# Patient Record
Sex: FEMALE | Race: WHITE | Employment: OTHER | URBAN - METROPOLITAN AREA
[De-identification: names, ages, dates, MRNs, and addresses within clinical notes are randomized per-mention and may not be internally consistent; named-entity substitution may affect disease eponyms.]

---

## 2019-06-19 ENCOUNTER — HOSPITAL ENCOUNTER (EMERGENCY)
Age: 59
Discharge: HOME | End: 2019-06-19
Attending: EMERGENCY MEDICINE
Payer: MEDICAID

## 2019-06-19 ENCOUNTER — APPOINTMENT (OUTPATIENT)
Dept: GENERAL RADIOLOGY | Age: 59
End: 2019-06-19
Payer: MEDICAID

## 2019-06-19 VITALS
BODY MASS INDEX: 38.53 KG/M2 | HEIGHT: 64 IN | RESPIRATION RATE: 17 BRPM | SYSTOLIC BLOOD PRESSURE: 147 MMHG | OXYGEN SATURATION: 96 % | HEART RATE: 65 BPM | DIASTOLIC BLOOD PRESSURE: 88 MMHG | WEIGHT: 225.7 LBS | TEMPERATURE: 97.6 F

## 2019-06-19 DIAGNOSIS — R03.0 ELEVATED BLOOD PRESSURE READING: ICD-10-CM

## 2019-06-19 DIAGNOSIS — S60.212A CONTUSION OF LEFT WRIST, INITIAL ENCOUNTER: Primary | ICD-10-CM

## 2019-06-19 PROCEDURE — 99283 EMERGENCY DEPT VISIT LOW MDM: CPT

## 2019-06-19 PROCEDURE — 73110 X-RAY EXAM OF WRIST: CPT

## 2019-06-19 ASSESSMENT — PAIN DESCRIPTION - ORIENTATION: ORIENTATION: LEFT

## 2019-06-19 ASSESSMENT — PAIN DESCRIPTION - PAIN TYPE: TYPE: ACUTE PAIN

## 2019-06-19 ASSESSMENT — PAIN DESCRIPTION - LOCATION: LOCATION: WRIST

## 2019-06-19 ASSESSMENT — PAIN SCALES - GENERAL: PAINLEVEL_OUTOF10: 4

## 2019-06-19 NOTE — ED NOTES
Pt states understanding of d/c instructions and medications. Pt alert and oriented with steady gait upon discharge.       Paulie Campbell RN  06/19/19 2894

## 2019-06-19 NOTE — ED PROVIDER NOTES
TRIAGE CHIEF COMPLAINT:   Chief Complaint   Patient presents with    Wrist Pain         HPI: Goldie Weir is a 60 y.o. female who presents to the Emergency Department with complaint of Left wrist pain and swelling and bruising. The patient states she hit it a week ago when she fell but is still having pain and swelling. She points to the left wrist.  Denies numbness or weakness. She has no elbow or shoulder pain. Denies any other injury or pain. REVIEW OF SYSTEMS:  6 systems reviewed. Pertinent positives per HPI. Otherwise noted to be negative. Nursing notes reviewed and agree with above. Past medical/surgical history reviewed. MEDICATIONS   Patient's Medications   New Prescriptions    No medications on file   Previous Medications    ALBUTEROL (PROVENTIL HFA;VENTOLIN HFA) 108 (90 BASE) MCG/ACT INHALER    Inhale 2 puffs into the lungs every 6 hours as needed for Wheezing    ASCORBIC ACID (VITAMIN C) 500 MG TABLET    Take 500 mg by mouth daily    ASPIRIN 81 MG CHEWABLE TABLET    Take 81 mg by mouth daily    BUPROPION (WELLBUTRIN XL) 300 MG XL TABLET    Take 300 mg by mouth every morning    BUSPIRONE (BUSPAR) 10 MG TABLET    Take 10 mg by mouth 3 times daily    CINNAMON PO    Take by mouth    CLONAZEPAM (KLONOPIN) 1 MG TABLET    Take 1 mg by mouth 2 times daily as needed for Anxiety    DICLOFENAC (VOLTAREN) 75 MG EC TABLET    Take 75 mg by mouth 2 times daily    DIVALPROEX (DEPAKOTE ER) 500 MG ER TABLET    Take 500 mg by mouth daily    FLAXSEED, LINSEED, (FLAXSEED OIL) 1000 MG CAPS    Take by mouth    IBUPROFEN (ADVIL;MOTRIN) 600 MG TABLET    Take 600 mg by mouth every 6 hours as needed for Pain    LEVOTHYROXINE (SYNTHROID) 100 MCG TABLET    Take 100 mcg by mouth Daily    METOLAZONE (ZAROXOLYN) 2.5 MG TABLET    Take 2.5 mg by mouth Indications:  Take on Wednesday and Sundays only 30 minutes before torsemide    METOPROLOL (LOPRESSOR) 25 MG TABLET    Take 25 mg by mouth 2 times daily    MONTELUKAST (SINGULAIR) 10 MG TABLET    Take 10 mg by mouth nightly    MULTIPLE VITAMINS-MINERALS (THERAPEUTIC MULTIVITAMIN-MINERALS) TABLET    Take 1 tablet by mouth daily    MUPIROCIN (BACTROBAN) 2 % OINTMENT    Apply topically 3 times daily Apply topically 3 times daily. NICOTINE POLACRILEX (COMMIT) 4 MG LOZENGE    Take 4 mg by mouth as needed for Smoking cessation    NONFORMULARY        OMEGA-3 FATTY ACIDS (FISH OIL) 1000 MG CPDR    Take 1,000 mg by mouth 2 times daily    OMEPRAZOLE (PRILOSEC) 40 MG CAPSULE    Take 40 mg by mouth 2 times daily    PALIPERIDONE (INVEGA) 6 MG ER TABLET    Take 6 mg by mouth every morning    POLYETHYLENE GLYCOL (GLYCOLAX) PACKET    Take 17 g by mouth daily as needed    POTASSIUM CHLORIDE (KAYCIEL) 20 MEQ/15ML (10%) SOLUTION    Take by mouth daily    RANITIDINE (ZANTAC) 150 MG TABLET    Take 150 mg by mouth 2 times daily    SIMVASTATIN (ZOCOR) 40 MG TABLET    Take 40 mg by mouth nightly    SPIRONOLACTONE (ALDACTONE) 25 MG TABLET    Take 25 mg by mouth daily    TEMAZEPAM (RESTORIL) 30 MG CAPSULE    Take 30 mg by mouth nightly as needed for Sleep    TORSEMIDE (DEMADEX) 100 MG TABLET    Take 20 mg by mouth 2 times daily    VITAMIN D (CHOLECALCIFEROL) 5000 UNITS CAPS CAPSULE    Take 5,000 Units by mouth 2 times daily    VITAMIN E 1000 UNITS CAPSULE    Take 1,000 Units by mouth daily   Modified Medications    No medications on file   Discontinued Medications    No medications on file         ALLERGIES   Allergies   Allergen Reactions    Aleve [Naproxen]     Tums [Calcium Carbonate Antacid]          BP (!) 147/88   Pulse 65   Temp 97.6 °F (36.4 °C) (Oral)   Resp 17   Ht 5' 4\" (1.626 m)   Wt 102.4 kg (225 lb 11.2 oz)   SpO2 96%   BMI 38.74 kg/m²   General:  No acute distress. Non toxic appearance  Head:   Normocephalic and atraumatic  Eyes:   Conjunctiva clear, VIVIANA, EOM's intact. Sclera anicteric. ENT:   Mucous membranes moist  Neck:   Supple.  No adenopathy or jugular venous

## 2020-01-10 ENCOUNTER — HOSPITAL ENCOUNTER (EMERGENCY)
Age: 60
Discharge: HOME | End: 2020-01-10
Attending: EMERGENCY MEDICINE
Payer: MEDICAID

## 2020-01-10 ENCOUNTER — APPOINTMENT (OUTPATIENT)
Dept: GENERAL RADIOLOGY | Age: 60
End: 2020-01-10
Payer: MEDICAID

## 2020-01-10 VITALS
RESPIRATION RATE: 17 BRPM | HEART RATE: 79 BPM | SYSTOLIC BLOOD PRESSURE: 124 MMHG | DIASTOLIC BLOOD PRESSURE: 83 MMHG | OXYGEN SATURATION: 99 % | TEMPERATURE: 98 F

## 2020-01-10 LAB
A/G RATIO: 1.1 (ref 1.1–2.2)
ALBUMIN SERPL-MCNC: 4 G/DL (ref 3.4–5)
ALP BLD-CCNC: 251 U/L (ref 40–129)
ALT SERPL-CCNC: 159 U/L (ref 10–40)
ANION GAP SERPL CALCULATED.3IONS-SCNC: 13 MMOL/L (ref 3–16)
APTT: 26.7 SEC (ref 24.2–36.2)
AST SERPL-CCNC: 93 U/L (ref 15–37)
BASOPHILS ABSOLUTE: 0.1 K/UL (ref 0–0.2)
BASOPHILS RELATIVE PERCENT: 0.8 %
BILIRUB SERPL-MCNC: 0.3 MG/DL (ref 0–1)
BUN BLDV-MCNC: 22 MG/DL (ref 7–20)
CALCIUM SERPL-MCNC: 10.3 MG/DL (ref 8.3–10.6)
CHLORIDE BLD-SCNC: 95 MMOL/L (ref 99–110)
CO2: 31 MMOL/L (ref 21–32)
CREAT SERPL-MCNC: 0.9 MG/DL (ref 0.6–1.2)
D DIMER: 480 NG/ML DDU (ref 0–229)
EOSINOPHILS ABSOLUTE: 0.1 K/UL (ref 0–0.6)
EOSINOPHILS RELATIVE PERCENT: 1.3 %
GFR AFRICAN AMERICAN: >60
GFR NON-AFRICAN AMERICAN: >60
GLOBULIN: 3.8 G/DL
GLUCOSE BLD-MCNC: 101 MG/DL (ref 70–99)
HCT VFR BLD CALC: 38.1 % (ref 36–48)
HEMOGLOBIN: 12.8 G/DL (ref 12–16)
INR BLD: 1.23 (ref 0.86–1.14)
LYMPHOCYTES ABSOLUTE: 1.7 K/UL (ref 1–5.1)
LYMPHOCYTES RELATIVE PERCENT: 20.4 %
MCH RBC QN AUTO: 27.7 PG (ref 26–34)
MCHC RBC AUTO-ENTMCNC: 33.6 G/DL (ref 31–36)
MCV RBC AUTO: 82.5 FL (ref 80–100)
MONOCYTES ABSOLUTE: 1 K/UL (ref 0–1.3)
MONOCYTES RELATIVE PERCENT: 11.6 %
NEUTROPHILS ABSOLUTE: 5.5 K/UL (ref 1.7–7.7)
NEUTROPHILS RELATIVE PERCENT: 65.9 %
PDW BLD-RTO: 13.8 % (ref 12.4–15.4)
PLATELET # BLD: 322 K/UL (ref 135–450)
PMV BLD AUTO: 7.6 FL (ref 5–10.5)
POTASSIUM REFLEX MAGNESIUM: 3.7 MMOL/L (ref 3.5–5.1)
PROTHROMBIN TIME: 14.3 SEC (ref 10–13.2)
RBC # BLD: 4.63 M/UL (ref 4–5.2)
SODIUM BLD-SCNC: 139 MMOL/L (ref 136–145)
TOTAL PROTEIN: 7.8 G/DL (ref 6.4–8.2)
URIC ACID, SERUM: 9.6 MG/DL (ref 2.6–6)
WBC # BLD: 8.3 K/UL (ref 4–11)

## 2020-01-10 PROCEDURE — 6370000000 HC RX 637 (ALT 250 FOR IP): Performed by: EMERGENCY MEDICINE

## 2020-01-10 PROCEDURE — 84550 ASSAY OF BLOOD/URIC ACID: CPT

## 2020-01-10 PROCEDURE — 85025 COMPLETE CBC W/AUTO DIFF WBC: CPT

## 2020-01-10 PROCEDURE — 85379 FIBRIN DEGRADATION QUANT: CPT

## 2020-01-10 PROCEDURE — 85610 PROTHROMBIN TIME: CPT

## 2020-01-10 PROCEDURE — 85730 THROMBOPLASTIN TIME PARTIAL: CPT

## 2020-01-10 PROCEDURE — 73110 X-RAY EXAM OF WRIST: CPT

## 2020-01-10 PROCEDURE — 80053 COMPREHEN METABOLIC PANEL: CPT

## 2020-01-10 PROCEDURE — 99283 EMERGENCY DEPT VISIT LOW MDM: CPT

## 2020-01-10 RX ORDER — PREDNISONE 20 MG/1
20 TABLET ORAL 2 TIMES DAILY
Qty: 8 TABLET | Refills: 0 | Status: SHIPPED | OUTPATIENT
Start: 2020-01-10 | End: 2020-01-14

## 2020-01-10 RX ORDER — ASPIRIN 81 MG/1
TABLET, COATED ORAL
Status: ON HOLD | COMMUNITY
Start: 2019-12-19 | End: 2021-03-16 | Stop reason: HOSPADM

## 2020-01-10 RX ORDER — HYDROXYZINE HYDROCHLORIDE 10 MG/1
TABLET, FILM COATED ORAL
Status: ON HOLD | COMMUNITY
Start: 2019-12-03 | End: 2021-03-14 | Stop reason: CLARIF

## 2020-01-10 RX ORDER — TRAMADOL HYDROCHLORIDE 50 MG/1
50 TABLET ORAL EVERY 6 HOURS PRN
COMMUNITY
Start: 2019-12-30

## 2020-01-10 RX ORDER — POTASSIUM BICARBONATE 25 MEQ/1
25 TABLET, EFFERVESCENT ORAL 2 TIMES DAILY
Status: ON HOLD | COMMUNITY
Start: 2019-08-09 | End: 2021-03-28 | Stop reason: HOSPADM

## 2020-01-10 RX ORDER — NITROGLYCERIN 0.4 MG/1
0.4 TABLET SUBLINGUAL
COMMUNITY
Start: 2018-08-16

## 2020-01-10 RX ORDER — CITALOPRAM 40 MG/1
40 TABLET ORAL
Status: ON HOLD | COMMUNITY
Start: 2020-01-03 | End: 2021-03-14 | Stop reason: CLARIF

## 2020-01-10 RX ORDER — PREDNISONE 20 MG/1
60 TABLET ORAL ONCE
Status: COMPLETED | OUTPATIENT
Start: 2020-01-10 | End: 2020-01-10

## 2020-01-10 RX ORDER — PRAZOSIN HYDROCHLORIDE 2 MG/1
2 CAPSULE ORAL NIGHTLY
Status: ON HOLD | COMMUNITY
Start: 2019-12-19 | End: 2021-03-16 | Stop reason: HOSPADM

## 2020-01-10 RX ORDER — CHLORHEXIDINE GLUCONATE 0.12 MG/ML
15 RINSE ORAL
Status: ON HOLD | COMMUNITY
Start: 2017-07-14 | End: 2021-03-14 | Stop reason: CLARIF

## 2020-01-10 RX ORDER — LOPERAMIDE HYDROCHLORIDE 2 MG/1
CAPSULE ORAL
COMMUNITY
Start: 2019-04-11

## 2020-01-10 RX ORDER — BECLOMETHASONE DIPROPIONATE HFA 80 UG/1
AEROSOL, METERED RESPIRATORY (INHALATION)
Status: ON HOLD | COMMUNITY
Start: 2019-12-19 | End: 2021-03-14 | Stop reason: CLARIF

## 2020-01-10 RX ORDER — GABAPENTIN 300 MG/1
CAPSULE ORAL
COMMUNITY
Start: 2019-07-05

## 2020-01-10 RX ORDER — ACETAMINOPHEN 500 MG
1000 TABLET ORAL EVERY 6 HOURS PRN
Qty: 30 TABLET | Refills: 1 | Status: SHIPPED | OUTPATIENT
Start: 2020-01-10 | End: 2020-01-10

## 2020-01-10 RX ORDER — HYDROXYZINE 50 MG/1
TABLET, FILM COATED ORAL
Status: ON HOLD | COMMUNITY
Start: 2019-12-31 | End: 2021-03-14 | Stop reason: CLARIF

## 2020-01-10 RX ADMIN — PREDNISONE 60 MG: 20 TABLET ORAL at 14:36

## 2020-01-10 ASSESSMENT — PAIN DESCRIPTION - FREQUENCY: FREQUENCY: CONTINUOUS

## 2020-01-10 ASSESSMENT — PAIN DESCRIPTION - ORIENTATION: ORIENTATION: LEFT

## 2020-01-10 ASSESSMENT — PAIN DESCRIPTION - LOCATION: LOCATION: ARM

## 2020-01-10 ASSESSMENT — PAIN SCALES - GENERAL: PAINLEVEL_OUTOF10: 10

## 2020-01-10 ASSESSMENT — PAIN DESCRIPTION - DESCRIPTORS: DESCRIPTORS: SHARP

## 2020-01-10 ASSESSMENT — PAIN DESCRIPTION - PAIN TYPE: TYPE: ACUTE PAIN

## 2020-01-10 NOTE — ED PROVIDER NOTES
TRIAGE CHIEF COMPLAINT:   Chief Complaint   Patient presents with    Arm Pain         HPI: Liseth Chand is a 60 y.o. female who presents to the Emergency Department with complaint of left arm pain mostly in the left wrist and lower arm for the past 1 to 2 weeks. The pain radiates from the wrist up to the elbow. Denies any recent fall or specific injury but she does use a walker and thought she might be putting too much pressure on her left arm when she uses the walker. She denies any shoulder pain. Denies any recent chest pain although does take occasionally for chest pain. Denies shortness of breath. No fever or chills. She has no cough or URI symptoms. Patient has a history of some arthritis of the left wrist and history of old scaphoid fracture of the left wrist.  This or weakness. The patient does take Eliquis but does not know why she takes this medication. It was started 1 or 2 months ago by [facility]. She states she had a DVT in 1 of her legs many years ago as a teenager. She has a history of heart failure with decreased ejection fraction and previous history of lung cancer, left upper lobe. REVIEW OF SYSTEMS:  6 systems reviewed. Pertinent positives per HPI. Otherwise noted to be negative. Nursing notes reviewed and agree with above. Past medical/surgical history reviewed.     MEDICATIONS   Patient's Medications   New Prescriptions    No medications on file   Previous Medications    ALBUTEROL (PROVENTIL HFA;VENTOLIN HFA) 108 (90 BASE) MCG/ACT INHALER    Inhale 2 puffs into the lungs every 6 hours as needed for Wheezing    APIXABAN (ELIQUIS) 5 MG TABS TABLET    Take 5 mg by mouth    ASCORBIC ACID (VITAMIN C) 500 MG TABLET    Take 500 mg by mouth daily    ASPIRIN 81 MG CHEWABLE TABLET    Take 81 mg by mouth daily    ASPIRIN LOW DOSE 81 MG EC TABLET        BUPROPION (WELLBUTRIN XL) 300 MG XL TABLET    Take 300 mg by mouth every morning    BUSPIRONE (BUSPAR) 10 MG TABLET    Take 10 mg by mouth 3 times daily    CHLORHEXIDINE (PERIDEX) 0.12 % SOLUTION    Take 15 mLs by mouth    CINNAMON PO    Take by mouth    CITALOPRAM (CELEXA) 40 MG TABLET    Take 40 mg by mouth    CLONAZEPAM (KLONOPIN) 1 MG TABLET    Take 1 mg by mouth 2 times daily as needed for Anxiety    DICLOFENAC (VOLTAREN) 75 MG EC TABLET    Take 75 mg by mouth 2 times daily    DIVALPROEX (DEPAKOTE ER) 500 MG ER TABLET    Take 500 mg by mouth daily    FLAXSEED, LINSEED, (FLAXSEED OIL) 1000 MG CAPS    Take by mouth    GABAPENTIN (NEURONTIN) 300 MG CAPSULE    TAKE ONE CAPSULE BY MOUTH THREE TIMES A DAY    HYDROXYZINE (ATARAX) 10 MG TABLET        HYDROXYZINE (ATARAX) 50 MG TABLET        IBUPROFEN (ADVIL;MOTRIN) 600 MG TABLET    Take 600 mg by mouth every 6 hours as needed for Pain    LEVOTHYROXINE (SYNTHROID) 100 MCG TABLET    Take 100 mcg by mouth Daily    LOPERAMIDE (IMODIUM) 2 MG CAPSULE    TAKE ONE CAPSULE BY MOUTH THREE TIMES A DAY AS NEEDED FOR DIARRHEA    METOLAZONE (ZAROXOLYN) 2.5 MG TABLET    Take 2.5 mg by mouth Indications: Take on Wednesday and Sundays only 30 minutes before torsemide    METOPROLOL (LOPRESSOR) 25 MG TABLET    Take 25 mg by mouth 2 times daily    MONTELUKAST (SINGULAIR) 10 MG TABLET    Take 10 mg by mouth nightly    MULTIPLE VITAMINS-MINERALS (THERAPEUTIC MULTIVITAMIN-MINERALS) TABLET    Take 1 tablet by mouth daily    MUPIROCIN (BACTROBAN) 2 % OINTMENT    Apply topically 3 times daily Apply topically 3 times daily.     NICOTINE POLACRILEX (COMMIT) 4 MG LOZENGE    Take 4 mg by mouth as needed for Smoking cessation    NITROGLYCERIN (NITROSTAT) 0.4 MG SL TABLET    Place 0.4 mg under the tongue    NONFORMULARY        OMEGA-3 FATTY ACIDS (FISH OIL) 1000 MG CPDR    Take 1,000 mg by mouth 2 times daily    OMEPRAZOLE (PRILOSEC) 40 MG CAPSULE    Take 40 mg by mouth 2 times daily    PALIPERIDONE (INVEGA) 6 MG ER TABLET    Take 6 mg by mouth every morning    POLYETHYLENE GLYCOL (GLYCOLAX) PACKET    Take 17 g by mouth daily as rashes or lesions to exposed skin  Back:   CVA tenderness. Neuro:  Alert and OX3. Speech clear and appropriate. No upper/lower extremity weakness. Normal sensation in all extremities. No facial asymmetry or weakness. Gait normal.  Psych:   Affect normal. Mood normal        RADIOLOGY:  XR WRIST LEFT (MIN 3 VIEWS)   Final Result   Impression: No acute osseous abnormality. Chronic changes of the scaphoid similar to the prior study reflecting sequelae of fracture, nonunion and distal necrosis. Stable radiopaque foreign body.           LAB  Labs Reviewed   D-DIMER, QUANTITATIVE - Abnormal; Notable for the following components:       Result Value    D-Dimer, Quant 480 (*)     All other components within normal limits    Narrative:     Performed at:  [facility]  [address] [employer]  [place]   Phone [phone]   URIC ACID - Abnormal; Notable for the following components:    Uric Acid, Serum 9.6 (*)     All other components within normal limits    Narrative:     Performed at:  [facility]  [address]  [place]   Phone [phone]   COMPREHENSIVE METABOLIC PANEL W/ REFLEX TO MG FOR LOW K - Abnormal; Notable for the following components:    Chloride 95 (*)     Glucose 101 (*)     BUN 22 (*)     Alkaline Phosphatase 251 (*)      (*)     AST 93 (*)     All other components within normal limits    Narrative:     Performed at:  [facility]  [place]   Phone [phone] - Abnormal; Notable for the following components:    Protime 14.3 (*)     INR 1.23 (*)     All other components within normal limits    Narrative:     Performed at:  [facility]  [address] [employer]  [employer]   Phone [phone]   CBC WITH AUTO DIFFERENTIAL    Narrative:     Performed agreement with the plan and questions have been answered. I also discussed with the patient and/or family the reasons which may require a return visit and the importance of follow-up care.        (Please note that portions of this note may have been completed with a voice recognition program.  Efforts were made to edit the dictation but occasionally words are mis-transcribed)        FINAL IMPRESSION:  1 --left wrist pain  2 --elevated uric acid  3 --elevated d-dimer                   Destin Dao MD  01/10/20 Brianna Chaney MD  01/10/20 [id number]

## 2020-01-10 NOTE — ED NOTES
Patient states that her left arm has been hurting for a week or two. She states that she pushes down pretty hard on her walker and she thinks this might have caused the pain. Her left and and wrist are swollen and slightly warm. There also might be slight redness. No denies any injury.       Laurie Teran RN  01/10/20 [id number]

## 2020-01-10 NOTE — ED NOTES
Patient verbalized understanding of d/c instructions. Patient given scripts x 1. Patient left the ED and instructed on follow up. Patient given order for venous doppler and number to call for scheduling.         Neftali Wilson RN  01/10/20 0287

## 2021-03-14 ENCOUNTER — APPOINTMENT (OUTPATIENT)
Dept: GENERAL RADIOLOGY | Age: 61
DRG: 663 | End: 2021-03-14
Payer: MEDICAID

## 2021-03-14 ENCOUNTER — APPOINTMENT (OUTPATIENT)
Dept: CT IMAGING | Age: 61
DRG: 663 | End: 2021-03-14
Payer: MEDICAID

## 2021-03-14 ENCOUNTER — HOSPITAL ENCOUNTER (INPATIENT)
Age: 61
LOS: 2 days | Discharge: HOME | DRG: 663 | End: 2021-03-16
Attending: EMERGENCY MEDICINE | Admitting: INTERNAL MEDICINE
Payer: MEDICAID

## 2021-03-14 DIAGNOSIS — D64.9 ANEMIA, UNSPECIFIED TYPE: Primary | ICD-10-CM

## 2021-03-14 LAB
A/G RATIO: 1.1 (ref 1.1–2.2)
ABO/RH: NORMAL
ACETAMINOPHEN LEVEL: <5 UG/ML (ref 10–30)
ALBUMIN SERPL-MCNC: 4.3 G/DL (ref 3.4–5)
ALP BLD-CCNC: 135 U/L (ref 40–129)
ALT SERPL-CCNC: 30 U/L (ref 10–40)
ANION GAP SERPL CALCULATED.3IONS-SCNC: 12 MMOL/L (ref 3–16)
ANISOCYTOSIS: ABNORMAL
ANISOCYTOSIS: ABNORMAL
ANTIBODY SCREEN: NORMAL
AST SERPL-CCNC: 26 U/L (ref 15–37)
BANDED NEUTROPHILS RELATIVE PERCENT: 3 % (ref 0–7)
BANDED NEUTROPHILS RELATIVE PERCENT: 7 % (ref 0–7)
BASOPHILS ABSOLUTE: 0 K/UL (ref 0–0.2)
BASOPHILS ABSOLUTE: 0 K/UL (ref 0–0.2)
BASOPHILS RELATIVE PERCENT: 0 %
BASOPHILS RELATIVE PERCENT: 0 %
BILIRUB SERPL-MCNC: <0.2 MG/DL (ref 0–1)
BILIRUBIN URINE: NEGATIVE
BLOOD BANK DISPENSE STATUS: NORMAL
BLOOD BANK PRODUCT CODE: NORMAL
BLOOD, URINE: NEGATIVE
BPU ID: NORMAL
BUN BLDV-MCNC: 40 MG/DL (ref 7–20)
CALCIUM SERPL-MCNC: 10.4 MG/DL (ref 8.3–10.6)
CHLORIDE BLD-SCNC: 90 MMOL/L (ref 99–110)
CLARITY: CLEAR
CO2: 32 MMOL/L (ref 21–32)
COLOR: YELLOW
CREAT SERPL-MCNC: 1.6 MG/DL (ref 0.6–1.2)
DESCRIPTION BLOOD BANK: NORMAL
DOHLE BODIES: PRESENT
EOSINOPHILS ABSOLUTE: 0.1 K/UL (ref 0–0.6)
EOSINOPHILS ABSOLUTE: 0.1 K/UL (ref 0–0.6)
EOSINOPHILS RELATIVE PERCENT: 1 %
EOSINOPHILS RELATIVE PERCENT: 1 %
ETHANOL: NORMAL MG/DL (ref 0–0.08)
FERRITIN: 44 NG/ML (ref 15–150)
FOLATE: 14.33 NG/ML (ref 4.78–24.2)
GFR AFRICAN AMERICAN: 40
GFR NON-AFRICAN AMERICAN: 33
GLOBULIN: 3.8 G/DL
GLUCOSE BLD-MCNC: 108 MG/DL (ref 70–99)
GLUCOSE BLD-MCNC: 121 MG/DL (ref 70–99)
GLUCOSE BLD-MCNC: 99 MG/DL (ref 70–99)
GLUCOSE URINE: NEGATIVE MG/DL
HCT VFR BLD CALC: 19.7 % (ref 36–48)
HCT VFR BLD CALC: 22.8 % (ref 36–48)
HEMOGLOBIN: 6.1 G/DL (ref 12–16)
HEMOGLOBIN: 6.3 G/DL (ref 12–16)
HEMOGLOBIN: 7.3 G/DL (ref 12–16)
HYPERSEGMENTED NEUTROPHILS: PRESENT
HYPOCHROMIA: ABNORMAL
HYPOCHROMIA: ABNORMAL
KETONES, URINE: NEGATIVE MG/DL
LACTATE DEHYDROGENASE: 199 U/L (ref 100–190)
LEUKOCYTE ESTERASE, URINE: NEGATIVE
LYMPHOCYTES ABSOLUTE: 1.1 K/UL (ref 1–5.1)
LYMPHOCYTES ABSOLUTE: 1.7 K/UL (ref 1–5.1)
LYMPHOCYTES RELATIVE PERCENT: 12 %
LYMPHOCYTES RELATIVE PERCENT: 9 %
Lab: NORMAL
MAGNESIUM: 2.3 MG/DL (ref 1.8–2.4)
MCH RBC QN AUTO: 26.7 PG (ref 26–34)
MCH RBC QN AUTO: 26.8 PG (ref 26–34)
MCHC RBC AUTO-ENTMCNC: 31.7 G/DL (ref 31–36)
MCHC RBC AUTO-ENTMCNC: 32.1 G/DL (ref 31–36)
MCV RBC AUTO: 83.5 FL (ref 80–100)
MCV RBC AUTO: 84.2 FL (ref 80–100)
MICROSCOPIC EXAMINATION: NORMAL
MONOCYTES ABSOLUTE: 1 K/UL (ref 0–1.3)
MONOCYTES ABSOLUTE: 1.1 K/UL (ref 0–1.3)
MONOCYTES RELATIVE PERCENT: 7 %
MONOCYTES RELATIVE PERCENT: 9 %
MYELOCYTE PERCENT: 1 %
NEUTROPHILS ABSOLUTE: 10 K/UL (ref 1.7–7.7)
NEUTROPHILS ABSOLUTE: 11.4 K/UL (ref 1.7–7.7)
NEUTROPHILS RELATIVE PERCENT: 73 %
NEUTROPHILS RELATIVE PERCENT: 77 %
NITRITE, URINE: NEGATIVE
OCCULT BLOOD DIAGNOSTIC: NORMAL
PDW BLD-RTO: 16.1 % (ref 12.4–15.4)
PDW BLD-RTO: 16.1 % (ref 12.4–15.4)
PERFORMED ON: ABNORMAL
PERFORMED ON: NORMAL
PH UA: 6
PH UA: 6 (ref 5–8)
PLATELET # BLD: 298 K/UL (ref 135–450)
PLATELET # BLD: 343 K/UL (ref 135–450)
PLATELET SLIDE REVIEW: ADEQUATE
PLATELET SLIDE REVIEW: ADEQUATE
PMV BLD AUTO: 6.8 FL (ref 5–10.5)
PMV BLD AUTO: 7 FL (ref 5–10.5)
POLYCHROMASIA: ABNORMAL
POLYCHROMASIA: ABNORMAL
POTASSIUM REFLEX MAGNESIUM: 3.3 MMOL/L (ref 3.5–5.1)
PRO-BNP: 139 PG/ML (ref 0–124)
PROTEIN UA: NEGATIVE MG/DL
RBC # BLD: 2.29 M/UL (ref 4–5.2)
RBC # BLD: 2.36 M/UL (ref 4–5.2)
SALICYLATE, SERUM: <0.3 MG/DL (ref 15–30)
SODIUM BLD-SCNC: 134 MMOL/L (ref 136–145)
SPECIFIC GRAVITY UA: 1.02 (ref 1–1.03)
SPHEROCYTES: ABNORMAL
TOTAL CK: 90 U/L (ref 26–192)
TOTAL PROTEIN: 8.1 G/DL (ref 6.4–8.2)
TOXIC GRANULATION: PRESENT
TROPONIN: <0.01 NG/ML
URINE REFLEX TO CULTURE: NORMAL
URINE TYPE: NORMAL
UROBILINOGEN, URINE: 0.2 E.U./DL
VITAMIN B-12: 806 PG/ML (ref 211–911)
WBC # BLD: 12.4 K/UL (ref 4–11)
WBC # BLD: 14.2 K/UL (ref 4–11)

## 2021-03-14 PROCEDURE — 83550 IRON BINDING TEST: CPT

## 2021-03-14 PROCEDURE — 85045 AUTOMATED RETICULOCYTE COUNT: CPT

## 2021-03-14 PROCEDURE — 82746 ASSAY OF FOLIC ACID SERUM: CPT

## 2021-03-14 PROCEDURE — 83735 ASSAY OF MAGNESIUM: CPT

## 2021-03-14 PROCEDURE — 82077 ASSAY SPEC XCP UR&BREATH IA: CPT

## 2021-03-14 PROCEDURE — 81003 URINALYSIS AUTO W/O SCOPE: CPT

## 2021-03-14 PROCEDURE — 36430 TRANSFUSION BLD/BLD COMPNT: CPT

## 2021-03-14 PROCEDURE — 86923 COMPATIBILITY TEST ELECTRIC: CPT

## 2021-03-14 PROCEDURE — 85018 HEMOGLOBIN: CPT

## 2021-03-14 PROCEDURE — 6370000000 HC RX 637 (ALT 250 FOR IP): Performed by: INTERNAL MEDICINE

## 2021-03-14 PROCEDURE — 85025 COMPLETE CBC W/AUTO DIFF WBC: CPT

## 2021-03-14 PROCEDURE — G0328 FECAL BLOOD SCRN IMMUNOASSAY: HCPCS

## 2021-03-14 PROCEDURE — 82550 ASSAY OF CK (CPK): CPT

## 2021-03-14 PROCEDURE — 1200000000 HC SEMI PRIVATE

## 2021-03-14 PROCEDURE — 86900 BLOOD TYPING SEROLOGIC ABO: CPT

## 2021-03-14 PROCEDURE — 80143 DRUG ASSAY ACETAMINOPHEN: CPT

## 2021-03-14 PROCEDURE — 80179 DRUG ASSAY SALICYLATE: CPT

## 2021-03-14 PROCEDURE — 82607 VITAMIN B-12: CPT

## 2021-03-14 PROCEDURE — 2500000003 HC RX 250 WO HCPCS: Performed by: INTERNAL MEDICINE

## 2021-03-14 PROCEDURE — 2580000003 HC RX 258: Performed by: EMERGENCY MEDICINE

## 2021-03-14 PROCEDURE — 84484 ASSAY OF TROPONIN QUANT: CPT

## 2021-03-14 PROCEDURE — 83036 HEMOGLOBIN GLYCOSYLATED A1C: CPT

## 2021-03-14 PROCEDURE — 83615 LACTATE (LD) (LDH) ENZYME: CPT

## 2021-03-14 PROCEDURE — 93005 ELECTROCARDIOGRAM TRACING: CPT | Performed by: EMERGENCY MEDICINE

## 2021-03-14 PROCEDURE — 36415 COLL VENOUS BLD VENIPUNCTURE: CPT

## 2021-03-14 PROCEDURE — 83880 ASSAY OF NATRIURETIC PEPTIDE: CPT

## 2021-03-14 PROCEDURE — 83540 ASSAY OF IRON: CPT

## 2021-03-14 PROCEDURE — 86901 BLOOD TYPING SEROLOGIC RH(D): CPT

## 2021-03-14 PROCEDURE — 82728 ASSAY OF FERRITIN: CPT

## 2021-03-14 PROCEDURE — 71045 X-RAY EXAM CHEST 1 VIEW: CPT

## 2021-03-14 PROCEDURE — 86850 RBC ANTIBODY SCREEN: CPT

## 2021-03-14 PROCEDURE — 70450 CT HEAD/BRAIN W/O DYE: CPT

## 2021-03-14 PROCEDURE — 85014 HEMATOCRIT: CPT

## 2021-03-14 PROCEDURE — 83010 ASSAY OF HAPTOGLOBIN QUANT: CPT

## 2021-03-14 PROCEDURE — 80053 COMPREHEN METABOLIC PANEL: CPT

## 2021-03-14 PROCEDURE — P9016 RBC LEUKOCYTES REDUCED: HCPCS

## 2021-03-14 PROCEDURE — 99283 EMERGENCY DEPT VISIT LOW MDM: CPT

## 2021-03-14 RX ORDER — LEVOTHYROXINE SODIUM 0.1 MG/1
100 TABLET ORAL
Status: DISCONTINUED | OUTPATIENT
Start: 2021-03-15 | End: 2021-03-16 | Stop reason: HOSPADM

## 2021-03-14 RX ORDER — SENNA AND DOCUSATE SODIUM 50; 8.6 MG/1; MG/1
1 TABLET, FILM COATED ORAL 2 TIMES DAILY
Status: DISCONTINUED | OUTPATIENT
Start: 2021-03-14 | End: 2021-03-16 | Stop reason: HOSPADM

## 2021-03-14 RX ORDER — NICOTINE POLACRILEX 4 MG
15 LOZENGE BUCCAL PRN
Status: DISCONTINUED | OUTPATIENT
Start: 2021-03-14 | End: 2021-03-16 | Stop reason: HOSPADM

## 2021-03-14 RX ORDER — DEXTROSE MONOHYDRATE 25 G/50ML
12.5 INJECTION, SOLUTION INTRAVENOUS PRN
Status: DISCONTINUED | OUTPATIENT
Start: 2021-03-14 | End: 2021-03-16 | Stop reason: HOSPADM

## 2021-03-14 RX ORDER — PROMETHAZINE HYDROCHLORIDE 12.5 MG/1
12.5 TABLET ORAL EVERY 6 HOURS PRN
Status: DISCONTINUED | OUTPATIENT
Start: 2021-03-14 | End: 2021-03-16 | Stop reason: HOSPADM

## 2021-03-14 RX ORDER — DOXYCYCLINE 100 MG/1
100 CAPSULE ORAL 2 TIMES DAILY
Status: ON HOLD | COMMUNITY
Start: 2021-03-12 | End: 2021-03-16 | Stop reason: HOSPADM

## 2021-03-14 RX ORDER — DEXTROSE MONOHYDRATE 50 MG/ML
100 INJECTION, SOLUTION INTRAVENOUS PRN
Status: DISCONTINUED | OUTPATIENT
Start: 2021-03-14 | End: 2021-03-16 | Stop reason: HOSPADM

## 2021-03-14 RX ORDER — DULOXETIN HYDROCHLORIDE 30 MG/1
30 CAPSULE, DELAYED RELEASE ORAL DAILY
COMMUNITY

## 2021-03-14 RX ORDER — INSULIN LISPRO 100 [IU]/ML
0-3 INJECTION, SOLUTION INTRAVENOUS; SUBCUTANEOUS NIGHTLY
Status: DISCONTINUED | OUTPATIENT
Start: 2021-03-14 | End: 2021-03-16 | Stop reason: HOSPADM

## 2021-03-14 RX ORDER — INSULIN LISPRO 100 [IU]/ML
0.08 INJECTION, SOLUTION INTRAVENOUS; SUBCUTANEOUS
Status: DISCONTINUED | OUTPATIENT
Start: 2021-03-14 | End: 2021-03-15

## 2021-03-14 RX ORDER — ARIPIPRAZOLE 10 MG/1
10 TABLET ORAL DAILY
COMMUNITY

## 2021-03-14 RX ORDER — 0.9 % SODIUM CHLORIDE 0.9 %
1000 INTRAVENOUS SOLUTION INTRAVENOUS ONCE
Status: DISCONTINUED | OUTPATIENT
Start: 2021-03-14 | End: 2021-03-16 | Stop reason: HOSPADM

## 2021-03-14 RX ORDER — ACETAMINOPHEN 650 MG/1
650 SUPPOSITORY RECTAL EVERY 6 HOURS PRN
Status: DISCONTINUED | OUTPATIENT
Start: 2021-03-14 | End: 2021-03-16 | Stop reason: HOSPADM

## 2021-03-14 RX ORDER — DULOXETIN HYDROCHLORIDE 60 MG/1
60 CAPSULE, DELAYED RELEASE ORAL DAILY
Status: ON HOLD | COMMUNITY
End: 2021-03-16 | Stop reason: HOSPADM

## 2021-03-14 RX ORDER — SODIUM CHLORIDE 9 MG/ML
INJECTION, SOLUTION INTRAVENOUS PRN
Status: DISCONTINUED | OUTPATIENT
Start: 2021-03-14 | End: 2021-03-16 | Stop reason: HOSPADM

## 2021-03-14 RX ORDER — SODIUM CHLORIDE 0.9 % (FLUSH) 0.9 %
10 SYRINGE (ML) INJECTION EVERY 12 HOURS SCHEDULED
Status: DISCONTINUED | OUTPATIENT
Start: 2021-03-14 | End: 2021-03-16 | Stop reason: HOSPADM

## 2021-03-14 RX ORDER — ONDANSETRON 2 MG/ML
4 INJECTION INTRAMUSCULAR; INTRAVENOUS EVERY 6 HOURS PRN
Status: DISCONTINUED | OUTPATIENT
Start: 2021-03-14 | End: 2021-03-16 | Stop reason: HOSPADM

## 2021-03-14 RX ORDER — DIPHENHYDRAMINE HCL 25 MG
25 TABLET ORAL EVERY 6 HOURS PRN
COMMUNITY

## 2021-03-14 RX ORDER — FLUTICASONE PROPIONATE 220 UG/1
1 AEROSOL, METERED RESPIRATORY (INHALATION) 2 TIMES DAILY
COMMUNITY

## 2021-03-14 RX ORDER — SODIUM CHLORIDE 0.9 % (FLUSH) 0.9 %
10 SYRINGE (ML) INJECTION PRN
Status: DISCONTINUED | OUTPATIENT
Start: 2021-03-14 | End: 2021-03-16 | Stop reason: HOSPADM

## 2021-03-14 RX ORDER — SODIUM CHLORIDE 9 MG/ML
1000 INJECTION, SOLUTION INTRAVENOUS ONCE
Status: COMPLETED | OUTPATIENT
Start: 2021-03-14 | End: 2021-03-14

## 2021-03-14 RX ORDER — ACETAMINOPHEN 325 MG/1
650 TABLET ORAL EVERY 6 HOURS PRN
Status: DISCONTINUED | OUTPATIENT
Start: 2021-03-14 | End: 2021-03-16 | Stop reason: HOSPADM

## 2021-03-14 RX ORDER — INSULIN LISPRO 100 [IU]/ML
0-6 INJECTION, SOLUTION INTRAVENOUS; SUBCUTANEOUS
Status: DISCONTINUED | OUTPATIENT
Start: 2021-03-14 | End: 2021-03-16 | Stop reason: HOSPADM

## 2021-03-14 RX ORDER — DIVALPROEX SODIUM 500 MG/1
500 TABLET, EXTENDED RELEASE ORAL DAILY
Status: DISCONTINUED | OUTPATIENT
Start: 2021-03-14 | End: 2021-03-14

## 2021-03-14 RX ORDER — CICLOPIROX 7.7 MG/G
GEL TOPICAL 2 TIMES DAILY
Status: ON HOLD | COMMUNITY
End: 2021-03-15 | Stop reason: CLARIF

## 2021-03-14 RX ORDER — FAMOTIDINE 40 MG/1
40 TABLET, FILM COATED ORAL DAILY
Status: ON HOLD | COMMUNITY
End: 2021-03-15 | Stop reason: CLARIF

## 2021-03-14 RX ADMIN — MICONAZOLE NITRATE: 20 POWDER TOPICAL at 21:07

## 2021-03-14 RX ADMIN — DOCUSATE SODIUM 50 MG AND SENNOSIDES 8.6 MG 1 TABLET: 8.6; 5 TABLET, FILM COATED ORAL at 16:22

## 2021-03-14 RX ADMIN — MICONAZOLE NITRATE: 20 POWDER TOPICAL at 16:22

## 2021-03-14 RX ADMIN — SODIUM CHLORIDE 500 ML: 9 INJECTION, SOLUTION INTRAVENOUS at 10:54

## 2021-03-14 RX ADMIN — INSULIN LISPRO 10 UNITS: 100 INJECTION, SOLUTION INTRAVENOUS; SUBCUTANEOUS at 17:20

## 2021-03-14 RX ADMIN — DOCUSATE SODIUM 50 MG AND SENNOSIDES 8.6 MG 1 TABLET: 8.6; 5 TABLET, FILM COATED ORAL at 21:07

## 2021-03-14 ASSESSMENT — PAIN DESCRIPTION - DESCRIPTORS: DESCRIPTORS: ACHING

## 2021-03-14 ASSESSMENT — PAIN DESCRIPTION - LOCATION: LOCATION: KNEE;LEG

## 2021-03-14 ASSESSMENT — PAIN DESCRIPTION - PROGRESSION: CLINICAL_PROGRESSION: NOT CHANGED

## 2021-03-14 ASSESSMENT — PAIN SCALES - GENERAL: PAINLEVEL_OUTOF10: 8

## 2021-03-14 NOTE — ED PROVIDER NOTES
Emergency Physician Note    Chief Complaint  Dizziness (Pt c/o constant dizziness x 1 week. )       History of Present Illness  Mahendra Perez is a 63 y.o. female who presents to the ED for light headedness for 1 week. Reports she becomes light headed upon standing and notes bilateral leg weakness. Also experiencing palpitations and some mid-sternal chest pain which is intermittent. Describes mild CP that is better with rest and worse upon exertion. Decreased ability to ambulate over the past week, 2/2 to weakness, uses a walker intermittently at home. Denies fever, dizziness, room spinning, difficulty speaking, balance instability, unilateral weakness,     10 systems reviewed, pertinent positives per HPI otherwise noted to be negative      Nursing notes reviewed. ED Triage Vitals   Enc Vitals Group      BP       Pulse       Resp       Temp       Temp src       SpO2       Weight       Height       Head Circumference       Peak Flow       Pain Score       Pain Loc       Pain Edu? Excl. in [address]? GENERAL:  Awake, alert. Well developed, well nourished with no apparent distress. HENT:  Normocephalic, Atraumatic, moist mucous membranes. EYES:  Pupils equal round and reactive to light, Conjunctiva pale, extraocular movements normal.  NECK:  No meningeal signs, Supple. CHEST:  Regular rate and rhythm, chest wall non-tender. LUNGS:  Clear to auscultation bilaterally. ABDOMEN:  Soft, non-tender, no rebound, rigidity or guarding, non-distended, normal bowel sounds. No costovertebral angle tenderness to palpation. BACK:  No tenderness. EXTREMITIES:  Normal range of motion, no edema, no bony tenderness, no deformity, distal pulses present. SKIN: Warm, dry and intact. Chronic stasis dermatitis to anterior BLE, Small fissure to posterior L heel and small fissure to L great toe. NEUROLOGIC: Normal mental status. Moving all extremities to command.   Negative rombergs, negative finger to nose.  Patient is ambulatory but with difficulty. Sensation is symmetrical and in tact. Patient declined heel to shin 2/2 \"my legs hurt\". : Rectal exam revealed no gross blood, hemoccult performed     LABS and DIAGNOSTIC RESULTS  EKG  The Ekg interpreted by me shows  normal sinus rhythm with a rate of 82  Axis is   Normal  QTc is  normal  Intervals and Durations are unremarkable. ST Segments: normal  Delta waves, Brugada Syndrome, and Short AL are not present. RADIOLOGY  X-RAYS:  I have reviewed radiologic plain film image(s). ALL OTHER NON-PLAIN FILM IMAGES SUCH AS CT, ULTRASOUND AND MRI HAVE BEEN READ BY THE RADIOLOGIST. CT HEAD WO CONTRAST   Final Result      No acute intracranial abnormality or significant mass effect. XR CHEST PORTABLE   Final Result      No acute disease.                        LABS  Labs Reviewed   CBC WITH AUTO DIFFERENTIAL - Abnormal; Notable for the following components:       Result Value    WBC 14.2 (*)     RBC 2.36 (*)     Hemoglobin 6.3 (*)     Hematocrit 19.7 (*)     RDW 16.1 (*)     Neutrophils Absolute 11.4 (*)     Anisocytosis 1+ (*)     Polychromasia 1+ (*)     Hypochromia 1+ (*)     All other components within normal limits    Narrative:     Rock Pruett tel. [phone],  Hematology results called to and read back by Dr. Kateryna Glass, 03/14/2021 10:03,  by [facility]  Performed at:  [facility]  [address],  [address]   Phone [phone]   COMPREHENSIVE METABOLIC PANEL W/ REFLEX TO MG FOR LOW K - Abnormal; Notable for the following components:    Sodium 134 (*)     Potassium reflex Magnesium 3.3 (*)     Chloride 90 (*)     Glucose 121 (*)     BUN 40 (*)     CREATININE 1.6 (*)     GFR Non- 33 (*)     GFR African American 40 (*)     Alkaline Phosphatase 135 (*)     All other components within normal limits    Narrative:     Performed at:  [facility]  [place] [address],  [address] [place] [address], [place] [address]   Phone [phone]   BRAIN NATRIURETIC PEPTIDE - Abnormal; Notable for the following components:    Pro- (*)     All other components within normal limits    Narrative:     Performed at:  [place] [address],  [address] [place] [address], [place] [address]   Phone [phone]   SALICYLATE LEVEL - Abnormal; Notable for the following components:    Salicylate, Serum <7.5 (*)     All other components within normal limits    Narrative:     Performed at:  [place] [address],  [address] [place] [address], [place] [address]   Phone [phone] LEVEL - Abnormal; Notable for the following components:    Acetaminophen Level <5 (*)     All other components within normal limits    Narrative:     Performed at:  [address], [place] [address]   Phone [phone]   TROPONIN    Narrative:     Performed at:  [address], [place] [address]   Phone [phone]   CK    Narrative:     Performed at:  [address], [place] [address]   Phone [phone]   ETHANOL    Narrative:     Performed at:  [address], [place] [address]   Phone [phone]   MAGNESIUM    Narrative:     Performed at:  [address], [place] [address]   Phone [phone]   BLOOD OCCULT STOOL DIAGNOSTIC    Narrative:     ORDER#: [id number]                          ORDERED BY: Susie Kimbrough  SOURCE: Stool                              COLLECTED:  03/14/21 10:30  ANTIBIOTICS AT TREE.:                      RECEIVED :  03/14/21 10:34  Performed at:  [address] [place] [address]   Phone [phone]   URINE RT REFLEX TO CULTURE   URINE DRUG SCREEN       PROCEDURES      MEDICAL DECISION MAKING          The total Critical Care time is 30 minutes which excludes separately billable procedures. The critical care was concerning significant anemia. This time is exclusive of any time documented by any other providers. Patient was given scripts for the following medications. I counseled patient how to take these medications. New Prescriptions    No medications on file       Disposition  Pt is in fair condition upon Admit to telemetry.       Seen in collaboration with attending physician Dr. Jefferson Peck PGY1          Hermelinda Dorado DO  Resident  03/14/21 4276

## 2021-03-14 NOTE — H&P
Hospital Medicine History & Physical      PCP: Chey Nabil    Date of Admission: 3/14/2021    Date of Service: 3/14/2021    Pt seen/examined on 3/14/2021    Admitted to Inpatient with expected LOS greater than two midnights due to medical therapy. Chief Complaint:     Chief Complaint   Patient presents with    Dizziness     Pt c/o constant dizziness x 1 week. History Of Present Illness:      63 y.o. female who presented to [facility]. with fatigue for 1 week worsening over the past few days. A/w generalized weakness over the same time period. Reports easy bruising that has been worked up in the past but no cause was found. She has had several bouts of lower extremity cellulitis for which she has been on and off various antibiotics. This is per patient, although her most recent note from PCP Dr. Destin Cardona at [facility] does not mention anything about antibiotics or cellulitis. The previous note does mention antibiotics but not specifically what she has been on. Found to have hgb 6.9. Last hgb we have was 11.9 in June 2019. No evidence of any bleeding of any kind. FOBT negative    Past Medical History:          Diagnosis Date    Anemia     Anxiety     Arthritis     Back pain     CAD (coronary artery disease)     Cancer ([address]     CHF (congestive heart failure) (HCC)     Chronic kidney disease     Depression     Diabetes mellitus ([address]     Esophageal reflux     Hep C w/o coma, chronic (HCC)     Hyperlipidemia     Hypertension     Leg abscess     Neuropathy     Schizoaffective disorder [facility]     Thyroid disease        Past Surgical History:      History reviewed. No pertinent surgical history. Medications Prior to Admission:      Prior to Admission medications    Medication Sig Start Date End Date Taking?  Authorizing Provider   nitroGLYCERIN (NITROSTAT) 0.4 MG SL tablet Place 0.4 mg under the tongue 8/16/18   Historical Provider, MD   apixaban (ELIQUIS) 5 MG TABS tablet Take 5 mg by mouth    Historical Provider, MD   ASPIRIN LOW DOSE 81 MG EC tablet  12/19/19   Historical Provider, MD   QVAR REDIHALER 80 MCG/ACT AERB inhaler  12/19/19   Historical Provider, MD   chlorhexidine (PERIDEX) 0.12 % solution Take 15 mLs by mouth 7/14/17   Historical Provider, MD   citalopram (CELEXA) 40 MG tablet Take 40 mg by mouth 1/3/20   Historical Provider, MD   gabapentin (NEURONTIN) 300 MG capsule TAKE ONE CAPSULE BY MOUTH THREE TIMES A DAY 7/5/19   Historical Provider, MD   hydrOXYzine (ATARAX) 10 MG tablet  12/3/19   Historical Provider, MD   hydrOXYzine (ATARAX) 50 MG tablet  12/31/19   Historical Provider, MD   loperamide (IMODIUM) 2 MG capsule TAKE ONE CAPSULE BY MOUTH THREE TIMES A DAY AS NEEDED FOR DIARRHEA 4/11/19   Historical Provider, MD   potassium bicarbonate (K-LYTE) 25 MEQ disintegrating tablet Take 25 mEq by mouth 8/9/19   Historical Provider, MD   prazosin (MINIPRESS) 2 MG capsule  12/19/19   Historical Provider, MD   traMADol Delford Ally) 50 MG tablet  12/30/19   Historical Provider, MD   NONFORMULARY     Historical Provider, MD   CINNAMON PO Take by mouth    Historical Provider, MD   albuterol (PROVENTIL HFA;VENTOLIN HFA) 108 (90 BASE) MCG/ACT inhaler Inhale 2 puffs into the lungs every 6 hours as needed for Wheezing    Historical Provider, MD   ascorbic acid (VITAMIN C) 500 MG tablet Take 500 mg by mouth daily    Historical Provider, MD   buPROPion (WELLBUTRIN XL) 300 MG XL tablet Take 300 mg by mouth every morning    Historical Provider, MD   busPIRone (BUSPAR) 10 MG tablet Take 10 mg by mouth 3 times daily    Historical Provider, MD   aspirin 81 MG chewable tablet Take 81 mg by mouth daily    Historical Provider, MD   vitamin D (CHOLECALCIFEROL) 5000 UNITS CAPS capsule Take 5,000 Units by mouth 2 times daily    Historical Provider, MD   clonazePAM (KLONOPIN) 1 MG tablet Take 1 mg by mouth 2 times daily as needed for Anxiety    Historical Provider, MD   diclofenac (VOLTAREN) 75 MG EC tablet Take mg by mouth daily    Historical Provider, MD   temazepam (RESTORIL) 30 MG capsule Take 30 mg by mouth nightly as needed for Sleep    Historical Provider, MD   torsemide (DEMADEX) 100 MG tablet Take 20 mg by mouth 2 times daily    Historical Provider, MD   vitamin E 1000 UNITS capsule Take 1,000 Units by mouth daily    Historical Provider, MD       Allergies:  Aleve [naproxen] and Tums [calcium carbonate antacid]    Social History:      TOBACCO:   reports that she has never smoked. She does not have any smokeless tobacco history on file. ETOH:   reports no history of alcohol use. Family History:      Reviewed in detail and negative except as follows:    History reviewed. No pertinent family history. REVIEW OF SYSTEMS:   Pertinent positives and negatives as noted in the HPI. All other systems reviewed and negative. PHYSICAL EXAM PERFORMED:    /77   Pulse 79   Temp 97.7 °F (36.5 °C) (Oral)   Resp 16   Ht 5' 4\" (1.626 m)   Wt 262 lb 12.6 oz (119.2 kg)   SpO2 98%   BMI 45.11 kg/m²     General appearance:  NAD, obese  Eyes: Pupils equal, round, reactive to light, conjunctiva/corneas clear  Ears/Nose/Mouth/Throat: No external lesions or scars, hearing intact to voice  Neck: Trachea midline, no masses noted, no thyromegaly  Respiratory:  Non-labored breathing, clear to auscultation bilaterally  Cardiovascular: Regular rate and rhythm, no murmurs, gallops, or rubs  Abdomen: obese, soft, non-tender  Musculoskeletal: warm, venous stasis bilaterally  Skin: normal color, no wounds noted  Psychiatric: A&Ox4, good insight and judgment    Labs:     Recent Labs     03/14/21  0925   WBC 14.2*   HGB 6.3*   HCT 19.7*        Recent Labs     03/14/21  0925   *   K 3.3*   CL 90*   CO2 32   BUN 40*   CREATININE 1.6*   CALCIUM 10.4     Recent Labs     03/14/21  0925   AST 26   ALT 30   BILITOT <0.2   ALKPHOS 135*     No results for input(s): INR in the last 72 hours.   Recent Labs     03/14/21  7764 CKTOTAL 90   TROPONINI <0.01       Urinalysis:    No results found for: Caretha Munda, BACTERIA, RBCUA, BLOODU, [address], [address]    Radiology:     CT HEAD WO CONTRAST   Final Result      No acute intracranial abnormality or significant mass effect. XR CHEST PORTABLE   Final Result      No acute disease. ASSESSMENT:    Active Hospital Problems    Diagnosis Date Noted    Anemia [D64.9] 03/14/2021       PLAN:    # Acute anemia  -unclear etiology, FOBT negative  -check iron panel, ferritin, LDH, haptoglobin, hepatic panel, B12, folate, peripheral smear  -trend h/h  -transfuse for hgb< 7    # Hypokalemia  -replete and recheck    # HTN  # Hypothyroid  # HLD  # Depression  # T2DM  # Chronic heart failure  -will need home medications re-ordered once med-rec is done    DVT Prophylaxis: SCDs  Diet: No diet orders on file  Code Status: No Order    PT/OT Eval Status: n/a, baseline    Dispo: Inpt, dispo pending clinical improvement    Aimee Adams MD    Thank you Gaston Bar for the opportunity to be involved in this patient's care. If you have any questions or concerns please feel free to contact me at [phone].

## 2021-03-14 NOTE — ED PROVIDER NOTES
I independently evaluated and obtained a history and physical on Mahendra Perez. All diagnostic, treatment, and disposition assistants were made to myself in conjunction the resident physician. For further details of this patient's emergency department encounter, please see the resident physician's documentation. History: Patient is a 59-year-old female with a complicated past medical history including but not limited to hypothyroidism, schizoaffective disorder, coronary artery disease, CHF, hypertension, hyperlipidemia, and chronic kidney disease who presents with 1 week of diffuse symptoms. She reports that she has had dizziness which she describes as lightheadedness and bilateral leg weakness and denies any vertigo or room spinning. She denies any specific neurologic deficits such as weakness or numbness in 1 arm or leg versus the other, difficulty speaking, facial droop, or balance instability. She reports her primary symptoms are lightheadedness, weakness in bilateral lower extremities, chest pain, shortness of breath, palpitations, and fatigue. She reports that her chest pain is mild, substernal, and has been intermittent for 1 week. She denies any radiation of the chest pain. She reports it is worse with exertion and completely relieved by rest.  She also reports 2 recent cuts to her left foot although these are also a week old. She denies any fever, dysuria, productive cough, or infectious symptoms at this time. She reports that her diffuse weakness has progressed to the point where she has difficulty ambulating on her own. Previously she reports she used a walker intermittently but now she states even with a walker she finds it difficult to ambulate on her own power and therefore her boyfriend brought her to the emergency department for evaluation. She denies any falls or direct trauma.   Of note patient recently saw her primary care doctor 1 month ago and had her Eliquis discontinued due to lack of current indication. Physician Exam: Chronically ill-appearing elderly  female in no acute distress. Regular rate and rhythm. Intact distal pulses. Abdomen soft and nontender to palpation. Symmetric smile. Alert and oriented to person, place, time, situation. 4+ out of 5 strength equal in all 4 extremities with no focal weakness in 1 extremity versus the other. Sensation intact in all 4 extremities. Patient able to stand on own power and has been very slow deliberate gait for 3 to 4 seconds before stating that she no longer feels comfortable walking. Negative Romberg test and negative finger-to-nose. Patient declines heel-to-shin due to stating both of her legs hurt. 2+ pitting edema with bilateral redness concerning for chronic venous stasis. No posterior leg tenderness or palpable venous cords. No beefy red induration or signs of unilateral cellulitis. MDM:    The Ekg interpreted by me shows  normal sinus rhythm with a rate of 82  Axis is   Normal  QTc is  472ms  Intervals and Durations are unremarkable. ST Segments: normal  No previous EKG available for comparison      CVA considered given chief complaint of dizziness however patient clarifies that she felt lightheaded and denies any vertigo, unilateral numbness or weakness, or focal neurologic deficit. I do not suspect CVA at this time. Head CT is not show any acute abnormality patient symptoms have been going on for 1 week. Patient has undetectable troponin EKG does not show any acute signs of ischemia or arrhythmia. Her evaluation is concerning for significant anemia at 6.3. Digital rectal exam is performed with light brown stool which is Hemoccult negative. Patient denies any vaginal bleeding, bloody vomiting, or known blood loss at this time. Given patient's significant anemia and generalized weakness I do feel she warrants admission for possible transfusion and specialty consultation.   I have spoken to the hospitalist who accepts the patient in transfer for further work-up. Given the patient's neurologic symptoms, need for CVA evaluation on arrival for physical exam, emergent anemia, and need for frequent reevaluation she does require 30 minutes critical care time. Please see residents note for more detail. FINAL IMPRESSION      1.  Anemia, unspecified type             Claudeen Rota, MD  03/14/21 3028

## 2021-03-14 NOTE — PROGRESS NOTES
4 Eyes Admission Assessment     I agree as the admission nurse that 2 RN's have performed a thorough Head to Toe Skin Assessment on the patient. ALL assessment sites listed below have been assessed on admission. Areas assessed by both nurses:   [x]   Head, Face, and Ears   [x]   Shoulders, Back, and Chest  [x]   Arms, Elbows, and Hands   [x]   Coccyx, Sacrum, and Ischium  [x]   Legs, Feet, and Heels         Does the Patient have Skin Breakdown?   Abrasion to L great toe and heel, unstageable to L shin, redness to abd folds        Justyn Prevention initiated:  Yes   Wound Care Orders initiated:  NA      WOC nurse consulted for Pressure Injury (Stage 3,4, Unstageable, DTI, NWPT, and Complex wounds) or Justyn score 18 or lower:  Yes      Nurse 1 eSignature: Electronically signed by Laura Jain RN on 3/14/21 at 1:51 PM EDT    **SHARE this note so that the co-signing nurse is able to place an eSignature**    Nurse 2 eSignature: Electronically signed by Dennise Eller RN on 3/14/21 at 2:05 PM EDT

## 2021-03-14 NOTE — CONSULTS
Medication Reconciliation Update:    I spoke to pharmacist at [facility], then reviewed home medications at bedside with patient. NOTE: Pt adds her meds to her \"med planner\" each week     Further changes made to home med list:    ADDED:  1) Apixaban 5mg BID. Last filled 2/17 x 30-day. Per PCP note 2/16, pt was to discontinue. Per pt, she forgot this, and has been adding to weekly med planner and taking BID. No indication per PCP note 2/16. 2) Augmentin 875 mg BID - Filled 3/4 x 10-day. Per pt, she had not finished the 10-days when admitted. 3) Clonazepam 0.5 mg daily PRN. Filled 2/20 x 30-day    DOSE CHANGE:  1) Levothyroxine- takes 125 mcg daily. Removed old entry of 100 mcg daily      REMOVED (not taking)  1) Vitamin C  2) Loprox cream  3) Cinnamon  4) Famotidine  5) Flaxseed  6) Vitamin E  7) Fish oil  8) Omeprazole    OTHER NOTES:  1) Potassium chloride-  25 mEq tablet- pr Last filled 2/17 x 30-days. Prescribed BID. Per pt, this large tablet does not fit in her med planner, and she has been forgetting to take it for about 1 month. Pharmacy Consult Note  - Admission Medication Reconciliation      Pharmacy consulted for reconciliation of iugtq-fy-afszetzae medications. I reviewed Rx fill history via \"Complete Dispense Report\" in Epic, recent office visits at [facility]. The following changes made to jzklh-mg-ihmopszum medication list:    ADDED:  1. Abilify  2. Doxycycline  3. Diphenhydramine  4. Ciclopirox  5. Duloxetine  6. Famotidine  7. flovent    Dose or Frequency CHANGE:  1. Torsemide from 20mg BID to 100mg daily  2. Vitamin D from BID to daily    REMOVED:  1. Apixaban (seems to be discontinued on 2/16)  2. Duplicate aspirin  3. Buspirone  4. Chlorhexidine  5. Citalopram  6. Clonazepam  7. Diclofenac  8. Divalproex  9. Hydroxyzine  10. Metolazone  11. paliperidone  12. Potassium chloride  13. Beclomethasone    NOTES:  Medication Reconciliation is not yet finalized. I was not able to discuss with patient home medications. Patient's pharmacy is closed today [facility] [phone] and pharmacy will call to follow up tomorrow with refill history. Adjusted home medications based on recent visits to /refill history    Patient finished a course of Augmentin on 3/14. The doxycyline was ordered for 3/12-3/22. The duloxetine orders appears to be started on 1/6 for both 30mg and 60mg, unsure of how patient is taking these medications. UPDATED HOME MEDICATION LIST  No current facility-administered medications on file prior to encounter. Medication Sig    ARIPiprazole (ABILIFY) 10 MG tablet Take 10 mg by mouth daily    doxycycline monohydrate (MONODOX) 100 MG capsule Take 100 mg by mouth 2 times daily    diphenhydrAMINE (BENADRYL ALLERGY) 25 MG tablet Take 25 mg by mouth every 6 hours as needed for Itching    Ciclopirox (LOPROX) 0.77 % gel Apply topically 2 times daily Apply to legs    DULoxetine (CYMBALTA) 60 MG extended release capsule Take 60 mg by mouth daily    DULoxetine (CYMBALTA) 30 MG extended release capsule Take 30 mg by mouth daily    famotidine (PEPCID) 40 MG tablet Take 40 mg by mouth daily    fluticasone (FLOVENT HFA) 220 MCG/ACT inhaler Inhale 1 puff into the lungs 2 times daily    nitroGLYCERIN (NITROSTAT) 0.4 MG SL tablet Place 0.4 mg under the tongue    ASPIRIN LOW DOSE 81 MG EC tablet     gabapentin (NEURONTIN) 300 MG capsule TAKE ONE CAPSULE BY MOUTH THREE TIMES A DAY    loperamide (IMODIUM) 2 MG capsule TAKE ONE CAPSULE BY MOUTH THREE TIMES A DAY AS NEEDED FOR DIARRHEA    potassium bicarbonate (K-LYTE) 25 MEQ disintegrating tablet Take 25 mEq by mouth daily     prazosin (MINIPRESS) 2 MG capsule Take 2 mg by mouth nightly     traMADol (ULTRAM) 50 MG tablet Take 50 mg by mouth every 6 hours as needed for Pain.      albuterol (PROVENTIL HFA;VENTOLIN HFA) 108 (90 BASE) MCG/ACT inhaler Inhale 2 puffs into the lungs every 6 hours as needed for Wheezing    buPROPion (WELLBUTRIN XL) 300 MG XL tablet Take 300 mg by mouth every morning    vitamin D (CHOLECALCIFEROL) 5000 UNITS CAPS capsule Take 5,000 Units by mouth daily     levothyroxine (SYNTHROID) 100 MCG tablet Take 100 mcg by mouth every morning (before breakfast)     metoprolol (LOPRESSOR) 25 MG tablet Take 25 mg by mouth 2 times daily    montelukast (SINGULAIR) 10 MG tablet Take 10 mg by mouth nightly    Multiple Vitamins-Minerals (THERAPEUTIC MULTIVITAMIN-MINERALS) tablet Take 1 tablet by mouth daily    mupirocin (BACTROBAN) 2 % ointment Apply topically 3 times daily Apply topically 3 times daily.  polyethylene glycol (GLYCOLAX) packet Take 17 g by mouth daily as needed    simvastatin (ZOCOR) 40 MG tablet Take 40 mg by mouth nightly    spironolactone (ALDACTONE) 25 MG tablet Take 25 mg by mouth daily    torsemide (DEMADEX) 100 MG tablet Take 100 mg by mouth daily     CINNAMON PO Take by mouth    ascorbic acid (VITAMIN C) 500 MG tablet Take 500 mg by mouth daily    Omega-3 Fatty Acids (FISH OIL) 1000 MG CPDR Take 1,000 mg by mouth 2 times daily    Flaxseed, Linseed, (FLAXSEED OIL) 1000 MG CAPS Take by mouth    nicotine polacrilex (COMMIT) 4 MG lozenge Take 4 mg by mouth as needed for Smoking cessation    omeprazole (PRILOSEC) 40 MG capsule Take 40 mg by mouth 2 times daily    vitamin E 1000 UNITS capsule Take 1,000 Units by mouth daily          Thank you for the consult  Please call with questions:    Ilana DUMONT   3/14/2021 3:01 PM  [phone] ([address] [place] [address])

## 2021-03-14 NOTE — PROGRESS NOTES
Pt admitted to room 2458 by stretcher. Alert and oriented. Fall precautions in place. Admission assessment complete, unable to complete home med rec, pt does not know names of medications and unable to reach her pharmacy. Call light in reach.

## 2021-03-15 LAB
ANION GAP SERPL CALCULATED.3IONS-SCNC: 8 MMOL/L (ref 3–16)
BLOOD BANK DISPENSE STATUS: NORMAL
BLOOD BANK PRODUCT CODE: NORMAL
BLOOD SMEAR REVIEW: NORMAL
BPU ID: NORMAL
BUN BLDV-MCNC: 18 MG/DL (ref 7–20)
CALCIUM SERPL-MCNC: 9.2 MG/DL (ref 8.3–10.6)
CHLORIDE BLD-SCNC: 98 MMOL/L (ref 99–110)
CO2: 33 MMOL/L (ref 21–32)
CREAT SERPL-MCNC: 1 MG/DL (ref 0.6–1.2)
DESCRIPTION BLOOD BANK: NORMAL
EKG ATRIAL RATE: 82 BPM
EKG DIAGNOSIS: NORMAL
EKG P AXIS: 80 DEGREES
EKG P-R INTERVAL: 162 MS
EKG Q-T INTERVAL: 404 MS
EKG QRS DURATION: 86 MS
EKG QTC CALCULATION (BAZETT): 472 MS
EKG R AXIS: 27 DEGREES
EKG T AXIS: 50 DEGREES
EKG VENTRICULAR RATE: 82 BPM
ESTIMATED AVERAGE GLUCOSE: 108.3 MG/DL
GFR AFRICAN AMERICAN: >60
GFR NON-AFRICAN AMERICAN: 56
GLUCOSE BLD-MCNC: 106 MG/DL (ref 70–99)
GLUCOSE BLD-MCNC: 109 MG/DL (ref 70–99)
GLUCOSE BLD-MCNC: 109 MG/DL (ref 70–99)
GLUCOSE BLD-MCNC: 111 MG/DL (ref 70–99)
GLUCOSE BLD-MCNC: 117 MG/DL (ref 70–99)
GLUCOSE BLD-MCNC: 124 MG/DL (ref 70–99)
HAPTOGLOBIN: 263 MG/DL (ref 30–200)
HBA1C MFR BLD: 5.4 %
HCT VFR BLD CALC: 20.6 % (ref 36–48)
HCT VFR BLD CALC: 21.8 % (ref 36–48)
HCT VFR BLD CALC: 22.8 % (ref 36–48)
HCT VFR BLD CALC: 25.9 % (ref 36–48)
HEMOGLOBIN: 6.8 G/DL (ref 12–16)
HEMOGLOBIN: 7.8 G/DL (ref 12–16)
HEMOGLOBIN: 8.3 G/DL (ref 12–16)
IMMATURE RETIC FRACT: 0.76 (ref 0.21–0.37)
IRON SATURATION: 7 % (ref 15–50)
IRON: 30 UG/DL (ref 37–145)
MAGNESIUM: 2.5 MG/DL (ref 1.8–2.4)
MCH RBC QN AUTO: 28.4 PG (ref 26–34)
MCHC RBC AUTO-ENTMCNC: 33.2 G/DL (ref 31–36)
MCV RBC AUTO: 85.7 FL (ref 80–100)
PDW BLD-RTO: 16.3 % (ref 12.4–15.4)
PERFORMED ON: ABNORMAL
PLATELET # BLD: 292 K/UL (ref 135–450)
PMV BLD AUTO: 6.8 FL (ref 5–10.5)
POTASSIUM REFLEX MAGNESIUM: 3.5 MMOL/L (ref 3.5–5.1)
RBC # BLD: 2.4 M/UL (ref 4–5.2)
RETICULOCYTE COUNT PCT: 6.42 % (ref 0.5–2.18)
SODIUM BLD-SCNC: 139 MMOL/L (ref 136–145)
TOTAL IRON BINDING CAPACITY: 426 UG/DL (ref 260–445)
WBC # BLD: 9.9 K/UL (ref 4–11)

## 2021-03-15 PROCEDURE — 85014 HEMATOCRIT: CPT

## 2021-03-15 PROCEDURE — 2580000003 HC RX 258: Performed by: INTERNAL MEDICINE

## 2021-03-15 PROCEDURE — 93010 ELECTROCARDIOGRAM REPORT: CPT | Performed by: INTERNAL MEDICINE

## 2021-03-15 PROCEDURE — 36430 TRANSFUSION BLD/BLD COMPNT: CPT

## 2021-03-15 PROCEDURE — 80048 BASIC METABOLIC PNL TOTAL CA: CPT

## 2021-03-15 PROCEDURE — 6370000000 HC RX 637 (ALT 250 FOR IP): Performed by: STUDENT IN AN ORGANIZED HEALTH CARE EDUCATION/TRAINING PROGRAM

## 2021-03-15 PROCEDURE — 94761 N-INVAS EAR/PLS OXIMETRY MLT: CPT

## 2021-03-15 PROCEDURE — 85027 COMPLETE CBC AUTOMATED: CPT

## 2021-03-15 PROCEDURE — 6370000000 HC RX 637 (ALT 250 FOR IP): Performed by: INTERNAL MEDICINE

## 2021-03-15 PROCEDURE — 85018 HEMOGLOBIN: CPT

## 2021-03-15 PROCEDURE — 83735 ASSAY OF MAGNESIUM: CPT

## 2021-03-15 PROCEDURE — P9016 RBC LEUKOCYTES REDUCED: HCPCS

## 2021-03-15 PROCEDURE — 94640 AIRWAY INHALATION TREATMENT: CPT

## 2021-03-15 PROCEDURE — 6360000002 HC RX W HCPCS: Performed by: STUDENT IN AN ORGANIZED HEALTH CARE EDUCATION/TRAINING PROGRAM

## 2021-03-15 PROCEDURE — 2060000000 HC ICU INTERMEDIATE R&B

## 2021-03-15 PROCEDURE — 6360000002 HC RX W HCPCS: Performed by: INTERNAL MEDICINE

## 2021-03-15 PROCEDURE — 36415 COLL VENOUS BLD VENIPUNCTURE: CPT

## 2021-03-15 RX ORDER — CLONAZEPAM 0.5 MG/1
0.5 TABLET ORAL DAILY
Status: DISCONTINUED | OUTPATIENT
Start: 2021-03-15 | End: 2021-03-16 | Stop reason: HOSPADM

## 2021-03-15 RX ORDER — CLONAZEPAM 0.5 MG/1
0.5 TABLET ORAL DAILY PRN
COMMUNITY

## 2021-03-15 RX ORDER — FLUTICASONE PROPIONATE 220 UG/1
1 AEROSOL, METERED RESPIRATORY (INHALATION) 2 TIMES DAILY
Status: DISCONTINUED | OUTPATIENT
Start: 2021-03-15 | End: 2021-03-15

## 2021-03-15 RX ORDER — FAMOTIDINE 20 MG/1
40 TABLET, FILM COATED ORAL DAILY
Status: DISCONTINUED | OUTPATIENT
Start: 2021-03-15 | End: 2021-03-16 | Stop reason: HOSPADM

## 2021-03-15 RX ORDER — BUPROPION HYDROCHLORIDE 150 MG/1
300 TABLET ORAL EVERY MORNING
Status: DISCONTINUED | OUTPATIENT
Start: 2021-03-16 | End: 2021-03-16 | Stop reason: HOSPADM

## 2021-03-15 RX ORDER — ALBUTEROL SULFATE 2.5 MG/3ML
2.5 SOLUTION RESPIRATORY (INHALATION) EVERY 4 HOURS PRN
Status: DISCONTINUED | OUTPATIENT
Start: 2021-03-15 | End: 2021-03-16 | Stop reason: HOSPADM

## 2021-03-15 RX ORDER — BUDESONIDE 0.5 MG/2ML
0.5 INHALANT ORAL 2 TIMES DAILY
Status: DISCONTINUED | OUTPATIENT
Start: 2021-03-15 | End: 2021-03-16 | Stop reason: HOSPADM

## 2021-03-15 RX ORDER — AMOXICILLIN AND CLAVULANATE POTASSIUM 875; 125 MG/1; MG/1
1 TABLET, FILM COATED ORAL 2 TIMES DAILY
Status: ON HOLD | COMMUNITY
Start: 2021-03-05 | End: 2021-03-16 | Stop reason: HOSPADM

## 2021-03-15 RX ORDER — GABAPENTIN 300 MG/1
300 CAPSULE ORAL 3 TIMES DAILY
Status: DISCONTINUED | OUTPATIENT
Start: 2021-03-15 | End: 2021-03-16 | Stop reason: HOSPADM

## 2021-03-15 RX ORDER — DULOXETIN HYDROCHLORIDE 60 MG/1
60 CAPSULE, DELAYED RELEASE ORAL DAILY
Status: DISCONTINUED | OUTPATIENT
Start: 2021-03-15 | End: 2021-03-16 | Stop reason: HOSPADM

## 2021-03-15 RX ORDER — SODIUM CHLORIDE 9 MG/ML
INJECTION, SOLUTION INTRAVENOUS PRN
Status: DISCONTINUED | OUTPATIENT
Start: 2021-03-15 | End: 2021-03-16 | Stop reason: HOSPADM

## 2021-03-15 RX ORDER — BUPROPION HYDROCHLORIDE 300 MG/1
300 TABLET ORAL EVERY MORNING
COMMUNITY

## 2021-03-15 RX ORDER — TRAMADOL HYDROCHLORIDE 50 MG/1
50 TABLET ORAL EVERY 6 HOURS PRN
Status: DISCONTINUED | OUTPATIENT
Start: 2021-03-15 | End: 2021-03-16 | Stop reason: HOSPADM

## 2021-03-15 RX ORDER — LEVOTHYROXINE SODIUM 0.12 MG/1
125 TABLET ORAL DAILY
COMMUNITY

## 2021-03-15 RX ADMIN — Medication 10 ML: at 20:15

## 2021-03-15 RX ADMIN — BUDESONIDE INHALATION 500 MCG: 0.5 SUSPENSION RESPIRATORY (INHALATION) at 15:12

## 2021-03-15 RX ADMIN — Medication 10 ML: at 07:50

## 2021-03-15 RX ADMIN — FAMOTIDINE 40 MG: 20 TABLET, FILM COATED ORAL at 12:11

## 2021-03-15 RX ADMIN — LEVOTHYROXINE SODIUM 100 MCG: 0.1 TABLET ORAL at 07:49

## 2021-03-15 RX ADMIN — ACETAMINOPHEN 650 MG: 325 TABLET ORAL at 02:03

## 2021-03-15 RX ADMIN — GABAPENTIN 300 MG: 300 CAPSULE ORAL at 23:16

## 2021-03-15 RX ADMIN — IRON SUCROSE 300 MG: 20 INJECTION, SOLUTION INTRAVENOUS at 17:57

## 2021-03-15 RX ADMIN — MICONAZOLE NITRATE: 20 POWDER TOPICAL at 07:50

## 2021-03-15 RX ADMIN — DULOXETINE HYDROCHLORIDE 60 MG: 60 CAPSULE, DELAYED RELEASE ORAL at 16:25

## 2021-03-15 RX ADMIN — DOCUSATE SODIUM 50 MG AND SENNOSIDES 8.6 MG 1 TABLET: 8.6; 5 TABLET, FILM COATED ORAL at 20:14

## 2021-03-15 RX ADMIN — MICONAZOLE NITRATE: 20 POWDER TOPICAL at 20:10

## 2021-03-15 RX ADMIN — CLONAZEPAM 0.5 MG: 0.5 TABLET ORAL at 16:25

## 2021-03-15 RX ADMIN — ACETAMINOPHEN 650 MG: 325 TABLET ORAL at 23:18

## 2021-03-15 RX ADMIN — DOCUSATE SODIUM 50 MG AND SENNOSIDES 8.6 MG 1 TABLET: 8.6; 5 TABLET, FILM COATED ORAL at 07:49

## 2021-03-15 ASSESSMENT — PAIN SCALES - GENERAL
PAINLEVEL_OUTOF10: 3
PAINLEVEL_OUTOF10: 0
PAINLEVEL_OUTOF10: 1
PAINLEVEL_OUTOF10: 0
PAINLEVEL_OUTOF10: 8

## 2021-03-15 ASSESSMENT — PAIN DESCRIPTION - PROGRESSION: CLINICAL_PROGRESSION: NOT CHANGED

## 2021-03-15 ASSESSMENT — PAIN DESCRIPTION - ORIENTATION: ORIENTATION: RIGHT;LEFT

## 2021-03-15 ASSESSMENT — PAIN DESCRIPTION - LOCATION: LOCATION: LEG

## 2021-03-15 ASSESSMENT — PAIN - FUNCTIONAL ASSESSMENT: PAIN_FUNCTIONAL_ASSESSMENT: ACTIVITIES ARE NOT PREVENTED

## 2021-03-15 NOTE — CARE COORDINATION
Case Management Assessment           Initial Evaluation                Date / Time of Evaluation: 3/15/2021 12:05 PM                 Assessment Completed by: Lary Higuera    Patient Name: Adry Tidwell     YOB: 1960  Diagnosis: Anemia [D64.9]     Date / Time: 3/14/2021  9:17 AM    Patient Admission Status: Inpatient    If patient is discharged prior to next notation, then this note serves as note for discharge by case management. Current PCP: Pino Porter Patient: No    Chart Reviewed: Yes  Patient/ Family Interviewed: Yes    Initial assessment completed at bedside with: patient    Hospitalization in the last 30 days: Yes    Emergency Contacts:  Extended Emergency Contact Information  Primary Emergency Contact: Isak Ferrell  Address: [address]           [address] [place] [address] [place] [address] Phone: [phone]  Relation: Other  [facility] Phone: [phone]  Relation: Child   needed? No    Advance Directives:   Code Status: Full Code      Financial  Payor: MEDICAID [place] / Plan: [address] [place] [address] DEPT OF JOB / Product Type: *No Product type* /     Pre-cert required for SNF: Yes    Pharmacy    [facility], [address] [place] [address], [facility]  [address]  [facility]  Phone: [phone] Fax: [phone]      Potential assistance Purchasing Medications: Potential Assistance Purchasing Medications: No  Does Patient want to participate in local refill/ meds to beds program?: No    Meds To Beds General Rules:  1. Can ONLY be done Monday- Friday between 8:30am-5pm  2. Prescription(s) must be in pharmacy by 3pm to be filled same day  3. Copy of patient's insurance/ prescription drug card and patient face sheet must be sent along with the prescription(s)  4. Cost of Rx cannot be added to hospital bill.  If financial assistance is needed, please contact unit  or ;  or  CANNOT provide pharmacy voucher for patients co-pays  5. Patients can then  the prescription on their way out of the hospital at discharge, or pharmacy can deliver to the bedside if staff is available. (payment due at time of pick-up or delivery - cash, check, or card accepted)     Able to afford home medications/ co-pay costs: Yes    ADLS  Support Systems: Spouse/Significant Other, Children    PT AM-PAC:   /24  OT AM-PAC:   /24    HOUSING  Home Environment: Home   Steps:     Plans to RETURN to current housing: Yes  Barriers to RETURNING to current housing:     [address]  Currently ACTIVE with [address]: No  Home Care Agency: in the past had [facility] [address]    Currently ACTIVE with [place] on Aging: Yes  Passport/ Waiver: Yes  Passport/ Waiver Services: [facility] and Meals on [address]  DME Provider: none        DISCHARGE PLAN:  Disposition: Home with [address]: [address] Rob[address]o    p [phone]  f [phone] for discharge: family -daughter    Factors facilitating achievement of predicted outcomes: Family support, Motivated and Cooperative    Barriers to discharge: Medical complications    Additional Case Management Notes: active with [place] [facility], meals on wheels, life alert    The Plan for Transition of Care is related to the following treatment goals of Anemia [D64.9]    The Patient and/or patient representative Napoleon Hicks and her family were provided with a choice of provider and agrees with the discharge plan Yes    Freedom of choice list was provided with basic dialogue that supports the patient's individualized plan of care/goals and shares the quality data associated with the providers.  Yes    Care Transition patient: No    Sena Halsted, RN  [facility]  Case Management Department  Ph: [phone]

## 2021-03-15 NOTE — PROGRESS NOTES
than or equal to 92% on room air = 0    Peak Flow (asthma only): not applicable = 0    RSI: 7-8 = BID and Q4HPRN (every four hours as needed) for dyspnea        Plan       Goals: medication delivery, mobilize retained secretions, volume expansion and improve oxygenation    Patient/caregiver was educated on the proper method of use for Respiratory Care Devices:  Yes      Level of patient/caregiver understanding able to:   ? Verbalize understanding   ? Demonstrate understanding       ? Teach back        ? Needs reinforcement       ? No available caregiver               ? Other:     Response to education:  Good     Is patient being placed on Home Treatment Regimen? No     Does the patient have everything they need prior to discharge? No     Comments: Patient takes Albuterol inhaler PRN at home. Plan of Care: Continue meds as ordered. Electronically signed by Davonte Murillo RCP on 3/15/2021 at 3:21 PM    Respiratory Protocol Guidelines     1. Assessment and treatment by Respiratory Therapy will be initiated for medication and therapeutic interventions upon initiation of aerosolized medication. 2. Physician will be contacted for respiratory rate (RR) greater than 35 breaths per minute. Therapy will be held for heart rate (HR) greater than 140 beats per minute, pending direction from physician. 3. Bronchodilators will be administered via Metered Dose Inhaler (MDI) with spacer when the following criteria are met:  a. Alert and cooperative     b. HR < 140 bpm  c. RR < 30 bpm                d. Can demonstrate a 2-3 second inspiratory hold  4. Bronchodilators will be administered via Hand Held Nebulizer [facility]) to patients when ANY of the following criteria are met  a. Incognizant or uncooperative          b. Patients treated with HHN at Home        c. Unable to demonstrate proper use of MDI with spacer     d. RR > 30 bpm   5.  Bronchodilators will be delivered via Metered Dose Inhaler (MDI), HHN, Aerogen to intubated patients on mechanical ventilation. 6. Inhalation medication orders will be delivered and/or substituted as outlined below. Aerosolized Medications Ordering and Administration Guidelines:    1. All Medications will be ordered by a physician, and their frequency and/or modality will be adjusted as defined by the patients Respiratory Severity Index (RSI) score. 2. If the patient does not have documented COPD, consider discontinuing anticholinergics when RSI is less than 9.  3. If the bronchospasm worsens (increased RSI), then the bronchodilator frequency can be increased to a maximum of every 4 hours. If greater than every 4 hours is required, the physician will be contacted. 4. If the bronchospasm improves, the frequency of the bronchodilator can be decreased, based on the patient's RSI, but not less than home treatment regimen frequency. 5. Bronchodilator(s) will be discontinued if patient has a RSI less than 9 and has received no scheduled or as needed treatment for 72  Hrs. Patients Ordered on a Mucolytic Agent:    1. Must always be administered with a bronchodilator. 2. Discontinue if patient experiences worsened bronchospasm, or secretions have lessened to the point that the patient is able to clear them with a cough. Anti-inflammatory and Combination Medications:    1. If the patient lacks prior history of lung disease, is not using inhaled anti-inflammatory medication at home, and lacks wheezing by examination or by history for at least 24 hours, contact physician for possible discontinuation.

## 2021-03-15 NOTE — CONSULTS
[facility]                        Consult Note      Requesting Physician:  Aravind Maloney    CHIEF COMPLAINT:   Chief Complaint   Patient presents with    Dizziness     Pt c/o constant dizziness x 1 week. HISTORY OF PRESENT ILLNESS:      Ms. Ricky Parker  is a 63 y.o. female we are seeing in consultation for Anemia. Patient is a delightful 27-year-old white female who was admitted with severe anemia weakness and shortness of breath. On admission her hemoglobin was 6.8 down from 11.9 on a previous CBC in 2019. The patient relates no prior history of anemia or transfusion. Her last colonoscopy was about 10 years ago. She has not noted any blood in her stools or urine. She has not had periods for quite a long period of time, she tells me due to taking medications to suppress them. She has been transfused overnight. On admission iron studies were drawn showing her to have severe iron deficiency. Of note vitamin B12 and folate were both normal as was her haptoglobin. Past Medical History:        Diagnosis Date    Anemia     Anxiety     Arthritis     Back pain     CAD (coronary artery disease)     Cancer ([facility] [address]     CHF (congestive heart failure) (HCC)     Chronic kidney disease     Depression     Diabetes mellitus (HCC)     Esophageal reflux     Hep C w/o coma, chronic (HCC)     Hyperlipidemia     Hypertension     Leg abscess     Neuropathy     Schizoaffective disorder ([facility] [address]     Thyroid disease      Past Surgical History:    History reviewed. No pertinent surgical history.     Current Medications:    Current Facility-Administered Medications: 0.9 % sodium chloride infusion, , Intravenous, PRN  albuterol (PROVENTIL) nebulizer solution 2.5 mg, 2.5 mg, Nebulization, Q4H PRN  famotidine (PEPCID) tablet 40 mg, 40 mg, Oral, Daily  budesonide (PULMICORT) nebulizer suspension 500 mcg, 0.5 mg, Nebulization, BID  iron sucrose (VENOFER) 300 mg in sodium chloride 0.9 % 250 mL IVPB, 300 mg, Intravenous, Q24H  0.9 % on file     Minutes per session: Not on file    Stress: Not on file   Relationships    Social connections     Talks on phone: Not on file     Gets together: Not on file     Attends Zoroastrianism service: Not on file     Active member of club or organization: Not on file     Attends meetings of clubs or organizations: Not on file     Relationship status: Not on file    Intimate partner violence     Fear of current or ex partner: Not on file     Emotionally abused: Not on file     Physically abused: Not on file     Forced sexual activity: Not on file   Other Topics Concern    Not on file   Social History Narrative    Not on file          Family History:     History reviewed. No pertinent family history. REVIEW OF SYSTEMS:      · Constitutional: Denies fever, sweats, weight loss. .     · Eyes: No visual changes or diplopia. No scleral icterus. · ENT: No Headaches, hearing loss or vertigo. No mouth sores or sore throat. · Cardiovascular: No chest pain, dyspnea on exertion, palpitations or loss of consciousness. · Respiratory: No cough or wheezing, no sputum production. No hemoptysis. .    · Gastrointestinal: No abdominal pain, appetite loss, blood in stools. No change in bowel habits. · Genitourinary: No dysuria, trouble voiding, or hematuria. · Musculoskeletal:  Generalized weakness. No joint complaints. · Integumentary: No rash or pruritis. · Neurological: No headache, diplopia. No change in gait, balance, or coordination. No paresthesias. · Endocrine: No temperature intolerance. No excessive thirst, fluid intake, or urination. · Hematologic/Lymphatic: No abnormal bruising or ecchymoses, blood clots or swollen lymph nodes. · Allergic/Immunologic: No nasal congestion or hives.          PHYSICAL EXAM:      Vitals:  /67   Pulse 82   Temp 98.3 °F (36.8 °C) (Oral)   Resp 16   Ht 5' 4\" (1.626 m)   Wt 264 lb 15.9 oz (120.2 kg)   SpO2 99%   BMI 45.49 kg/m²     CONSTITUTIONAL:  awake, alert, cooperative, no apparent distress, and appears stated age NAD  EYES:  Lids and lashes normal, pupils equal, round and reactive to light, extra ocular muscles intact, sclera clear, conjunctiva normal  ENT:  Normocephalic, without obvious abnormality, atramatic, sinuses nontender on palpation, external ears without lesions, oral pharynx with moist mucus membranes, tonsils without erythema or exudates, gums normal and good dentition. NECK:  Supple, symmetrical, trachea midline, no adenopathy, thyroid symmetric, not enlarged and no tenderness, skin normal  HEMATOLOGIC/LYMPHATICS:  no cervical lymphadenopathy, no supraclavicular lymphadenopathy, no axillary lymphadenopathy and no inguinal lymphadenopathy  LUNGS:  No increased work of breathing, good air exchange, clear to auscultation bilaterally, no crackles or wheezing  CARDIOVASCULAR:  , regular rate and rhythm, normal S1 and S2, no S3 or S4, and no murmur noted  ABDOMEN:  No scars, normal bowel sounds, soft, non-distended, non-tender, no masses palpated, no hepatosplenomegally  MUSCULOSKELETAL:  There is no redness, warmth, or swelling of the joints. Full range of motion noted. Motor strength is 5 out of 5 all extremities bilaterally. NEUROLOGIC:  Awake, alert, oriented to name, place and time. Cranial nerves II-XII are grossly intact. Motor is 5 out of 5 bilaterally. SKIN:  no bruising or bleeding      DATA:    PT/INR:    Recent Labs     03/14/21  0925   PROT 8.1     PTT:  No results for input(s): APTT in the last 72 hours.   CMP:    Lab Results   Component Value Date     03/15/2021    K 3.5 03/15/2021    CL 98 03/15/2021    CO2 33 03/15/2021    BUN 18 03/15/2021    PROT 8.1 03/14/2021     Magnesium:    Lab Results   Component Value Date    MG 2.50 03/15/2021     Phosphorus:  No components found for: PO4  Calcium:  No components found for: CA  CBC:    Lab Results   Component Value Date    WBC 9.9 03/15/2021    RBC 2.40 03/15/2021    HGB 8.3 03/15/2021    HCT 25.9 03/15/2021    MCV 85.7 03/15/2021    RDW 16.3 03/15/2021     03/15/2021     DIFF:    Lab Results   Component Value Date    MCV 85.7 03/15/2021    RDW 16.3 03/15/2021        IMPRESSION/RECOMMENDATIONS:    1. Iron Deficiency anemia:  At this point she is clearly severely anemic and iron deficient. This is the most likely etiology for her anemia. We will give her IV iron to replace that. If she does not normalize her hemoglobin following that we will need to consider a bone marrow aspirate and biopsy. Of course she will need a GI work-up with both upper and lower endoscopies in the not too distant future to try to understand why she is losing blood. I related all this to her. She had several questions which I believe were answered adequately. Most of this could, of course, be done as an outpatient and I have given her my card for follow-up purposes. Thank you for the consultation. Will follow with you    Yogesh Grimm.  Kenny Nova, [address] [place] [address]

## 2021-03-15 NOTE — PROGRESS NOTES
Internal Medicine  Progress Note  PGY-3    Hospital Day: 2                                                      Admit Date: 3/14/2021                                     PCP: Blake Negron      CC: Dizziness                       Interval Hx: No acute events overnight    Daily Plan:  3/15/2021    Subjective: Patient seen and examined at bedside. Patient denies dizziness or lightheadedness this morning. No fevers, chills, chest pain, SOB, abdominal pain, nausea, vomiting, dysuria, hematuria, melena, hematochezia. Medications:    Scheduled Meds:   famotidine  40 mg Oral Daily    fluticasone  1 puff Inhalation BID    sodium chloride  1,000 mL Intravenous Once    sodium chloride flush  10 mL Intravenous 2 times per day    sennosides-docusate sodium  1 tablet Oral BID    levothyroxine  100 mcg Oral QAM AC    insulin lispro  0.08 Units/kg Subcutaneous TID WC    insulin lispro  0-6 Units Subcutaneous TID WC    insulin lispro  0-3 Units Subcutaneous Nightly    miconazole   Topical BID      Continuous Infusions:   sodium chloride      sodium chloride      dextrose       PRN Meds:sodium chloride, albuterol, sodium chloride, sodium chloride flush, promethazine **OR** ondansetron, magnesium hydroxide, acetaminophen **OR** acetaminophen, glucose, dextrose, glucagon (rDNA), dextrose    Allergies: Allergies   Allergen Reactions    Aleve [Naproxen]     Tums [Calcium Carbonate Antacid]          Physical Exam:     Vitals: /75   Pulse 82   Temp 97.5 °F (36.4 °C) (Oral)   Resp 18   Ht 5' 4\" (1.626 m)   Wt 264 lb 15.9 oz (120.2 kg)   SpO2 94%   BMI 45.49 kg/m²     I/O:      Intake/Output Summary (Last 24 hours) at 3/15/2021 1113  Last data filed at 3/14/2021 2206  Gross per 24 hour   Intake 770 ml   Output 2300 ml   Net -1530 ml         Physical Exam  Constitutional:       General: She is not in acute distress. Appearance: She is obese. She is not ill-appearing, toxic-appearing or diaphoretic. HENT:      Head: Normocephalic and atraumatic. Eyes:      General: No scleral icterus. Right eye: No discharge. Left eye: No discharge. Extraocular Movements: Extraocular movements intact. Conjunctiva/sclera: Conjunctivae normal.      Pupils: Pupils are equal, round, and reactive to light. Cardiovascular:      Rate and Rhythm: Normal rate and regular rhythm. Heart sounds: Normal heart sounds. No murmur. No friction rub. No gallop. Pulmonary:      Effort: Pulmonary effort is normal. No respiratory distress. Breath sounds: Normal breath sounds. No wheezing, rhonchi or rales. Abdominal:      General: Bowel sounds are normal. There is no distension. Palpations: Abdomen is soft. Tenderness: There is no abdominal tenderness. There is no guarding or rebound. Musculoskeletal:         General: No swelling or tenderness. Comments: BLLE with venous stasis changes    Skin:     General: Skin is warm and dry. Coloration: Skin is pale. Skin is not jaundiced. Neurological:      General: No focal deficit present. Mental Status: Mental status is at baseline.              DATA:       Labs  CBC:   Recent Labs     03/14/21  0925 03/14/21  1422 03/14/21  2213 03/15/21  0642   WBC 14.2* 12.4*  --  9.9   HGB 6.3* 6.1* 7.3* 6.8*   HCT 19.7* 19.2* 22.8* 20.6*    298  --  292       BMP:   Recent Labs     03/14/21  0925 03/15/21  0642   * 139   K 3.3* 3.5   CL 90* 98*   CO2 32 33*   BUN 40* 18   CREATININE 1.6* 1.0   GLUCOSE 121* 106*     LFT's:   Recent Labs     03/14/21 0925   AST 26   ALT 30   BILITOT <0.2   ALKPHOS 135*     Troponin:   Recent Labs     03/14/21 0925   TROPONINI <0.01     CK:   Recent Labs     03/14/21 0925   CKTOTAL 90       Urinalysis:  Lab Results   Component Value Date    NITRU Negative 03/14/2021    BLOODU Negative 03/14/2021    SPECGRAV 1.020 03/14/2021    GLUCOSEU Negative 03/14/2021       Radiology:  CT HEAD WO CONTRAST   Final Result      No acute intracranial abnormality or significant mass effect. XR CHEST PORTABLE   Final Result      No acute disease. ASSESSMENT AND PLAN:     Acute anemia - unclear etiology, FOBT negative, haptoglobin 263, iron 30, iron sat 7, ferritin 44. B12, folate wnl.  S/p 1 unit pRBC  - consider heme consult  - trend h/h  - transfuse for hgb< 7, transfusing another 1 unit pRBC this morning.      Hypokalemia - resolved     # HTN - currently normotensive, monitor   # Hypothyroid - continue home synthroid   # HLD  # Depression - patient unsure of cymbalta dose, awaiting med rec  # T2DM - a1c 5.4, not on any medications at home, LDSSI  # Chronic heart failure - holding home torsemide     Code Status:Full Code  FEN: Diet NPO, After Midnight  PPX: SCDs  DISPO: GMF      I will discuss the patient with Angel Alcaraz MD  -----------------------------  Rachel Cam MD  Internal Medicine Resident, PGY-3

## 2021-03-15 NOTE — PLAN OF CARE
Problem: Pain:  Goal: Pain level will decrease  Description: Pain level will decrease  Outcome: Ongoing     Problem: Falls - Risk of:  Goal: Will remain free from falls  Description: Will remain free from falls  Outcome: Ongoing  Goal: Absence of physical injury  Description: Absence of physical injury  Outcome: Ongoing

## 2021-03-15 NOTE — PROGRESS NOTES
Call from lab.  hgb 6.8/hct 20.6. MD notified via perfect serve. 2. The status of comorbities. (See ED/admit documents)

## 2021-03-15 NOTE — PROGRESS NOTES
Physician Progress Note      PATIENT:               Jasson Hill  CSN #:                  [id number]  :                       1960  ADMIT DATE:       3/14/2021 9:17 AM  DISCH DATE:  RESPONDING  PROVIDER #:        Kirk Peña MD        QUERY TEXT:    Stage of Chronic Kidney Disease: Please provide further specificity, if known. Clinical indicators include: chronic kidney disease, bun, creatinine  Options provided:  -- Chronic kidney disease stage 1  -- Chronic kidney disease stage 2  -- Chronic kidney disease stage 3  -- Chronic kidney disease stage 3a  -- Chronic kidney disease stage 3b  -- Chronic kidney disease stage 4  -- Chronic kidney disease stage 5  -- Chronic kidney disease stage 5, requiring dialysis  -- End stage renal disease  -- Other - I will add my own diagnosis  -- Disagree - Not applicable / Not valid  -- Disagree - Clinically Unable to determine / Unknown        PROVIDER RESPONSE TEXT:    The patient has chronic kidney disease stage 1.       Electronically signed by:  Kirk Peña MD 3/15/2021 3:27 PM

## 2021-03-16 VITALS
DIASTOLIC BLOOD PRESSURE: 76 MMHG | HEART RATE: 88 BPM | WEIGHT: 255.8 LBS | TEMPERATURE: 98.4 F | RESPIRATION RATE: 16 BRPM | HEIGHT: 64 IN | SYSTOLIC BLOOD PRESSURE: 131 MMHG | OXYGEN SATURATION: 93 % | BODY MASS INDEX: 43.67 KG/M2

## 2021-03-16 LAB
AMORPHOUS: NORMAL /HPF
ANION GAP SERPL CALCULATED.3IONS-SCNC: 10 MMOL/L (ref 3–16)
BILIRUBIN URINE: NEGATIVE
BLOOD, URINE: NEGATIVE
BUN BLDV-MCNC: 13 MG/DL (ref 7–20)
CALCIUM SERPL-MCNC: 9.6 MG/DL (ref 8.3–10.6)
CHLORIDE BLD-SCNC: 96 MMOL/L (ref 99–110)
CLARITY: CLEAR
CO2: 29 MMOL/L (ref 21–32)
COLOR: YELLOW
CREAT SERPL-MCNC: 1 MG/DL (ref 0.6–1.2)
GFR AFRICAN AMERICAN: >60
GFR NON-AFRICAN AMERICAN: 56
GLUCOSE BLD-MCNC: 106 MG/DL (ref 70–99)
GLUCOSE BLD-MCNC: 117 MG/DL (ref 70–99)
GLUCOSE BLD-MCNC: 118 MG/DL (ref 70–99)
GLUCOSE BLD-MCNC: 98 MG/DL (ref 70–99)
GLUCOSE URINE: NEGATIVE MG/DL
HCT VFR BLD CALC: 25.4 % (ref 36–48)
HEMOGLOBIN: 8.3 G/DL (ref 12–16)
KETONES, URINE: NEGATIVE MG/DL
LEUKOCYTE ESTERASE, URINE: ABNORMAL
MAGNESIUM: 2.4 MG/DL (ref 1.8–2.4)
MCH RBC QN AUTO: 27.7 PG (ref 26–34)
MCHC RBC AUTO-ENTMCNC: 32.6 G/DL (ref 31–36)
MCV RBC AUTO: 85.1 FL (ref 80–100)
MICROSCOPIC EXAMINATION: YES
NITRITE, URINE: NEGATIVE
PDW BLD-RTO: 16.6 % (ref 12.4–15.4)
PERFORMED ON: ABNORMAL
PH UA: 6.5 (ref 5–8)
PLATELET # BLD: 344 K/UL (ref 135–450)
PMV BLD AUTO: 6.9 FL (ref 5–10.5)
POTASSIUM REFLEX MAGNESIUM: 3.4 MMOL/L (ref 3.5–5.1)
PROTEIN UA: NEGATIVE MG/DL
RBC # BLD: 2.98 M/UL (ref 4–5.2)
RBC UA: NORMAL /HPF (ref 0–4)
SODIUM BLD-SCNC: 135 MMOL/L (ref 136–145)
SPECIFIC GRAVITY UA: 1.01 (ref 1–1.03)
URINE TYPE: ABNORMAL
UROBILINOGEN, URINE: 0.2 E.U./DL
WBC # BLD: 10.9 K/UL (ref 4–11)
WBC UA: NORMAL /HPF (ref 0–5)

## 2021-03-16 PROCEDURE — 85027 COMPLETE CBC AUTOMATED: CPT

## 2021-03-16 PROCEDURE — 6360000002 HC RX W HCPCS: Performed by: INTERNAL MEDICINE

## 2021-03-16 PROCEDURE — 80048 BASIC METABOLIC PNL TOTAL CA: CPT

## 2021-03-16 PROCEDURE — 2580000003 HC RX 258: Performed by: INTERNAL MEDICINE

## 2021-03-16 PROCEDURE — 6370000000 HC RX 637 (ALT 250 FOR IP): Performed by: STUDENT IN AN ORGANIZED HEALTH CARE EDUCATION/TRAINING PROGRAM

## 2021-03-16 PROCEDURE — 83735 ASSAY OF MAGNESIUM: CPT

## 2021-03-16 PROCEDURE — 6370000000 HC RX 637 (ALT 250 FOR IP): Performed by: INTERNAL MEDICINE

## 2021-03-16 PROCEDURE — 94640 AIRWAY INHALATION TREATMENT: CPT

## 2021-03-16 PROCEDURE — 81001 URINALYSIS AUTO W/SCOPE: CPT

## 2021-03-16 PROCEDURE — 6360000002 HC RX W HCPCS: Performed by: STUDENT IN AN ORGANIZED HEALTH CARE EDUCATION/TRAINING PROGRAM

## 2021-03-16 PROCEDURE — 94761 N-INVAS EAR/PLS OXIMETRY MLT: CPT

## 2021-03-16 PROCEDURE — 36415 COLL VENOUS BLD VENIPUNCTURE: CPT

## 2021-03-16 RX ORDER — FERROUS SULFATE 325(65) MG
325 TABLET ORAL 2 TIMES DAILY
Qty: 180 TABLET | Refills: 1 | Status: ON HOLD | OUTPATIENT
Start: 2021-03-16 | End: 2021-03-26

## 2021-03-16 RX ORDER — POTASSIUM CHLORIDE 20 MEQ/1
40 TABLET, EXTENDED RELEASE ORAL ONCE
Status: COMPLETED | OUTPATIENT
Start: 2021-03-16 | End: 2021-03-16

## 2021-03-16 RX ORDER — PANTOPRAZOLE SODIUM 40 MG/1
40 TABLET, DELAYED RELEASE ORAL DAILY
Qty: 90 TABLET | Refills: 1 | Status: SHIPPED | OUTPATIENT
Start: 2021-03-16

## 2021-03-16 RX ADMIN — GABAPENTIN 300 MG: 300 CAPSULE ORAL at 13:32

## 2021-03-16 RX ADMIN — DULOXETINE HYDROCHLORIDE 60 MG: 60 CAPSULE, DELAYED RELEASE ORAL at 08:11

## 2021-03-16 RX ADMIN — CLONAZEPAM 0.5 MG: 0.5 TABLET ORAL at 08:11

## 2021-03-16 RX ADMIN — IRON SUCROSE 300 MG: 20 INJECTION, SOLUTION INTRAVENOUS at 13:27

## 2021-03-16 RX ADMIN — BUPROPION HYDROCHLORIDE 300 MG: 150 TABLET, FILM COATED, EXTENDED RELEASE ORAL at 08:11

## 2021-03-16 RX ADMIN — LEVOTHYROXINE SODIUM 100 MCG: 0.1 TABLET ORAL at 06:38

## 2021-03-16 RX ADMIN — BUDESONIDE INHALATION 500 MCG: 0.5 SUSPENSION RESPIRATORY (INHALATION) at 08:30

## 2021-03-16 RX ADMIN — POTASSIUM CHLORIDE 40 MEQ: 1500 TABLET, EXTENDED RELEASE ORAL at 10:36

## 2021-03-16 RX ADMIN — FAMOTIDINE 40 MG: 20 TABLET, FILM COATED ORAL at 08:12

## 2021-03-16 RX ADMIN — Medication 10 ML: at 08:15

## 2021-03-16 RX ADMIN — MICONAZOLE NITRATE: 20 POWDER TOPICAL at 08:12

## 2021-03-16 RX ADMIN — GABAPENTIN 300 MG: 300 CAPSULE ORAL at 08:11

## 2021-03-16 RX ADMIN — DOCUSATE SODIUM 50 MG AND SENNOSIDES 8.6 MG 1 TABLET: 8.6; 5 TABLET, FILM COATED ORAL at 08:12

## 2021-03-16 ASSESSMENT — PAIN SCALES - GENERAL: PAINLEVEL_OUTOF10: 0

## 2021-03-16 NOTE — PLAN OF CARE
Problem: Pain:  Goal: Pain level will decrease  Description: Pain level will decrease  Outcome: Ongoing  Note: Pt with complaints of restless legs. Consulted MD and gabapentin ordered. Pt satisfied. Problem: Falls - Risk of:  Goal: Will remain free from falls  Description: Will remain free from falls  Outcome: Ongoing  Note: Pt is a High fall risk. See Ann Marie Ordonez Fall Score and ABCDS Injury Risk assessments.   + Screening for Orthostasis and/or + High Fall Risk per FRANKLIN/ABCDS: Explained fall risk precautions to pt and family and rationale behind their use to keep the patient safe. Pt bed is in low position, side rails up, call light and belongings are in reach. Fall wristband applied and present on pts wrist.  Bed alarm on. Pt encouraged to call for assistance. Will continue with hourly rounds for PO intake, pain needs, toileting and repositioning as needed.

## 2021-03-16 NOTE — DISCHARGE SUMMARY
INTERNAL MEDICINE    RESIDENT DISCHARGE SUMMARY   Discharge Summaries      Patient ID: Kyung Nguyen   Gender: female      :  1960  AGE: 63 y.o. MRN:  [id number]  Code Status: Full Code   PCP: Ada Nam    Admit date:  3/14/2021      Discharge date:  21    Admitting Physician:  Ana Francois MD    Discharge Physician: Rachel Palmer MD     Discharged Condition:  Stable    Discharge Diagnoses:  Dizziness   Iron Deficiency Anemia     The patient was seen and examined on day of discharge and this discharge summary is in conjunction with any daily progress note from day of discharge. Hospital Course:   63 y.o. female who presented to [facility] with fatigue for 1 week worsening over the past few days. Found to have hemoglobin 6.9. FOBT negative and no evidence of bleeding. Patient admitted for further workup.      Patient required 2 units pRBC during admission. Labs studies were consistent with iron deficiency anemia. Patient received 2 doses IV venofer prior to discharge and is to follow up with Hematology for a 3rd infusion. She was also discharged on Ferrous Sulfate. Patient also instructed to follow up with Gastroenterology for EGD and Colonoscopy. Patient was already supposed to be off of Eliquis per her report, so this was stopped on discharge. Aspirin also held at discharge.      Patient remained normotensive during admission. Prazosin stopped on discharge. Patient was also on antibiotics for concerns of cellulitis prior to admission. Patient's lower extremities seemed more consistent with chronic venous stasis dermatitis, and therefore antibiotics were stopped.      Patient discharged in stable condition.      Disposition:  Home    Physical Exam Performed:     /76   Pulse 88   Temp 98.4 °F (36.9 °C) (Oral)   Resp 16   Ht 5' 4\" (1.626 m)   Wt 255 lb 12.8 oz (116 kg)   SpO2 93%   BMI 43.91 kg/m²     Physical Exam    Constitutional:       General: She is not in acute distress.     Appearance: She is obese. She is not ill-appearing, toxic-appearing or diaphoretic. HENT:      Head: Normocephalic and atraumatic. Eyes:      General: No scleral icterus.        Right eye: No discharge.         Left eye: No discharge.      Extraocular Movements: Extraocular movements intact.      Conjunctiva/sclera: Conjunctivae normal.      Pupils: Pupils are equal, round, and reactive to light. Cardiovascular:      Rate and Rhythm: Normal rate and regular rhythm.      Heart sounds: Normal heart sounds. No murmur. No friction rub. No gallop.    Pulmonary:      Effort: Pulmonary effort is normal. No respiratory distress.      Breath sounds: Normal breath sounds. No wheezing, rhonchi or rales. Abdominal:      General: Bowel sounds are normal. There is no distension.      Palpations: Abdomen is soft.      Tenderness: There is no abdominal tenderness. There is no guarding or rebound. Musculoskeletal:         General: No swelling or tenderness.      Comments: BLLE with venous stasis changes    Skin:     General: Skin is warm and dry.      Coloration: Skin is pale. Skin is not jaundiced. Neurological:      General: No focal deficit present.      Mental Status: Mental status is at baseline.     Labs: For convenience and continuity at follow-up the following most recent labs are provided:      CBC:    Lab Results   Component Value Date    WBC 10.9 03/16/2021    HGB 8.3 03/16/2021    HCT 25.4 03/16/2021     03/16/2021       Renal:    Lab Results   Component Value Date     03/16/2021    K 3.4 03/16/2021    CL 96 03/16/2021    CO2 29 03/16/2021    BUN 13 03/16/2021    CREATININE 1.0 03/16/2021    CALCIUM 9.6 03/16/2021         Significant Diagnostic Studies    Radiology:   CT HEAD WO CONTRAST   Final Result      No acute intracranial abnormality or significant mass effect. XR CHEST PORTABLE   Final Result      No acute disease.                          Consults:     IP CONSULT TO SOCIAL WORK  IP CONSULT TO PHARMACY  IP CONSULT TO HEMATOLOGY      Discharge Instructions/Follow-up:  Follow up with PCP in 1 week, hematology in 2 days, and gastroenterology in 1 week. Activity: activity as tolerated    Diet: regular diet    Discharge Medications:     Current Discharge Medication List           Details   ferrous sulfate (IRON 325) 325 (65 Fe) MG tablet Take 1 tablet by mouth 2 times daily  Qty: 180 tablet, Refills: 1      pantoprazole (PROTONIX) 40 MG tablet Take 1 tablet by mouth daily  Qty: 90 tablet, Refills: 1              Details   clonazePAM (KLONOPIN) 0.5 MG tablet Take 0.5 mg by mouth daily as needed. buPROPion (WELLBUTRIN XL) 300 MG extended release tablet Take 300 mg by mouth every morning      levothyroxine (SYNTHROID) 125 MCG tablet Take 125 mcg by mouth Daily      ARIPiprazole (ABILIFY) 10 MG tablet Take 10 mg by mouth daily      diphenhydrAMINE (BENADRYL ALLERGY) 25 MG tablet Take 25 mg by mouth every 6 hours as needed for Itching      DULoxetine (CYMBALTA) 30 MG extended release capsule Take 30 mg by mouth daily      fluticasone (FLOVENT HFA) 220 MCG/ACT inhaler Inhale 1 puff into the lungs 2 times daily      gabapentin (NEURONTIN) 300 MG capsule TAKE ONE CAPSULE BY MOUTH THREE TIMES A DAY      loperamide (IMODIUM) 2 MG capsule TAKE ONE CAPSULE BY MOUTH THREE TIMES A DAY AS NEEDED FOR DIARRHEA      potassium bicarbonate (K-LYTE) 25 MEQ disintegrating tablet Take 25 mEq by mouth 2 times daily Pt has been forgetting to take; does not fit in her weekly med planner      traMADol (ULTRAM) 50 MG tablet Take 50 mg by mouth every 6 hours as needed for Pain.        albuterol (PROVENTIL HFA;VENTOLIN HFA) 108 (90 BASE) MCG/ACT inhaler Inhale 2 puffs into the lungs every 6 hours as needed for Wheezing      vitamin D (CHOLECALCIFEROL) 5000 UNITS CAPS capsule Take 5,000 Units by mouth daily       metoprolol (LOPRESSOR) 25 MG tablet Take 25 mg by mouth 2 times daily

## 2021-03-16 NOTE — PROGRESS NOTES
Pt discharged pt to home via lift. Peripheral IV removed. Discharge paperwork reviewed with patient, patient stated understanding. All needs met.

## 2021-03-16 NOTE — PROGRESS NOTES
4 Eyes Admission Assessment     I agree as the admission nurse that 2 RN's have performed a thorough Head to Toe Skin Assessment on the patient. ALL assessment sites listed below have been assessed on admission. Areas assessed by both nurses: Matilde Mcconnell and Daron Peper  [x]   Head, Face, and Ears   [x]   Shoulders, Back, and Chest  [x]   Arms, Elbows, and Hands   [x]   Coccyx, Sacrum, and Ischium  [x]   Legs, Feet, and Heels        Does the Patient have Skin Breakdown?   Yes a wound was noted on the Admission Assessment and an LDA was Initiated documentation include the Kyra-wound, Wound Assessment, Measurements, Dressing Treatment, Drainage, and Color\",         Justyn Prevention initiated:  Yes   Wound Care Orders initiated:  NA      WOC nurse consulted for Pressure Injury (Stage 3,4, Unstageable, DTI, NWPT, and Complex wounds) or Justyn score 18 or lower:  Yes      Nurse 1 eSignature: Electronically signed by Rebeca Mukherjee RN on 3/15/21 at 9:18 PM EDT    **SHARE this note so that the co-signing nurse is able to place an eSignature**    Nurse 2 eSignature: Electronically signed by Indira Fong RN on 3/15/21 at 9:46 PM EDT

## 2021-03-16 NOTE — PROGRESS NOTES
INTERNAL MEDICINE    RESIDENT DISCHARGE SUMMARY   Discharge Summaries      Patient ID: Ellis Bishop   Gender: female      :  1960  AGE: 63 y.o. MRN:  [id number]  Code Status: Full Code   PCP: Chey Ferrer    Admit date:  3/14/2021      Discharge date:  21    Admitting Physician:  Claudene Rook, MD    Discharge Physician: Dalton Arambula MD     Discharged Condition:  Stable    Discharge Diagnoses:  Dizziness   Iron Deficiency Anemia     The patient was seen and examined on day of discharge and this discharge summary is in conjunction with any daily progress note from day of discharge. Hospital Course:   63 y.o. female who presented to [facility] with fatigue for 1 week worsening over the past few days. Found to have hemoglobin 6.9. FOBT negative and no evidence of bleeding. Patient admitted for further workup. Patient required 2 units pRBC during admission. Labs studies were consistent with iron deficiency anemia. Patient received 2 doses IV venofer prior to discharge and is to follow up with Hematology for a 3rd infusion. She was also discharged on Ferrous Sulfate. Patient also instructed to follow up with Gastroenterology for EGD and Colonoscopy. Patient was already supposed to be off of Eliquis per her report, so this was stopped on discharge. Aspirin also held at discharge. Patient remained normotensive during admission. Prazosin stopped on discharge. Patient was also on antibiotics for concerns of cellulitis prior to admission. Patient's lower extremities seemed more consistent with chronic venous stasis dermatitis, and therefore antibiotics were stopped. Patient discharged in stable condition.      Disposition:  Home    Physical Exam Performed:     /76   Pulse 88   Temp 98.4 °F (36.9 °C) (Oral)   Resp 16   Ht 5' 4\" (1.626 m)   Wt 255 lb 12.8 oz (116 kg)   SpO2 93%   BMI 43.91 kg/m²     Physical Exam    Constitutional:       General: She is not in acute distress. Appearance: She is obese. She is not ill-appearing, toxic-appearing or diaphoretic. HENT:      Head: Normocephalic and atraumatic. Eyes:      General: No scleral icterus. Right eye: No discharge. Left eye: No discharge. Extraocular Movements: Extraocular movements intact. Conjunctiva/sclera: Conjunctivae normal.      Pupils: Pupils are equal, round, and reactive to light. Cardiovascular:      Rate and Rhythm: Normal rate and regular rhythm. Heart sounds: Normal heart sounds. No murmur. No friction rub. No gallop. Pulmonary:      Effort: Pulmonary effort is normal. No respiratory distress. Breath sounds: Normal breath sounds. No wheezing, rhonchi or rales. Abdominal:      General: Bowel sounds are normal. There is no distension. Palpations: Abdomen is soft. Tenderness: There is no abdominal tenderness. There is no guarding or rebound. Musculoskeletal:         General: No swelling or tenderness. Comments: BLLE with venous stasis changes    Skin:     General: Skin is warm and dry. Coloration: Skin is pale. Skin is not jaundiced. Neurological:      General: No focal deficit present. Mental Status: Mental status is at baseline. Labs: For convenience and continuity at follow-up the following most recent labs are provided:      CBC:    Lab Results   Component Value Date    WBC 10.9 03/16/2021    HGB 8.3 03/16/2021    HCT 25.4 03/16/2021     03/16/2021       Renal:    Lab Results   Component Value Date     03/16/2021    K 3.4 03/16/2021    CL 96 03/16/2021    CO2 29 03/16/2021    BUN 13 03/16/2021    CREATININE 1.0 03/16/2021    CALCIUM 9.6 03/16/2021         Significant Diagnostic Studies    Radiology:   CT HEAD WO CONTRAST   Final Result      No acute intracranial abnormality or significant mass effect. XR CHEST PORTABLE   Final Result      No acute disease.                          Consults:     IP CONSULT TO SOCIAL WORK  IP CONSULT TO PHARMACY  IP CONSULT TO HEMATOLOGY      Discharge Instructions/Follow-up:  Follow up with PCP in 1 week, hematology in 2 days, and gastroenterology in 1 week. Activity: activity as tolerated    Diet: regular diet    Discharge Medications:     Current Discharge Medication List           Details   ferrous sulfate (IRON 325) 325 (65 Fe) MG tablet Take 1 tablet by mouth 2 times daily  Qty: 180 tablet, Refills: 1      pantoprazole (PROTONIX) 40 MG tablet Take 1 tablet by mouth daily  Qty: 90 tablet, Refills: 1              Details   clonazePAM (KLONOPIN) 0.5 MG tablet Take 0.5 mg by mouth daily as needed. buPROPion (WELLBUTRIN XL) 300 MG extended release tablet Take 300 mg by mouth every morning      levothyroxine (SYNTHROID) 125 MCG tablet Take 125 mcg by mouth Daily      ARIPiprazole (ABILIFY) 10 MG tablet Take 10 mg by mouth daily      diphenhydrAMINE (BENADRYL ALLERGY) 25 MG tablet Take 25 mg by mouth every 6 hours as needed for Itching      DULoxetine (CYMBALTA) 30 MG extended release capsule Take 30 mg by mouth daily      fluticasone (FLOVENT HFA) 220 MCG/ACT inhaler Inhale 1 puff into the lungs 2 times daily      gabapentin (NEURONTIN) 300 MG capsule TAKE ONE CAPSULE BY MOUTH THREE TIMES A DAY      loperamide (IMODIUM) 2 MG capsule TAKE ONE CAPSULE BY MOUTH THREE TIMES A DAY AS NEEDED FOR DIARRHEA      potassium bicarbonate (K-LYTE) 25 MEQ disintegrating tablet Take 25 mEq by mouth 2 times daily Pt has been forgetting to take; does not fit in her weekly med planner      traMADol (ULTRAM) 50 MG tablet Take 50 mg by mouth every 6 hours as needed for Pain.        albuterol (PROVENTIL HFA;VENTOLIN HFA) 108 (90 BASE) MCG/ACT inhaler Inhale 2 puffs into the lungs every 6 hours as needed for Wheezing      vitamin D (CHOLECALCIFEROL) 5000 UNITS CAPS capsule Take 5,000 Units by mouth daily       metoprolol (LOPRESSOR) 25 MG tablet Take 25 mg by mouth 2 times daily

## 2021-03-16 NOTE — PROGRESS NOTES
Patient transferred from [phone] to [phone]. All belongings with transferred with patient. Report given to receiving nurse. Will continue to monitor.

## 2021-03-16 NOTE — PROGRESS NOTES
Pt states having restless leg in legs. Takes gabapentin and tramadol at home. RN notified MD. See new orders.

## 2021-03-16 NOTE — CONSULTS
[facility]                        Consult Note      Requesting Physician:  Aravind Maloney    CHIEF COMPLAINT:   Chief Complaint   Patient presents with    Dizziness     Pt c/o constant dizziness x 1 week. HISTORY OF PRESENT ILLNESS:      Ms. Ricky Parker  is a 63 y.o. female we are seeing in consultation for Anemia. Patient is a delightful 27-year-old white female who was admitted with severe anemia weakness and shortness of breath. On admission her hemoglobin was 6.8 down from 11.9 on a previous CBC in 2019. The patient relates no prior history of anemia or transfusion. Her last colonoscopy was about 10 years ago. She has not noted any blood in her stools or urine. She has not had periods for quite a long period of time, she tells me due to taking medications to suppress them. She has been transfused overnight. On admission iron studies were drawn showing her to have severe iron deficiency. Of note vitamin B12 and folate were both normal as was her haptoglobin. Past Medical History:        Diagnosis Date    Anemia     Anxiety     Arthritis     Back pain     CAD (coronary artery disease)     Cancer ([facility] [address]     CHF (congestive heart failure) (HCC)     Chronic kidney disease     Depression     Diabetes mellitus (HCC)     Esophageal reflux     Hep C w/o coma, chronic (HCC)     Hyperlipidemia     Hypertension     Leg abscess     Neuropathy     Schizoaffective disorder ([facility] [address]     Thyroid disease      Past Surgical History:    History reviewed. No pertinent surgical history.     Current Medications:    Current Facility-Administered Medications: 0.9 % sodium chloride infusion, , Intravenous, PRN  albuterol (PROVENTIL) nebulizer solution 2.5 mg, 2.5 mg, Nebulization, Q4H PRN  famotidine (PEPCID) tablet 40 mg, 40 mg, Oral, Daily  budesonide (PULMICORT) nebulizer suspension 500 mcg, 0.5 mg, Nebulization, BID  iron sucrose (VENOFER) 300 mg in sodium chloride 0.9 % 250 mL IVPB, 300 mg, Intravenous, Q24H  buPROPion (WELLBUTRIN XL) extended release tablet 300 mg, 300 mg, Oral, QAM  clonazePAM (KLONOPIN) tablet 0.5 mg, 0.5 mg, Oral, Daily  DULoxetine (CYMBALTA) extended release capsule 60 mg, 60 mg, Oral, Daily  traMADol (ULTRAM) tablet 50 mg, 50 mg, Oral, Q6H PRN  gabapentin (NEURONTIN) capsule 300 mg, 300 mg, Oral, TID  0.9 % sodium chloride bolus, 1,000 mL, Intravenous, Once  0.9 % sodium chloride infusion, , Intravenous, PRN  sodium chloride flush 0.9 % injection 10 mL, 10 mL, Intravenous, 2 times per day  sodium chloride flush 0.9 % injection 10 mL, 10 mL, Intravenous, PRN  promethazine (PHENERGAN) tablet 12.5 mg, 12.5 mg, Oral, Q6H PRN **OR** ondansetron (ZOFRAN) injection 4 mg, 4 mg, Intravenous, Q6H PRN  sennosides-docusate sodium (SENOKOT-S) 8.6-50 MG tablet 1 tablet, 1 tablet, Oral, BID  magnesium hydroxide (MILK OF MAGNESIA) 400 MG/5ML suspension 30 mL, 30 mL, Oral, Daily PRN  acetaminophen (TYLENOL) tablet 650 mg, 650 mg, Oral, Q6H PRN **OR** acetaminophen (TYLENOL) suppository 650 mg, 650 mg, Rectal, Q6H PRN  levothyroxine (SYNTHROID) tablet 100 mcg, 100 mcg, Oral, QAM AC  glucose (GLUTOSE) 40 % oral gel 15 g, 15 g, Oral, PRN  dextrose 50 % IV solution, 12.5 g, Intravenous, PRN  glucagon (rDNA) injection 1 mg, 1 mg, Intramuscular, PRN  dextrose 5 % solution, 100 mL/hr, Intravenous, PRN  insulin lispro (1 Unit Dial) 0-6 Units, 0-6 Units, Subcutaneous, TID WC  insulin lispro (1 Unit Dial) 0-3 Units, 0-3 Units, Subcutaneous, Nightly  miconazole (MICOTIN) 2 % powder, , Topical, BID  Allergies:  Aleve [naproxen] and Tums [calcium carbonate antacid]    Social History:      Social History     Socioeconomic History    Marital status: Legally      Spouse name: Not on file    Number of children: Not on file    Years of education: Not on file    Highest education level: Not on file   Occupational History    Not on file   Social Needs    Financial resource strain: Not on file    Food insecurity Worry: Not on file     Inability: Not on file    Transportation needs     Medical: Not on file     Non-medical: Not on file   Tobacco Use    Smoking status: Never Smoker   Substance and Sexual Activity    Alcohol use: No    Drug use: No    Sexual activity: Not on file   Lifestyle    Physical activity     Days per week: Not on file     Minutes per session: Not on file    Stress: Not on file   Relationships    Social connections     Talks on phone: Not on file     Gets together: Not on file     Attends Hoahaoism service: Not on file     Active member of club or organization: Not on file     Attends meetings of clubs or organizations: Not on file     Relationship status: Not on file    Intimate partner violence     Fear of current or ex partner: Not on file     Emotionally abused: Not on file     Physically abused: Not on file     Forced sexual activity: Not on file   Other Topics Concern    Not on file   Social History Narrative    Not on file          Family History:     History reviewed. No pertinent family history. REVIEW OF SYSTEMS:      · Constitutional: Denies fever, sweats, weight loss. .     · Eyes: No visual changes or diplopia. No scleral icterus. · ENT: No Headaches, hearing loss or vertigo. No mouth sores or sore throat. · Cardiovascular: No chest pain, dyspnea on exertion, palpitations or loss of consciousness. · Respiratory: No cough or wheezing, no sputum production. No hemoptysis. .    · Gastrointestinal: No abdominal pain, appetite loss, blood in stools. No change in bowel habits. · Genitourinary: No dysuria, trouble voiding, or hematuria. · Musculoskeletal:  Generalized weakness. No joint complaints. · Integumentary: No rash or pruritis. · Neurological: No headache, diplopia. No change in gait, balance, or coordination. No paresthesias. · Endocrine: No temperature intolerance. No excessive thirst, fluid intake, or urination.    · Hematologic/Lymphatic: No abnormal bruising or ecchymoses, blood clots or swollen lymph nodes. · Allergic/Immunologic: No nasal congestion or hives. PHYSICAL EXAM:      Vitals:  /70   Pulse 82   Temp 98.3 °F (36.8 °C) (Oral)   Resp 14   Ht 5' 4\" (1.626 m)   Wt 264 lb 15.9 oz (120.2 kg)   SpO2 (!) 4%   BMI 45.49 kg/m²     CONSTITUTIONAL:  awake, alert, cooperative, no apparent distress, and appears stated age NAD  EYES:  Lids and lashes normal, pupils equal, round and reactive to light, extra ocular muscles intact, sclera clear, conjunctiva normal  ENT:  Normocephalic, without obvious abnormality, atramatic, sinuses nontender on palpation, external ears without lesions, oral pharynx with moist mucus membranes, tonsils without erythema or exudates, gums normal and good dentition. NECK:  Supple, symmetrical, trachea midline, no adenopathy, thyroid symmetric, not enlarged and no tenderness, skin normal  HEMATOLOGIC/LYMPHATICS:  no cervical lymphadenopathy, no supraclavicular lymphadenopathy, no axillary lymphadenopathy and no inguinal lymphadenopathy  LUNGS:  No increased work of breathing, good air exchange, clear to auscultation bilaterally, no crackles or wheezing  CARDIOVASCULAR:  , regular rate and rhythm, normal S1 and S2, no S3 or S4, and no murmur noted  ABDOMEN:  No scars, normal bowel sounds, soft, non-distended, non-tender, no masses palpated, no hepatosplenomegally  MUSCULOSKELETAL:  There is no redness, warmth, or swelling of the joints. Full range of motion noted. Motor strength is 5 out of 5 all extremities bilaterally. NEUROLOGIC:  Awake, alert, oriented to name, place and time. Cranial nerves II-XII are grossly intact. Motor is 5 out of 5 bilaterally. SKIN:  no bruising or bleeding      DATA:    PT/INR:    Recent Labs     03/14/21  0925   PROT 8.1     PTT:  No results for input(s): APTT in the last 72 hours.   CMP:    Lab Results   Component Value Date     03/16/2021    K 3.4 03/16/2021    CL 96 03/16/2021 CO2 29 03/16/2021    BUN 13 03/16/2021    PROT 8.1 03/14/2021     Magnesium:    Lab Results   Component Value Date    MG 2.40 03/16/2021     Phosphorus:  No components found for: PO4  Calcium:  No components found for: CA  CBC:    Lab Results   Component Value Date    WBC 10.9 03/16/2021    RBC 2.98 03/16/2021    HGB 8.3 03/16/2021    HCT 25.4 03/16/2021    MCV 85.1 03/16/2021    RDW 16.6 03/16/2021     03/16/2021     DIFF:    Lab Results   Component Value Date    MCV 85.1 03/16/2021    RDW 16.6 03/16/2021        IMPRESSION/RECOMMENDATIONS:    1. Iron Deficiency anemia:  At this point she is clearly severely anemic and iron deficient. This is the most likely etiology for her anemia. We will give her IV iron to replace that. If she does not normalize her hemoglobin following that we will need to consider a bone marrow aspirate and biopsy. Of course she will need a GI work-up with both upper and lower endoscopies in the not too distant future to try to understand why she is losing blood. I related all this to her. She had several questions which I believe were answered adequately. Most of this could, of course, be done as an outpatient and I have given her my card for follow-up purposes. 3/16/21: Johnathon first day of IV iron well. No new complaints this morning. Has vigorous retic response. Hgb up from yesterday. Will receive second dose of IV iron today, could receive last dose in the office if you wish to discharge. Thank you for the consultation. Will follow with you    Perico Petty.  Renato Pro, [address] [place] [address]

## 2021-03-16 NOTE — PROGRESS NOTES
Patient admitted to [facility] via stretcherfrom 6s for diagnosis of iron deficiency anemia. Patient oriented to patient room including call light and bed controls. Admission assessment completed - see admission flowsheet documentation. Patient is a high fall risk. Safety measures instituted per policy. Patient oriented to unit policies and procedures including: pain management practices, unit safety precautions, family rapid response, q4h vital signs and assessments, daily 4am lab draws, weekly chest x-rays, daily chlorhexidine bathing, standing transfusion orders, and routine central line care. Also discussed use of call light and how to get in touch with nursing staff. Stressed the importance of calling out immediately for any changes in condition including but not limited to: pain, chills, fever, nausea, vomiting, diarrhea, chest pain, sob/solano, assistance with toileting, bleeding, or any other symptoms that are out of the ordinary for the patient. Patient verbalizes understanding of all instructions and will call for assistance as needed.

## 2021-03-26 ENCOUNTER — APPOINTMENT (OUTPATIENT)
Dept: GENERAL RADIOLOGY | Age: 61
DRG: 463 | End: 2021-03-26
Payer: MEDICAID

## 2021-03-26 ENCOUNTER — HOSPITAL ENCOUNTER (INPATIENT)
Age: 61
LOS: 2 days | Discharge: HOME | DRG: 463 | End: 2021-03-28
Attending: EMERGENCY MEDICINE | Admitting: INTERNAL MEDICINE
Payer: MEDICAID

## 2021-03-26 DIAGNOSIS — N39.0 URINARY TRACT INFECTION WITHOUT HEMATURIA, SITE UNSPECIFIED: ICD-10-CM

## 2021-03-26 DIAGNOSIS — N17.9 AKI (ACUTE KIDNEY INJURY) (HCC): ICD-10-CM

## 2021-03-26 DIAGNOSIS — L03.116 LEFT LEG CELLULITIS: ICD-10-CM

## 2021-03-26 DIAGNOSIS — R53.1 GENERALIZED WEAKNESS: Primary | ICD-10-CM

## 2021-03-26 DIAGNOSIS — D50.9 IRON DEFICIENCY ANEMIA, UNSPECIFIED IRON DEFICIENCY ANEMIA TYPE: ICD-10-CM

## 2021-03-26 PROBLEM — L03.90 CELLULITIS: Status: ACTIVE | Noted: 2021-03-26

## 2021-03-26 LAB
A/G RATIO: 1.2 (ref 1.1–2.2)
ALBUMIN SERPL-MCNC: 4 G/DL (ref 3.4–5)
ALP BLD-CCNC: 124 U/L (ref 40–129)
ALT SERPL-CCNC: 22 U/L (ref 10–40)
ANION GAP SERPL CALCULATED.3IONS-SCNC: 10 MMOL/L (ref 3–16)
ANISOCYTOSIS: ABNORMAL
AST SERPL-CCNC: 21 U/L (ref 15–37)
BACTERIA: ABNORMAL /HPF
BASOPHILS ABSOLUTE: 0.1 K/UL (ref 0–0.2)
BASOPHILS RELATIVE PERCENT: 1.1 %
BILIRUB SERPL-MCNC: <0.2 MG/DL (ref 0–1)
BILIRUBIN URINE: NEGATIVE
BLOOD, URINE: NEGATIVE
BUN BLDV-MCNC: 34 MG/DL (ref 7–20)
C-REACTIVE PROTEIN: 21.8 MG/L (ref 0–5.1)
CALCIUM SERPL-MCNC: 9.3 MG/DL (ref 8.3–10.6)
CHLORIDE BLD-SCNC: 99 MMOL/L (ref 99–110)
CLARITY: CLEAR
CO2: 29 MMOL/L (ref 21–32)
COLOR: YELLOW
CREAT SERPL-MCNC: 1.3 MG/DL (ref 0.6–1.2)
EKG ATRIAL RATE: 69 BPM
EKG DIAGNOSIS: NORMAL
EKG P AXIS: 65 DEGREES
EKG P-R INTERVAL: 168 MS
EKG Q-T INTERVAL: 416 MS
EKG QRS DURATION: 90 MS
EKG QTC CALCULATION (BAZETT): 445 MS
EKG R AXIS: -2 DEGREES
EKG T AXIS: 42 DEGREES
EKG VENTRICULAR RATE: 69 BPM
EOSINOPHILS ABSOLUTE: 0.1 K/UL (ref 0–0.6)
EOSINOPHILS RELATIVE PERCENT: 1.9 %
EPITHELIAL CELLS, UA: ABNORMAL /HPF (ref 0–5)
GFR AFRICAN AMERICAN: 50
GFR NON-AFRICAN AMERICAN: 42
GLOBULIN: 3.4 G/DL
GLUCOSE BLD-MCNC: 95 MG/DL (ref 70–99)
GLUCOSE URINE: NEGATIVE MG/DL
HCT VFR BLD CALC: 27.5 % (ref 36–48)
HEMOGLOBIN: 8.7 G/DL (ref 12–16)
HYPOCHROMIA: ABNORMAL
KETONES, URINE: NEGATIVE MG/DL
LACTIC ACID: 0.5 MMOL/L (ref 0.4–2)
LEUKOCYTE ESTERASE, URINE: ABNORMAL
LYMPHOCYTES ABSOLUTE: 1.6 K/UL (ref 1–5.1)
LYMPHOCYTES RELATIVE PERCENT: 23.9 %
MCH RBC QN AUTO: 26.8 PG (ref 26–34)
MCHC RBC AUTO-ENTMCNC: 31.6 G/DL (ref 31–36)
MCV RBC AUTO: 84.9 FL (ref 80–100)
MICROCYTES: ABNORMAL
MICROSCOPIC EXAMINATION: YES
MONOCYTES ABSOLUTE: 0.9 K/UL (ref 0–1.3)
MONOCYTES RELATIVE PERCENT: 14.1 %
NEUTROPHILS ABSOLUTE: 4 K/UL (ref 1.7–7.7)
NEUTROPHILS RELATIVE PERCENT: 59 %
NITRITE, URINE: NEGATIVE
PDW BLD-RTO: 17.9 % (ref 12.4–15.4)
PH UA: 6 (ref 5–8)
PLATELET SLIDE REVIEW: ADEQUATE
POLYCHROMASIA: ABNORMAL
POTASSIUM REFLEX MAGNESIUM: 3.8 MMOL/L (ref 3.5–5.1)
PROTEIN UA: NEGATIVE MG/DL
RBC # BLD: 3.24 M/UL (ref 4–5.2)
RBC UA: ABNORMAL /HPF (ref 0–4)
SEDIMENTATION RATE, ERYTHROCYTE: 76 MM/HR (ref 0–30)
SLIDE REVIEW: ABNORMAL
SODIUM BLD-SCNC: 138 MMOL/L (ref 136–145)
SPECIFIC GRAVITY UA: 1.01 (ref 1–1.03)
SPHEROCYTES: ABNORMAL
STOMATOCYTES: ABNORMAL
T4 FREE: 1.3 NG/DL (ref 0.9–1.8)
TOTAL PROTEIN: 7.4 G/DL (ref 6.4–8.2)
TROPONIN: <0.01 NG/ML
TSH REFLEX: 0.88 UIU/ML (ref 0.27–4.2)
TSH SERPL DL<=0.05 MIU/L-ACNC: 0.89 UIU/ML (ref 0.27–4.2)
URINE REFLEX TO CULTURE: YES
URINE TYPE: ABNORMAL
UROBILINOGEN, URINE: 0.2 E.U./DL
VITAMIN D 25-HYDROXY: 67.3 NG/ML
WBC # BLD: 6.7 K/UL (ref 4–11)
WBC UA: ABNORMAL /HPF (ref 0–5)

## 2021-03-26 PROCEDURE — 97166 OT EVAL MOD COMPLEX 45 MIN: CPT

## 2021-03-26 PROCEDURE — 99284 EMERGENCY DEPT VISIT MOD MDM: CPT

## 2021-03-26 PROCEDURE — 86140 C-REACTIVE PROTEIN: CPT

## 2021-03-26 PROCEDURE — 84484 ASSAY OF TROPONIN QUANT: CPT

## 2021-03-26 PROCEDURE — 87086 URINE CULTURE/COLONY COUNT: CPT

## 2021-03-26 PROCEDURE — 87077 CULTURE AEROBIC IDENTIFY: CPT

## 2021-03-26 PROCEDURE — 87186 SC STD MICRODIL/AGAR DIL: CPT

## 2021-03-26 PROCEDURE — 81001 URINALYSIS AUTO W/SCOPE: CPT

## 2021-03-26 PROCEDURE — 87070 CULTURE OTHR SPECIMN AEROBIC: CPT

## 2021-03-26 PROCEDURE — 73590 X-RAY EXAM OF LOWER LEG: CPT

## 2021-03-26 PROCEDURE — 96365 THER/PROPH/DIAG IV INF INIT: CPT

## 2021-03-26 PROCEDURE — 87205 SMEAR GRAM STAIN: CPT

## 2021-03-26 PROCEDURE — 97162 PT EVAL MOD COMPLEX 30 MIN: CPT

## 2021-03-26 PROCEDURE — 97535 SELF CARE MNGMENT TRAINING: CPT

## 2021-03-26 PROCEDURE — 2580000003 HC RX 258: Performed by: EMERGENCY MEDICINE

## 2021-03-26 PROCEDURE — 71045 X-RAY EXAM CHEST 1 VIEW: CPT

## 2021-03-26 PROCEDURE — 93005 ELECTROCARDIOGRAM TRACING: CPT | Performed by: EMERGENCY MEDICINE

## 2021-03-26 PROCEDURE — 6360000002 HC RX W HCPCS: Performed by: STUDENT IN AN ORGANIZED HEALTH CARE EDUCATION/TRAINING PROGRAM

## 2021-03-26 PROCEDURE — 80053 COMPREHEN METABOLIC PANEL: CPT

## 2021-03-26 PROCEDURE — 97530 THERAPEUTIC ACTIVITIES: CPT

## 2021-03-26 PROCEDURE — 82306 VITAMIN D 25 HYDROXY: CPT

## 2021-03-26 PROCEDURE — 94664 DEMO&/EVAL PT USE INHALER: CPT

## 2021-03-26 PROCEDURE — 94640 AIRWAY INHALATION TREATMENT: CPT

## 2021-03-26 PROCEDURE — 2580000003 HC RX 258: Performed by: STUDENT IN AN ORGANIZED HEALTH CARE EDUCATION/TRAINING PROGRAM

## 2021-03-26 PROCEDURE — 6370000000 HC RX 637 (ALT 250 FOR IP): Performed by: STUDENT IN AN ORGANIZED HEALTH CARE EDUCATION/TRAINING PROGRAM

## 2021-03-26 PROCEDURE — 97116 GAIT TRAINING THERAPY: CPT

## 2021-03-26 PROCEDURE — 6370000000 HC RX 637 (ALT 250 FOR IP): Performed by: INTERNAL MEDICINE

## 2021-03-26 PROCEDURE — 86403 PARTICLE AGGLUT ANTBDY SCRN: CPT

## 2021-03-26 PROCEDURE — 2580000003 HC RX 258: Performed by: INTERNAL MEDICINE

## 2021-03-26 PROCEDURE — 36415 COLL VENOUS BLD VENIPUNCTURE: CPT

## 2021-03-26 PROCEDURE — 85025 COMPLETE CBC W/AUTO DIFF WBC: CPT

## 2021-03-26 PROCEDURE — 6360000002 HC RX W HCPCS: Performed by: EMERGENCY MEDICINE

## 2021-03-26 PROCEDURE — 84443 ASSAY THYROID STIM HORMONE: CPT

## 2021-03-26 PROCEDURE — 84439 ASSAY OF FREE THYROXINE: CPT

## 2021-03-26 PROCEDURE — 93010 ELECTROCARDIOGRAM REPORT: CPT | Performed by: INTERNAL MEDICINE

## 2021-03-26 PROCEDURE — 83605 ASSAY OF LACTIC ACID: CPT

## 2021-03-26 PROCEDURE — 2060000000 HC ICU INTERMEDIATE R&B

## 2021-03-26 PROCEDURE — 85652 RBC SED RATE AUTOMATED: CPT

## 2021-03-26 RX ORDER — ACETAMINOPHEN 325 MG/1
650 TABLET ORAL EVERY 6 HOURS PRN
Status: DISCONTINUED | OUTPATIENT
Start: 2021-03-26 | End: 2021-03-28 | Stop reason: HOSPADM

## 2021-03-26 RX ORDER — MONTELUKAST SODIUM 10 MG/1
10 TABLET ORAL NIGHTLY
COMMUNITY

## 2021-03-26 RX ORDER — ASPIRIN 81 MG/1
81 TABLET ORAL DAILY
Status: ON HOLD | COMMUNITY
End: 2021-03-26

## 2021-03-26 RX ORDER — GABAPENTIN 300 MG/1
300 CAPSULE ORAL 3 TIMES DAILY
Status: DISCONTINUED | OUTPATIENT
Start: 2021-03-26 | End: 2021-03-28 | Stop reason: HOSPADM

## 2021-03-26 RX ORDER — FERROUS SULFATE 325(65) MG
325 TABLET ORAL 2 TIMES DAILY WITH MEALS
Status: DISCONTINUED | OUTPATIENT
Start: 2021-03-26 | End: 2021-03-28 | Stop reason: HOSPADM

## 2021-03-26 RX ORDER — PROMETHAZINE HYDROCHLORIDE 12.5 MG/1
12.5 TABLET ORAL EVERY 6 HOURS PRN
Status: DISCONTINUED | OUTPATIENT
Start: 2021-03-26 | End: 2021-03-28 | Stop reason: HOSPADM

## 2021-03-26 RX ORDER — MAGNESIUM SULFATE IN WATER 40 MG/ML
2000 INJECTION, SOLUTION INTRAVENOUS PRN
Status: DISCONTINUED | OUTPATIENT
Start: 2021-03-26 | End: 2021-03-28 | Stop reason: HOSPADM

## 2021-03-26 RX ORDER — 0.9 % SODIUM CHLORIDE 0.9 %
1000 INTRAVENOUS SOLUTION INTRAVENOUS ONCE
Status: COMPLETED | OUTPATIENT
Start: 2021-03-26 | End: 2021-03-26

## 2021-03-26 RX ORDER — SODIUM CHLORIDE 0.9 % (FLUSH) 0.9 %
10 SYRINGE (ML) INJECTION PRN
Status: DISCONTINUED | OUTPATIENT
Start: 2021-03-26 | End: 2021-03-28 | Stop reason: HOSPADM

## 2021-03-26 RX ORDER — DEXTROSE MONOHYDRATE 50 MG/ML
100 INJECTION, SOLUTION INTRAVENOUS PRN
Status: DISCONTINUED | OUTPATIENT
Start: 2021-03-26 | End: 2021-03-28 | Stop reason: HOSPADM

## 2021-03-26 RX ORDER — ONDANSETRON 2 MG/ML
4 INJECTION INTRAMUSCULAR; INTRAVENOUS EVERY 6 HOURS PRN
Status: DISCONTINUED | OUTPATIENT
Start: 2021-03-26 | End: 2021-03-28 | Stop reason: HOSPADM

## 2021-03-26 RX ORDER — ARIPIPRAZOLE 5 MG/1
10 TABLET ORAL DAILY
Status: DISCONTINUED | OUTPATIENT
Start: 2021-03-26 | End: 2021-03-28 | Stop reason: HOSPADM

## 2021-03-26 RX ORDER — OXYCODONE HYDROCHLORIDE 5 MG/1
5 TABLET ORAL EVERY 4 HOURS PRN
Status: DISCONTINUED | OUTPATIENT
Start: 2021-03-26 | End: 2021-03-26

## 2021-03-26 RX ORDER — SODIUM CHLORIDE 0.9 % (FLUSH) 0.9 %
10 SYRINGE (ML) INJECTION EVERY 12 HOURS SCHEDULED
Status: DISCONTINUED | OUTPATIENT
Start: 2021-03-26 | End: 2021-03-28 | Stop reason: HOSPADM

## 2021-03-26 RX ORDER — BUPROPION HYDROCHLORIDE 150 MG/1
300 TABLET ORAL EVERY MORNING
Status: DISCONTINUED | OUTPATIENT
Start: 2021-03-26 | End: 2021-03-28 | Stop reason: HOSPADM

## 2021-03-26 RX ORDER — FAMOTIDINE 20 MG/1
20 TABLET, FILM COATED ORAL DAILY PRN
Status: DISCONTINUED | OUTPATIENT
Start: 2021-03-26 | End: 2021-03-28 | Stop reason: HOSPADM

## 2021-03-26 RX ORDER — DULOXETIN HYDROCHLORIDE 60 MG/1
60 CAPSULE, DELAYED RELEASE ORAL DAILY
COMMUNITY
End: 2021-04-13

## 2021-03-26 RX ORDER — ATORVASTATIN CALCIUM 20 MG/1
20 TABLET, FILM COATED ORAL DAILY
Status: DISCONTINUED | OUTPATIENT
Start: 2021-03-26 | End: 2021-03-28 | Stop reason: HOSPADM

## 2021-03-26 RX ORDER — DULOXETIN HYDROCHLORIDE 30 MG/1
30 CAPSULE, DELAYED RELEASE ORAL DAILY
Status: DISCONTINUED | OUTPATIENT
Start: 2021-03-26 | End: 2021-03-26

## 2021-03-26 RX ORDER — MONTELUKAST SODIUM 10 MG/1
10 TABLET ORAL NIGHTLY
Status: DISCONTINUED | OUTPATIENT
Start: 2021-03-26 | End: 2021-03-28 | Stop reason: HOSPADM

## 2021-03-26 RX ORDER — BUDESONIDE 0.5 MG/2ML
0.5 INHALANT ORAL 2 TIMES DAILY
Status: DISCONTINUED | OUTPATIENT
Start: 2021-03-26 | End: 2021-03-28 | Stop reason: HOSPADM

## 2021-03-26 RX ORDER — DOXYCYCLINE 50 MG/1
100 CAPSULE ORAL EVERY 12 HOURS SCHEDULED
Status: DISCONTINUED | OUTPATIENT
Start: 2021-03-26 | End: 2021-03-28 | Stop reason: HOSPADM

## 2021-03-26 RX ORDER — HEPARIN SODIUM 5000 [USP'U]/ML
5000 INJECTION, SOLUTION INTRAVENOUS; SUBCUTANEOUS EVERY 8 HOURS SCHEDULED
Status: DISCONTINUED | OUTPATIENT
Start: 2021-03-26 | End: 2021-03-28 | Stop reason: HOSPADM

## 2021-03-26 RX ORDER — POTASSIUM CHLORIDE 7.45 MG/ML
10 INJECTION INTRAVENOUS PRN
Status: DISCONTINUED | OUTPATIENT
Start: 2021-03-26 | End: 2021-03-28 | Stop reason: HOSPADM

## 2021-03-26 RX ORDER — ACETAMINOPHEN 650 MG/1
650 SUPPOSITORY RECTAL EVERY 6 HOURS PRN
Status: DISCONTINUED | OUTPATIENT
Start: 2021-03-26 | End: 2021-03-28 | Stop reason: HOSPADM

## 2021-03-26 RX ORDER — LEVOTHYROXINE SODIUM 0.12 MG/1
125 TABLET ORAL DAILY
Status: DISCONTINUED | OUTPATIENT
Start: 2021-03-26 | End: 2021-03-28 | Stop reason: HOSPADM

## 2021-03-26 RX ORDER — TRAMADOL HYDROCHLORIDE 50 MG/1
50 TABLET ORAL EVERY 6 HOURS PRN
Status: DISCONTINUED | OUTPATIENT
Start: 2021-03-26 | End: 2021-03-28 | Stop reason: HOSPADM

## 2021-03-26 RX ORDER — CLONAZEPAM 0.5 MG/1
0.5 TABLET ORAL DAILY PRN
Status: DISCONTINUED | OUTPATIENT
Start: 2021-03-26 | End: 2021-03-28 | Stop reason: HOSPADM

## 2021-03-26 RX ORDER — ALBUTEROL SULFATE 2.5 MG/3ML
2.5 SOLUTION RESPIRATORY (INHALATION) EVERY 4 HOURS PRN
Status: DISCONTINUED | OUTPATIENT
Start: 2021-03-26 | End: 2021-03-28 | Stop reason: HOSPADM

## 2021-03-26 RX ORDER — PANTOPRAZOLE SODIUM 40 MG/1
40 TABLET, DELAYED RELEASE ORAL DAILY
Status: DISCONTINUED | OUTPATIENT
Start: 2021-03-26 | End: 2021-03-28 | Stop reason: HOSPADM

## 2021-03-26 RX ORDER — DEXTROSE MONOHYDRATE 25 G/50ML
12.5 INJECTION, SOLUTION INTRAVENOUS PRN
Status: DISCONTINUED | OUTPATIENT
Start: 2021-03-26 | End: 2021-03-28 | Stop reason: HOSPADM

## 2021-03-26 RX ORDER — OSTOMY ADHESIVE
1 STRIP MISCELLANEOUS ONCE
Status: DISCONTINUED | OUTPATIENT
Start: 2021-03-26 | End: 2021-03-28 | Stop reason: HOSPADM

## 2021-03-26 RX ORDER — FLUTICASONE PROPIONATE 220 UG/1
1 AEROSOL, METERED RESPIRATORY (INHALATION) 2 TIMES DAILY
Status: DISCONTINUED | OUTPATIENT
Start: 2021-03-26 | End: 2021-03-26

## 2021-03-26 RX ORDER — SODIUM CHLORIDE 9 MG/ML
1000 INJECTION, SOLUTION INTRAVENOUS ONCE
Status: COMPLETED | OUTPATIENT
Start: 2021-03-26 | End: 2021-03-26

## 2021-03-26 RX ORDER — SODIUM CHLORIDE 9 MG/ML
INJECTION, SOLUTION INTRAVENOUS CONTINUOUS
Status: DISCONTINUED | OUTPATIENT
Start: 2021-03-26 | End: 2021-03-28

## 2021-03-26 RX ORDER — NICOTINE POLACRILEX 4 MG
15 LOZENGE BUCCAL PRN
Status: DISCONTINUED | OUTPATIENT
Start: 2021-03-26 | End: 2021-03-28 | Stop reason: HOSPADM

## 2021-03-26 RX ADMIN — Medication 5000 UNITS: at 16:26

## 2021-03-26 RX ADMIN — SODIUM CHLORIDE: 9 INJECTION, SOLUTION INTRAVENOUS at 20:40

## 2021-03-26 RX ADMIN — GABAPENTIN 300 MG: 300 CAPSULE ORAL at 11:57

## 2021-03-26 RX ADMIN — SODIUM CHLORIDE 1000 ML: 9 INJECTION, SOLUTION INTRAVENOUS at 05:47

## 2021-03-26 RX ADMIN — DOXYCYCLINE 100 MG: 50 CAPSULE ORAL at 11:57

## 2021-03-26 RX ADMIN — HEPARIN SODIUM 5000 UNITS: 5000 INJECTION INTRAVENOUS; SUBCUTANEOUS at 16:26

## 2021-03-26 RX ADMIN — LEVOTHYROXINE SODIUM 125 MCG: 125 TABLET ORAL at 12:01

## 2021-03-26 RX ADMIN — CEFTRIAXONE 1000 MG: 1 INJECTION, POWDER, FOR SOLUTION INTRAMUSCULAR; INTRAVENOUS at 05:02

## 2021-03-26 RX ADMIN — TRAMADOL HYDROCHLORIDE 50 MG: 50 TABLET, FILM COATED ORAL at 21:16

## 2021-03-26 RX ADMIN — SODIUM CHLORIDE 1000 ML: 9 INJECTION, SOLUTION INTRAVENOUS at 09:54

## 2021-03-26 RX ADMIN — GABAPENTIN 300 MG: 300 CAPSULE ORAL at 20:40

## 2021-03-26 RX ADMIN — BUPROPION HYDROCHLORIDE 300 MG: 150 TABLET, FILM COATED, EXTENDED RELEASE ORAL at 11:57

## 2021-03-26 RX ADMIN — CEFTRIAXONE 1000 MG: 1 INJECTION, POWDER, FOR SOLUTION INTRAMUSCULAR; INTRAVENOUS at 05:47

## 2021-03-26 RX ADMIN — ARIPIPRAZOLE 10 MG: 5 TABLET ORAL at 11:56

## 2021-03-26 RX ADMIN — DOXYCYCLINE 100 MG: 50 CAPSULE ORAL at 20:40

## 2021-03-26 RX ADMIN — GABAPENTIN 300 MG: 300 CAPSULE ORAL at 16:26

## 2021-03-26 RX ADMIN — SODIUM CHLORIDE 1000 ML: 9 INJECTION, SOLUTION INTRAVENOUS at 04:05

## 2021-03-26 RX ADMIN — PANTOPRAZOLE SODIUM 40 MG: 40 TABLET, DELAYED RELEASE ORAL at 11:57

## 2021-03-26 RX ADMIN — MONTELUKAST 10 MG: 10 TABLET, FILM COATED ORAL at 20:40

## 2021-03-26 RX ADMIN — ATORVASTATIN CALCIUM 20 MG: 20 TABLET, FILM COATED ORAL at 11:57

## 2021-03-26 RX ADMIN — BUDESONIDE 500 MCG: 0.5 SUSPENSION RESPIRATORY (INHALATION) at 20:47

## 2021-03-26 RX ADMIN — ALBUTEROL SULFATE 2.5 MG: 2.5 SOLUTION RESPIRATORY (INHALATION) at 12:06

## 2021-03-26 ASSESSMENT — PAIN DESCRIPTION - FREQUENCY: FREQUENCY: CONTINUOUS

## 2021-03-26 ASSESSMENT — ENCOUNTER SYMPTOMS
VOMITING: 0
WHEEZING: 0
DIARRHEA: 1
CONSTIPATION: 0
SHORTNESS OF BREATH: 0
NAUSEA: 0
PHOTOPHOBIA: 0
CHEST TIGHTNESS: 0
RHINORRHEA: 0
ABDOMINAL PAIN: 0
SINUS PRESSURE: 0
SINUS PAIN: 0

## 2021-03-26 ASSESSMENT — PAIN DESCRIPTION - PROGRESSION: CLINICAL_PROGRESSION: NOT CHANGED

## 2021-03-26 ASSESSMENT — PAIN SCALES - GENERAL: PAINLEVEL_OUTOF10: 7

## 2021-03-26 ASSESSMENT — PAIN DESCRIPTION - LOCATION: LOCATION: LEG

## 2021-03-26 ASSESSMENT — PAIN DESCRIPTION - DESCRIPTORS: DESCRIPTORS: ACHING;OTHER (COMMENT)

## 2021-03-26 ASSESSMENT — PAIN DESCRIPTION - ORIENTATION: ORIENTATION: RIGHT;LEFT

## 2021-03-26 ASSESSMENT — PAIN DESCRIPTION - PAIN TYPE: TYPE: CHRONIC PAIN

## 2021-03-26 NOTE — ED PROVIDER NOTES
[facility]  EMERGENCY DEPT VISIT      Patient Identification  Radha Nguyen is a 63 y.o. female. Chief Complaint   Fatigue and Fall      History of Present Illness: This is a  63 y.o. female who presents via ambulance to the ED with complaints of generalized weakness, lightheadedness and a fall. Patient states that she has been feeling generally fatigued and weak or drinking well with a little bit of lightheadedness. She bent over to do something and fell into a basket and was too weak to get up. She had to push her medical alert button. She denies hitting head. No loc. No headache. No neck or back pain. Left shoulder slightly sore. Patient states that she has been having loose stools today. She has felt nauseated but has had no vomiting. She reports some indigestion but no specific abdominal pain. No fever. No chest pain or shortness of breath. She was recently discharged from the hospital 10 days ago after being found to be very anemic. She received IV iron in the hospital and was supposed to follow-up with GI for outpatient colonoscopy and EGD but this is not yet been arranged. Patient denies any melena or hematochezia. She does report that she has been on antibiotics and believes it to be doxycycline for an infection on her left leg. She states that she has a chronic wound there that will not heal.    Past Medical History:   Diagnosis Date    Anemia     Anxiety     Arthritis     Back pain     CAD (coronary artery disease)     Cancer (HCC)     CHF (congestive heart failure) (HCC)     Chronic kidney disease     Depression     Diabetes mellitus (HCC)     Esophageal reflux     Hep C w/o coma, chronic (HCC)     Hyperlipidemia     Hypertension     Leg abscess     Neuropathy     Schizoaffective disorder ([facility] [address]     Thyroid disease        History reviewed. No pertinent surgical history.       Current Facility-Administered Medications:     cefTRIAXone (ROCEPHIN) 1000 mg IVPB in 50 mL D5W minibag, 1,000 mg, Intravenous, Once, Joanne Burdick MD    0.9 % sodium chloride bolus, 1,000 mL, Intravenous, Once, Joanne Burdick MD    Current Outpatient Medications:     ferrous sulfate (IRON 325) 325 (65 Fe) MG tablet, Take 1 tablet by mouth 2 times daily, Disp: 180 tablet, Rfl: 1    pantoprazole (PROTONIX) 40 MG tablet, Take 1 tablet by mouth daily, Disp: 90 tablet, Rfl: 1    clonazePAM (KLONOPIN) 0.5 MG tablet, Take 0.5 mg by mouth daily as needed. , Disp: , Rfl:     buPROPion (WELLBUTRIN XL) 300 MG extended release tablet, Take 300 mg by mouth every morning, Disp: , Rfl:     levothyroxine (SYNTHROID) 125 MCG tablet, Take 125 mcg by mouth Daily, Disp: , Rfl:     ARIPiprazole (ABILIFY) 10 MG tablet, Take 10 mg by mouth daily, Disp: , Rfl:     diphenhydrAMINE (BENADRYL ALLERGY) 25 MG tablet, Take 25 mg by mouth every 6 hours as needed for Itching, Disp: , Rfl:     DULoxetine (CYMBALTA) 30 MG extended release capsule, Take 30 mg by mouth daily, Disp: , Rfl:     fluticasone (FLOVENT HFA) 220 MCG/ACT inhaler, Inhale 1 puff into the lungs 2 times daily, Disp: , Rfl:     nitroGLYCERIN (NITROSTAT) 0.4 MG SL tablet, Place 0.4 mg under the tongue, Disp: , Rfl:     gabapentin (NEURONTIN) 300 MG capsule, TAKE ONE CAPSULE BY MOUTH THREE TIMES A DAY, Disp: , Rfl:     loperamide (IMODIUM) 2 MG capsule, TAKE ONE CAPSULE BY MOUTH THREE TIMES A DAY AS NEEDED FOR DIARRHEA, Disp: , Rfl:     potassium bicarbonate (K-LYTE) 25 MEQ disintegrating tablet, Take 25 mEq by mouth 2 times daily Pt has been forgetting to take; does not fit in her weekly med planner, Disp: , Rfl:     traMADol (ULTRAM) 50 MG tablet, Take 50 mg by mouth every 6 hours as needed for Pain. , Disp: , Rfl:     albuterol (PROVENTIL HFA;VENTOLIN HFA) 108 (90 BASE) MCG/ACT inhaler, Inhale 2 puffs into the lungs every 6 hours as needed for Wheezing, Disp: , Rfl:     ascorbic acid (VITAMIN C) 500 MG tablet, Take 500 mg by mouth daily, Disp: , Rfl:     vitamin D (CHOLECALCIFEROL) 5000 UNITS CAPS capsule, Take 5,000 Units by mouth daily , Disp: , Rfl:     metoprolol (LOPRESSOR) 25 MG tablet, Take 25 mg by mouth 2 times daily, Disp: , Rfl:     montelukast (SINGULAIR) 10 MG tablet, Take 10 mg by mouth nightly, Disp: , Rfl:     Multiple Vitamins-Minerals (THERAPEUTIC MULTIVITAMIN-MINERALS) tablet, Take 1 tablet by mouth daily, Disp: , Rfl:     nicotine polacrilex (COMMIT) 4 MG lozenge, Take 4 mg by mouth as needed for Smoking cessation, Disp: , Rfl:     mupirocin (BACTROBAN) 2 % ointment, Apply topically 3 times daily Apply topically 3 times daily. , Disp: , Rfl:     polyethylene glycol (GLYCOLAX) packet, Take 17 g by mouth daily as needed, Disp: , Rfl:     simvastatin (ZOCOR) 40 MG tablet, Take 40 mg by mouth nightly, Disp: , Rfl:     spironolactone (ALDACTONE) 25 MG tablet, Take 25 mg by mouth daily, Disp: , Rfl:     torsemide (DEMADEX) 100 MG tablet, Take 100 mg by mouth daily , Disp: , Rfl:     vitamin E 1000 UNITS capsule, Take 1,000 Units by mouth daily, Disp: , Rfl:     Allergies   Allergen Reactions    Aleve [Naproxen]     Tums [Calcium Carbonate Antacid]        Social History     Socioeconomic History    Marital status: Legally      Spouse name: Not on file    Number of children: Not on file    Years of education: Not on file    Highest education level: Not on file   Occupational History    Not on file   Social Needs    Financial resource strain: Not on file    Food insecurity     Worry: Not on file     Inability: Not on file   [employer] needs     Medical: Not on file     Non-medical: Not on file   Tobacco Use    Smoking status: Never Smoker    Smokeless tobacco: Never Used   Substance and Sexual Activity    Alcohol use: No    Drug use: No    Sexual activity: Not on file   Lifestyle    Physical activity     Days per week: Not on file     Minutes per session: Not on file    Stress: Not on file   Relationships  Social connections     Talks on phone: Not on file     Gets together: Not on file     Attends Buddhism service: Not on file     Active member of club or organization: Not on file     Attends meetings of clubs or organizations: Not on file     Relationship status: Not on file    Intimate partner violence     Fear of current or ex partner: Not on file     Emotionally abused: Not on file     Physically abused: Not on file     Forced sexual activity: Not on file   Other Topics Concern    Not on file   Social History Narrative    Not on file       Nursing Notes Reviewed      ROS:  GENERAL:  No fever, no chills, no diaphoresis, + appetite changes  EYES: no eye discharge, no eye redness, no visual changes  ENT: no nasal congestion, no sore throat  CARDIAC: no chest pain, no palpitations, no leg swelling  PULM: no cough, no shortness of breath  ABD: no abdominal pain, + nausea, no vomiting, + diarrhea, no melena or hematochezia  : no dysuria, no hematuria, no urgency, no frequency. No flank pain  MUSCULOSKELETAL: no back pain, no arthralgias, + myalgias  NEURO: no headache, + lightheadedness, no dizziness, no numbness, no weakness, no syncope, no confusion, no speech difficulty  SKIN: no rashes, + erythema, + wounds, no ecchymosis      PHYSICAL EXAM:  GENERAL APPEARANCE: Roland Land is in no acute respiratory distress. Awake and alert. VITAL SIGNS:   ED Triage Vitals [03/26/21 0221]   Enc Vitals Group      /68      Pulse 66      Resp 16      Temp 98.2 °F (36.8 °C)      Temp Source Oral      SpO2 98 %      Weight       Height       Head Circumference       Peak Flow       Pain Score       Pain Loc       Pain Edu? Excl. in [facility]? HEAD: Normocephalic, atraumatic. EYES:  Extraocular muscles are intact. Pupils equal round and reactive to light. Conjunctivas are pink. Negative scleral icterus. ENT:  Mucous membranes are moist.  Pharynx without erythema or exudates. NECK: Nontender and supple.   No No acute findings in the chest.              Labs:  Results for orders placed or performed during the hospital encounter of 03/26/21   CBC Auto Differential   Result Value Ref Range    WBC 6.7 4.0 - 11.0 K/uL    RBC 3.24 (L) 4.00 - 5.20 M/uL    Hemoglobin 8.7 (L) 12.0 - 16.0 g/dL    Hematocrit 27.5 (L) 36.0 - 48.0 %    MCV 84.9 80.0 - 100.0 fL    MCH 26.8 26.0 - 34.0 pg    MCHC 31.6 31.0 - 36.0 g/dL    RDW 17.9 (H) 12.4 - 15.4 %    PLATELET SLIDE REVIEW Adequate     SLIDE REVIEW see below     Neutrophils % 59.0 %    Lymphocytes % 23.9 %    Monocytes % 14.1 %    Eosinophils % 1.9 %    Basophils % 1.1 %    Neutrophils Absolute 4.0 1.7 - 7.7 K/uL    Lymphocytes Absolute 1.6 1.0 - 5.1 K/uL    Monocytes Absolute 0.9 0.0 - 1.3 K/uL    Eosinophils Absolute 0.1 0.0 - 0.6 K/uL    Basophils Absolute 0.1 0.0 - 0.2 K/uL    Anisocytosis 2+ (A)     Microcytes Occasional (A)     Polychromasia Occasional (A)     Hypochromia Occasional (A)     Spherocytes 1+ (A)     Stomatocytes Occasional (A)    Comprehensive Metabolic Panel w/ Reflex to MG   Result Value Ref Range    Sodium 138 136 - 145 mmol/L    Potassium reflex Magnesium 3.8 3.5 - 5.1 mmol/L    Chloride 99 99 - 110 mmol/L    CO2 29 21 - 32 mmol/L    Anion Gap 10 3 - 16    Glucose 95 70 - 99 mg/dL    BUN 34 (H) 7 - 20 mg/dL    CREATININE 1.3 (H) 0.6 - 1.2 mg/dL    GFR Non-African American 42 (A) >60    GFR  50 (A) >60    Calcium 9.3 8.3 - 10.6 mg/dL    Total Protein 7.4 6.4 - 8.2 g/dL    Albumin 4.0 3.4 - 5.0 g/dL    Albumin/Globulin Ratio 1.2 1.1 - 2.2    Total Bilirubin <0.2 0.0 - 1.0 mg/dL    Alkaline Phosphatase 124 40 - 129 U/L    ALT 22 10 - 40 U/L    AST 21 15 - 37 U/L    Globulin 3.4 g/dL   Urinalysis Reflex to Culture    Specimen: Urine, clean catch   Result Value Ref Range    Color, UA Yellow Straw/Yellow    Clarity, UA Clear Clear    Glucose, Ur Negative Negative mg/dL    Bilirubin Urine Negative Negative    Ketones, Urine Negative Negative mg/dL

## 2021-03-26 NOTE — CONSULTS
Department of Podiatry Consult Note  Resident       Reason for Consult:  non-healing wound LLE  Requesting Physician:  Heidy Fitzpatrick MD    CHIEF COMPLAINT:  Leg wound    HISTORY OF PRESENT ILLNESS:                The patient is a 63 y.o. female with significant past medical history of CAD, CKD, DM, HLD, HTN, anemia, schizophrenia, hep C, and thyroid disease who is consulted for nonhealing wound left lower extremity. Patient states that she has chronic swelling to bilateral lower extremities as well as a wound to the left lower extremity that will heal and open back up. Patient states that Dr. Teena Lorenzana at [facility] has been treating her in the past.  Patient states that the patient had used a medicated compressive dressing to heal the wound however it has been opened back up for quite a while and she thinks she was taking an oral antibiotic. Patient states that she has chronic swelling and has compression garments at home but she has not been wearing secondary to the wound on her left leg. Patient denies recent trauma to bilateral lower extremities. The patient was admitted to [facility] secondary to cellulitis however she presented to the emergency department for generalized weakness, lightheadedness, and a fall. The patient states that she has been feeling fatigued recently. Of note, the patient was recently discharged from the hospital on the 16th secondary to dizziness and iron deficiency anemia. Past Medical History:        Diagnosis Date    Anemia     Anxiety     Arthritis     Back pain     CAD (coronary artery disease)     Cancer ([facility] [address]     CHF (congestive heart failure) (HCC)     Chronic kidney disease     Depression     Diabetes mellitus (HCC)     Esophageal reflux     Hep C w/o coma, chronic (HCC)     Hyperlipidemia     Hypertension     Leg abscess     Neuropathy     Schizoaffective disorder ([facility] [address]     Thyroid disease      Past Surgical History:    History reviewed.  No pertinent surgical history.   Current Medications:    Current Facility-Administered Medications: sodium chloride flush 0.9 % injection 10 mL, 10 mL, Intravenous, 2 times per day  sodium chloride flush 0.9 % injection 10 mL, 10 mL, Intravenous, PRN  potassium chloride 10 mEq/100 mL IVPB (Peripheral Line), 10 mEq, Intravenous, PRN  magnesium sulfate 2000 mg in 50 mL IVPB premix, 2,000 mg, Intravenous, PRN  promethazine (PHENERGAN) tablet 12.5 mg, 12.5 mg, Oral, Q6H PRN **OR** ondansetron (ZOFRAN) injection 4 mg, 4 mg, Intravenous, Q6H PRN  famotidine (PEPCID) tablet 20 mg, 20 mg, Oral, Daily PRN  acetaminophen (TYLENOL) tablet 650 mg, 650 mg, Oral, Q6H PRN **OR** acetaminophen (TYLENOL) suppository 650 mg, 650 mg, Rectal, Q6H PRN  oxyCODONE (ROXICODONE) immediate release tablet 5 mg, 5 mg, Oral, Q4H PRN  [START ON 3/27/2021] cefTRIAXone (ROCEPHIN) 2000 mg IVPB in D5W 50ml minibag, 2,000 mg, Intravenous, Q24H  albuterol (PROVENTIL) nebulizer solution 2.5 mg, 2.5 mg, Nebulization, Q4H PRN  ARIPiprazole (ABILIFY) tablet 10 mg, 10 mg, Oral, Daily  buPROPion (WELLBUTRIN XL) extended release tablet 300 mg, 300 mg, Oral, QAM  clonazePAM (KLONOPIN) tablet 0.5 mg, 0.5 mg, Oral, Daily PRN  ferrous sulfate (IRON 325) tablet 325 mg, 325 mg, Oral, BID WC  gabapentin (NEURONTIN) capsule 300 mg, 300 mg, Oral, TID  levothyroxine (SYNTHROID) tablet 125 mcg, 125 mcg, Oral, Daily  montelukast (SINGULAIR) tablet 10 mg, 10 mg, Oral, Nightly  pantoprazole (PROTONIX) tablet 40 mg, 40 mg, Oral, Daily  atorvastatin (LIPITOR) tablet 20 mg, 20 mg, Oral, Daily  glucose (GLUTOSE) 40 % oral gel 15 g, 15 g, Oral, PRN  dextrose 50 % IV solution, 12.5 g, Intravenous, PRN  glucagon (rDNA) injection 1 mg, 1 mg, Intramuscular, PRN  dextrose 5 % solution, 100 mL/hr, Intravenous, PRN  heparin (porcine) injection 5,000 Units, 5,000 Units, Subcutaneous, 3 times per day  budesonide (PULMICORT) nebulizer suspension 500 mcg, 0.5 mg, Nebulization, BID  doxycycline monohydrate (MONODOX) capsule 100 mg, 100 mg, Oral, 2 times per day  0.9 % sodium chloride infusion, , Intravenous, Continuous  [START ON 3/27/2021] DULoxetine (CYMBALTA) extended release capsule 90 mg, 90 mg, Oral, Daily  Allergies:   Aleve [naproxen] and Tums [calcium carbonate antacid]  Social History:    TOBACCO:   reports that she has never smoked. She has never used smokeless tobacco.  ETOH:   reports no history of alcohol use. Family History:   History reviewed. No pertinent family history. REVIEW OF SYSTEMS:    CONSTITUTIONAL:  positive for  fatigue and malaise  GENITOURINARY:  positive for frequency and urgency  MUSCULOSKELETAL:  positive for  arthralgias, pain and joint swelling  BEHAVIOR/PSYCH:  positive for fatigue and anxiety    PHYSICAL EXAM:      Vitals:    /64   Pulse 73   Temp 96.9 °F (36.1 °C) (Oral)   Resp 18   Wt 255 lb (115.7 kg)   SpO2 93%   BMI 43.77 kg/m²     LABS:   Recent Labs     03/26/21  0232   WBC 6.7   HGB 8.7*   HCT 27.5*     Recent Labs     03/26/21  0232      K 3.8   CL 99   CO2 29   BUN 34*   CREATININE 1.3*     Recent Labs     03/26/21  0232   PROT 7.4         GENERAL: Patient is alert and oriented x3. No signs of acute distress noted. LOWER EXTREMITY EVALUATION:    VASCULAR: DP and PT pulses are palpable 2/4 b/l. CFT is brisk to the digits of the foot b/l. Skin temperature is warm to cool from proximal to distal with focal calor noted to b/l LE. Right LE erythema resolves with elevation, left LE erythema remains with elevation. Diffuse non-pitting edema noted to b/l LE, L>R. No pain with calf compression b/l. NEUROLOGIC: Gross and epicritic sensation is intact b/l. Protective sensation is intact at all pedal sites b/l. DERMATOLOGIC:   Patient provided verbal consent for photos to be obtained today, 3/26/21.         Full-thickness ulceration noted to the left proximal lower extremity measuring approximately 4 cm in diameter with a combination of granular, fibrotic, and sanguinous crust.  Periwound erythema noted. Erythema noted to the anterior shin from the level of the wound extending distally to the level of the ankle. No fluctuance, crepitus, drainage, malodor, tunneling, or tracking noted. Diffuse dermatologic changes noted to bilateral lower extremities likely secondary to hemosiderin deposits. Nails 1-5 b/l are thickened, elongated, and discolored yellow with subungual debris. . Webspaces 1-4 b/l are clean, dry, and intact. No hyperkeratosis noted. No subcutaneous nodules, rashes, or other skin lesions noted. MUSCULOSKELETAL: Muscle strength is 5/5 for all pedal groups tested. Pain with periwound palpation. Ankle joint ROM is decreased in dorsiflexion with the knee extended. Left foot no biomechanical abnormalities noted. Right foot forefoot varus deformity. IMAGING:  Not indicated    ASSESSMENT:   1. Cellulitis, left LE  2. Chronic venous stasis dermatitis  3. Venous insufficiency b/l LE  4.   Diabetes mellitus type 2    PLAN:  -Patient seen and examined at the bedside this afternoon  -VSS, no leukocytosis noted (WBC 6.7)  -ESR 76 and CRP 21.8  -Prealbumin ordered, will f/u results  -XR left tib fib ordered, will f/u results  -Wound culture left LE obtained, will f/u results  -Continue antibiotics per primary  -Left LE dressed with Betadine, Mepilex Band-Aid, and Ace compression  -Unna boot ordered to room, will apply at dressing change tomorrow  -Nursing communication placed for LILLY hose to be placed on the right LE  -Patient is WBAT to b/l LE with appropriate assistance  -Discussed with patient's the importance of extremity elevation and compression to assist in edema control      DISPO: Full-thickness ulceration noted to the left LE mild cellulitis in the setting of chronic venous stasis dermatitis, will continue with local wound care while patient is in house      Thank you for the opportunity to take part in the

## 2021-03-26 NOTE — PROGRESS NOTES
Tub/Shower unit  Bathroom Toilet: Standard(vanity next to commode)  Bathroom Equipment: Grab bars in shower, Shower chair  Home Equipment: Rolling walker, Alert Button  ADL Assistance: Needs assistance(emiienarturo helps in the shower)  Homemaking Assistance: Needs assistance(COA services 2x week)  Ambulation Assistance: Independent(uses walker all the time inside apartment, does not use outside apartment bc can't carry apartment down steps)  Transfer Assistance: Independent  Active : No(christiano drives)  Leisure & Hobbies: Reading medical books  Additional Comments: Christiano also uses a walker at home. Emiienarturo has COA services 3x week in addition to the COA services pt receives 2x week. Pt and emiienarturo help each other in the shower \"because we are fall risks\". Christiano drives to grocery store and waits in the car, while pt shops inside.       Objective  Strength  Strength RLE: WFL  Strength LLE: WFL    Bed mobility  Supine to Sit: Modified independent     Transfers  Sit to Stand: Modified independent  Stand to sit: Modified independent     Ambulation  Device: Rolling Walker  Assistance: Stand by assistance  Quality of Gait: steady gait  Gait Deviations: Slow Kelsie;Decreased step length;Decreased step height  Distance: 15ft + 30ft    Balance  Sitting - Static: Good  Sitting - Dynamic: Good  Standing - Static: Good  Standing - Dynamic: Good(using rolling walker)    Treatment included gait and transfer training with patient education     Plan: Discharge acute PT      Safety Devices  Type of devices: Left in chair, Call light within reach, Nurse notified(RN aware no chair alarm in place)      AM-PAC Score  AM-PAC Inpatient Mobility Raw Score : 22 (03/26/21 1414)  AM-PAC Inpatient T-Scale Score : 53.28 (03/26/21 1414)  Mobility Inpatient CMS 0-100% Score: 20.91 (03/26/21 1414)  Mobility Inpatient CMS G-Code Modifier : CJ (03/26/21 1414)      Therapy Time   Individual Concurrent Group Co-treatment   Time In

## 2021-03-26 NOTE — DISCHARGE INSTR - COC
Continuity of Care Form    Patient Name: Neeraj Bullard   :  1960  MRN:  [id number]    Admit date:  3/26/2021  Discharge date:  ***    Code Status Order: Full Code   Advance Directives:   Advance Care Flowsheet Documentation       Date/Time Healthcare Directive Type of Healthcare Directive Copy in [address] Agent's Name Healthcare Agent's Phone Number    21 0725  No, patient does not have an advance directive for healthcare treatment -- -- -- -- --            Admitting Physician:  Dara Angel DO  PCP: Tana Storm    Discharging Nurse: [facility] Unit/Room#: 2671/0200-56  Discharging Unit Phone Number: ***    Emergency Contact:   Extended Emergency Contact Information  Primary Emergency Contact: Isak Ferrell  Address: [address]           [place] [address] Aaliyah Fabian of [address] [place] [address] Phone: [phone]  Relation: Other  Secondary Emergency Contact: Precious Westbrook  [place] Phone: [phone]  Relation: Child   needed? No    Past Surgical History:  History reviewed. No pertinent surgical history. Immunization History: There is no immunization history on file for this patient.     Active Problems:  Patient Active Problem List   Diagnosis Code    Anemia D64.9    Cellulitis L03.90       Isolation/Infection:   Isolation            C Diff Contact          Patient Infection Status       Infection Onset Added Last Indicated Last Indicated By Review Planned Expiration Resolved Resolved By    C-diff Rule Out 21 Clostridium Difficile Toxin/Antigen (Ordered)                Nurse Assessment:  Last Vital Signs: /64   Pulse 73   Temp 96.9 °F (36.1 °C) (Oral)   Resp 18   Wt 255 lb (115.7 kg)   SpO2 93%   BMI 43.77 kg/m²     Last documented pain score (0-10 scale):    Last Weight:   Wt Readings from Last 1 Encounters:   21 255 lb (115.7 kg)     Mental Status:  {IP PT MENTAL STATUS::::0}    IV Access:  { PHUONG IV ACCESS:441397001:::0}    Nursing Mobility/ADLs:  Walking   {CHP DME ADLs:970871619:::0}  Transfer  {CHP DME ADLs:139623449:::0}  Bathing  {CHP DME ADLs:928272098:::0}  Dressing  {CHP DME ADLs:223213164:::0}  Toileting  {CHP DME ADLs:586100332:::0}  Feeding  {CHP DME ADLs:968392845:::0}  Med Admin  {CHP DME ADLs:001648858:::0}  Med Delivery   { PHUONG MED Delivery:089880797:::0}    Wound Care Documentation and Therapy:  Wound 21 Leg Left; Lower unstageable (Active)   Wound Etiology Pressure Unstageable 21 1106   Dressing/Treatment Open to air 21 1106   Number of days: 11        Elimination:  Continence: Bowel: {YES / EI:13228}  Bladder: {YES / QT:22840}  Urinary Catheter: {Urinary Catheter:798750022:::0}   Colostomy/Ileostomy/Ileal Conduit: {YES / UR:63731}       Date of Last BM: ***    Intake/Output Summary (Last 24 hours) at 3/26/2021 1357  Last data filed at 3/26/2021 0532  Gross per 24 hour   Intake 50 ml   Output --   Net 50 ml     I/O last 3 completed shifts:   In: 48 [IV Piggyback:50]  Out: -     Safety Concerns:     [employer] Safety Concerns:345669543:::0}    Impairments/Disabilities:      [employer] Impairments/Disabilities:210230410:::0}    Nutrition Therapy:  Current Nutrition Therapy:   [employer] Diet List:915648463:::0}    Routes of Feeding: {CHP DME Other Feedings:948619923:::0}  Liquids: {Slp liquid thickness:15100}  Daily Fluid Restriction: {CHP DME Yes amt example:419300231:::0}  Last Modified Barium Swallow with Video (Video Swallowing Test): {Done Not Done ZMOJ:922377193:::2}    Treatments at the Time of Hospital Discharge:   Respiratory Treatments: ***  Oxygen Therapy:  {Therapy; copd oxygen:97687:::0}  Ventilator:    {[facility] Vent List:776399723:::0}    Rehab Therapies: {THERAPEUTIC INTERVENTION:4367554012}  Weight Bearing Status/Restrictions: {[facility] Weight Bearin:::0}  Other Medical Equipment (for information only, NOT a DME order): {EQUIPMENT:376851829}  Other Treatments: ***    Patient's personal belongings (please select all that are sent with patient):  {CHP DME Belongings:340698465:::0}    RN SIGNATURE:  {Esignature:590747115:::0}    CASE MANAGEMENT/SOCIAL WORK SECTION    Inpatient Status Date: ***    Readmission Risk Assessment Score:  Readmission Risk              Risk of Unplanned Readmission:        25           Discharging to Facility/ Agency   Name:   Address:  Phone:  Fax:    Dialysis Facility (if applicable)   Name:  Address:  Dialysis Schedule:  Phone:  Fax:    / signature: {Esignature:182301074:::0}    PHYSICIAN SECTION    Prognosis: {Prognosis:1420039675:::0}    Condition at Discharge: Nava Wade Patient Condition:222268188:::0}    Rehab Potential (if transferring to Rehab): {Prognosis:4556657204:::0}    Recommended Labs or Other Treatments After Discharge: ***    PODIATRY WOUND CARE INSTRUCTIONS:  Left lower extremity dressing is to be changed on Monday, Wednesday, and Friday consisting of:  1. Gently wrap Unna boot from the toe sulcus to the knee without compression  2. Gently wrap Coban from toe sulcus to the knee without compression  3. Gently wrap 4 inch Ace bandage from toe to ankle with compression  4. Gently wrap 6 inch Ace bandage from ankle to knee with compression    Please use LILLY hose for compression of the right lower extremity daily. Patient is weightbearing as tolerated to bilateral lower extremities. Patient is to schedule a follow-up appointment with the [facility] wound care center within 1 week of discharge. Physician Certification: I certify the above information and transfer of Robles Escoto  is necessary for the continuing treatment of the diagnosis listed and that she requires {Admit to Appropriate Level of Care:01149:::0} for {GREATER/LESS:289204312} 30 days.      Update Admission H&P: {CHP DME Changes in HandP:264592758:::0}    PHYSICIAN SIGNATURE: {[facility]:688766849:::0}

## 2021-03-26 NOTE — H&P
Internal Medicine   History and Physical  PGY-3      Patient's PCP: Allyson Espinoza    Date of Admission: 3/26/2021    Date of Service: Pt seen/examined on 3/26/2021 and Admitted to Inpatient with expected LOS greater than two midnights due to medical therapy. CC: Fatigue and Fall     HISTORY OF PRESENT ILLNESS:   63 y.o. female with a PMH iron deficiency anemia, HTN, HLD, chronic diastolic heart failure, depression, hypothyroidism presenting with fall. Patient states she fell over a stool yesterday. She thinks she lost her balance, but is unsure. Denies hitting her head or any pain following the fall. She says she often loses her balance but does not always fall. She does not recall being lightheaded or dizzy. No chest pain, SOB, abdominal pain, nausea, vomiting, constipation. Did have 2 episodes of diarrhea yesterday, but has not had any more today. Does note some difficulty with urination and endorses urinary frequency. Also endorses fatigue which is worse than prior admission. Past Medical / Surgical History:    Past Medical History:   Diagnosis Date    Anemia     Anxiety     Arthritis     Back pain     CAD (coronary artery disease)     [facility] ([facility] [address]     CHF (congestive heart failure) (HCC)     Chronic kidney disease     Depression     Diabetes mellitus (HCC)     Esophageal reflux     Hep C w/o coma, chronic (HCC)     Hyperlipidemia     Hypertension     Leg abscess     Neuropathy     [facility]     Thyroid disease      History reviewed. No pertinent surgical history. Medications Prior to Admission:    Prior to Admission medications    Medication Sig Start Date End Date Taking?  Authorizing Provider   DULoxetine (CYMBALTA) 60 MG extended release capsule Take 60 mg by mouth daily 30mg + 60mg = 90mg daily   Yes Historical Provider, MD   montelukast (SINGULAIR) 10 MG tablet Take 10 mg by mouth nightly   Yes Historical Provider, MD   pantoprazole (PROTONIX) 40 MG tablet Take 1 tablet by mouth daily 3/16/21  Yes Maylin Zacarias MD   clonazePAM (KLONOPIN) 0.5 MG tablet Take 0.5 mg by mouth daily as needed. Yes Historical Provider, MD   buPROPion (WELLBUTRIN XL) 300 MG extended release tablet Take 300 mg by mouth every morning   Yes Historical Provider, MD   levothyroxine (SYNTHROID) 125 MCG tablet Take 125 mcg by mouth Daily   Yes Historical Provider, MD   ARIPiprazole (ABILIFY) 10 MG tablet Take 10 mg by mouth daily   Yes Historical Provider, MD   diphenhydrAMINE (BENADRYL ALLERGY) 25 MG tablet Take 25 mg by mouth every 6 hours as needed for Itching   Yes Historical Provider, MD   DULoxetine (CYMBALTA) 30 MG extended release capsule Take 30 mg by mouth daily 30mg + 60mg = 90mg total   Yes Historical Provider, MD   fluticasone (FLOVENT HFA) 220 MCG/ACT inhaler Inhale 1 puff into the lungs 2 times daily   Yes Historical Provider, MD   gabapentin (NEURONTIN) 300 MG capsule TAKE ONE CAPSULE BY MOUTH THREE TIMES A DAY 7/5/19  Yes Historical Provider, MD   loperamide (IMODIUM) 2 MG capsule TAKE ONE CAPSULE BY MOUTH THREE TIMES A DAY AS NEEDED FOR DIARRHEA 4/11/19  Yes Historical Provider, MD   potassium bicarbonate (K-LYTE) 25 MEQ disintegrating tablet Take 25 mEq by mouth 2 times daily Pt has been forgetting to take; does not fit in her weekly med planner 8/9/19  Yes Historical Provider, MD   traMADol (ULTRAM) 50 MG tablet Take 50 mg by mouth every 6 hours as needed for Pain.   12/30/19  Yes Historical Provider, MD   albuterol (PROVENTIL HFA;VENTOLIN HFA) 108 (90 BASE) MCG/ACT inhaler Inhale 2 puffs into the lungs every 6 hours as needed for Wheezing   Yes Historical Provider, MD   ascorbic acid (VITAMIN C) 500 MG tablet Take 500 mg by mouth daily   Yes Historical Provider, MD   vitamin D (CHOLECALCIFEROL) 5000 UNITS CAPS capsule Take 5,000 Units by mouth daily    Yes Historical Provider, MD   metoprolol (LOPRESSOR) 25 MG tablet Take 25 mg by mouth 2 times daily   Yes Historical stiffness.        + LLE swelling and pain. Skin: Positive for pallor. Negative for rash. Neurological: Negative for dizziness, light-headedness and headaches. All other systems reviewed and are negative. PHYSICAL EXAM:  /72   Pulse 83   Temp 97.4 °F (36.3 °C) (Oral)   Resp 16   Ht 5' 4\" (1.626 m)   Wt 255 lb (115.7 kg)   SpO2 95%   BMI 43.77 kg/m²   No results for input(s): POCGLU in the last 72 hours. Physical Exam  Constitutional:       General: She is not in acute distress. Appearance: She is obese. She is not ill-appearing, toxic-appearing or diaphoretic. Comments: Lethargic but easily arouses and holds a conversation   HENT:      Head: Normocephalic and atraumatic. Eyes:      General: No scleral icterus. Right eye: No discharge. Left eye: No discharge. Extraocular Movements: Extraocular movements intact. Conjunctiva/sclera: Conjunctivae normal.   Cardiovascular:      Rate and Rhythm: Normal rate and regular rhythm. Heart sounds: Normal heart sounds. No murmur. No friction rub. No gallop. Pulmonary:      Effort: Pulmonary effort is normal. No respiratory distress. Breath sounds: Normal breath sounds. No wheezing, rhonchi or rales. Abdominal:      General: Bowel sounds are normal. There is no distension. Palpations: Abdomen is soft. Tenderness: There is no abdominal tenderness. There is no guarding or rebound. Musculoskeletal:      Left lower leg: Edema (1+ pitting edema) present. Comments: Has chronic venous stasis changes bilaterally, but LLE with increased swelling and erythema. Also has non-healing wound LLE   Skin:     General: Skin is warm and dry. Coloration: Skin is pale. Neurological:      General: No focal deficit present. Mental Status: Mental status is at baseline.            LABS:  Recent Labs     03/26/21  0232   WBC 6.7   HGB 8.7*   HCT 27.5*     Recent Labs     03/26/21 0232      K 3.8 CL 99   CO2 29   BUN 34*   CREATININE 1.3*   GLUCOSE 95     Recent Labs     03/26/21  0232   AST 21   ALT 22   BILITOT <0.2   ALKPHOS 124     Recent Labs     03/26/21  0232   TROPONINI <0.01             Recent Labs     03/26/21  0620   LACTA 0.5       Urinalysis:  Lab Results   Component Value Date    NITRU Negative 03/26/2021    WBCUA 21-50 03/26/2021    BACTERIA 1+ 03/26/2021    RBCUA 3-4 03/26/2021    BLOODU Negative 03/26/2021    SPECGRAV 1.015 03/26/2021    GLUCOSEU Negative 03/26/2021       Radiology:       XR CHEST PORTABLE   Final Result     No acute findings in the chest.          XR TIBIA FIBULA LEFT (2 VIEWS)    (Results Pending)         Assessment & Plan:      Acute Cystitis - UA with mod LE, 21-50 WBC, does have 6-10 epi  - continue rocephin  - f/u urine cx  -  ml/hr    LLE Cellulitis - patient has blle chronic venous stasis changes, but LLE has increased erythema and swelling along with non-healing wound  - consult podiatry   - continue rocephin  - start doxycycline   - check ESR/CRP    MAAME - suspect prerenal. Has received 3 L IVF  - continue IVF    Fatigue - patient endorses worsening fatigue. Hemoglobin is stable compared to recent admission. May be related to infection. - check TSH  - management as above    Diarrhea - has been on abx recently, had 2 episodes of diarrhea prior to admission. No diarrhea since admitted  - check C diff   - holding home imodium  - continue IVF    Iron Deficiency Anemia  - hgb stable.  Received IV venofer on last admission  - continue iron supplements  - will need outpatient GI follow up as this has not been completed yet     Fall - patient unsure why she fell, no LOC  - PT/OT  - check orthostats  - Vit D25 OH level ordered    HTN - SBP 90-100s  - holding spironolactone, lopressor and torsemid    Hypothyroidism  - continue home synthroid  - check TSH, free T4    HLD  - continue statin    T2DM - last A1c 5.4, not on any medications at home  - hypoglycemia protocol  - accuchecks AC/HS    Depression  - continue cymbalta, wellbutrin    Chronic Diastolic Heart Failure - no s/s acute exacerbation. ECHO 2/2020 with EF 55-60%  - holding torsemide    Code Status:  Full Code  F/E/N:  DIET CARB CONTROL;  GI / DVT Prophylaxis:  subQ heparin  Disposition:  GMF  PT/OT Eval Status: Consulted     I will discuss the patient with MD Eloina Sheppard MD,   Internal Medicine Resident, PGY-3    I performed a history and physical examination on the patient and discussed the management with the resident physician. I reviewed and agree with the findings and plan as documented in her note. Necessary changes made to the note.      I certify that Benjamin Hidalgo is expected to be hospitalized for > 2 midnights based on the assessment and plan as outlined in resident note    Electronically signed by Eloina Gonzales MD on 3/26/21 at 2:27 PM EDT

## 2021-03-26 NOTE — PLAN OF CARE
4 Eyes Admission Assessment     I agree as the admission nurse that 2 RN's have performed a thorough Head to Toe Skin Assessment on the patient. ALL assessment sites listed below have been assessed on admission. Areas assessed by both nurses: ***  [x]   Head, Face, and Ears   [x]   Shoulders, Back, and Chest  [x]   Arms, Elbows, and Hands   [x]   Coccyx, Sacrum, and Ischium  [x]   Legs, Feet, and Heels        Does the Patient have Skin Breakdown?   Yes a wound was noted on the Admission Assessment and an LDA was Initiated documentation include the Kyra-wound, Wound Assessment, Measurements, Dressing Treatment, Drainage, and Color\",          Justyn Prevention initiated:  Yes   Wound Care Orders initiated:  Yes DPM      WOC nurse consulted for Pressure Injury (Stage 3,4, Unstageable, DTI, NWPT, and Complex wounds) or Justyn score 18 or lower:  NA     Nurse 1 eSignature: Electronically signed by Dee Fields RN on 3/26/21 at 6:53 PM EDT    **SHARE this note so that the co-signing nurse is able to place an eSignature**    Nurse 2 eSignature: {Esignature:[id number]}

## 2021-03-26 NOTE — PROGRESS NOTES
Occupational Therapy  Facility/Department: [address]  Daily Treatment Note  NAME: Robles Escoto  : 1960  MRN: [id number]    Date of Service: 3/26/2021    Discharge Recommendations:Laura Murillo scored a 22/24 on the -PAC ADL Inpatient form. At this time, no further OT is recommended upon discharge. Recommend patient returns to prior setting with prior services. Home with assist PRN, Home with nursing aide  OT Equipment Recommendations  Equipment Needed: No    Assessment   Assessment: Pt from home with S/O. Pt recently d/c'ed with anemia 3/16. Pt appears functioning at baseline level with mobility ( walker ) and ADLs. Pt active with COA / has life alert button and HHA. Pt would benefit from visiting RN to manage health issues. No DME needs. No Acute OT services. Prognosis: Fair  Decision Making: Medium Complexity  OT Education: OT Role;Plan of Care  Patient Education: verb understanding  REQUIRES OT FOLLOW UP: No  Activity Tolerance  Activity Tolerance: Patient Tolerated treatment well  Activity Tolerance: pt does well with encouragement  Safety Devices  Safety Devices in place: Yes  Type of devices: Call light within reach; Chair alarm in place;Nurse notified; Left in chair         Patient Diagnosis(es): The primary encounter diagnosis was Generalized weakness. Diagnoses of MAAME (acute kidney injury) ([facility] [address] Urinary tract infection without hematuria, site unspecified, Left leg cellulitis, and Iron deficiency anemia, unspecified iron deficiency anemia type were also pertinent to this visit. has a past medical history of Anemia, Anxiety, Arthritis, Back pain, CAD (coronary artery disease), Cancer ([address] CHF (congestive heart failure) ([address] Chronic kidney disease, Depression, Diabetes mellitus ([address] Esophageal reflux, Hep C w/o coma, chronic ([address] Hyperlipidemia, Hypertension, Leg abscess, Neuropathy, Schizoaffective disorder ([address] and Thyroid disease.    has no past surgical history on file. Restrictions  Position Activity Restriction  Other position/activity restrictions: Up as tolerated    Subjective   General  Chart Reviewed: Yes  Additional Pertinent Hx: Admit 3/26 with cellulitis  LLE    recently d/c from [facility] 3/16 with anemia. Cxray - neg, UA +                                     PMHX: CAD, OA, CHF, HTN, HLD, DM, anemia, anxiety, Schizoaffective d/o, Depression/ neuropathy  Family / Caregiver Present: No  Diagnosis: Cellulitis LLE  Subjective  Subjective: \" I feel pretty good\"  Pt in bed agreeable for OOB /OT eval and tx. Vital Signs  Patient Currently in Pain: Denies     Orientation  Orientation  Overall Orientation Status: Within Functional Limits    Objective    ADL  Feeding: Setup  Grooming: Setup  LE Dressing: Modified independent ;Supervision  Toileting: Modified independent ;Supervision  Additional Comments: Pt reports she sit for LE ADLs. S/O can help her if needed. Balance  Sitting Balance: Independent  Standing Balance: Modified independent   Standing Balance  Time: 3 mins x 2 and 4 mins x 2  Activity: functional transfers /stance and functional mobility in room / bathroom  Functional Mobility  Functional - Mobility Device: Rolling Walker  Activity: To/from bathroom; Other  Assist Level: Supervision  Toilet Transfers  Toilet - Technique: Ambulating  Equipment Used: Standard toilet  Toilet Transfer: Modified independent  Toilet Transfers Comments: using rail -- has sink for leverage     Transfers  Sit to stand: Supervision  Stand to sit: Supervision  Transfer Comments: v.cues for hand placement -- encouragement when L foot was sore. Cognition  Overall Cognitive Status: [facility]  Cognition Comment: Slow processing noted. ? Intellectual disability / mental health disorder.         Plan  D/c Acute OT services     AM-PAC Score  AM-PAC Inpatient Daily Activity Raw Score: 22 (03/26/21 1439)  AM-PAC Inpatient ADL T-Scale Score : 47.1 (03/26/21 1439)  ADL Inpatient CMS 0-100% Score: 25.8 (03/26/21 1439)  ADL Inpatient CMS G-Code Modifier : CJ (03/26/21 1439)      Therapy Time   Individual Concurrent Group Co-treatment   Time In 1342         Time Out 1422         Minutes 40              Timed Code Treatment Minutes:   24 mins     Total Treatment Minutes:  40 mins       Kiera Amaro, OT

## 2021-03-26 NOTE — PROGRESS NOTES
RESPIRATORY THERAPY ASSESSMENT    Name:  Neeraj Bullard  Medical Record Number:  [id number]  Age: 63 y.o. Gender: female  : 1960  Today's Date:  3/26/2021  Room:  6317/6317-01    Assessment     Is the patient being admitted for a COPD or Asthma exacerbation? No   (If yes the patient will be seen every 4 hours for the first 24 hours and then reassessed)    Patient Admission Diagnosis      Allergies  Allergies   Allergen Reactions    Aleve [Naproxen]     Tums [Calcium Carbonate Antacid]              Pulmonary History:Pneumonia  Home Oxygen Therapy:  room air  Home Respiratory Therapy:Albuterol and Budesonide   Current Respiratory Therapy:    Treatment Type: HHN  Medications: Albuterol O4 PRN    Respiratory Severity Index(RSI)   Patients with orders for inhalation medications, oxygen, or any therapeutic treatment modality will be placed on Respiratory Protocol. They will be assessed with the first treatment and at least every 72 hours thereafter. The following severity scale will be used to determine frequency of treatment intervention. Smoking History: No Smoking History = 0    Social History  Social History     Tobacco Use    Smoking status: Never Smoker    Smokeless tobacco: Never Used   Substance Use Topics    Alcohol use: No    Drug use: No       Recent Surgical History: None = 0  Past Surgical History  History reviewed. No pertinent surgical history.     Level of Consciousness: Alert, Oriented, and Cooperative = 0    Level of Activity: Mostly sedentary, minimal walking = 2    Respiratory Pattern: Regular Pattern; RR 8-20 = 0    Breath Sounds: Diminished unilaterally = 1    Sputum   ,  , Sputum How Obtained: Spontaneous cough  Cough: Strong, spontaneous, non-productive = 0    Vital Signs   /72   Pulse 83   Temp 97.4 °F (36.3 °C) (Oral)   Resp 16   Ht 5' 4\" (1.626 m)   Wt 255 lb (115.7 kg)   SpO2 95%   BMI 43.77 kg/m²   SPO2 (COPD values may differ): Greater than or equal to 92% will be delivered and/or substituted as outlined below. Aerosolized Medications Ordering and Administration Guidelines:    1. All Medications will be ordered by a physician, and their frequency and/or modality will be adjusted as defined by the patients Respiratory Severity Index (RSI) score. 2. If the patient does not have documented COPD, consider discontinuing anticholinergics when RSI is less than 9.  3. If the bronchospasm worsens (increased RSI), then the bronchodilator frequency can be increased to a maximum of every 4 hours. If greater than every 4 hours is required, the physician will be contacted. 4. If the bronchospasm improves, the frequency of the bronchodilator can be decreased, based on the patient's RSI, but not less than home treatment regimen frequency. 5. Bronchodilator(s) will be discontinued if patient has a RSI less than 9 and has received no scheduled or as needed treatment for 72  Hrs. Patients Ordered on a Mucolytic Agent:    1. Must always be administered with a bronchodilator. 2. Discontinue if patient experiences worsened bronchospasm, or secretions have lessened to the point that the patient is able to clear them with a cough. Anti-inflammatory and Combination Medications:    1. If the patient lacks prior history of lung disease, is not using inhaled anti-inflammatory medication at home, and lacks wheezing by examination or by history for at least 24 hours, contact physician for possible discontinuation.

## 2021-03-27 LAB
A/G RATIO: 1.2 (ref 1.1–2.2)
ALBUMIN SERPL-MCNC: 3.6 G/DL (ref 3.4–5)
ALP BLD-CCNC: 125 U/L (ref 40–129)
ALT SERPL-CCNC: 15 U/L (ref 10–40)
ANION GAP SERPL CALCULATED.3IONS-SCNC: 11 MMOL/L (ref 3–16)
AST SERPL-CCNC: 15 U/L (ref 15–37)
BASOPHILS ABSOLUTE: 0 K/UL (ref 0–0.2)
BASOPHILS RELATIVE PERCENT: 0.8 %
BILIRUB SERPL-MCNC: 0.3 MG/DL (ref 0–1)
BUN BLDV-MCNC: 15 MG/DL (ref 7–20)
CALCIUM SERPL-MCNC: 9 MG/DL (ref 8.3–10.6)
CHLORIDE BLD-SCNC: 106 MMOL/L (ref 99–110)
CO2: 24 MMOL/L (ref 21–32)
CREAT SERPL-MCNC: 0.9 MG/DL (ref 0.6–1.2)
EOSINOPHILS ABSOLUTE: 0.1 K/UL (ref 0–0.6)
EOSINOPHILS RELATIVE PERCENT: 1.7 %
GFR AFRICAN AMERICAN: >60
GFR NON-AFRICAN AMERICAN: >60
GLOBULIN: 3.1 G/DL
GLUCOSE BLD-MCNC: 105 MG/DL (ref 70–99)
GLUCOSE BLD-MCNC: 93 MG/DL (ref 70–99)
GLUCOSE BLD-MCNC: 95 MG/DL (ref 70–99)
GLUCOSE BLD-MCNC: 97 MG/DL (ref 70–99)
GLUCOSE BLD-MCNC: 97 MG/DL (ref 70–99)
HCT VFR BLD CALC: 28 % (ref 36–48)
HEMOGLOBIN: 8.8 G/DL (ref 12–16)
LYMPHOCYTES ABSOLUTE: 1.1 K/UL (ref 1–5.1)
LYMPHOCYTES RELATIVE PERCENT: 17.6 %
MCH RBC QN AUTO: 27.6 PG (ref 26–34)
MCHC RBC AUTO-ENTMCNC: 31.6 G/DL (ref 31–36)
MCV RBC AUTO: 87.3 FL (ref 80–100)
MONOCYTES ABSOLUTE: 0.7 K/UL (ref 0–1.3)
MONOCYTES RELATIVE PERCENT: 11.1 %
NEUTROPHILS ABSOLUTE: 4.2 K/UL (ref 1.7–7.7)
NEUTROPHILS RELATIVE PERCENT: 68.8 %
PDW BLD-RTO: 18.5 % (ref 12.4–15.4)
PERFORMED ON: ABNORMAL
PERFORMED ON: NORMAL
PLATELET # BLD: 249 K/UL (ref 135–450)
PMV BLD AUTO: 8.2 FL (ref 5–10.5)
POTASSIUM REFLEX MAGNESIUM: 3.9 MMOL/L (ref 3.5–5.1)
PREALBUMIN: 17.9 MG/DL (ref 20–40)
RBC # BLD: 3.2 M/UL (ref 4–5.2)
SODIUM BLD-SCNC: 141 MMOL/L (ref 136–145)
TOTAL PROTEIN: 6.7 G/DL (ref 6.4–8.2)
URINE CULTURE, ROUTINE: NORMAL
WBC # BLD: 6.1 K/UL (ref 4–11)

## 2021-03-27 PROCEDURE — 2060000000 HC ICU INTERMEDIATE R&B

## 2021-03-27 PROCEDURE — 2580000003 HC RX 258: Performed by: INTERNAL MEDICINE

## 2021-03-27 PROCEDURE — 6360000002 HC RX W HCPCS: Performed by: STUDENT IN AN ORGANIZED HEALTH CARE EDUCATION/TRAINING PROGRAM

## 2021-03-27 PROCEDURE — 80053 COMPREHEN METABOLIC PANEL: CPT

## 2021-03-27 PROCEDURE — 94640 AIRWAY INHALATION TREATMENT: CPT

## 2021-03-27 PROCEDURE — 36415 COLL VENOUS BLD VENIPUNCTURE: CPT

## 2021-03-27 PROCEDURE — 6370000000 HC RX 637 (ALT 250 FOR IP): Performed by: INTERNAL MEDICINE

## 2021-03-27 PROCEDURE — 6360000002 HC RX W HCPCS: Performed by: INTERNAL MEDICINE

## 2021-03-27 PROCEDURE — 85025 COMPLETE CBC W/AUTO DIFF WBC: CPT

## 2021-03-27 PROCEDURE — 84134 ASSAY OF PREALBUMIN: CPT

## 2021-03-27 PROCEDURE — 6370000000 HC RX 637 (ALT 250 FOR IP): Performed by: STUDENT IN AN ORGANIZED HEALTH CARE EDUCATION/TRAINING PROGRAM

## 2021-03-27 RX ADMIN — PANTOPRAZOLE SODIUM 40 MG: 40 TABLET, DELAYED RELEASE ORAL at 06:56

## 2021-03-27 RX ADMIN — HEPARIN SODIUM 5000 UNITS: 5000 INJECTION INTRAVENOUS; SUBCUTANEOUS at 00:48

## 2021-03-27 RX ADMIN — GABAPENTIN 300 MG: 300 CAPSULE ORAL at 13:17

## 2021-03-27 RX ADMIN — DULOXETINE HYDROCHLORIDE 90 MG: 60 CAPSULE, DELAYED RELEASE ORAL at 09:28

## 2021-03-27 RX ADMIN — TRAMADOL HYDROCHLORIDE 50 MG: 50 TABLET, FILM COATED ORAL at 15:59

## 2021-03-27 RX ADMIN — BUPROPION HYDROCHLORIDE 300 MG: 150 TABLET, FILM COATED, EXTENDED RELEASE ORAL at 09:28

## 2021-03-27 RX ADMIN — CEFTRIAXONE SODIUM 2000 MG: 2 INJECTION, POWDER, FOR SOLUTION INTRAMUSCULAR; INTRAVENOUS at 06:56

## 2021-03-27 RX ADMIN — Medication 10 ML: at 22:26

## 2021-03-27 RX ADMIN — DICLOFENAC SODIUM 2 G: 10 GEL TOPICAL at 13:17

## 2021-03-27 RX ADMIN — SODIUM CHLORIDE: 9 INJECTION, SOLUTION INTRAVENOUS at 19:30

## 2021-03-27 RX ADMIN — MONTELUKAST 10 MG: 10 TABLET, FILM COATED ORAL at 21:13

## 2021-03-27 RX ADMIN — GABAPENTIN 300 MG: 300 CAPSULE ORAL at 21:13

## 2021-03-27 RX ADMIN — DOXYCYCLINE 100 MG: 50 CAPSULE ORAL at 09:28

## 2021-03-27 RX ADMIN — BUDESONIDE 500 MCG: 0.5 SUSPENSION RESPIRATORY (INHALATION) at 08:35

## 2021-03-27 RX ADMIN — HEPARIN SODIUM 5000 UNITS: 5000 INJECTION INTRAVENOUS; SUBCUTANEOUS at 16:00

## 2021-03-27 RX ADMIN — HEPARIN SODIUM 5000 UNITS: 5000 INJECTION INTRAVENOUS; SUBCUTANEOUS at 09:27

## 2021-03-27 RX ADMIN — Medication 10 ML: at 09:29

## 2021-03-27 RX ADMIN — GABAPENTIN 300 MG: 300 CAPSULE ORAL at 09:29

## 2021-03-27 RX ADMIN — HEPARIN SODIUM 5000 UNITS: 5000 INJECTION INTRAVENOUS; SUBCUTANEOUS at 23:28

## 2021-03-27 RX ADMIN — Medication 5000 UNITS: at 09:30

## 2021-03-27 RX ADMIN — LEVOTHYROXINE SODIUM 125 MCG: 125 TABLET ORAL at 06:56

## 2021-03-27 RX ADMIN — BUDESONIDE 500 MCG: 0.5 SUSPENSION RESPIRATORY (INHALATION) at 21:01

## 2021-03-27 RX ADMIN — ARIPIPRAZOLE 10 MG: 5 TABLET ORAL at 09:28

## 2021-03-27 RX ADMIN — FERROUS SULFATE TAB 325 MG (65 MG ELEMENTAL FE) 325 MG: 325 (65 FE) TAB at 17:15

## 2021-03-27 RX ADMIN — TRAMADOL HYDROCHLORIDE 50 MG: 50 TABLET, FILM COATED ORAL at 09:27

## 2021-03-27 RX ADMIN — ATORVASTATIN CALCIUM 20 MG: 20 TABLET, FILM COATED ORAL at 21:15

## 2021-03-27 RX ADMIN — FERROUS SULFATE TAB 325 MG (65 MG ELEMENTAL FE) 325 MG: 325 (65 FE) TAB at 09:39

## 2021-03-27 RX ADMIN — DOXYCYCLINE 100 MG: 50 CAPSULE ORAL at 21:13

## 2021-03-27 ASSESSMENT — PAIN DESCRIPTION - ONSET
ONSET: ON-GOING
ONSET: ON-GOING

## 2021-03-27 ASSESSMENT — PAIN SCALES - GENERAL
PAINLEVEL_OUTOF10: 6
PAINLEVEL_OUTOF10: 5

## 2021-03-27 ASSESSMENT — PAIN DESCRIPTION - FREQUENCY
FREQUENCY: INTERMITTENT
FREQUENCY: CONTINUOUS

## 2021-03-27 ASSESSMENT — PAIN DESCRIPTION - LOCATION: LOCATION: LEG

## 2021-03-27 ASSESSMENT — PAIN - FUNCTIONAL ASSESSMENT
PAIN_FUNCTIONAL_ASSESSMENT: ACTIVITIES ARE NOT PREVENTED
PAIN_FUNCTIONAL_ASSESSMENT: ACTIVITIES ARE NOT PREVENTED

## 2021-03-27 ASSESSMENT — PAIN DESCRIPTION - PROGRESSION
CLINICAL_PROGRESSION: GRADUALLY WORSENING

## 2021-03-27 ASSESSMENT — PAIN DESCRIPTION - ORIENTATION
ORIENTATION: LEFT
ORIENTATION: LEFT

## 2021-03-27 ASSESSMENT — PAIN DESCRIPTION - PAIN TYPE: TYPE: CHRONIC PAIN

## 2021-03-27 ASSESSMENT — PAIN DESCRIPTION - DESCRIPTORS: DESCRIPTORS: ACHING

## 2021-03-27 NOTE — PROGRESS NOTES
Pt had large formed bm, however unable to send for c.diff as it was not diarrhea, no abdominal pain/tenderness/loss of appetite and does not have a fever.

## 2021-03-27 NOTE — PLAN OF CARE
Pt resting comfortably in bed with IV fluids infusing. BLE elevated on pillows. No stool this shift for ordered c-diff testing. Patient is alert and oriented, calls appropriately for needs. Will continue to monitor. Problem: Falls - Risk of:  Goal: Will remain free from falls  Description: Will remain free from falls  Outcome: Ongoing   Patient at risk for falls. Patient resting quietly in bed. Side rails up x 3. Bed locked in lowest position. Bed alarm on. Bedside table and call light within reach. Patient instructed to call for assistance. Patient verbalized understanding. Will continue to monitor.

## 2021-03-27 NOTE — PROGRESS NOTES
Podiatric Surgery Daily Progress Note      Admit Date: 3/26/2021      Code:Full Code    Patient seen and examined, labs and records reviewed    Subjective:     Patient seen at the recliner chair this AM with legs in a dependant position. Patient denies any overnight acute events. Patient denies f/c/n/v/sob/cp. Review of Systems: A 12 point review of symptoms is unremarkable with the exception of the chief complaint. Patient specifically denies nausea, fever, vomiting, chills, shortness of breath, chest pain, abdominal pain, constipation or difficulty urinating.            Objective     BP (!) 153/84   Pulse 106   Temp 98.5 °F (36.9 °C) (Oral)   Resp 18   Ht 5' 4\" (1.626 m)   Wt 255 lb (115.7 kg)   SpO2 93%   BMI 43.77 kg/m²      I/O:    Intake/Output Summary (Last 24 hours) at 3/27/2021 0650  Last data filed at 3/27/2021 0409  Gross per 24 hour   Intake 1673.33 ml   Output --   Net 1673.33 ml              Wt Readings from Last 3 Encounters:   03/26/21 255 lb (115.7 kg)   03/16/21 255 lb 12.8 oz (116 kg)   06/19/19 225 lb 11.2 oz (102.4 kg)       LABS:    Recent Labs     03/26/21  0232 03/27/21  0523   WBC 6.7 6.1   HGB 8.7* 8.8*   HCT 27.5* 28.0*   PLT  --  249        Recent Labs     03/27/21  0523      K 3.9      CO2 24   BUN 15   CREATININE 0.9        Recent Labs     03/26/21  0232 03/27/21  0523   PROT 7.4 6.7       CBC with Differential:    Lab Results   Component Value Date    WBC 6.1 03/27/2021    RBC 3.20 03/27/2021    HGB 8.8 03/27/2021    HCT 28.0 03/27/2021     03/27/2021    MCV 87.3 03/27/2021    MCH 27.6 03/27/2021    MCHC 31.6 03/27/2021    RDW 18.5 03/27/2021    BANDSPCT 7 03/14/2021    LYMPHOPCT 17.6 03/27/2021    MONOPCT 11.1 03/27/2021    MYELOPCT 1 03/14/2021    BASOPCT 0.8 03/27/2021    MONOSABS 0.7 03/27/2021    LYMPHSABS 1.1 03/27/2021    EOSABS 0.1 03/27/2021    BASOSABS 0.0 03/27/2021     CMP:    Lab Results   Component Value Date     03/27/2021    K 3.9 03/27/2021     03/27/2021    CO2 24 03/27/2021    BUN 15 03/27/2021    CREATININE 0.9 03/27/2021    GFRAA >60 03/27/2021    AGRATIO 1.2 03/27/2021    LABGLOM >60 03/27/2021    GLUCOSE 97 03/27/2021    PROT 6.7 03/27/2021    LABALBU 3.6 03/27/2021    CALCIUM 9.0 03/27/2021    BILITOT 0.3 03/27/2021    ALKPHOS 125 03/27/2021    AST 15 03/27/2021    ALT 15 03/27/2021         LOWER EXTREMITY EXAMINATION    Dressing to left LE intact. No strikethrough noted to the external dressing. Moderate sanguinous drainage noted to the internal layers of the dressing. VASCULAR: DP and PT pulses are palpable 2/4 b/l. CFT is brisk to the digits of the foot b/l. Skin temperature is warm to cool from proximal to distal with focal calor noted to b/l LE. Right LE erythema resolves with elevation, left LE erythema remains with elevation. Diffuse non-pitting edema noted to b/l LE, L>R. No pain with calf compression b/l.     NEUROLOGIC: Gross and epicritic sensation is intact b/l. Protective sensation is intact at all pedal sites b/l.     DERMATOLOGIC:   Full-thickness ulceration noted to the left proximal lower extremity measuring approximately 4 cm in diameter with a combination of granular, fibrotic, and sanguinous crust.  Periwound erythema noted. Erythema noted to the anterior shin from the level of the wound extending distally to the level of the ankle. No fluctuance, crepitus, drainage, malodor, tunneling, or tracking noted.     Diffuse dermatologic changes noted to bilateral lower extremities likely secondary to hemosiderin deposits. Nails 1-5 b/l are thickened, elongated, and discolored yellow with subungual debris. . Webspaces 1-4 b/l are clean, dry, and intact. No hyperkeratosis noted. No subcutaneous nodules, rashes, or other skin lesions noted.     MUSCULOSKELETAL: Muscle strength is 5/5 for all pedal groups tested. Pain with periwound palpation. Ankle joint ROM is decreased in dorsiflexion with the knee extended. Left foot no biomechanical abnormalities noted. Right foot forefoot varus deformity. IMAGING:  Narrative   2 VIEWS LEFT TIBIA-FIBULA.       HISTORY: Nonhealing ulcer       FINDINGS: There is no fracture or dislocation.  No focal bony erosive process or periosteal new bone identified.  Visualized knee and ankle joints are intact.  There is diffuse edematous changes throughout the lower extremity and ankle.  No gas in the    soft tissues seen.  No radiopaque foreign body identified.           Impression       1.  No findings for acute bony abnormality.       2.  Diffuse edematous changes throughout the left lower extremity.           ASSESSMENT/PLAN  1. Cellulitis, left LE  2. Chronic venous stasis dermatitis  3. Venous insufficiency b/l LE  4.   Diabetes mellitus type 2    -VSS, no leukocytosis noted (WBC 6.1)  -ESR 76 and CRP 21.8  -Prealbumin ordered, will f/u results  -XR left tib fib ordered, will f/u results  -Wound culture left LE in process, will f/u results  -Continue antibiotics per primary  -Left LE dressed with unna boots, kerlix, and ace with compression  -Left LE dressing to be left clean, dry, and intact - will change Monday  -Unna boot ordered to room, will apply at dressing change tomorrow  -Nursing communication placed for LILLY hose to be placed on the right LE daily  -Patient is WBAT to b/l LE with appropriate assistance  -Discussed with patient's the importance of extremity elevation and compression to assist in edema control, voiced understanding    Patient seen and evaluated with Dr. Vianey Richey DPM.    DISPO: Full-thickness ulceration noted to the left LE mild cellulitis in the setting of chronic venous stasis dermatitis, will continue with local wound care while patient is in house; will f/u wound culture for final antibiotic recommendations    Jeana Owens DPM  Podiatric Resident PGY2  Pager [phone] or [employer]  3/27/2021, 6:50 AM

## 2021-03-27 NOTE — PROGRESS NOTES
Hospitalist Progress Note      PCP: Smita Lora    Date of Admission: 3/26/2021    Chief Complaint: Fatigue and [facility] AND [place] Course: Admitted for MAAME, acute cystitis and left lower extremity cellulitis. Continue antibiotics and IV fluids. Subjective: Patient states that her lower extremity pain is improving and her urinary symptoms are improving. Denies any other symptoms. Medications:  Reviewed    Infusion Medications    dextrose      sodium chloride 100 mL/hr at 03/26/21 2040     Scheduled Medications    sodium chloride flush  10 mL Intravenous 2 times per day    cefTRIAXone (ROCEPHIN) IV  2,000 mg Intravenous Q24H    ARIPiprazole  10 mg Oral Daily    buPROPion  300 mg Oral QAM    ferrous sulfate  325 mg Oral BID WC    gabapentin  300 mg Oral TID    levothyroxine  125 mcg Oral Daily    montelukast  10 mg Oral Nightly    pantoprazole  40 mg Oral Daily    atorvastatin  20 mg Oral Daily    heparin (porcine)  5,000 Units Subcutaneous 3 times per day    budesonide  0.5 mg Nebulization BID    doxycycline monohydrate  100 mg Oral 2 times per day    DULoxetine  90 mg Oral Daily    vitamin D  5,000 Units Oral Daily    Unna-Flex Elastic Unna Boot  1 each Topical Once     PRN Meds: sodium chloride flush, potassium chloride, magnesium sulfate, promethazine **OR** ondansetron, famotidine, acetaminophen **OR** acetaminophen, albuterol, clonazePAM, glucose, dextrose, glucagon (rDNA), dextrose, traMADol      Intake/Output Summary (Last 24 hours) at 3/27/2021 1985  Last data filed at 3/27/2021 0902  Gross per 24 hour   Intake 2153.33 ml   Output 600 ml   Net 1553.33 ml       Physical Exam Performed:    BP (!) 143/80   Pulse 91   Temp 97.7 °F (36.5 °C) (Oral)   Resp 18   Ht 5' 4\" (1.626 m)   Wt 255 lb (115.7 kg)   SpO2 95%   BMI 43.77 kg/m²     General appearance: No apparent distress, appears stated age and cooperative.   Morbidly obese  HEENT: Pupils equal, round, and reactive to light. Conjunctivae/corneas clear. Neck: Supple, with full range of motion. No jugular venous distention. Trachea midline. Respiratory:  Normal respiratory effort. Clear to auscultation, bilaterally without Rales/Wheezes/Rhonchi. Cardiovascular: Regular rate and rhythm with normal S1/S2 without murmurs, rubs or gallops. Abdomen: Soft, non-tender, non-distended with normal bowel sounds. Musculoskeletal: Bilateral lower extremity 1+ edema with chronic venous stasis changes. Left lower extremity with nonhealing wound, in dressing  Neurologic:  Neurovascularly intact without any focal sensory/motor deficits. Cranial nerves: II-XII intact, grossly non-focal.  Psychiatric: Alert and oriented, thought content appropriate, normal insight  Capillary Refill: Brisk,< 3 seconds   Peripheral Pulses: +2 palpable, equal bilaterally       Labs:   Recent Labs     03/26/21 0232 03/27/21 0523   WBC 6.7 6.1   HGB 8.7* 8.8*   HCT 27.5* 28.0*   PLT  --  249     Recent Labs     03/26/21 0232 03/27/21  0523    141   K 3.8 3.9   CL 99 106   CO2 29 24   BUN 34* 15   CREATININE 1.3* 0.9   CALCIUM 9.3 9.0     Recent Labs     03/26/21 0232 03/27/21  0523   AST 21 15   ALT 22 15   BILITOT <0.2 0.3   ALKPHOS 124 125     No results for input(s): INR in the last 72 hours. Recent Labs     03/26/21 0232   TROPONINI <0.01       Urinalysis:      Lab Results   Component Value Date    NITRU Negative 03/26/2021    WBCUA 21-50 03/26/2021    BACTERIA 1+ 03/26/2021    RBCUA 3-4 03/26/2021    BLOODU Negative 03/26/2021    SPECGRAV 1.015 03/26/2021    GLUCOSEU Negative 03/26/2021       Radiology:  XR TIBIA FIBULA LEFT (2 VIEWS)   Final Result      1. No findings for acute bony abnormality. 2.  Diffuse edematous changes throughout the left lower extremity.         XR CHEST PORTABLE   Final Result     No acute findings in the chest.                  Assessment/Plan:  Acute Cystitis - UA with mod LE, 21-50 WBC, does have 6-10 epi  - patient

## 2021-03-28 VITALS
BODY MASS INDEX: 45.04 KG/M2 | HEIGHT: 64 IN | RESPIRATION RATE: 16 BRPM | TEMPERATURE: 98.1 F | OXYGEN SATURATION: 96 % | SYSTOLIC BLOOD PRESSURE: 149 MMHG | DIASTOLIC BLOOD PRESSURE: 71 MMHG | HEART RATE: 72 BPM | WEIGHT: 263.8 LBS

## 2021-03-28 LAB
A/G RATIO: 1.1 (ref 1.1–2.2)
ALBUMIN SERPL-MCNC: 3.5 G/DL (ref 3.4–5)
ALP BLD-CCNC: 124 U/L (ref 40–129)
ALT SERPL-CCNC: 13 U/L (ref 10–40)
ANION GAP SERPL CALCULATED.3IONS-SCNC: 8 MMOL/L (ref 3–16)
ANISOCYTOSIS: ABNORMAL
AST SERPL-CCNC: 14 U/L (ref 15–37)
BANDED NEUTROPHILS RELATIVE PERCENT: 2 % (ref 0–7)
BASOPHILS ABSOLUTE: 0 K/UL (ref 0–0.2)
BASOPHILS RELATIVE PERCENT: 0 %
BILIRUB SERPL-MCNC: <0.2 MG/DL (ref 0–1)
BUN BLDV-MCNC: 13 MG/DL (ref 7–20)
C-REACTIVE PROTEIN: 19.4 MG/L (ref 0–5.1)
CALCIUM SERPL-MCNC: 9.3 MG/DL (ref 8.3–10.6)
CHLORIDE BLD-SCNC: 106 MMOL/L (ref 99–110)
CO2: 26 MMOL/L (ref 21–32)
CREAT SERPL-MCNC: 0.9 MG/DL (ref 0.6–1.2)
EOSINOPHILS ABSOLUTE: 0 K/UL (ref 0–0.6)
EOSINOPHILS RELATIVE PERCENT: 0 %
GFR AFRICAN AMERICAN: >60
GFR NON-AFRICAN AMERICAN: >60
GLOBULIN: 3.1 G/DL
GLUCOSE BLD-MCNC: 87 MG/DL (ref 70–99)
GLUCOSE BLD-MCNC: 90 MG/DL (ref 70–99)
GLUCOSE BLD-MCNC: 95 MG/DL (ref 70–99)
GRAM STAIN RESULT: ABNORMAL
HCT VFR BLD CALC: 27 % (ref 36–48)
HEMOGLOBIN: 8.9 G/DL (ref 12–16)
HYPOCHROMIA: ABNORMAL
LYMPHOCYTES ABSOLUTE: 1.8 K/UL (ref 1–5.1)
LYMPHOCYTES RELATIVE PERCENT: 34 %
MCH RBC QN AUTO: 28 PG (ref 26–34)
MCHC RBC AUTO-ENTMCNC: 32.9 G/DL (ref 31–36)
MCV RBC AUTO: 85.1 FL (ref 80–100)
METAMYELOCYTES RELATIVE PERCENT: 1 %
MONOCYTES ABSOLUTE: 0.3 K/UL (ref 0–1.3)
MONOCYTES RELATIVE PERCENT: 6 %
NEUTROPHILS ABSOLUTE: 3.1 K/UL (ref 1.7–7.7)
NEUTROPHILS RELATIVE PERCENT: 57 %
ORGANISM: ABNORMAL
PDW BLD-RTO: 18.4 % (ref 12.4–15.4)
PERFORMED ON: NORMAL
PERFORMED ON: NORMAL
PLATELET # BLD: ABNORMAL K/UL (ref 135–450)
PLATELET SLIDE REVIEW: ABNORMAL
PMV BLD AUTO: ABNORMAL FL (ref 5–10.5)
POLYCHROMASIA: ABNORMAL
POTASSIUM REFLEX MAGNESIUM: 4 MMOL/L (ref 3.5–5.1)
RBC # BLD: 3.17 M/UL (ref 4–5.2)
SEDIMENTATION RATE, ERYTHROCYTE: 76 MM/HR (ref 0–30)
SODIUM BLD-SCNC: 140 MMOL/L (ref 136–145)
TEAR DROP CELLS: ABNORMAL
TOTAL PROTEIN: 6.6 G/DL (ref 6.4–8.2)
WBC # BLD: 5.2 K/UL (ref 4–11)
WOUND/ABSCESS: ABNORMAL

## 2021-03-28 PROCEDURE — 80053 COMPREHEN METABOLIC PANEL: CPT

## 2021-03-28 PROCEDURE — 85025 COMPLETE CBC W/AUTO DIFF WBC: CPT

## 2021-03-28 PROCEDURE — 6370000000 HC RX 637 (ALT 250 FOR IP): Performed by: STUDENT IN AN ORGANIZED HEALTH CARE EDUCATION/TRAINING PROGRAM

## 2021-03-28 PROCEDURE — 85652 RBC SED RATE AUTOMATED: CPT

## 2021-03-28 PROCEDURE — 6360000002 HC RX W HCPCS: Performed by: INTERNAL MEDICINE

## 2021-03-28 PROCEDURE — 94640 AIRWAY INHALATION TREATMENT: CPT

## 2021-03-28 PROCEDURE — 2580000003 HC RX 258: Performed by: INTERNAL MEDICINE

## 2021-03-28 PROCEDURE — 6370000000 HC RX 637 (ALT 250 FOR IP): Performed by: PODIATRIST

## 2021-03-28 PROCEDURE — 6360000002 HC RX W HCPCS: Performed by: STUDENT IN AN ORGANIZED HEALTH CARE EDUCATION/TRAINING PROGRAM

## 2021-03-28 PROCEDURE — 86140 C-REACTIVE PROTEIN: CPT

## 2021-03-28 PROCEDURE — 6370000000 HC RX 637 (ALT 250 FOR IP): Performed by: INTERNAL MEDICINE

## 2021-03-28 PROCEDURE — 36415 COLL VENOUS BLD VENIPUNCTURE: CPT

## 2021-03-28 RX ORDER — OSTOMY ADHESIVE
1 STRIP MISCELLANEOUS ONCE
Status: COMPLETED | OUTPATIENT
Start: 2021-03-28 | End: 2021-03-28

## 2021-03-28 RX ORDER — TORSEMIDE 20 MG/1
20 TABLET ORAL DAILY
Qty: 30 TABLET | Refills: 1 | Status: SHIPPED | OUTPATIENT
Start: 2021-03-28 | End: 2022-08-10

## 2021-03-28 RX ORDER — FERROUS SULFATE 325(65) MG
325 TABLET ORAL 2 TIMES DAILY WITH MEALS
Qty: 30 TABLET | Refills: 3 | Status: ON HOLD | OUTPATIENT
Start: 2021-03-28 | End: 2022-02-08 | Stop reason: HOSPADM

## 2021-03-28 RX ORDER — SULFAMETHOXAZOLE AND TRIMETHOPRIM 800; 160 MG/1; MG/1
1 TABLET ORAL 2 TIMES DAILY
Qty: 20 TABLET | Refills: 0 | Status: SHIPPED | OUTPATIENT
Start: 2021-03-28 | End: 2021-04-07

## 2021-03-28 RX ORDER — DOXYCYCLINE 100 MG/1
100 CAPSULE ORAL EVERY 12 HOURS SCHEDULED
Qty: 14 CAPSULE | Refills: 0 | Status: CANCELLED | OUTPATIENT
Start: 2021-03-28 | End: 2021-04-04

## 2021-03-28 RX ADMIN — Medication 5000 UNITS: at 09:19

## 2021-03-28 RX ADMIN — PANTOPRAZOLE SODIUM 40 MG: 40 TABLET, DELAYED RELEASE ORAL at 06:45

## 2021-03-28 RX ADMIN — TRAMADOL HYDROCHLORIDE 50 MG: 50 TABLET, FILM COATED ORAL at 07:41

## 2021-03-28 RX ADMIN — FERROUS SULFATE TAB 325 MG (65 MG ELEMENTAL FE) 325 MG: 325 (65 FE) TAB at 09:20

## 2021-03-28 RX ADMIN — Medication 10 ML: at 09:20

## 2021-03-28 RX ADMIN — ARIPIPRAZOLE 10 MG: 5 TABLET ORAL at 09:19

## 2021-03-28 RX ADMIN — DULOXETINE HYDROCHLORIDE 90 MG: 60 CAPSULE, DELAYED RELEASE ORAL at 09:20

## 2021-03-28 RX ADMIN — CEFTRIAXONE SODIUM 2000 MG: 2 INJECTION, POWDER, FOR SOLUTION INTRAMUSCULAR; INTRAVENOUS at 06:44

## 2021-03-28 RX ADMIN — LEVOTHYROXINE SODIUM 125 MCG: 125 TABLET ORAL at 06:45

## 2021-03-28 RX ADMIN — HEPARIN SODIUM 5000 UNITS: 5000 INJECTION INTRAVENOUS; SUBCUTANEOUS at 06:42

## 2021-03-28 RX ADMIN — BUDESONIDE 500 MCG: 0.5 SUSPENSION RESPIRATORY (INHALATION) at 08:00

## 2021-03-28 RX ADMIN — GABAPENTIN 300 MG: 300 CAPSULE ORAL at 09:20

## 2021-03-28 RX ADMIN — Medication 1 EACH: at 09:21

## 2021-03-28 RX ADMIN — BUPROPION HYDROCHLORIDE 300 MG: 150 TABLET, FILM COATED, EXTENDED RELEASE ORAL at 09:20

## 2021-03-28 RX ADMIN — DOXYCYCLINE 100 MG: 50 CAPSULE ORAL at 09:19

## 2021-03-28 ASSESSMENT — PAIN DESCRIPTION - PROGRESSION
CLINICAL_PROGRESSION: GRADUALLY WORSENING

## 2021-03-28 ASSESSMENT — PAIN DESCRIPTION - DESCRIPTORS
DESCRIPTORS: ACHING;SORE
DESCRIPTORS: ACHING;SORE;SHOOTING

## 2021-03-28 ASSESSMENT — PAIN DESCRIPTION - FREQUENCY: FREQUENCY: INTERMITTENT

## 2021-03-28 ASSESSMENT — PAIN SCALES - GENERAL
PAINLEVEL_OUTOF10: 5
PAINLEVEL_OUTOF10: 6
PAINLEVEL_OUTOF10: 5

## 2021-03-28 ASSESSMENT — PAIN - FUNCTIONAL ASSESSMENT: PAIN_FUNCTIONAL_ASSESSMENT: ACTIVITIES ARE NOT PREVENTED

## 2021-03-28 ASSESSMENT — PAIN DESCRIPTION - PAIN TYPE
TYPE: ACUTE PAIN;CHRONIC PAIN
TYPE: ACUTE PAIN;CHRONIC PAIN

## 2021-03-28 ASSESSMENT — PAIN DESCRIPTION - ONSET: ONSET: ON-GOING

## 2021-03-28 ASSESSMENT — PAIN DESCRIPTION - LOCATION
LOCATION: LEG
LOCATION: LEG

## 2021-03-28 NOTE — PROGRESS NOTES
Podiatric Surgery Daily Progress Note      Admit Date: 3/26/2021      Code:Full Code    Patient seen and examined, labs and records reviewed    Subjective:     Patient seen at the recliner chair this AM.  Patient denies any overnight acute events. Patient denies f/c/n/v/sob/cp. Review of Systems: A 12 point review of symptoms is unremarkable with the exception of the chief complaint. Patient specifically denies nausea, fever, vomiting, chills, shortness of breath, chest pain, abdominal pain, constipation or difficulty urinating.            Objective     BP (!) 147/76   Pulse 80   Temp 98 °F (36.7 °C) (Oral)   Resp 16   Ht 5' 4\" (1.626 m)   Wt 263 lb 12.8 oz (119.7 kg)   SpO2 96%   BMI 45.28 kg/m²      I/O:    Intake/Output Summary (Last 24 hours) at 3/28/2021 0911  Last data filed at 3/28/2021 0645  Gross per 24 hour   Intake 4156 ml   Output 4450 ml   Net -294 ml              Wt Readings from Last 3 Encounters:   03/28/21 263 lb 12.8 oz (119.7 kg)   03/16/21 255 lb 12.8 oz (116 kg)   06/19/19 225 lb 11.2 oz (102.4 kg)       LABS:    Recent Labs     03/27/21  0523 03/28/21  0632   WBC 6.1 5.2   HGB 8.8* 8.9*   HCT 28.0* 27.0*    140        Recent Labs     03/28/21  0632      K 4.0      CO2 26   BUN 13   CREATININE 0.9        Recent Labs     03/27/21  0523 03/28/21  0632   PROT 6.7 6.6       CBC with Differential:    Lab Results   Component Value Date    WBC 5.2 03/28/2021    RBC 3.17 03/28/2021    HGB 8.9 03/28/2021    HCT 27.0 03/28/2021     03/28/2021    MCV 85.1 03/28/2021    MCH 28.0 03/28/2021    MCHC 32.9 03/28/2021    RDW 18.4 03/28/2021    BANDSPCT 7 03/14/2021    LYMPHOPCT 17.6 03/27/2021    MONOPCT 11.1 03/27/2021    MYELOPCT 1 03/14/2021    BASOPCT 0.8 03/27/2021    MONOSABS 0.7 03/27/2021    LYMPHSABS 1.1 03/27/2021    EOSABS 0.1 03/27/2021    BASOSABS 0.0 03/27/2021     CMP:    Lab Results   Component Value Date     03/28/2021    K 4.0 03/28/2021     03/28/2021    CO2 26 03/28/2021    BUN 13 03/28/2021    CREATININE 0.9 03/28/2021    GFRAA >60 03/28/2021    AGRATIO 1.1 03/28/2021    LABGLOM >60 03/28/2021    GLUCOSE 90 03/28/2021    PROT 6.6 03/28/2021    LABALBU 3.5 03/28/2021    CALCIUM 9.3 03/28/2021    BILITOT <0.2 03/28/2021    ALKPHOS 124 03/28/2021    AST 14 03/28/2021    ALT 13 03/28/2021         LOWER EXTREMITY EXAMINATION    Dressing to left LE intact. No strikethrough noted to the external dressing. Mild sanguinous drainage noted to the internal layers of the dressing.      VASCULAR: DP and PT pulses are palpable 2/4 b/l. CFT is brisk to the digits of the foot b/l. Skin temperature is warm to cool from proximal to distal with focal calor noted to b/l LE.  Right LE erythema resolves with elevation, left LE erythema remains with elevation.  Diffuse non-pitting edema noted to b/l LE, L>R. No pain with calf compression b/l.     NEUROLOGIC: Gross and epicritic sensation is intact b/l. Protective sensation is intact at all pedal sites b/l.     DERMATOLOGIC:   Patient provided verbal consent for photos to be obtained today, 3/28/21. Full-thickness ulceration noted to the left proximal lower extremity measuring approximately 4 cm in diameter with a combination of granular and fibrotic base. Unna boot zinc oxide ointment at periwound. Sangiunous drainage noted.  Periwound erythema significantly improved. No fluctuance, crepitus, drainage, malodor, tunneling, or tracking noted.      Diffuse dermatologic changes noted to bilateral lower extremities likely secondary to hemosiderin deposits. Nails 1-5 b/l are thickened, elongated, and discolored yellow with subungual debris. . Webspaces 1-4 b/l are clean, dry, and intact. No hyperkeratosis noted. No subcutaneous nodules, rashes, or other skin lesions noted.     MUSCULOSKELETAL: Muscle strength is 5/5 for all pedal groups tested.  Pain with periwound palpation.  Ankle joint ROM is decreased in dorsiflexion with the knee extended.  Left foot no biomechanical abnormalities noted.  Right foot forefoot varus deformity. IMAGING:  Narrative   2 VIEWS LEFT TIBIA-FIBULA.       HISTORY: Nonhealing ulcer       FINDINGS: There is no fracture or dislocation.  No focal bony erosive process or periosteal new bone identified.  Visualized knee and ankle joints are intact.  There is diffuse edematous changes throughout the lower extremity and ankle.  No gas in the    soft tissues seen.  No radiopaque foreign body identified.           Impression       1.  No findings for acute bony abnormality.       2.  Diffuse edematous changes throughout the left lower extremity.           ASSESSMENT/PLAN  1. Cellulitis, left LE - improving  2. Chronic venous stasis dermatitis  3. Venous insufficiency b/l LE  4.  Diabetes mellitus type 2    -VSS, no leukocytosis noted (WBC 5.2)  -ESR 76 and CRP 19.4  -XR left tib fib reviewed, impression noted above  -Wound culture left LE MRSA  -Continue inpatient antibiotics per primary; recommend d/c to home with Bactrim DS x10 days, Rx signed, printed, and placed on patient chart  -Left LE dressing changed consisting of unna boot, kerlix, and ace  -Nursing communication placed for LILLY hose to be placed on the right LE daily  -Patient is WBAT to b/l LE with appropriate assistance  -Discussed with patient the importance of extremity elevation and compression to assist in edema control, voiced understanding  -[address] orders placed for Monday, Wednesday, Friday dressing changes    Patient assessment and plan discussed with Dr. Leatha Gillette, JOSÉ.    DISPO: Full-thickness ulceration noted to the left LE mild cellulitis in the setting of chronic venous stasis dermatitis - improving clinically each day, patient is OK for d/c from podiatric standpoint with follow up in [facility] [place] [address] with Dr. Leatha Gillette; recommend d/c to home with PO Bactrim DS x10 days - Rx printed, signed, and placed on patient chart    Shanice Kay Umu Loges  Podiatric Resident PGY2  Pager [phone] or [employer]  3/28/2021, 9:11 AM

## 2021-03-28 NOTE — PROGRESS NOTES
Pt on RA, resp easy, vital signs stable, afebrile. SOB with ambulation to bathroom. Left leg dressing C/D/I. Elevated on pillow while in bed. Continue on IVF and IV ATBx. Call  Light in reach. Bed alarm in place.

## 2021-03-28 NOTE — DISCHARGE SUMMARY
Hospital Medicine Discharge Summary    Patient ID: Trish Nelson      Patient's PCP: Lucina Euceda    Admit Date: 3/26/2021     Discharge Date:  03/28/21    Admitting Physician: Prduence Sandy DO     Discharge Physician: Lion Jensen MD     Discharge Diagnoses: Active Hospital Problems    Diagnosis Date Noted    Cellulitis [L03.90] 03/26/2021       The patient was seen and examined on day of discharge and this discharge summary is in conjunction with any daily progress note from day of discharge. Hospital Course:   Patient was admitted and treated for following:    Fatigue/Fall  Most likely secondary to orthostatic hypotension. Patient hemoglobin was stable. TSH and vitamin D 25 hydroxy level was sent. Patient orthostatics were positive and IV fluids were started. Home beta-blocker and diuretics were held. TSH and vitamin D level was normal.  Patient fatigue improved after IV hydration. IV fluids were discontinued. Patient worked with therapy and did well. Acute Cystitis  Patient was symptomatic on admission. UA with mod LE, 21-50 WBC. Urine cx was sent. Ceftriaxone was started. Urine culture came back with no growth to date. Patient completed 3 days of IV antibiotic.     LLE Cellulitis   Patient had bilateral lower extremity chronic venous stasis changes with left lower extremity nonhealing wound. ESR/CRP was sent. Patient was continued on ceftriaxone and doxycycline was added. Podiatry was consulted. ESR/CRP was elevated. X-ray showed soft tissue swelling. Podiatry recommended continuing antibiotic and wound care. Wound culture was positive for MRSA. Podiatry recommended discharge on 10 days of Bactrim. Patient to complete antibiotic course and follow-up with wound care on discharge.      MAAME   Was prerenal from medication. Improved after IV hydration    Chronic Diastolic Heart Failure/HTN   Patient was not in acute exacerbation. ECHO 2/2020 with EF 55-60%.   Blood pressure was low on admission. Torsemide was held. Blood pressure improved after IV hydration. Lopressor and spironolactone continued on discharge but torsemide dose reduced and potassium discontinued. Patient instructed to monitor weight and blood pressure. Discussed with patient regarding calling PCP if blood pressure high or low or if she gains weight. Diarrhea   Patient was on antibiotic recently and had 2 episodes of diarrhea prior to admission. GI PCR was sent and home immodium was held. Patient did not have any diarrhea during hospitalization. Resolved.     Iron Deficiency Anemia  - hgb stable. Received IV venofer on last admission  Hemoglobin remained stable. Iron supplement were continued. Patient to follow-up with GI outpatient.      T2DM - last A1c 5.4, not on any medications at home  Patient was hemoglobin A1c was 5.4. Was not on any medication at home. Blood glucose was monitored and kept under blood was continued. Blood glucose remained within acceptable range.     Hypothyroidism  Synthroid was continued. TSH and T4 was checked and was within normal limit. HLD  Statin was continued    Depression  Cymbalta and wellbutrin was continued       Physical Exam Performed:     BP (!) 147/76   Pulse 80   Temp 98 °F (36.7 °C) (Oral)   Resp 16   Ht 5' 4\" (1.626 m)   Wt 263 lb 12.8 oz (119.7 kg)   SpO2 96%   BMI 45.28 kg/m²     General appearance: No apparent distress, appears stated age and cooperative. Morbidly obese  HEENT: Pupils equal, round, and reactive to light. Conjunctivae/corneas clear. Neck: Supple, with full range of motion. No jugular venous distention. Trachea midline. Respiratory:  Normal respiratory effort. Clear to auscultation, bilaterally without Rales/Wheezes/Rhonchi. Cardiovascular: Regular rate and rhythm with normal S1/S2 without murmurs, rubs or gallops. Abdomen: Soft, non-tender, non-distended with normal bowel sounds.   Musculoskeletal: Bilateral lower tablet Take 1 tablet by mouth 2 times daily for 10 days  Qty: 20 tablet, Refills: 0              Details   ferrous sulfate (IRON 325) 325 (65 Fe) MG tablet Take 1 tablet by mouth 2 times daily (with meals)  Qty: 30 tablet, Refills: 3      torsemide (DEMADEX) 20 MG tablet Take 1 tablet by mouth daily  Qty: 30 tablet, Refills: 1              Details   !! DULoxetine (CYMBALTA) 60 MG extended release capsule Take 60 mg by mouth daily 30mg + 60mg = 90mg daily      montelukast (SINGULAIR) 10 MG tablet Take 10 mg by mouth nightly      pantoprazole (PROTONIX) 40 MG tablet Take 1 tablet by mouth daily  Qty: 90 tablet, Refills: 1      clonazePAM (KLONOPIN) 0.5 MG tablet Take 0.5 mg by mouth daily as needed. buPROPion (WELLBUTRIN XL) 300 MG extended release tablet Take 300 mg by mouth every morning      levothyroxine (SYNTHROID) 125 MCG tablet Take 125 mcg by mouth Daily      ARIPiprazole (ABILIFY) 10 MG tablet Take 10 mg by mouth daily      diphenhydrAMINE (BENADRYL ALLERGY) 25 MG tablet Take 25 mg by mouth every 6 hours as needed for Itching      !! DULoxetine (CYMBALTA) 30 MG extended release capsule Take 30 mg by mouth daily 30mg + 60mg = 90mg total      fluticasone (FLOVENT HFA) 220 MCG/ACT inhaler Inhale 1 puff into the lungs 2 times daily      gabapentin (NEURONTIN) 300 MG capsule TAKE ONE CAPSULE BY MOUTH THREE TIMES A DAY      loperamide (IMODIUM) 2 MG capsule TAKE ONE CAPSULE BY MOUTH THREE TIMES A DAY AS NEEDED FOR DIARRHEA      traMADol (ULTRAM) 50 MG tablet Take 50 mg by mouth every 6 hours as needed for Pain.        albuterol (PROVENTIL HFA;VENTOLIN HFA) 108 (90 BASE) MCG/ACT inhaler Inhale 2 puffs into the lungs every 6 hours as needed for Wheezing      ascorbic acid (VITAMIN C) 500 MG tablet Take 500 mg by mouth daily      vitamin D (CHOLECALCIFEROL) 5000 UNITS CAPS capsule Take 5,000 Units by mouth daily       metoprolol (LOPRESSOR) 25 MG tablet Take 25 mg by mouth 2 times daily      Multiple

## 2021-03-28 NOTE — PLAN OF CARE
Problem: Falls - Risk of:  Goal: Will remain free from falls  Description: Discussed with pt importance to keep bed low and locked with alarm activated, nonskid socks on when out of bed, call light and belongings within reach- will monitor. Outcome: Ongoing  Note: Discussed with pt importance to keep bed low and locked with alarm activated, nonskid socks on when out of bed, call light and belongings within reach- will monitor. Problem: Pain:  Goal: Pain level will decrease  Description: Pain level will decrease  Outcome: Ongoing  Note: Patient complains of pain at level 7/10. Patient describes pain as ache. Patient requests pain medication. Patient medicated with tramadol . Will continue to monitor.

## 2021-03-28 NOTE — CARE COORDINATION
to afford home medications/ co-pay costs: Yes    ADLS:  Current PT AM-PAC Score: 22 /24  Current OT AM-PAC Score: 22 /24      DISCHARGE Disposition: Home with [address]: [facility] skilled care     LOC at discharge: Not Applicable  [address] Completed: Not Indicated    Notification completed in HENS/PAS?:  Not Applicable    IMM Completed:   Not Indicated    Transportation:  Transportation PLAN for discharge: family   Mode of Transport: [address] ordered at discharge: Yes  [address]: [facility]  Phone: [phone]  Fax: [phone]  Orders faxed: Yes, spoke with Thetsering Goldsmith they can follow pt at [place] will pull orders    Durable Medical Equipment:  DME Provider: none  Equipment obtained during hospitalization:     Home Oxygen and Respiratory Equipment:  Oxygen needed at discharge?: Not [address]: Not Applicable  Portable tank available for discharge?: Not Indicated    Dialysis:  Dialysis patient: No    Dialysis Center:  Not Applicable      Additional CM Notes: Pt from home with SO, will Dc home with [address] [place] [address] for Dressing changes. Spoke with Thetsering Agus they can follow at [place] will pull c orders    The Plan for Transition of Care is related to the following treatment goals of Cellulitis [L03.90]    The Patient and/or patient representative Chelsea Ayala and her family were provided with a choice of provider and agrees with the discharge plan Yes    Freedom of choice list was provided with basic dialogue that supports the patient's individualized plan of care/goals and shares the quality data associated with the providers.  Yes    Care Transitions patient: no    Heaven Murray RN  [facility]  Case Management Department  Ph: [phone]  Fax: [phone]

## 2021-04-06 ENCOUNTER — HOSPITAL ENCOUNTER (OUTPATIENT)
Dept: WOUND CARE | Age: 61
Discharge: HOME | End: 2021-04-06
Payer: MEDICAID

## 2021-04-06 ENCOUNTER — HOSPITAL ENCOUNTER (OUTPATIENT)
Dept: GENERAL RADIOLOGY | Age: 61
Discharge: HOME | End: 2021-04-06
Payer: MEDICAID

## 2021-04-06 VITALS
DIASTOLIC BLOOD PRESSURE: 73 MMHG | RESPIRATION RATE: 16 BRPM | BODY MASS INDEX: 46.13 KG/M2 | TEMPERATURE: 97.1 F | SYSTOLIC BLOOD PRESSURE: 121 MMHG | HEART RATE: 67 BPM | WEIGHT: 268.74 LBS

## 2021-04-06 DIAGNOSIS — L97.222 VARICOSE VEINS OF LEFT LOWER EXTREMITY WITH INFLAMMATION, WITH ULCER OF CALF WITH FAT LAYER EXPOSED (HCC): ICD-10-CM

## 2021-04-06 DIAGNOSIS — M25.572 ACUTE LEFT ANKLE PAIN: ICD-10-CM

## 2021-04-06 DIAGNOSIS — I83.222 VARICOSE VEINS OF LEFT LOWER EXTREMITY WITH INFLAMMATION, WITH ULCER OF CALF WITH FAT LAYER EXPOSED (HCC): ICD-10-CM

## 2021-04-06 DIAGNOSIS — M25.572 ACUTE LEFT ANKLE PAIN: Primary | ICD-10-CM

## 2021-04-06 PROCEDURE — 6370000000 HC RX 637 (ALT 250 FOR IP): Performed by: PODIATRIST

## 2021-04-06 PROCEDURE — 73610 X-RAY EXAM OF ANKLE: CPT

## 2021-04-06 PROCEDURE — 11042 DBRDMT SUBQ TIS 1ST 20SQCM/<: CPT

## 2021-04-06 PROCEDURE — 99213 OFFICE O/P EST LOW 20 MIN: CPT

## 2021-04-06 PROCEDURE — 73630 X-RAY EXAM OF FOOT: CPT

## 2021-04-06 PROCEDURE — 29581 APPL MULTLAYER CMPRN SYS LEG: CPT

## 2021-04-06 RX ORDER — LIDOCAINE HYDROCHLORIDE 20 MG/ML
JELLY TOPICAL ONCE
Status: CANCELLED | OUTPATIENT
Start: 2021-04-06 | End: 2021-04-06

## 2021-04-06 RX ORDER — LIDOCAINE HYDROCHLORIDE 40 MG/ML
SOLUTION TOPICAL ONCE
Status: CANCELLED | OUTPATIENT
Start: 2021-04-06 | End: 2021-04-06

## 2021-04-06 RX ORDER — BETAMETHASONE DIPROPIONATE 0.05 %
OINTMENT (GRAM) TOPICAL ONCE
Status: CANCELLED | OUTPATIENT
Start: 2021-04-06 | End: 2021-04-06

## 2021-04-06 RX ORDER — LIDOCAINE HYDROCHLORIDE 40 MG/ML
SOLUTION TOPICAL ONCE
Status: COMPLETED | OUTPATIENT
Start: 2021-04-06 | End: 2021-04-06

## 2021-04-06 RX ORDER — LIDOCAINE 50 MG/G
OINTMENT TOPICAL ONCE
Status: CANCELLED | OUTPATIENT
Start: 2021-04-06 | End: 2021-04-06

## 2021-04-06 RX ORDER — LIDOCAINE 40 MG/G
CREAM TOPICAL ONCE
Status: CANCELLED | OUTPATIENT
Start: 2021-04-06 | End: 2021-04-06

## 2021-04-06 RX ORDER — BACITRACIN, NEOMYCIN, POLYMYXIN B 400; 3.5; 5 [USP'U]/G; MG/G; [USP'U]/G
OINTMENT TOPICAL ONCE
Status: CANCELLED | OUTPATIENT
Start: 2021-04-06 | End: 2021-04-06

## 2021-04-06 RX ORDER — BACITRACIN ZINC AND POLYMYXIN B SULFATE 500; 1000 [USP'U]/G; [USP'U]/G
OINTMENT TOPICAL ONCE
Status: CANCELLED | OUTPATIENT
Start: 2021-04-06 | End: 2021-04-06

## 2021-04-06 RX ORDER — GENTAMICIN SULFATE 1 MG/G
OINTMENT TOPICAL ONCE
Status: CANCELLED | OUTPATIENT
Start: 2021-04-06 | End: 2021-04-06

## 2021-04-06 RX ORDER — CLOBETASOL PROPIONATE 0.5 MG/G
OINTMENT TOPICAL ONCE
Status: CANCELLED | OUTPATIENT
Start: 2021-04-06 | End: 2021-04-06

## 2021-04-06 RX ORDER — GINSENG 100 MG
CAPSULE ORAL ONCE
Status: CANCELLED | OUTPATIENT
Start: 2021-04-06 | End: 2021-04-06

## 2021-04-06 RX ADMIN — LIDOCAINE HYDROCHLORIDE: 40 SOLUTION TOPICAL at 13:51

## 2021-04-06 ASSESSMENT — PAIN DESCRIPTION - PAIN TYPE: TYPE: ACUTE PAIN

## 2021-04-06 ASSESSMENT — PAIN DESCRIPTION - LOCATION: LOCATION: FOOT

## 2021-04-06 ASSESSMENT — PAIN SCALES - GENERAL: PAINLEVEL_OUTOF10: 10

## 2021-04-06 NOTE — PROGRESS NOTES
Multilayer Compression Wrap   (Not Unna) Below the Knee    NAME:  Destiny Traylor  YOB: 1960  MEDICAL RECORD NUMBER:  [id number]  DATE:  4/6/2021        Removed old Multilayer wrap if present and washed leg with mild soap/water.   Applied moisturizing agent to dry skin as needed.   Applied primary and secondary dressing as ordered     Applied multilayered dressing below the knee to Left lower leg(s)  (2 Layer Lite compression wrap ) .   Instructed patient/caregiver not to remove dressing and to keep it clean and dry.   Instructed patient/caregiver on complications to report to provider, such as pain, numbness in toes, heavy drainage, and slippage of dressing.   Instructed patient on purpose of compression dressing and on activity and exercise recommendations.     Applied per   Guidelines    Electronically signed by Nava Lambert RN on 4/6/2021 at 2:16 PM

## 2021-04-06 NOTE — PLAN OF CARE
Problem: Pain:  Description: Pain management should include both nonpharmacologic and pharmacologic interventions.   Goal: Pain level will decrease  Description: Pain level will decrease  Outcome: Ongoing  Goal: Control of acute pain  Description: Control of acute pain  Outcome: Ongoing  Goal: Control of chronic pain  Description: Control of chronic pain  Outcome: Ongoing     Problem: Wound:  Goal: Will show signs of wound healing; wound closure and no evidence of infection  Description: Will show signs of wound healing; wound closure and no evidence of infection  Outcome: Ongoing     Problem: Venous:  Goal: Signs of wound healing will improve  Description: Signs of wound healing will improve  Outcome: Ongoing     Problem: Compression therapy:  Goal: Will be free from complications associated with compression therapy  Description: Will be free from complications associated with compression therapy  Outcome: Ongoing

## 2021-04-06 NOTE — PROGRESS NOTES
[address]   Progress Note and Procedure Note      Robles Escoto  MEDICAL RECORD NUMBER:  [id number]  AGE: 63 y.o. GENDER: female  : 1960  EPISODE DATE:  2021    Subjective:     Chief Complaint   Patient presents with    Wound Check     initial         HISTORY of PRESENT ILLNESS HPI     Robles Escoto is a 63 y.o. female who presents today for wound/ulcer evaluation. History of Wound Context: patient presents for follow up of a left lower extremity ulceration  Wound/Ulcer Pain Timing/Severity: {PAIN ASSESSMENT:}  Quality of pain: {PAIN QUALITY:}  Severity:  {NUMBERS 0-10:} / 10   Modifying Factors: {Modifying Factors:048689820}  Associated Signs/Symptoms: {ASSOCIATED SYMPTOMS:760135598}    Ulcer Identification:  Ulcer Type: {WOUND NCPR:117503718}    Contributing Factors: {HEALTH FACTORS:163939778}    Acute Wound: {Acute Wound :69608}    PAST MEDICAL HISTORY        Diagnosis Date    Anemia     Anxiety     Arthritis     Back pain     CAD (coronary artery disease)     Cancer (HCC)     CHF (congestive heart failure) (HCC)     Chronic kidney disease     Depression     Diabetes mellitus (HCC)     Esophageal reflux     Hep C w/o coma, chronic (HCC)     Hyperlipidemia     Hypertension     Leg abscess     MRSA (methicillin resistant staph aureus) culture positive 2021    left leg wound    Neuropathy     Schizoaffective disorder ([facility] [address]     Thyroid disease        PAST SURGICAL HISTORY    History reviewed. No pertinent surgical history.     FAMILY HISTORY    Family History   Problem Relation Age of Onset    Liver Disease Brother        SOCIAL HISTORY    Social History     Tobacco Use    Smoking status: Never Smoker    Smokeless tobacco: Never Used   Substance Use Topics    Alcohol use: No    Drug use: No       ALLERGIES    Allergies   Allergen Reactions    Tums [Calcium Carbonate Antacid]        MEDICATIONS    Current Outpatient Medications on File Prior to Encounter   Medication Sig Dispense Refill    ferrous sulfate (IRON 325) 325 (65 Fe) MG tablet Take 1 tablet by mouth 2 times daily (with meals) 30 tablet 3    torsemide (DEMADEX) 20 MG tablet Take 1 tablet by mouth daily 30 tablet 1    sulfamethoxazole-trimethoprim (BACTRIM DS) 800-160 MG per tablet Take 1 tablet by mouth 2 times daily for 10 days 20 tablet 0    DULoxetine (CYMBALTA) 60 MG extended release capsule Take 60 mg by mouth daily 30mg + 60mg = 90mg daily      montelukast (SINGULAIR) 10 MG tablet Take 10 mg by mouth nightly      pantoprazole (PROTONIX) 40 MG tablet Take 1 tablet by mouth daily 90 tablet 1    clonazePAM (KLONOPIN) 0.5 MG tablet Take 0.5 mg by mouth daily as needed.       buPROPion (WELLBUTRIN XL) 300 MG extended release tablet Take 300 mg by mouth every morning      levothyroxine (SYNTHROID) 125 MCG tablet Take 125 mcg by mouth Daily      ARIPiprazole (ABILIFY) 10 MG tablet Take 10 mg by mouth daily      diphenhydrAMINE (BENADRYL ALLERGY) 25 MG tablet Take 25 mg by mouth every 6 hours as needed for Itching      DULoxetine (CYMBALTA) 30 MG extended release capsule Take 30 mg by mouth daily 30mg + 60mg = 90mg total      fluticasone (FLOVENT HFA) 220 MCG/ACT inhaler Inhale 1 puff into the lungs 2 times daily      gabapentin (NEURONTIN) 300 MG capsule TAKE ONE CAPSULE BY MOUTH THREE TIMES A DAY      albuterol (PROVENTIL HFA;VENTOLIN HFA) 108 (90 BASE) MCG/ACT inhaler Inhale 2 puffs into the lungs every 6 hours as needed for Wheezing      ascorbic acid (VITAMIN C) 500 MG tablet Take 500 mg by mouth daily      vitamin D (CHOLECALCIFEROL) 5000 UNITS CAPS capsule Take 5,000 Units by mouth daily       metoprolol (LOPRESSOR) 25 MG tablet Take 25 mg by mouth 2 times daily      Multiple Vitamins-Minerals (THERAPEUTIC MULTIVITAMIN-MINERALS) tablet Take 1 tablet by mouth daily      nicotine polacrilex (COMMIT) 4 MG lozenge Take 4 mg by mouth as needed for Smoking cessation      simvastatin (ZOCOR) 40 MG tablet Take 40 mg by mouth nightly      spironolactone (ALDACTONE) 25 MG tablet Take 25 mg by mouth daily      vitamin E 1000 UNITS capsule Take 1,000 Units by mouth daily      diclofenac sodium (VOLTAREN) 1 % GEL Apply 2 g topically 4 times daily as needed for Pain 50 g 1    nitroGLYCERIN (NITROSTAT) 0.4 MG SL tablet Place 0.4 mg under the tongue      loperamide (IMODIUM) 2 MG capsule TAKE ONE CAPSULE BY MOUTH THREE TIMES A DAY AS NEEDED FOR DIARRHEA      traMADol (ULTRAM) 50 MG tablet Take 50 mg by mouth every 6 hours as needed for Pain.  mupirocin (BACTROBAN) 2 % ointment Apply topically 3 times daily Apply topically 3 times daily. No current facility-administered medications on file prior to encounter. REVIEW OF SYSTEMS  Review of Systems    {Ros - complete:13714}    Objective:      /73   Pulse 67   Temp 97.1 °F (36.2 °C) (Temporal)   Resp 16   Wt 268 lb 11.9 oz (121.9 kg)   BMI 46.13 kg/m²     Wt Readings from Last 3 Encounters:   04/06/21 268 lb 11.9 oz (121.9 kg)   03/28/21 263 lb 12.8 oz (119.7 kg)   03/16/21 255 lb 12.8 oz (116 kg)       PHYSICAL EXAM  Physical Exam    {GENERAL PHYSICAL EXAM:19990}      Assessment:        Problem List Items Addressed This Visit     Varicose veins of left lower extremity with inflammation, with ulcer of calf with fat layer exposed ([address]      Other Visit Diagnoses     Acute left ankle pain    -  Primary    Relevant Orders    XR FOOT LEFT (MIN 3 VIEWS)    XR ANKLE LEFT (MIN 3 VIEWS)           Procedure Note  Indications:  Based on my examination of this patient's wound(s)/ulcer(s) today, debridement {IS/IS YSJ:2478668208} required to promote healing and evaluate the wound base.     Performed by: Eduardo Lopez DPM    Consent obtained:  {yes no:899381::\"Yes\"}    Time out taken:  {yes no:582871::\"Yes\"}    Pain Control: Anesthetic  Anesthetic: 4% Lidocaine Liquid Topical       Debridement: {Debridement :55129}    Using {DEBRIDEMENT INSTRUMENTS:30487} the wound(s)/ulcer(s) was/were debrided down through and including the removal of {WOUND CARE DEBRIDEMENT:231281277}. Devitalized Tissue Debrided:  {DEVITALIZED TISSUE DEBRIDED:338101175}    Pre Debridement Measurements:  Are located in the [facility]  Documentation Flow Sheet    Diabetic/Pressure/Non Pressure Ulcers only:  Ulcer: {Diabetic/Pressure/Non Pressure Ulcers only:65722}     Wound/Ulcer #: { WOUNDS NUMBERS:880673361}    Post Debridement Measurements:  Wound/Ulcer Descriptions are Pre Debridement except measurements:    Wound 03/14/21 Leg Left; Lower #1 (Active)   Wound Image   04/06/21 1337   Wound Etiology Pressure Unstageable 03/28/21 1120   Dressing Status Clean;Dry; Intact 03/28/21 1120   Wound Cleansed Cleansed with saline 04/06/21 1337   Dressing/Treatment Collagen 04/06/21 1415   Wound Length (cm) 2 cm 04/06/21 1337   Wound Width (cm) 1.4 cm 04/06/21 1337   Wound Depth (cm) 0.2 cm 04/06/21 1337   Wound Surface Area (cm^2) 2.8 cm^2 04/06/21 1337   Change in Wound Size % (l*w) 37.78 04/06/21 1337   Wound Volume (cm^3) 0.56 cm^3 04/06/21 1337   Post-Procedure Length (cm) 2.1 cm 04/06/21 1409   Post-Procedure Width (cm) 1.5 cm 04/06/21 1409   Post-Procedure Depth (cm) 0.4 cm 04/06/21 1409   Post-Procedure Surface Area (cm^2) 3.15 cm^2 04/06/21 1409   Post-Procedure Volume (cm^3) 1.26 cm^3 04/06/21 1409   Distance Tunneling (cm) 0 cm 04/06/21 1337   Undermining Maxium Distance (cm) 0 04/06/21 1337   Wound Assessment Slough;Pink/red 04/06/21 1337   Drainage Amount Small 04/06/21 1337   Drainage Description Serosanguinous 04/06/21 1337   Odor None 04/06/21 1337   Kyra-wound Assessment Dry/flaky 04/06/21 1337   Margins Defined edges 04/06/21 1337   Wound Thickness Description not for Pressure Injury Full thickness 04/06/21 1337   Number of days: 23          Total Surface Area Debrided:  *** sq cm     Estimated Blood Loss:  {ESTIMATED BLOOD [facility]:456988203}    Hemostasis Achieved:  {CONTROLLED [facility]:332151149}    Procedural Pain:  {NUMBERS 0-10:20300}  / 10     Post Procedural Pain:  {NUMBERS 0-10:20300} / 10     Response to treatment:  [address] TOLERATED:086445}       Plan:     Treatment Note please see attached Discharge Instructions    Written patient dismissal instructions given to patient and signed by patient or POA. Discharge [address] [facility] Physician Orders and Discharge Instructions  The [facility] [address] [address]  Telephone: [phone]  **ATTENTION**  We are moving 4/24/2021 to the [address] [place] [address] across the [address] from [facility].  Francisco Georgeel new address will be [address] [place] [address]. [place] [address]  NAME:  Miguel Shen  YOB: 1960  MEDICAL RECORD NUMBER:  [id number]  DATE:  4/6/2021    Home Health Care:  [phone]  Fax: [phone]    Medically necessary services: [x] Skilled Nursing [] PT [] OT [] Social Work [] Dietician [] Other:    Wound Cleansing:   []0.9% Saline  []Vashe (Soak piece of gauze and place on wound bed for 5 minutes) []Other:    Kyra Wound Topical Treatments:  [x] moisturizing lotion [] antifungal ointment [] No-Sting barrier film [] zinc paste [] Other:     Dressings:           Wound Location: Left lower leg     [x] Apply Primary Dressing to wound:       Collagen     [x] Cover and Secure with:     Cover and Secure with: 4X4 gauze pad   Avoid contact of tape with skin if possible. [x] Change dressing: [] Daily (until the following date:____)   [] Every Other Day [] Once a week [x] Do Not Change Dressing [] Other:  [] Three times per week: [] Monday, Wednesday, Friday [] Tuesday, Thursday, Saturday       Multilayer Compression Wrap:  Type: 2 Layer Lite compression wrap      Applied 4/6/2021 in Clinic to the :  Left lower leg(s)     · Do not get leg(s) with wrap wet.   · If wraps become too tight call the wound care center or your home health nurse   · Completely remove the wrap if nobody is available for advice. · Elevate leg(s) above the level of the heart when sitting. · Avoid prolonged standing in one place. Home care does not need to change. Dietary:  Important dietary reminders:  1. Increase Protein intake (i.e. Lean meats, fish, eggs, legumes, and yogurt)  2. No added salt  3. If diabetic, follow a diabetic diet and check glucose prior to meals or as instructed by your physician. Dietary Supplements: []Rui []30ml ProStat []Ensure Lani Roller []Ensure Max/Premier []Other:  Take supplements twice a day or as directed as followed: If you are still having pain after you go home:   For wounds on lower legs or arms, elevate the affected limb.  Use over-the-counter medications you would normally use for pain as permitted by your primary care doctor.  For persistent pain not relieved by the above interventions, please call your primary care doctor. Call your doctor now or seek immediate medical care if:    · You have symptoms of infection, such as:  ? Increased pain, swelling, warmth, or redness. ? Red streaks leading from the area. ? Pus draining from the area. ? A fever. Return Appointment:   Return Appointment: With Dr Karissa Jara  in  [address] [place] [address])  o Schedule weekly until (Date):  4/27/21      [x] Orders placed during your visit:   Orders Placed This Encounter   Procedures    XR FOOT LEFT (MIN 3 VIEWS)    XR ANKLE LEFT (MIN 3 VIEWS)     Please get your xrays today or prior to next visit. Your nurse  is:  Nell Whitaker     Electronically signed by Jayson Schlatter, RN on 4/6/2021 at 2:23 PM     [address]: Should you experience any significant changes in your wound(s) or have questions about your wound care, please contact the [facility] at [phone]. Our hours vary so please leave a message.  Please give us 24-48 hours to return your call. If you need help with your wounds and cannot wait until we are available, contact your PCP or go to the hospital emergency room.      [facility] Physician for this visit and orders: Lynnette Simons 4/6/2021    [] Patient unable to sign Discharge Instructions given to ECF/Transportation/POA          Electronically signed by Yaquelin Nevarez DPM on 4/6/2021 at 3:16 PM

## 2021-04-08 ENCOUNTER — NURSE ONLY (OUTPATIENT)
Dept: PRIMARY CARE CLINIC | Age: 61
End: 2021-04-08
Payer: MEDICAID

## 2021-04-08 DIAGNOSIS — Z20.822 SUSPECTED COVID-19 VIRUS INFECTION: Primary | ICD-10-CM

## 2021-04-08 PROCEDURE — 99211 OFF/OP EST MAY X REQ PHY/QHP: CPT | Performed by: NURSE PRACTITIONER

## 2021-04-08 NOTE — PROGRESS NOTES
[address]   Progress Note and Procedure Note      Radha Nguyen  MEDICAL RECORD NUMBER:  [id number]  AGE: 63 y.o. GENDER: female  : 1960  EPISODE DATE:  2021    Subjective:     Chief Complaint   Patient presents with    Wound Check     initial         HISTORY of PRESENT ILLNESS HPI     Radha Nguyen is a 63 y.o. female who presents today for wound/ulcer evaluation. History of Wound Context: Patient presents today for follow-up of a left lower extremity ulceration. Patient relates she has been having daily dressing changes per home health care. She denies any constitutional symptoms. Wound/Ulcer Pain Timing/Severity: none  Quality of pain: N/A  Severity:  0 / 10   Modifying Factors: None  Associated Signs/Symptoms: edema    Ulcer Identification:  Ulcer Type: venous    Contributing Factors: edema and venous stasis    Acute Wound: N/A    PAST MEDICAL HISTORY        Diagnosis Date    Anemia     Anxiety     Arthritis     Back pain     CAD (coronary artery disease)     Cancer (HCC)     CHF (congestive heart failure) (HCC)     Chronic kidney disease     Depression     Diabetes mellitus (HCC)     Esophageal reflux     Hep C w/o coma, chronic (HCC)     Hyperlipidemia     Hypertension     Leg abscess     MRSA (methicillin resistant staph aureus) culture positive 2021    left leg wound    Neuropathy     Schizoaffective disorder ([facility] [address] [facility]     Thyroid disease        PAST SURGICAL HISTORY    History reviewed. No pertinent surgical history.     FAMILY HISTORY    Family History   Problem Relation Age of Onset    Liver Disease Brother        SOCIAL HISTORY    Social History     Tobacco Use    Smoking status: Never Smoker    Smokeless tobacco: Never Used   Substance Use Topics    Alcohol use: No    Drug use: No       ALLERGIES    Allergies   Allergen Reactions    Tums [Calcium Carbonate Antacid] Nausea Only       MEDICATIONS    Current Outpatient Medications on File Prior to Encounter   Medication Sig Dispense Refill    ferrous sulfate (IRON 325) 325 (65 Fe) MG tablet Take 1 tablet by mouth 2 times daily (with meals) 30 tablet 3    torsemide (DEMADEX) 20 MG tablet Take 1 tablet by mouth daily 30 tablet 1    DULoxetine (CYMBALTA) 60 MG extended release capsule Take 60 mg by mouth daily 30mg + 60mg = 90mg daily      montelukast (SINGULAIR) 10 MG tablet Take 10 mg by mouth nightly      pantoprazole (PROTONIX) 40 MG tablet Take 1 tablet by mouth daily 90 tablet 1    clonazePAM (KLONOPIN) 0.5 MG tablet Take 0.5 mg by mouth daily as needed.       buPROPion (WELLBUTRIN XL) 300 MG extended release tablet Take 300 mg by mouth every morning      levothyroxine (SYNTHROID) 125 MCG tablet Take 125 mcg by mouth Daily      ARIPiprazole (ABILIFY) 10 MG tablet Take 10 mg by mouth daily      diphenhydrAMINE (BENADRYL ALLERGY) 25 MG tablet Take 25 mg by mouth every 6 hours as needed for Itching      DULoxetine (CYMBALTA) 30 MG extended release capsule Take 30 mg by mouth daily 30mg + 60mg = 90mg total      fluticasone (FLOVENT HFA) 220 MCG/ACT inhaler Inhale 1 puff into the lungs 2 times daily      gabapentin (NEURONTIN) 300 MG capsule TAKE ONE CAPSULE BY MOUTH THREE TIMES A DAY      albuterol (PROVENTIL HFA;VENTOLIN HFA) 108 (90 BASE) MCG/ACT inhaler Inhale 2 puffs into the lungs every 6 hours as needed for Wheezing      ascorbic acid (VITAMIN C) 500 MG tablet Take 500 mg by mouth daily      vitamin D (CHOLECALCIFEROL) 5000 UNITS CAPS capsule Take 5,000 Units by mouth daily       metoprolol (LOPRESSOR) 25 MG tablet Take 25 mg by mouth 2 times daily      Multiple Vitamins-Minerals (THERAPEUTIC MULTIVITAMIN-MINERALS) tablet Take 1 tablet by mouth daily      nicotine polacrilex (COMMIT) 4 MG lozenge Take 4 mg by mouth as needed for Smoking cessation      simvastatin (ZOCOR) 40 MG tablet Take 40 mg by mouth nightly      spironolactone (ALDACTONE) 25 MG tablet Take 25 mg by mouth daily      vitamin E 1000 UNITS capsule Take 1,000 Units by mouth daily      diclofenac sodium (VOLTAREN) 1 % GEL Apply 2 g topically 4 times daily as needed for Pain 50 g 1    nitroGLYCERIN (NITROSTAT) 0.4 MG SL tablet Place 0.4 mg under the tongue      loperamide (IMODIUM) 2 MG capsule TAKE ONE CAPSULE BY MOUTH THREE TIMES A DAY AS NEEDED FOR DIARRHEA      traMADol (ULTRAM) 50 MG tablet Take 50 mg by mouth every 6 hours as needed for Pain.  mupirocin (BACTROBAN) 2 % ointment Apply topically 3 times daily Apply topically 3 times daily. No current facility-administered medications on file prior to encounter. REVIEW OF SYSTEMS  Review of Systems    Pertinent items are noted in HPI. Review of Systems: A 12 point review of symptoms is unremarkable with the exception of the chief complaint. Patient specifically denies nausea, fever, vomiting, chills, shortness of breath, chest pain, abdominal pain, constipation or difficulty urinating. Objective:      /73   Pulse 67   Temp 97.1 °F (36.2 °C) (Temporal)   Resp 16   Wt 268 lb 11.9 oz (121.9 kg)   BMI 46.13 kg/m²     Wt Readings from Last 3 Encounters:   04/08/21 268 lb (121.6 kg)   04/06/21 268 lb 11.9 oz (121.9 kg)   03/28/21 263 lb 12.8 oz (119.7 kg)       PHYSICAL EXAM  Physical Exam    DP/PT pulses palpable bilaterally. CFT brisk to all digits. Digits are pink and warm to the touch. Hair growth is normal in appearance. edema noted bilaterally. No calf pain with palpation noted. Patient has multiple varicose veins noted on her bilateral lower extremities. Epicritic sensation is grossly intact bilaterally. Negative clonus and babinski reflex is down going. Ulcer noted on the anterior aspect of the left lower leg. Wound has fibrotic and nonviable tissue that extends down through and includes the subcutaneous tissue. After debridement the wound has a granular base.   There is no surrounding erythema, edema, warmth or malodor noted. The wound does not probe or track to bone. Patient's muscle strength for dorsiflexion plantarflexion inversion and eversion is intact and symmetrical bilaterally. Assessment:        Problem List Items Addressed This Visit     Varicose veins of left lower extremity with inflammation, with ulcer of calf with fat layer exposed ([address]      Other Visit Diagnoses     Acute left ankle pain    -  Primary    Relevant Orders    XR FOOT LEFT (MIN 3 VIEWS) (Completed)    XR ANKLE LEFT (MIN 3 VIEWS) (Completed)           Procedure Note  Indications:  Based on my examination of this patient's wound(s)/ulcer(s) today, debridement is required to promote healing and evaluate the wound base. Performed by: Alem Hernández DPM    Consent obtained:  Yes    Time out taken:  Yes    Pain Control: Anesthetic  Anesthetic: 4% Lidocaine Liquid Topical       Debridement: Excisional Debridement    Using curette the wound(s)/ulcer(s) was/were debrided down through and including the removal of epidermis, dermis and subcutaneous tissue. Devitalized Tissue Debrided:  fibrin and slough    Pre Debridement Measurements:  Are located in the [place]  Documentation Flow Sheet    Diabetic/Pressure/Non Pressure Ulcers only:  Ulcer: Non-Pressure ulcer, fat layer exposed     Wound/Ulcer #: 1    Post Debridement Measurements:  Wound/Ulcer Descriptions are Pre Debridement except measurements:    Wound 03/14/21 Leg Left; Lower #1 (Active)   Wound Image   04/06/21 1337   Wound Etiology Pressure Unstageable 03/28/21 1120   Dressing Status Clean;Dry; Intact 03/28/21 1120   Wound Cleansed Cleansed with saline 04/06/21 1337   Dressing/Treatment Collagen 04/06/21 1415   Wound Length (cm) 2 cm 04/06/21 1337   Wound Width (cm) 1.4 cm 04/06/21 1337   Wound Depth (cm) 0.2 cm 04/06/21 1337   Wound Surface Area (cm^2) 2.8 cm^2 04/06/21 1337   Change in Wound Size % (l*w) 37.78 04/06/21 1337   Wound Volume (cm^3) 0.56 cm^3 04/06/21 1337   Post-Procedure Length (cm) 2.1 cm 04/06/21 1409   Post-Procedure Width (cm) 1.5 cm 04/06/21 1409   Post-Procedure Depth (cm) 0.4 cm 04/06/21 1409   Post-Procedure Surface Area (cm^2) 3.15 cm^2 04/06/21 1409   Post-Procedure Volume (cm^3) 1.26 cm^3 04/06/21 1409   Distance Tunneling (cm) 0 cm 04/06/21 1337   Undermining Maxium Distance (cm) 0 04/06/21 1337   Wound Assessment Slough;Pink/red 04/06/21 1337   Drainage Amount Small 04/06/21 1337   Drainage Description Serosanguinous 04/06/21 1337   Odor None 04/06/21 1337   Kyra-wound Assessment Dry/flaky 04/06/21 1337   Margins Defined edges 04/06/21 1337   Wound Thickness Description not for Pressure Injury Full thickness 04/06/21 1337   Number of days: 24          Total Surface Area Debrided:  3.15 sq cm     Estimated Blood Loss:  Minimal    Hemostasis Achieved:  by pressure    Procedural Pain:  0  / 10     Post Procedural Pain:  0 / 10     Response to treatment:  Well tolerated by patient. Plan:   E/M x30 minutes. Will control patient's edema with a multilayer compression dressing. Discussed with patient that the likely etiology of the slow healing wound is edema that is not properly managed. Patient was instructed to keep her dressing clean dry and intact. Treatment Note please see attached Discharge Instructions    Written patient dismissal instructions given to patient and signed by patient or POA. RTC 1 week       Discharge [address] [facility] Physician Orders and Discharge Instructions  The [address] [facility], [address] [place] [address]  Telephone: [phone]  **ATTENTION**  We are moving 4/24/2021 to the [address] [place] [address] across the [address] from [facility]  [address] [place] [address] address will be [facility] [address].  [place] [address]  NAME:  Lilli Hernandez  YOB: 1960  MEDICAL RECORD NUMBER:  [id number]  DATE:  4/6/2021    Home Health Care:  [facility] [address] [facility] [phone]  Fax: [phone]    Medically necessary services: [x] Skilled Nursing [] PT [] OT [] Social Work [] Dietician [] Other:    Wound Cleansing:   []0.9% Saline  []Vashe (Soak piece of gauze and place on wound bed for 5 minutes) []Other:    Kyra Wound Topical Treatments:  [x] moisturizing lotion [] antifungal ointment [] No-Sting barrier film [] zinc paste [] Other:     Dressings:           Wound Location: Left lower leg     [x] Apply Primary Dressing to wound:       Collagen     [x] Cover and Secure with:     Cover and Secure with: 4X4 gauze pad   Avoid contact of tape with skin if possible. [x] Change dressing: [] Daily (until the following date:____)   [] Every Other Day [] Once a week [x] Do Not Change Dressing [] Other:  [] Three times per week: [] Monday, Wednesday, Friday [] Tuesday, Thursday, Saturday       Multilayer Compression Wrap:  Type: 2 Layer Lite compression wrap      Applied 4/6/2021 in Clinic to the :  Left lower leg(s)     · Do not get leg(s) with wrap wet. · If wraps become too tight call the wound care center or your home health nurse   · Completely remove the wrap if nobody is available for advice. · Elevate leg(s) above the level of the heart when sitting. · Avoid prolonged standing in one place. Home care does not need to change. Dietary:  Important dietary reminders:  1. Increase Protein intake (i.e. Lean meats, fish, eggs, legumes, and yogurt)  2. No added salt  3. If diabetic, follow a diabetic diet and check glucose prior to meals or as instructed by your physician. Dietary Supplements: []Rui []30ml ProStat []Ensure [place] []Ensure Max/Premier []Other:  Take supplements twice a day or as directed as followed: If you are still having pain after you go home:   For wounds on lower legs or arms, elevate the affected limb.  Use over-the-counter medications you would normally use for pain as permitted by your primary care doctor.    For persistent pain not relieved by the above interventions, please call your primary care doctor. Call your doctor now or seek immediate medical care if:    · You have symptoms of infection, such as:  ? Increased pain, swelling, warmth, or redness. ? Red streaks leading from the area. ? Pus draining from the area. ? A fever. Return Appointment:   Return Appointment: With Dr Kojo Scott  in  [facility])  o Schedule weekly until (Date):  4/27/21      [x] Orders placed during your visit:   Orders Placed This Encounter   Procedures    XR FOOT LEFT (MIN 3 VIEWS)    XR ANKLE LEFT (MIN 3 VIEWS)     Please get your xrays today or prior to next visit. Your nurse  is:  Mary Mccullough     Electronically signed by Nicholas Cox RN on 4/6/2021 at 2:23 PM     [address]: Should you experience any significant changes in your wound(s) or have questions about your wound care, please contact the [address] [place] [address] at [phone]. Our hours vary so please leave a message. Please give us 24-48 hours to return your call. If you need help with your wounds and cannot wait until we are available, contact your PCP or go to the hospital emergency room.      [employer] Physician for this visit and orders: Lynnette Quintana 4/6/2021    [] Patient unable to sign Discharge Instructions given to ECF/Transportation/POA          Electronically signed by Kojo Scott DPM on 4/8/2021 at 10:09 AM

## 2021-04-08 NOTE — PROGRESS NOTES
Yvonne Metz received a viral test for COVID-19. They were educated on isolation and quarantine as appropriate. For any symptoms, they were directed to seek care from their PCP, given contact information to establish with a doctor, directed to an urgent care or the emergency room.

## 2021-04-09 LAB — SARS-COV-2: NOT DETECTED

## 2021-04-13 ENCOUNTER — HOSPITAL ENCOUNTER (OUTPATIENT)
Dept: WOUND CARE | Age: 61
Discharge: HOME | End: 2021-04-13
Payer: MEDICAID

## 2021-04-13 VITALS
TEMPERATURE: 97.7 F | RESPIRATION RATE: 16 BRPM | DIASTOLIC BLOOD PRESSURE: 74 MMHG | HEART RATE: 69 BPM | SYSTOLIC BLOOD PRESSURE: 117 MMHG

## 2021-04-13 DIAGNOSIS — L97.222 VARICOSE VEINS OF LEFT LOWER EXTREMITY WITH INFLAMMATION, WITH ULCER OF CALF WITH FAT LAYER EXPOSED (HCC): Primary | ICD-10-CM

## 2021-04-13 DIAGNOSIS — I83.222 VARICOSE VEINS OF LEFT LOWER EXTREMITY WITH INFLAMMATION, WITH ULCER OF CALF WITH FAT LAYER EXPOSED (HCC): Primary | ICD-10-CM

## 2021-04-13 PROCEDURE — 6370000000 HC RX 637 (ALT 250 FOR IP): Performed by: PODIATRIST

## 2021-04-13 PROCEDURE — 29581 APPL MULTLAYER CMPRN SYS LEG: CPT

## 2021-04-13 PROCEDURE — 11042 DBRDMT SUBQ TIS 1ST 20SQCM/<: CPT

## 2021-04-13 RX ORDER — LIDOCAINE HYDROCHLORIDE 20 MG/ML
JELLY TOPICAL ONCE
Status: CANCELLED | OUTPATIENT
Start: 2021-04-13 | End: 2021-04-13

## 2021-04-13 RX ORDER — BACITRACIN, NEOMYCIN, POLYMYXIN B 400; 3.5; 5 [USP'U]/G; MG/G; [USP'U]/G
OINTMENT TOPICAL ONCE
Status: CANCELLED | OUTPATIENT
Start: 2021-04-13 | End: 2021-04-13

## 2021-04-13 RX ORDER — GINSENG 100 MG
CAPSULE ORAL ONCE
Status: CANCELLED | OUTPATIENT
Start: 2021-04-13 | End: 2021-04-13

## 2021-04-13 RX ORDER — BETAMETHASONE DIPROPIONATE 0.05 %
OINTMENT (GRAM) TOPICAL ONCE
Status: CANCELLED | OUTPATIENT
Start: 2021-04-13 | End: 2021-04-13

## 2021-04-13 RX ORDER — LIDOCAINE 40 MG/G
CREAM TOPICAL ONCE
Status: CANCELLED | OUTPATIENT
Start: 2021-04-13 | End: 2021-04-13

## 2021-04-13 RX ORDER — BACITRACIN ZINC AND POLYMYXIN B SULFATE 500; 1000 [USP'U]/G; [USP'U]/G
OINTMENT TOPICAL ONCE
Status: CANCELLED | OUTPATIENT
Start: 2021-04-13 | End: 2021-04-13

## 2021-04-13 RX ORDER — LIDOCAINE HYDROCHLORIDE 40 MG/ML
SOLUTION TOPICAL ONCE
Status: COMPLETED | OUTPATIENT
Start: 2021-04-13 | End: 2021-04-13

## 2021-04-13 RX ORDER — LIDOCAINE HYDROCHLORIDE 40 MG/ML
SOLUTION TOPICAL ONCE
Status: CANCELLED | OUTPATIENT
Start: 2021-04-13 | End: 2021-04-13

## 2021-04-13 RX ORDER — POTASSIUM BICARBONATE 25 MEQ/1
25 TABLET, EFFERVESCENT ORAL 2 TIMES DAILY
COMMUNITY

## 2021-04-13 RX ORDER — GENTAMICIN SULFATE 1 MG/G
OINTMENT TOPICAL ONCE
Status: CANCELLED | OUTPATIENT
Start: 2021-04-13 | End: 2021-04-13

## 2021-04-13 RX ORDER — LIDOCAINE 50 MG/G
OINTMENT TOPICAL ONCE
Status: CANCELLED | OUTPATIENT
Start: 2021-04-13 | End: 2021-04-13

## 2021-04-13 RX ORDER — CLOBETASOL PROPIONATE 0.5 MG/G
OINTMENT TOPICAL ONCE
Status: CANCELLED | OUTPATIENT
Start: 2021-04-13 | End: 2021-04-13

## 2021-04-13 RX ADMIN — LIDOCAINE HYDROCHLORIDE: 40 SOLUTION TOPICAL at 13:51

## 2021-04-13 ASSESSMENT — PAIN DESCRIPTION - ORIENTATION: ORIENTATION: LEFT

## 2021-04-13 ASSESSMENT — PAIN DESCRIPTION - DESCRIPTORS: DESCRIPTORS: ACHING

## 2021-04-13 ASSESSMENT — PAIN SCALES - GENERAL: PAINLEVEL_OUTOF10: 8

## 2021-04-13 ASSESSMENT — PAIN DESCRIPTION - PAIN TYPE: TYPE: ACUTE PAIN

## 2021-04-13 NOTE — PROGRESS NOTES
[address]   Progress Note and Procedure Note      Robles Escoto  MEDICAL RECORD NUMBER:  [id number]  AGE: 63 y.o. GENDER: female  : 1960  EPISODE DATE:  2021    Subjective:     Chief Complaint   Patient presents with    Wound Check     left ankle         HISTORY of PRESENT ILLNESS HPI     Robles Escoto is a 63 y.o. female who presents today for wound/ulcer evaluation. History of Wound Context: Patient presents today for follow-up of a left lower extremity ulceration. Patient relates she has been having daily dressing changes per home health care as she relates that she removed her dressing because it was hurting her foot. She denies any constitutional symptoms. Wound/Ulcer Pain Timing/Severity: none  Quality of pain: N/A  Severity:  0 / 10   Modifying Factors: None  Associated Signs/Symptoms: edema    Ulcer Identification:  Ulcer Type: venous    Contributing Factors: edema and venous stasis    Acute Wound: N/A    PAST MEDICAL HISTORY        Diagnosis Date    Anemia     Anxiety     Arthritis     Back pain     CAD (coronary artery disease)     Cancer (HCC)     CHF (congestive heart failure) (HCC)     Chronic kidney disease     Depression     Diabetes mellitus (HCC)     Esophageal reflux     Hep C w/o coma, chronic (HCC)     Hyperlipidemia     Hypertension     Leg abscess     MRSA (methicillin resistant staph aureus) culture positive 2021    left leg wound    Neuropathy     Schizoaffective disorder ([facility] [address]     Thyroid disease        PAST SURGICAL HISTORY    History reviewed. No pertinent surgical history.     FAMILY HISTORY    Family History   Problem Relation Age of Onset    Liver Disease Brother        SOCIAL HISTORY    Social History     Tobacco Use    Smoking status: Former Smoker     Packs/day: 2.00     Years: 30.00     Pack years: 60.00     Types: Cigarettes     Quit date: 2021     Years since quittin.2    Smokeless tobacco: Never Used   Substance Use Topics    Alcohol use: No    Drug use: No       ALLERGIES    Allergies   Allergen Reactions    Tums [Calcium Carbonate Antacid] Nausea Only       MEDICATIONS    Current Outpatient Medications on File Prior to Encounter   Medication Sig Dispense Refill    potassium bicarbonate (K-LYTE) 25 MEQ disintegrating tablet Take 25 mEq by mouth 2 times daily      diclofenac sodium (VOLTAREN) 1 % GEL Apply 2 g topically 4 times daily as needed for Pain 50 g 1    ferrous sulfate (IRON 325) 325 (65 Fe) MG tablet Take 1 tablet by mouth 2 times daily (with meals) 30 tablet 3    torsemide (DEMADEX) 20 MG tablet Take 1 tablet by mouth daily (Patient taking differently: Take 100 mg by mouth daily ) 30 tablet 1    montelukast (SINGULAIR) 10 MG tablet Take 10 mg by mouth nightly      pantoprazole (PROTONIX) 40 MG tablet Take 1 tablet by mouth daily 90 tablet 1    clonazePAM (KLONOPIN) 0.5 MG tablet Take 0.5 mg by mouth daily as needed.  buPROPion (WELLBUTRIN XL) 300 MG extended release tablet Take 300 mg by mouth every morning      levothyroxine (SYNTHROID) 125 MCG tablet Take 125 mcg by mouth Daily      ARIPiprazole (ABILIFY) 10 MG tablet Take 10 mg by mouth daily      diphenhydrAMINE (BENADRYL ALLERGY) 25 MG tablet Take 25 mg by mouth every 6 hours as needed for Itching      DULoxetine (CYMBALTA) 30 MG extended release capsule Take 30 mg by mouth daily       fluticasone (FLOVENT HFA) 220 MCG/ACT inhaler Inhale 1 puff into the lungs 2 times daily      gabapentin (NEURONTIN) 300 MG capsule TAKE ONE CAPSULE BY MOUTH THREE TIMES A DAY      traMADol (ULTRAM) 50 MG tablet Take 50 mg by mouth every 6 hours as needed for Pain.        albuterol (PROVENTIL HFA;VENTOLIN HFA) 108 (90 BASE) MCG/ACT inhaler Inhale 2 puffs into the lungs every 6 hours as needed for Wheezing      vitamin D (CHOLECALCIFEROL) 5000 UNITS CAPS capsule Take 5,000 Units by mouth daily       metoprolol (LOPRESSOR) 25 MG tablet Take 25 mg by mouth 2 times daily      Multiple Vitamins-Minerals (THERAPEUTIC MULTIVITAMIN-MINERALS) tablet Take 1 tablet by mouth daily      nicotine polacrilex (COMMIT) 4 MG lozenge Take 4 mg by mouth as needed for Smoking cessation      simvastatin (ZOCOR) 40 MG tablet Take 40 mg by mouth nightly      spironolactone (ALDACTONE) 25 MG tablet Take 25 mg by mouth daily      vitamin E 1000 UNITS capsule Take 1,000 Units by mouth daily      ibuprofen (ADVIL;MOTRIN) 800 MG tablet Take 800 mg by mouth every 6 hours as needed for Pain      nitroGLYCERIN (NITROSTAT) 0.4 MG SL tablet Place 0.4 mg under the tongue      loperamide (IMODIUM) 2 MG capsule TAKE ONE CAPSULE BY MOUTH THREE TIMES A DAY AS NEEDED FOR DIARRHEA      ascorbic acid (VITAMIN C) 500 MG tablet Take 500 mg by mouth daily      mupirocin (BACTROBAN) 2 % ointment Apply topically 3 times daily Apply topically 3 times daily. No current facility-administered medications on file prior to encounter. REVIEW OF SYSTEMS  Review of Systems    Pertinent items are noted in HPI. Review of Systems: A 12 point review of symptoms is unremarkable with the exception of the chief complaint. Patient specifically denies nausea, fever, vomiting, chills, shortness of breath, chest pain, abdominal pain, constipation or difficulty urinating. Objective:      /74   Pulse 69   Temp 97.7 °F (36.5 °C) (Temporal)   Resp 16     Wt Readings from Last 3 Encounters:   04/06/21 268 lb 11.9 oz (121.9 kg)   03/28/21 263 lb 12.8 oz (119.7 kg)   03/16/21 255 lb 12.8 oz (116 kg)       PHYSICAL EXAM  Physical Exam    DP/PT pulses palpable bilaterally. CFT brisk to all digits. Digits are pink and warm to the touch. Hair growth is normal in appearance. edema noted bilaterally. No calf pain with palpation noted. Patient has multiple varicose veins noted on her bilateral lower extremities. Epicritic sensation is grossly intact bilaterally. Negative clonus and babinski reflex is down going. Ulcer noted on the anterior aspect of the left lower leg. Wound has fibrotic and nonviable tissue that extends down through and includes the subcutaneous tissue. After debridement the wound has a granular base. There is no surrounding erythema, edema, warmth or malodor noted. The wound does not probe or track to bone. Patient's muscle strength for dorsiflexion plantarflexion inversion and eversion is intact and symmetrical bilaterally. Assessment:        Problem List Items Addressed This Visit     Varicose veins of left lower extremity with inflammation, with ulcer of calf with fat layer exposed ([address] - Primary    Relevant Orders    Initiate Outpatient Wound Care Protocol           Procedure Note  Indications:  Based on my examination of this patient's wound(s)/ulcer(s) today, debridement is required to promote healing and evaluate the wound base. Performed by: Christine Celaya DPM    Consent obtained:  Yes    Time out taken:  Yes    Pain Control: Anesthetic  Anesthetic: 4% Lidocaine Liquid Topical       Debridement: Excisional Debridement    Using curette the wound(s)/ulcer(s) was/were debrided down through and including the removal of epidermis, dermis and subcutaneous tissue. Devitalized Tissue Debrided:  fibrin and slough    Pre Debridement Measurements:  Are located in the [place]  Documentation Flow Sheet    Diabetic/Pressure/Non Pressure Ulcers only:  Ulcer: Non-Pressure ulcer, fat layer exposed     Wound/Ulcer #: 1    Post Debridement Measurements:  Wound/Ulcer Descriptions are Pre Debridement except measurements:    Wound 03/14/21 Leg Left; Lower #1 (Active)   Wound Image   04/06/21 1337   Wound Etiology Pressure Unstageable 03/28/21 1120   Dressing Status Clean;Dry; Intact 03/28/21 1120   Wound Cleansed Cleansed with saline 04/13/21 1351   Dressing/Treatment Collagen 04/06/21 1415   Wound Length (cm) 1.6 cm 04/13/21 1351   Wound Width (cm) 1.4 cm 04/13/21 1351   Wound Depth (cm) 0.1 cm 04/13/21 1351   Wound Surface Area (cm^2) 2.24 cm^2 04/13/21 1351   Change in Wound Size % (l*w) 50.22 04/13/21 1351   Wound Volume (cm^3) 0.22 cm^3 04/13/21 1351   Wound Healing % 61 04/13/21 1351   Post-Procedure Length (cm) 1.7 cm 04/13/21 1441   Post-Procedure Width (cm) 1.5 cm 04/13/21 1441   Post-Procedure Depth (cm) 0.3 cm 04/13/21 1441   Post-Procedure Surface Area (cm^2) 2.55 cm^2 04/13/21 1441   Post-Procedure Volume (cm^3) 0.76 cm^3 04/13/21 1441   Distance Tunneling (cm) 0 cm 04/13/21 1351   Undermining Maxium Distance (cm) 0 04/13/21 1351   Wound Assessment Fibrinous 04/13/21 1351   Drainage Amount Small 04/13/21 1351   Drainage Description Brown 04/13/21 1351   Odor None 04/13/21 1351   Kyra-wound Assessment Dry/flaky 04/13/21 1351   Margins Defined edges 04/13/21 1351   Wound Thickness Description not for Pressure Injury Full thickness 04/13/21 1351   Number of days: 30          Total Surface Area Debrided:  2.55  sq cm     Estimated Blood Loss:  Minimal    Hemostasis Achieved:  by pressure    Procedural Pain:  0  / 10     Post Procedural Pain:  0 / 10     Response to treatment:  Well tolerated by patient. Plan:   E/M x30 minutes. Will add a spadigrip to the right lower extremity as patient relates that her skin is itching. She has edema on this leg and this may be causing some of the itching. Will control patient's edema with a multilayer compression dressing. Discussed with patient that the likely etiology of the slow healing wound is edema that is not properly managed. Patient was instructed to keep her dressing clean dry and intact. Treatment Note please see attached Discharge Instructions    Written patient dismissal instructions given to patient and signed by patient or POA.       RTC 1 week       Discharge [facility] Physician Orders and Discharge Instructions  [facility] [address] [facility], [facility] [address]  Telephone: [phone]  **ATTENTION**  We are moving 4/24/2021 to the [address] [place] [address] across the [address] from [facility].  [facility] address will be [address] [place] [address] with soap and water prior to and after every dressing change. Wound Cleansing: (All wounds are cleaned with 0.9% saline during your wound care visit)     · Keep wounds dry in the shower unless otherwise instructed by the physician. · For wounds on lower legs, cast covers can be purchased at local drug stores, so that you may shower and keep the wound(s) dry. Kyra wound Topical Treatments:  Do not apply lotions, creams, or ointments to the skin around the wound bed unless directed as followed:     [x] Apply around the wound: [x] moisturizing lotion [] Antifungal ointment [] No-Sting barrier film [] Zinc paste [] Other:     Dressings:           Wound Location: Left Lower Leg      Apply Primary Dressing to wound:       Collagen      Cover and Secure with:     Cover and Secure with: 4X4 gauze pad   Avoid contact of tape with skin if possible.  When to change Dressing: [] Daily [] Every Other Day [] Once a week  [] Three times per week: [] Monday, Wednesday, Friday [] Tuesday, Thursday, Saturday  [x] Do Not Change Dressing [] Other:       Edema Control:  Apply:  [x]  Spandagrip Strength:High compression  20-30 mm/Hg [] Left Lower Leg [x] Right Lower Leg        Apply every morning immediately when getting up. They should be applied to affected leg(s) from mid foot to knee making sure to cover the heel. Remove every night before going to bed. [x] Elevate leg(s) above the level of the heart for 30 minutes 4-5 times a day and/or when sitting. [x] Avoid prolonged standing in one place.        Multilayer Compression Wrap:    Type: 2 Layer Lite compression wrap   · Applied in Clinic to the :  Left lower leg(s) on 4/13/2021  · Do return your call. Call your doctor now or seek immediate medical care if:    · You have symptoms of infection, such as:  ? Increased pain, swelling, warmth, or redness. ? Red streaks leading from the area. ? Pus draining from the area. ? A fever.         Physician for this visit and orders: Baltazar Arnett DPM    [] Patient unable to sign Discharge Instructions given to ECF/Transportation/POA          Electronically signed by Baltazar Arnett DPM on 4/13/2021 at 2:56 PM

## 2021-04-13 NOTE — PROGRESS NOTES
Multilayer Compression Wrap   (Not Unna) Below the Knee    NAME:  Destiny Traylor  YOB: 1960  MEDICAL RECORD NUMBER:  [id number]  DATE:  4/13/2021        Removed old Multilayer wrap if present and washed leg with mild soap/water.   Applied moisturizing agent to dry skin as needed.   Applied primary and secondary dressing as ordered     Applied multilayered dressing below the knee to Left lower leg(s)  (2 Layer compression wrap ) .   Instructed patient/caregiver not to remove dressing and to keep it clean and dry.   Instructed patient/caregiver on complications to report to provider, such as pain, numbness in toes, heavy drainage, and slippage of dressing.   Instructed patient on purpose of compression dressing and on activity and exercise recommendations.     Applied per   Guidelines    Electronically signed by Venus Dasilva RN on 4/13/2021 at 2:59 PM

## 2021-04-20 ENCOUNTER — HOSPITAL ENCOUNTER (OUTPATIENT)
Dept: WOUND CARE | Age: 61
Discharge: HOME | End: 2021-04-20
Payer: MEDICAID

## 2021-04-20 NOTE — DISCHARGE INSTR - COC
Continuity of Care Form    Patient Name: Ozzie Nichols   :  1960  MRN:  [id number]    Admit date:  (Not on file)  Discharge date:  ***    Code Status Order: Prior   Advance Directives:     Admitting Physician:  No admitting provider for patient encounter. PCP: Riki Mccann    Discharging Nurse: [facility] Unit/Room#: No information available for this encounter. Discharging Unit Phone Number: ***    Emergency Contact:   Extended Emergency Contact Information  Primary Emergency Contact: Isak Ferrell  Address: [address]           [address] Phone: [phone]  Relation: Other  Secondary Emergency Contact: Simone Roberto  [place] Phone: [phone]  Relation: Child   needed? No    Past Surgical History:  No past surgical history on file. Immunization History: There is no immunization history on file for this patient. Active Problems:  Patient Active Problem List   Diagnosis Code    Anemia D64.9    Cellulitis L03.90    Varicose veins of left lower extremity with inflammation, with ulcer of calf with fat layer exposed [facility] I83.222, J85.810       Isolation/Infection:   Isolation          No Isolation        Patient Infection Status     Infection Onset Added Last Indicated Last Indicated By Review Planned Expiration Resolved Resolved By    MRSA 21 Culture, Wound        C-diff Rule Out 21 Clostridium difficile toxin/antigen (Ordered)              Nurse Assessment:  Last Vital Signs: There were no vitals taken for this visit.     Last documented pain score (0-10 scale):    Last Weight:   Wt Readings from Last 1 Encounters:   21 268 lb 11.9 oz (121.9 kg)     Mental Status:  {IP PT MENTAL STATUS:20164}    IV Access:  [address] [facility] PHUONG IV ACCESS:[id number]}    Nursing Mobility/ADLs:  Walking   {CHP DME XICC:293486017}  Transfer  {CHP DME APSQ:628403151}  Bathing  {P DME HTMD:232629473}  Dressing  {CHP DME VRTU:565599304}  Toileting  {CHP DME NNXY:763292927}  Feeding  {CHP DME RONU:646431464}  Med Admin  {CHP DME DVIK:633570654}  Med Delivery   { PHUONG MED Delivery:385770712}    Wound Care Documentation and Therapy:  Wound 03/14/21 Leg Left; Lower #1 (Active)   Wound Image   04/06/21 1337   Wound Etiology Pressure Unstageable 03/28/21 1120   Dressing Status Clean;Dry; Intact 03/28/21 1120   Wound Cleansed Cleansed with saline 04/13/21 1351   Dressing/Treatment Collagen 04/13/21 1452   Wound Length (cm) 1.6 cm 04/13/21 1351   Wound Width (cm) 1.4 cm 04/13/21 1351   Wound Depth (cm) 0.1 cm 04/13/21 1351   Wound Surface Area (cm^2) 2.24 cm^2 04/13/21 1351   Change in Wound Size % (l*w) 50.22 04/13/21 1351   Wound Volume (cm^3) 0.22 cm^3 04/13/21 1351   Wound Healing % 61 04/13/21 1351   Post-Procedure Length (cm) 1.7 cm 04/13/21 1441   Post-Procedure Width (cm) 1.5 cm 04/13/21 1441   Post-Procedure Depth (cm) 0.3 cm 04/13/21 1441   Post-Procedure Surface Area (cm^2) 2.55 cm^2 04/13/21 1441   Post-Procedure Volume (cm^3) 0.76 cm^3 04/13/21 1441   Distance Tunneling (cm) 0 cm 04/13/21 1351   Undermining Maxium Distance (cm) 0 04/13/21 1351   Wound Assessment Fibrinous 04/13/21 1351   Drainage Amount Small 04/13/21 1351   Drainage Description Brown 04/13/21 1351   Odor None 04/13/21 1351   Kyra-wound Assessment Dry/flaky 04/13/21 1351   Margins Defined edges 04/13/21 1351   Wound Thickness Description not for Pressure Injury Full thickness 04/13/21 1351   Number of days: 37        Elimination:  Continence:   · Bowel: {YES / JT:68623}  · Bladder: {YES / OS:10057}  Urinary Catheter: {Urinary Catheter:453340506}   Colostomy/Ileostomy/Ileal Conduit: {YES / EZ:09129}       Date of Last BM: ***  No intake or output data in the 24 hours ending 04/20/21 1733  No intake/output data recorded.     Safety Concerns:     [address] [facility] Safety Concerns:938011521}    Impairments/Disabilities:      [address] [facility] Impairments/Disabilities:822995753}    Nutrition Therapy:  Current Nutrition Therapy:   [address] PHUONG Diet List:858479393}    Routes of Feeding: {CHP DME Other Feedings:086173372}  Liquids: {Slp liquid thickness:40756}  Daily Fluid Restriction: {CHP DME Yes amt example:008383760}  Last Modified Barium Swallow with Video (Video Swallowing Test): {Done Not Done JTTR:154103644}    Treatments at the Time of Hospital Discharge:   Respiratory Treatments: ***  Oxygen Therapy:  {Therapy; copd oxygen:59071}  Ventilator:    {[facility] Vent WGDZ:097875845}    Rehab Therapies: {THERAPEUTIC INTERVENTION:7461368820}  Weight Bearing Status/Restrictions: {[facility] Weight Bearin}  Other Medical Equipment (for information only, NOT a DME order):  {EQUIPMENT:815466768}  Other Treatments: ***    Patient's personal belongings (please select all that are sent with patient):  {[facility] DME Belongings:739687426}    RN SIGNATURE:  {Esignature:558903078}    CASE MANAGEMENT/SOCIAL WORK SECTION    Inpatient Status Date: ***    Readmission Risk Assessment Score:  Readmission Risk              Risk of Unplanned Readmission:        0           Discharging to Facility/ Agency   · Name:   · Address:  · Phone:  · Fax:    Dialysis Facility (if applicable)   · Name:  · Address:  · Dialysis Schedule:  · Phone:  · Fax:    / signature: {Esignature:594154161}    PHYSICIAN SECTION    Prognosis: {Prognosis:5635585662}    Condition at Discharge: [address] Patient Condition:943436189}    Rehab Potential (if transferring to Rehab): {Prognosis:0490510836}    Recommended Labs or Other Treatments After Discharge: ***    Physician Certification: I certify the above information and transfer of Essie Hernandez  is necessary for the continuing treatment of the diagnosis listed and that she requires {Admit to Appropriate Level of Care:99474} for {GREATER/LESS:279070549} 30 days.      Update Admission H&P: {CHP DME Changes in ATWGF:977963465}    PHYSICIAN SIGNATURE:  {[facility]:572619215}

## 2021-04-23 ENCOUNTER — HOSPITAL ENCOUNTER (OUTPATIENT)
Dept: WOUND CARE | Age: 61
Discharge: HOME | End: 2021-04-23
Payer: MEDICAID

## 2021-04-23 ENCOUNTER — NURSE ONLY (OUTPATIENT)
Dept: PRIMARY CARE CLINIC | Age: 61
End: 2021-04-23
Payer: MEDICAID

## 2021-04-23 VITALS
DIASTOLIC BLOOD PRESSURE: 78 MMHG | TEMPERATURE: 96.8 F | RESPIRATION RATE: 16 BRPM | SYSTOLIC BLOOD PRESSURE: 127 MMHG | HEART RATE: 68 BPM

## 2021-04-23 DIAGNOSIS — L97.222 VARICOSE VEINS OF LEFT LOWER EXTREMITY WITH INFLAMMATION, WITH ULCER OF CALF WITH FAT LAYER EXPOSED (HCC): Primary | ICD-10-CM

## 2021-04-23 DIAGNOSIS — Z01.818 PRE-OP EXAMINATION: Primary | ICD-10-CM

## 2021-04-23 DIAGNOSIS — I83.222 VARICOSE VEINS OF LEFT LOWER EXTREMITY WITH INFLAMMATION, WITH ULCER OF CALF WITH FAT LAYER EXPOSED (HCC): Primary | ICD-10-CM

## 2021-04-23 PROCEDURE — 6370000000 HC RX 637 (ALT 250 FOR IP): Performed by: PODIATRIST

## 2021-04-23 PROCEDURE — 29581 APPL MULTLAYER CMPRN SYS LEG: CPT

## 2021-04-23 PROCEDURE — 11042 DBRDMT SUBQ TIS 1ST 20SQCM/<: CPT

## 2021-04-23 PROCEDURE — 99211 OFF/OP EST MAY X REQ PHY/QHP: CPT | Performed by: NURSE PRACTITIONER

## 2021-04-23 RX ORDER — CLOBETASOL PROPIONATE 0.5 MG/G
OINTMENT TOPICAL ONCE
Status: CANCELLED | OUTPATIENT
Start: 2021-04-23 | End: 2021-04-23

## 2021-04-23 RX ORDER — LIDOCAINE HYDROCHLORIDE 20 MG/ML
JELLY TOPICAL ONCE
Status: CANCELLED | OUTPATIENT
Start: 2021-04-23 | End: 2021-04-23

## 2021-04-23 RX ORDER — LIDOCAINE 40 MG/G
CREAM TOPICAL ONCE
Status: CANCELLED | OUTPATIENT
Start: 2021-04-23 | End: 2021-04-23

## 2021-04-23 RX ORDER — BACITRACIN, NEOMYCIN, POLYMYXIN B 400; 3.5; 5 [USP'U]/G; MG/G; [USP'U]/G
OINTMENT TOPICAL ONCE
Status: CANCELLED | OUTPATIENT
Start: 2021-04-23 | End: 2021-04-23

## 2021-04-23 RX ORDER — LIDOCAINE HYDROCHLORIDE 40 MG/ML
SOLUTION TOPICAL ONCE
Status: CANCELLED | OUTPATIENT
Start: 2021-04-23 | End: 2021-04-23

## 2021-04-23 RX ORDER — BETAMETHASONE DIPROPIONATE 0.05 %
OINTMENT (GRAM) TOPICAL ONCE
Status: CANCELLED | OUTPATIENT
Start: 2021-04-23 | End: 2021-04-23

## 2021-04-23 RX ORDER — BACITRACIN ZINC AND POLYMYXIN B SULFATE 500; 1000 [USP'U]/G; [USP'U]/G
OINTMENT TOPICAL ONCE
Status: CANCELLED | OUTPATIENT
Start: 2021-04-23 | End: 2021-04-23

## 2021-04-23 RX ORDER — LIDOCAINE 50 MG/G
OINTMENT TOPICAL ONCE
Status: CANCELLED | OUTPATIENT
Start: 2021-04-23 | End: 2021-04-23

## 2021-04-23 RX ORDER — GINSENG 100 MG
CAPSULE ORAL ONCE
Status: CANCELLED | OUTPATIENT
Start: 2021-04-23 | End: 2021-04-23

## 2021-04-23 RX ORDER — LIDOCAINE HYDROCHLORIDE 40 MG/ML
SOLUTION TOPICAL ONCE
Status: COMPLETED | OUTPATIENT
Start: 2021-04-23 | End: 2021-04-23

## 2021-04-23 RX ORDER — GENTAMICIN SULFATE 1 MG/G
OINTMENT TOPICAL ONCE
Status: CANCELLED | OUTPATIENT
Start: 2021-04-23 | End: 2021-04-23

## 2021-04-23 RX ADMIN — LIDOCAINE HYDROCHLORIDE: 40 SOLUTION TOPICAL at 13:41

## 2021-04-23 ASSESSMENT — PAIN SCALES - GENERAL: PAINLEVEL_OUTOF10: 8

## 2021-04-23 NOTE — PROGRESS NOTES
[facility]  Progress Note and Procedure Note      Marah Kunz  MEDICAL RECORD NUMBER:  [id number]  AGE: 63 y.o. GENDER: female  : 1960  EPISODE DATE:  2021    Subjective:     Chief Complaint   Patient presents with    Wound Check     left leg         HISTORY of PRESENT ILLNESS HPI     Marah Kunz is a 63 y.o. female who presents today for wound/ulcer evaluation. History of Wound Context: LEFT anterior shin wound with 80-90% wound granulation, pink and healthy with continued concerns of edema  Wound/Ulcer Pain Timing/Severity: intermittent, moderate  Quality of pain: burning  Severity:  3 / 10   Modifying Factors: Pain worsens with debridement  Associated Signs/Symptoms: edema and pain    Ulcer Identification:  Ulcer Type: venous    Contributing Factors: edema, venous stasis, shear force and obesity    Acute Wound: N/A        PAST MEDICAL HISTORY        Diagnosis Date    Anemia     Anxiety     Arthritis     Back pain     CAD (coronary artery disease)     Cancer (HCC)     CHF (congestive heart failure) (HCC)     Chronic kidney disease     Depression     Diabetes mellitus (HCC)     Esophageal reflux     Hep C w/o coma, chronic (HCC)     Hyperlipidemia     Hypertension     Leg abscess     MRSA (methicillin resistant staph aureus) culture positive 2021    left leg wound    Neuropathy     Schizoaffective disorder ([facility] [address]     Thyroid disease        PAST SURGICAL HISTORY    History reviewed. No pertinent surgical history. FAMILY HISTORY    Family History   Problem Relation Age of Onset    Liver Disease Brother        SOCIAL HISTORY    Social History     Tobacco Use    Smoking status: Former Smoker     Packs/day: 2.00     Years: 30.00     Pack years: 60.00     Types: Cigarettes     Quit date: 2021     Years since quittin.3    Smokeless tobacco: Never Used   Substance Use Topics    Alcohol use: No    Drug use:  No ALLERGIES    Allergies   Allergen Reactions    Tums [Calcium Carbonate Antacid] Nausea Only       MEDICATIONS    Current Outpatient Medications on File Prior to Encounter   Medication Sig Dispense Refill    potassium bicarbonate (K-LYTE) 25 MEQ disintegrating tablet Take 25 mEq by mouth 2 times daily      ferrous sulfate (IRON 325) 325 (65 Fe) MG tablet Take 1 tablet by mouth 2 times daily (with meals) 30 tablet 3    torsemide (DEMADEX) 20 MG tablet Take 1 tablet by mouth daily (Patient taking differently: Take 100 mg by mouth daily ) 30 tablet 1    montelukast (SINGULAIR) 10 MG tablet Take 10 mg by mouth nightly      pantoprazole (PROTONIX) 40 MG tablet Take 1 tablet by mouth daily 90 tablet 1    clonazePAM (KLONOPIN) 0.5 MG tablet Take 0.5 mg by mouth daily as needed.       buPROPion (WELLBUTRIN XL) 300 MG extended release tablet Take 300 mg by mouth every morning      ARIPiprazole (ABILIFY) 10 MG tablet Take 10 mg by mouth daily      diphenhydrAMINE (BENADRYL ALLERGY) 25 MG tablet Take 25 mg by mouth every 6 hours as needed for Itching      DULoxetine (CYMBALTA) 30 MG extended release capsule Take 30 mg by mouth daily       fluticasone (FLOVENT HFA) 220 MCG/ACT inhaler Inhale 1 puff into the lungs 2 times daily      gabapentin (NEURONTIN) 300 MG capsule TAKE ONE CAPSULE BY MOUTH THREE TIMES A DAY      albuterol (PROVENTIL HFA;VENTOLIN HFA) 108 (90 BASE) MCG/ACT inhaler Inhale 2 puffs into the lungs every 6 hours as needed for Wheezing      ascorbic acid (VITAMIN C) 500 MG tablet Take 500 mg by mouth daily      vitamin D (CHOLECALCIFEROL) 5000 UNITS CAPS capsule Take 5,000 Units by mouth daily       metoprolol (LOPRESSOR) 25 MG tablet Take 25 mg by mouth 2 times daily      Multiple Vitamins-Minerals (THERAPEUTIC MULTIVITAMIN-MINERALS) tablet Take 1 tablet by mouth daily      nicotine polacrilex (COMMIT) 4 MG lozenge Take 4 mg by mouth as needed for Smoking cessation      simvastatin (ZOCOR) 40 MG tablet Take 40 mg by mouth nightly      spironolactone (ALDACTONE) 25 MG tablet Take 25 mg by mouth daily      vitamin E 1000 UNITS capsule Take 1,000 Units by mouth daily      ibuprofen (ADVIL;MOTRIN) 800 MG tablet Take 800 mg by mouth every 6 hours as needed for Pain      diclofenac sodium (VOLTAREN) 1 % GEL Apply 2 g topically 4 times daily as needed for Pain 50 g 1    levothyroxine (SYNTHROID) 125 MCG tablet Take 125 mcg by mouth Daily      nitroGLYCERIN (NITROSTAT) 0.4 MG SL tablet Place 0.4 mg under the tongue      loperamide (IMODIUM) 2 MG capsule TAKE ONE CAPSULE BY MOUTH THREE TIMES A DAY AS NEEDED FOR DIARRHEA      traMADol (ULTRAM) 50 MG tablet Take 50 mg by mouth every 6 hours as needed for Pain.  mupirocin (BACTROBAN) 2 % ointment Apply topically 3 times daily Apply topically 3 times daily. No current facility-administered medications on file prior to encounter. REVIEW OF SYSTEMS    Pertinent items are noted in HPI. Last U1F if applicable:   Hemoglobin A1C   Date Value Ref Range Status   03/14/2021 5.4 See comment % Final     Comment:     Comment:  Diagnosis of Diabetes: > or = 6.5%  Increased risk of diabetes (Prediabetes): 5.7-6.4%  Glycemic Control: Nonpregnant Adults: <7.0%                    Pregnant: <6.0%           Objective:      /78   Pulse 68   Temp 96.8 °F (36 °C) (Temporal)   Resp 16     Wt Readings from Last 3 Encounters:   04/06/21 268 lb 11.9 oz (121.9 kg)   03/28/21 263 lb 12.8 oz (119.7 kg)   03/16/21 255 lb 12.8 oz (116 kg)       PHYSICAL EXAM    General Appearance: alert and oriented to person, place and time, well-developed and well-nourished, in no acute distress      Assessment:      1.  Varicose veins of left lower extremity with inflammation, with ulcer of calf with fat layer exposed ([address]           Procedure Note  Indications:  Based on my examination of this patient's wound(s)/ulcer(s) today, debridement is required to promote healing and evaluate the wound base. Performed by: Meng Perkins DPM    Consent obtained:  Yes    Time out taken:  Yes    Pain Control: Anesthetic  Anesthetic: 4% Lidocaine Liquid Topical       Debridement: Excisional Debridement    Using curette, scissors and forceps the wound(s)/ulcer(s) was/were debrided down through and including the removal of epidermis, dermis and subcutaneous tissue. Devitalized Tissue Debrided:  fibrin, biofilm, slough, exudate and callus    Pre Debridement Measurements:  Are located in the [place]  Documentation Flow Sheet    Wound/Ulcer #: 1    Post Debridement Measurements:  Wound/Ulcer Descriptions are Pre Debridement except measurements:    Wound 03/14/21 Leg Left; Lower #1 (Active)   Wound Image   04/06/21 1337   Wound Etiology Pressure Unstageable 03/28/21 1120   Dressing Status Clean;Dry; Intact 03/28/21 1120   Wound Cleansed Cleansed with saline 04/23/21 1335   Dressing/Treatment Collagen 04/23/21 1345   Wound Length (cm) 1.6 cm 04/23/21 1335   Wound Width (cm) 1.1 cm 04/23/21 1335   Wound Depth (cm) 0.1 cm 04/23/21 1335   Wound Surface Area (cm^2) 1.76 cm^2 04/23/21 1335   Change in Wound Size % (l*w) 60.89 04/23/21 1335   Wound Volume (cm^3) 0.18 cm^3 04/23/21 1335   Wound Healing % 68 04/23/21 1335   Post-Procedure Length (cm) 1.7 cm 04/23/21 1345   Post-Procedure Width (cm) 1.2 cm 04/23/21 1345   Post-Procedure Depth (cm) 0.3 cm 04/23/21 1345   Post-Procedure Surface Area (cm^2) 2.04 cm^2 04/23/21 1345   Post-Procedure Volume (cm^3) 0.61 cm^3 04/23/21 1345   Distance Tunneling (cm) 0 cm 04/23/21 1335   Undermining Maxium Distance (cm) 0 04/23/21 1335   Wound Assessment Fibrinous 04/23/21 1335   Drainage Amount Moderate 04/23/21 1335   Drainage Description Brown;Serosanguinous 04/23/21 1335   Odor None 04/23/21 1335   Kyra-wound Assessment Dry/flaky 04/23/21 1335   Margins Defined edges 04/23/21 1335   Wound Thickness Description not for Pressure Injury Full thickness 04/23/21 1335   Number of days: 39          Percent of Wound(s)/Ulcer(s) Debrided: 100%    Total Surface Area Debrided:   2.04 sq cm   Diabetic/Pressure/Non Pressre Ulcers only:  Ulcer: Non-Pressure ulcer, fat layer exposed     Estimated Blood Loss:  Minimal    Hemostasis Achieved:  by pressure    Procedural Pain:  5  / 10     Post Procedural Pain:  2 / 10     Response to treatment:  Well tolerated by patient. Plan:   Continued promotion of granulation thru serial debridement and use of Collagen as structural scaffolding for wound healing    Treatment Note please see attached Discharge Instructions    New Medication(s) at this visit:   New Prescriptions    No medications on file       Other orders at this visit:   Orders Placed This Encounter   Procedures    Initiate Outpatient Wound Care Protocol       Smoking Cessation: Counseling given: Not Answered      Written patient dismissal instructions given to patient and signed by patient or POA. Discharge [facility] Physician Orders and Discharge Instructions  [address] [address] [address]  Telephone: [phone] hands with soap and water prior to and after every dressing change. Wound Cleansing: (All wounds are cleaned with 0.9% saline during your wound care visit)   · Do not scrub or use excessive force. · With each dressing change, rinse wounds with 0.9% Saline. (May use wound wash or soft contact solution. Both can be purchased at a local drug store). · If unable to obtain saline, may use a gentle soap and water. · Keep wounds dry in the shower unless otherwise instructed by the physician. · For wounds on lower legs, cast covers can be purchased at local drug stores, so that you may shower and keep the wound(s) dry.     [] Instructions for Vashe Wash solution ONLY: Apply enough Vashe to soak a piece of gauze and place on wound bed for 5-10 minutes. DO NOT rinse after Vashe has been applied. Follow dressing application as instructed below. [] Vashe Wash applied during clinic visit. Kyra wound Topical Treatments:  Do not apply lotions, creams, or ointments to the skin around the wound bed unless directed as followed:     :       Dressings:           Wound Location: left lower leg      Apply Primary Dressing to wound:       Collagen      Cover and Secure with:     Cover and Secure with: 4X4 gauze pad   Avoid contact of tape with skin if possible.  When to change Dressing: [] Daily [] Every Other Day [] Once a week  [] Three times per week: [] Monday, Wednesday, Friday [] Tuesday, Thursday, Saturday  [x] Do Not Change Dressing [] Other:           Edema Control:  Apply: [] Compression Stocking  [] Left Lower Leg [] Right Lower Leg     [x]  Spandagrip Strength:High compression  20-30 mm/Hg [] Left Lower Leg [x] Right Lower Leg        Apply every morning immediately when getting up. They should be applied to affected leg(s) from mid foot to knee making sure to cover the heel. Remove every night before going to bed. [x] Elevate leg(s) above the level of the heart for 30 minutes 4-5 times a day and/or when sitting. [x] Avoid prolonged standing in one place. Multilayer Compression Wrap:    Type: 2 Layer Lite compression wrap   · Applied in Clinic to the :  Left lower leg(s) on 4/23/2021  · Do not get leg(s) with wrap wet. · If wraps become too tight call the center or completely remove the wrap. · Elevate leg(s) above the level of the heart when sitting. · Avoid prolonged standing in one place. ·     Dietary:  Important dietary reminders:  1. Increase Protein intake (i.e. Lean meats, fish, eggs, legumes, and yogurt)  2. No added salt  3. If diabetic, follow a diabetic diet and check glucose prior to meals or as instructed by your physician.     Dietary Supplements:  []Rui  []30ml ProStat  []EnsureEnlive []Ensure Max/Premier  []Other:    If you are still having pain after you go home:   For wounds on lower legs or arms, elevate the affected limb.  Use over-the-counter medications you would normally use for pain as permitted by your primary care doctor.  For persistent pain not relieved by the above interventions, please call your primary care doctor. Return Appointment:   Return Appointment: With Dr Rory Rivero  in  [facility])  o Scheduled weekly until (Date):      [] Return Appointment for a Wound Assessment with a nurse on:     []DME/Wound Dressing Supplies ordered at this visit: []Yes []No  o Supplies Provided by:   o Please call them directly to reorder supplies when you run out.  o CONTINUE TO USE THE SUPPLIES YOU HAVE AVAILABLE. IT IS MOST IMPORTANT TO KEEP THE WOUND COVERED AT ALL TIMES. [x] Orders placed during your visit:   Orders Placed This Encounter   Procedures    Initiate Outpatient Wound Care Protocol       Your nurse  is:  lisandro     Electronically signed by Gabriel Lagos RN on 4/23/2021 at 1:50 PM     [address] [place] [address] Information: Should you experience any significant changes in your wound(s) or have questions about your wound care, please contact the [address] [place] [address] at [phone]. We are open from 8:00am - 4:30p Monday thru Friday except for Wednesdays which we are closed. Please give us 24-48 hours to return your call. Call your doctor now or seek immediate medical care if:    · You have symptoms of infection, such as:  ? Increased pain, swelling, warmth, or redness. ? Red streaks leading from the area. ? Pus draining from the area. ? A fever. Physician for this visit and orders:  Nelson Wade DPM    [] Patient unable to sign Discharge Instructions given to ECF/Transportation/POA              Electronically signed by Nelson Wade DPM on 4/23/2021 at 1:53 PM

## 2021-04-24 LAB — SARS-COV-2: NOT DETECTED

## 2021-04-26 ENCOUNTER — HOSPITAL ENCOUNTER (OUTPATIENT)
Age: 61
Setting detail: OUTPATIENT SURGERY
Discharge: HOME | End: 2021-04-26
Attending: INTERNAL MEDICINE | Admitting: INTERNAL MEDICINE
Payer: MEDICAID

## 2021-04-26 VITALS
HEIGHT: 64 IN | RESPIRATION RATE: 16 BRPM | WEIGHT: 280 LBS | OXYGEN SATURATION: 96 % | DIASTOLIC BLOOD PRESSURE: 71 MMHG | BODY MASS INDEX: 47.8 KG/M2 | SYSTOLIC BLOOD PRESSURE: 137 MMHG | TEMPERATURE: 97.6 F | HEART RATE: 81 BPM

## 2021-04-26 DIAGNOSIS — D50.9 IRON DEFICIENCY ANEMIA, UNSPECIFIED IRON DEFICIENCY ANEMIA TYPE: ICD-10-CM

## 2021-04-26 LAB
GLUCOSE BLD-MCNC: 108 MG/DL (ref 70–99)
PERFORMED ON: ABNORMAL

## 2021-04-26 PROCEDURE — 88342 IMHCHEM/IMCYTCHM 1ST ANTB: CPT

## 2021-04-26 PROCEDURE — 6370000000 HC RX 637 (ALT 250 FOR IP): Performed by: INTERNAL MEDICINE

## 2021-04-26 PROCEDURE — 88305 TISSUE EXAM BY PATHOLOGIST: CPT

## 2021-04-26 PROCEDURE — 6360000002 HC RX W HCPCS: Performed by: INTERNAL MEDICINE

## 2021-04-26 PROCEDURE — 7100000010 HC PHASE II RECOVERY - FIRST 15 MIN: Performed by: INTERNAL MEDICINE

## 2021-04-26 PROCEDURE — 7100000011 HC PHASE II RECOVERY - ADDTL 15 MIN: Performed by: INTERNAL MEDICINE

## 2021-04-26 PROCEDURE — 99152 MOD SED SAME PHYS/QHP 5/>YRS: CPT | Performed by: INTERNAL MEDICINE

## 2021-04-26 PROCEDURE — 3609010600 HC COLONOSCOPY POLYPECTOMY SNARE/COLD BIOPSY: Performed by: INTERNAL MEDICINE

## 2021-04-26 PROCEDURE — 3609012400 HC EGD TRANSORAL BIOPSY SINGLE/MULTIPLE: Performed by: INTERNAL MEDICINE

## 2021-04-26 PROCEDURE — 99153 MOD SED SAME PHYS/QHP EA: CPT | Performed by: INTERNAL MEDICINE

## 2021-04-26 PROCEDURE — 2709999900 HC NON-CHARGEABLE SUPPLY: Performed by: INTERNAL MEDICINE

## 2021-04-26 PROCEDURE — 2580000003 HC RX 258: Performed by: INTERNAL MEDICINE

## 2021-04-26 RX ORDER — FENTANYL CITRATE 50 UG/ML
INJECTION, SOLUTION INTRAMUSCULAR; INTRAVENOUS PRN
Status: DISCONTINUED | OUTPATIENT
Start: 2021-04-26 | End: 2021-04-26 | Stop reason: ALTCHOICE

## 2021-04-26 RX ORDER — SODIUM CHLORIDE, SODIUM LACTATE, POTASSIUM CHLORIDE, CALCIUM CHLORIDE 600; 310; 30; 20 MG/100ML; MG/100ML; MG/100ML; MG/100ML
INJECTION, SOLUTION INTRAVENOUS CONTINUOUS
Status: DISCONTINUED | OUTPATIENT
Start: 2021-04-26 | End: 2021-04-26 | Stop reason: HOSPADM

## 2021-04-26 RX ORDER — MIDAZOLAM HYDROCHLORIDE 1 MG/ML
INJECTION INTRAMUSCULAR; INTRAVENOUS PRN
Status: DISCONTINUED | OUTPATIENT
Start: 2021-04-26 | End: 2021-04-26 | Stop reason: ALTCHOICE

## 2021-04-26 RX ORDER — DIPHENHYDRAMINE HYDROCHLORIDE 50 MG/ML
INJECTION INTRAMUSCULAR; INTRAVENOUS PRN
Status: DISCONTINUED | OUTPATIENT
Start: 2021-04-26 | End: 2021-04-26 | Stop reason: ALTCHOICE

## 2021-04-26 RX ADMIN — SODIUM CHLORIDE, POTASSIUM CHLORIDE, SODIUM LACTATE AND CALCIUM CHLORIDE: 600; 310; 30; 20 INJECTION, SOLUTION INTRAVENOUS at 12:14

## 2021-04-26 ASSESSMENT — PAIN SCALES - GENERAL
PAINLEVEL_OUTOF10: 0
PAINLEVEL_OUTOF10: 0

## 2021-04-26 ASSESSMENT — PAIN - FUNCTIONAL ASSESSMENT
PAIN_FUNCTIONAL_ASSESSMENT: 0-10

## 2021-04-26 NOTE — PROGRESS NOTES
Morgan Garcia notified that pt states she \" took all of the prep\", states that bowel movements are \" brown liquid, like diarrhea\".

## 2021-04-26 NOTE — BRIEF OP NOTE
Brief Postoperative Note      Patient: Hector Moreno  YOB: 1960  MRN: [id number]    Date of Procedure: 4/26/2021    Pre-Op Diagnosis: Iron deficiency anemia, unspecified iron deficiency anemia type [D50.9]    Post-Op Diagnosis: Same       Procedure(s):  EGD BIOPSY  COLONOSCOPY POLYPECTOMY SNARE/COLD BIOPSY    Surgeon(s):  Marsha Osullivan MD    Assistant:  * No surgical staff found *    Anesthesia: IV Sedation    Estimated Blood Loss (mL): Minimal    Complications: None    Specimens:   ID Type Source Tests Collected by Time Destination   A : gastric ulcer--gastric bx for H pylori Gastric Gastric SURGICAL PATHOLOGY Marsha Osullivan MD 4/26/2021 1320    B : cold snare removal transverse colon polys X2 Tissue Colon SURGICAL PATHOLOGY Marsha Osullivan MD 4/26/2021 1335    C : cold,snare removal descending colon polyp X2 Tissue Colon SURGICAL PATHOLOGY Marsha Osullivan MD 4/26/2021 1338    D : bx forcep X2 and cold snare X2 removal sigmoid polyps Tissue Colon SURGICAL PATHOLOGY Marsha Osullivan MD 4/26/2021 1344        Implants:  * No implants in log *      Drains: * No LDAs found *    Findings: gastric ulcer and Colon polyps    Electronically signed by Nathan Pichardo MD on 4/26/2021 at 1:55 PM

## 2021-04-26 NOTE — H&P
History and Physical / Pre-Sedation Assessment    Donny Nixon is a 63 y.o. female who presents today for EGD and colonoscopy procedure. PMHx:    Past Medical History:   Diagnosis Date    Anemia     Anxiety     Arthritis     Back pain     CAD (coronary artery disease)     Cancer ([facility]     CHF (congestive heart failure) (HCC)     Chronic kidney disease     Depression     Diabetes mellitus (HCC)     Esophageal reflux     Hep C w/o coma, chronic (HCC)     Hyperlipidemia     Hypertension     Leg abscess     MRSA (methicillin resistant staph aureus) culture positive 03/26/2021    left leg wound    Neuropathy     Schizoaffective disorder ([facility]     Thyroid disease        Medications:    Prior to Admission medications    Medication Sig Start Date End Date Taking? Authorizing Provider   potassium bicarbonate (K-LYTE) 25 MEQ disintegrating tablet Take 25 mEq by mouth 2 times daily   Yes Historical Provider, MD   montelukast (SINGULAIR) 10 MG tablet Take 10 mg by mouth nightly   Yes Historical Provider, MD   clonazePAM (KLONOPIN) 0.5 MG tablet Take 0.5 mg by mouth daily as needed.    Yes Historical Provider, MD   buPROPion (WELLBUTRIN XL) 300 MG extended release tablet Take 300 mg by mouth every morning   Yes Historical Provider, MD   ARIPiprazole (ABILIFY) 10 MG tablet Take 10 mg by mouth daily   Yes Historical Provider, MD   diphenhydrAMINE (BENADRYL ALLERGY) 25 MG tablet Take 25 mg by mouth every 6 hours as needed for Itching   Yes Historical Provider, MD   DULoxetine (CYMBALTA) 30 MG extended release capsule Take 30 mg by mouth daily    Yes Historical Provider, MD   fluticasone (FLOVENT HFA) 220 MCG/ACT inhaler Inhale 1 puff into the lungs 2 times daily   Yes Historical Provider, MD   nitroGLYCERIN (NITROSTAT) 0.4 MG SL tablet Place 0.4 mg under the tongue 8/16/18  Yes Historical Provider, MD   gabapentin (NEURONTIN) 300 MG capsule TAKE ONE CAPSULE BY MOUTH THREE TIMES A DAY 7/5/19  Yes Historical History   Problem Relation Age of Onset    Liver Disease Brother        Physical Exam:  Vital Signs: BP (!) 154/89   Pulse 87   Temp 97.5 °F (36.4 °C) (Temporal)   Resp 16   Ht 5' 4\" (1.626 m)   Wt 280 lb (127 kg)   SpO2 94%   BMI 48.06 kg/m²    Pulmonary: Normal  Cardiac: Normal  Abdomen: Normal    Pre-Procedure Assessment / Plan:  ASA Classification: Class 2 - A normal healthy patient with mild systemic disease  Level of Sedation Plan: Moderate sedation   Mallampati Score: I (soft palate, uvula, fauces, tonsillar pillars visible)  Post Procedure plan: Return to same level of care    Colonoscopy Interval History:  3 or more years since last colonoscopy, Less than 3 years since the patient's last colonoscopy due to medical reasons and Less than 3 years since the patient's last colonoscopy due to system reasons    Medical Reason for Colonoscopy before 3 years last colonoscopy incomplete, last colonoscopy had inadequate prep, piecemeal removal of adenomas and last colonoscopy found greater than 10 adenomas  System Reasons for Colonoscopy before 3 years:   previous colonoscopy report unavailable or unable to locate    I assessed the patient and find that the patient is in satisfactory condition to proceed with the planned procedure and sedation plan. Risks/benefits/alternatives of procedure discussed with patient and any present family members. Risks including, but not limited to: bleeding, perforation, post polypectomy syndrome, splenic injury, need for additional procedures or surgery, risks of anesthesia. Patient understands it is their responsibility to call office for pathology results if they do not hear from my office within 1-2 weeks. All questions answered.     Kwaku Olivera MD  4/26/2021

## 2021-04-26 NOTE — PROGRESS NOTES
Pt saw MD and instructions were reviewed  With pt and friend.  Both left per w/c to go home via private transport cab

## 2021-05-03 ENCOUNTER — HOSPITAL ENCOUNTER (OUTPATIENT)
Dept: WOUND CARE | Age: 61
Discharge: HOME | End: 2021-05-03
Payer: MEDICAID

## 2021-05-03 VITALS
SYSTOLIC BLOOD PRESSURE: 130 MMHG | TEMPERATURE: 97.5 F | RESPIRATION RATE: 16 BRPM | HEART RATE: 96 BPM | DIASTOLIC BLOOD PRESSURE: 67 MMHG

## 2021-05-03 DIAGNOSIS — L97.222 VARICOSE VEINS OF LEFT LOWER EXTREMITY WITH INFLAMMATION, WITH ULCER OF CALF WITH FAT LAYER EXPOSED (HCC): Primary | ICD-10-CM

## 2021-05-03 DIAGNOSIS — I83.222 VARICOSE VEINS OF LEFT LOWER EXTREMITY WITH INFLAMMATION, WITH ULCER OF CALF WITH FAT LAYER EXPOSED (HCC): Primary | ICD-10-CM

## 2021-05-03 PROCEDURE — 29581 APPL MULTLAYER CMPRN SYS LEG: CPT

## 2021-05-03 PROCEDURE — 6370000000 HC RX 637 (ALT 250 FOR IP): Performed by: PODIATRIST

## 2021-05-03 PROCEDURE — 11042 DBRDMT SUBQ TIS 1ST 20SQCM/<: CPT

## 2021-05-03 PROCEDURE — 11042 DBRDMT SUBQ TIS 1ST 20SQCM/<: CPT | Performed by: SPECIALIST

## 2021-05-03 RX ORDER — GINSENG 100 MG
CAPSULE ORAL ONCE
Status: CANCELLED | OUTPATIENT
Start: 2021-05-03 | End: 2021-05-03

## 2021-05-03 RX ORDER — BACITRACIN, NEOMYCIN, POLYMYXIN B 400; 3.5; 5 [USP'U]/G; MG/G; [USP'U]/G
OINTMENT TOPICAL ONCE
Status: CANCELLED | OUTPATIENT
Start: 2021-05-03 | End: 2021-05-03

## 2021-05-03 RX ORDER — CLOBETASOL PROPIONATE 0.5 MG/G
OINTMENT TOPICAL ONCE
Status: CANCELLED | OUTPATIENT
Start: 2021-05-03 | End: 2021-05-03

## 2021-05-03 RX ORDER — LIDOCAINE HYDROCHLORIDE 40 MG/ML
SOLUTION TOPICAL ONCE
Status: CANCELLED | OUTPATIENT
Start: 2021-05-03 | End: 2021-05-03

## 2021-05-03 RX ORDER — LIDOCAINE 40 MG/G
CREAM TOPICAL ONCE
Status: CANCELLED | OUTPATIENT
Start: 2021-05-03 | End: 2021-05-03

## 2021-05-03 RX ORDER — BACITRACIN ZINC AND POLYMYXIN B SULFATE 500; 1000 [USP'U]/G; [USP'U]/G
OINTMENT TOPICAL ONCE
Status: CANCELLED | OUTPATIENT
Start: 2021-05-03 | End: 2021-05-03

## 2021-05-03 RX ORDER — GENTAMICIN SULFATE 1 MG/G
OINTMENT TOPICAL ONCE
Status: CANCELLED | OUTPATIENT
Start: 2021-05-03 | End: 2021-05-03

## 2021-05-03 RX ORDER — LIDOCAINE 50 MG/G
OINTMENT TOPICAL ONCE
Status: CANCELLED | OUTPATIENT
Start: 2021-05-03 | End: 2021-05-03

## 2021-05-03 RX ORDER — BETAMETHASONE DIPROPIONATE 0.05 %
OINTMENT (GRAM) TOPICAL ONCE
Status: CANCELLED | OUTPATIENT
Start: 2021-05-03 | End: 2021-05-03

## 2021-05-03 RX ORDER — LIDOCAINE HYDROCHLORIDE 20 MG/ML
JELLY TOPICAL ONCE
Status: CANCELLED | OUTPATIENT
Start: 2021-05-03 | End: 2021-05-03

## 2021-05-03 RX ORDER — LIDOCAINE HYDROCHLORIDE 40 MG/ML
SOLUTION TOPICAL ONCE
Status: COMPLETED | OUTPATIENT
Start: 2021-05-03 | End: 2021-05-03

## 2021-05-03 RX ADMIN — LIDOCAINE HYDROCHLORIDE: 40 SOLUTION TOPICAL at 09:49

## 2021-05-03 ASSESSMENT — PAIN DESCRIPTION - PAIN TYPE: TYPE: CHRONIC PAIN

## 2021-05-03 NOTE — PROGRESS NOTES
Multilayer Compression Wrap   (Not Unna) Below the Knee    NAME:  Alex River  YOB: 1960  MEDICAL RECORD NUMBER:  [id number]  DATE:  5/3/2021        Removed old Multilayer wrap if present and washed leg with mild soap/water.   Applied moisturizing agent to dry skin as needed.   Applied primary and secondary dressing as ordered     Applied multilayered dressing below the knee to Left lower leg(s)  (2 Layer Lite compression wrap ) .   Instructed patient/caregiver not to remove dressing and to keep it clean and dry.   Instructed patient/caregiver on complications to report to provider, such as pain, numbness in toes, heavy drainage, and slippage of dressing.   Instructed patient on purpose of compression dressing and on activity and exercise recommendations.     Applied per   Guidelines    Electronically signed by Luiza Fishman RN on 5/3/2021 at 10:23 AM

## 2021-05-03 NOTE — PROGRESS NOTES
[address] [facility]  Progress Note and Procedure Note      Lisseth Lakhani  MEDICAL RECORD NUMBER:  [id number]  AGE: 63 y.o. GENDER: female  : 1960  EPISODE DATE:  5/3/2021    Subjective:     Chief Complaint   Patient presents with    Wound Check     left leg         HISTORY of PRESENT ILLNESS HPI     Lisseth Lakhani is a 63 y.o. female who presents today for wound/ulcer evaluation. History of Wound Context: This patient has been followed by both Dr. Zenaida Barrett and Dr. Chana Escalona for a full-thickness wound on the left anterior shin. She had recently been hospitalized at [facility] for cellulitis where the wound was first recognized. She states a visiting nurse has coming to the house and applying a compression wrap which apparently slipped causing increased pain and swelling of the left lower extremity    Pain Assessment:  Wound/Ulcer Pain Timing/Severity: intermittent  Quality of pain: burning  Severity:  3 / 10   Modifying Factors: None  Associated Signs/Symptoms: edema and pain    Ulcer Identification:  Ulcer Type: venous  Contributing Factors: edema, venous stasis, shear force and obesity    Objective:      /67   Pulse 96   Temp 97.5 °F (36.4 °C) (Temporal)   Resp 16     Wt Readings from Last 3 Encounters:   21 280 lb (127 kg)   21 268 lb 11.9 oz (121.9 kg)   21 263 lb 12.8 oz (119.7 kg)       PHYSICAL EXAM    Extremities: no cyanosis, no clubbing, 2 + edema-  left lower extremity and full-thickness wound with slough of fibrin granulation and epithelialization at the margins of the wound; periwound dry    Assessment:      1. Varicose veins of left lower extremity with inflammation, with ulcer of calf with fat layer exposed ([address]         Procedure Note  Indications:  Based on my examination of this patient's wound(s) today, sharp excision is required to promote healing and evaluate the extent healing. Performed by: Atilio Kelly MD    Consent obtained? Yes    Time out taken: Yes    Pain Control: Anesthetic: 4% Lidocaine Liquid Topical     Debridement:Excisional Debridement    Using curette the wound was sharply debrided    down through and including the removal of  epidermis, dermis and subcutaneous tissue. Devitalized Tissue Debrided:  fibrin and slough      Pre Debridement Measurements:  Are located in the Wound Documentation Flow Sheet   Wound #: 1     Post  Debridement Measurements:  Wound 03/14/21 Leg Left; Lower #1 (Active)   Wound Image   04/06/21 1337   Wound Cleansed Cleansed with saline 05/03/21 0949   Dressing/Treatment Collagen 04/23/21 1345   Wound Length (cm) 1.2 cm 05/03/21 0949   Wound Width (cm) 1 cm 05/03/21 0949   Wound Depth (cm) 0.1 cm 05/03/21 0949   Wound Surface Area (cm^2) 1.2 cm^2 05/03/21 0949   Change in Wound Size % (l*w) 73.33 05/03/21 0949   Wound Volume (cm^3) 0.12 cm^3 05/03/21 0949   Wound Healing % 79 05/03/21 0949   Post-Procedure Length (cm) 1.3 cm 05/03/21 1014   Post-Procedure Width (cm) 1.1 cm 05/03/21 1014   Post-Procedure Depth (cm) 0.3 cm 05/03/21 1014   Post-Procedure Surface Area (cm^2) 1.43 cm^2 05/03/21 1014   Post-Procedure Volume (cm^3) 0.43 cm^3 05/03/21 1014   Distance Tunneling (cm) 0 cm 05/03/21 0949   Undermining Maxium Distance (cm) 0 05/03/21 0949   Wound Assessment Pink/red;Slough 05/03/21 0949   Drainage Amount Small 05/03/21 0949   Drainage Description Yellow 05/03/21 0949   Odor None 05/03/21 0949   Kyra-wound Assessment Edematous 05/03/21 0949   Margins Defined edges 05/03/21 0949   Wound Thickness Description not for Pressure Injury Full thickness 05/03/21 0949   Number of days: 49          Percent of Wound Debrided: 100%    Total Surface Area Debrided:  1.4 sq cm    Diabetic/Pressure/Non Pressure Ulcers only:  Ulcer: Non-Pressure ulcer, fat layer exposed    Bleeding: Minimal    Hemostasis Achieved: by pressure    Procedural Pain: 3  / 10     Post Procedural Pain: 0 / 10     Response to treatment: With complaints of pain. Patient much more edema of the left lower extremity secondary to the compression wrap slipping that had been placed by visiting nurse. The wound itself measures smaller in size. We will wrap and keep her in a 2 layer compression with a primary collagen dressing to be followed next week wound clinic        Plan:     Treatment Note: Please see attached Discharge Instructions. These instructions were given and signed by the patient or POA    New Medication(s) at this visit:   New Prescriptions    No medications on file       Other orders at this visit:   Orders Placed This Encounter   Procedures   [id number] [address] Protocol       Discharge Instructions          [facility] Physician Orders and Discharge Instructions  [id number] [address] [facility]. [address]  Telephone: [phone] hands with soap and water prior to and after every dressing change. Wound Cleansing: (All wounds are cleaned with 0.9% saline during your wound care visit)   · Do not scrub or use excessive force. · With each dressing change, rinse wounds with 0.9% Saline. (May use wound wash or soft contact solution. Both can be purchased at a local drug store). · If unable to obtain saline, may use a gentle soap and water. · Keep wounds dry in the shower unless otherwise instructed by the physician. · For wounds on lower legs, cast covers can be purchased at local drug stores, so that you may shower and keep the wound(s) dry. [] Instructions for Vashe Wash solution ONLY: Apply enough Vashe to soak a piece of gauze and place on wound bed for 5-10 minutes. DO NOT rinse after Vashe has been applied. Follow dressing application as instructed below. [x] Vashe Wash applied during clinic visit.     Kyra wound Topical Treatments:  Do not apply lotions, creams, or ointments to the skin around the wound bed unless directed as followed:     [] Apply around the wound: [] moisturizing lotion [] Antifungal ointment [] No-Sting barrier film [] Zinc paste [] Other:       Dressings:           Wound Location: left lower leg wound       Apply Primary Dressing to wound:       Collagen      Cover and Secure with:     Cover and Secure with: 4X4 gauze pad   Avoid contact of tape with skin if possible.  When to change Dressing: [] Daily [] Every Other Day [] Once a week  [] Three times per week: [] Monday, Wednesday, Friday [] Tuesday, Thursday, Saturday  [] Do Not Change Dressing [] Other:      Multilayer Compression Wrap:    Type: 2 Layer Lite compression wrap   · Applied in Clinic to the :  Left lower leg(s) on 5/3/2021  · Do not get leg(s) with wrap wet. · If wraps become too tight call the center or completely remove the wrap. · Elevate leg(s) above the level of the heart when sitting. · Avoid prolonged standing in one place. Dietary:  Important dietary reminders:  1. Increase Protein intake (i.e. Lean meats, fish, eggs, legumes, and yogurt)  2. No added salt  3. If diabetic, follow a diabetic diet and check glucose prior to meals or as instructed by your physician. If you are still having pain after you go home:   For wounds on lower legs or arms, elevate the affected limb.  Use over-the-counter medications you would normally use for pain as permitted by your primary care doctor.  For persistent pain not relieved by the above interventions, please call your primary care doctor. Return Appointment:   Return Appointment: With Dr Christine Celaya or Dr. Apolonio Nyhan  in  [address] [place] [address])  o Scheduled weekly until (Date):      [] Return Appointment for a Wound Assessment with a nurse on:     []DME/Wound Dressing Supplies ordered at this visit: []Yes []No  o Supplies Provided by:   o Please call them directly to reorder supplies when you run out.  o CONTINUE TO USE THE SUPPLIES YOU HAVE AVAILABLE.  IT IS MOST IMPORTANT TO KEEP THE WOUND COVERED AT ALL TIMES. [x] Orders placed during your visit:   Orders Placed This Encounter   Procedures    Initiate Outpatient Wound Care Protocol     Home Care: [facility]: phone: [phone], fax [phone]    Your nurse  is:  Jennifer Coto     Electronically signed by Francesca Lamb RN on 5/3/2021 at 10:17 AM     [facility] Information: Should you experience any significant changes in your wound(s) or have questions about your wound care, please contact the [address] [place] [address] at [phone]. We are open from 8:00am - 4:30p Monday thru Friday except for Wednesdays which we are closed. Please give us 24-48 hours to return your call. Call your doctor now or seek immediate medical care if:    · You have symptoms of infection, such as:  ? Increased pain, swelling, warmth, or redness. ? Red streaks leading from the area. ? Pus draining from the area. ? A fever.         Physician for this visit and orders: Rachelle Pena MD    [] Patient unable to sign Discharge Instructions given to ECF/Transportation/POA        Electronically signed by Rachelle Pena MD on 5/3/2021 at 10:23 AM

## 2021-05-06 ENCOUNTER — HOSPITAL ENCOUNTER (EMERGENCY)
Age: 61
Discharge: HOME | End: 2021-05-06
Attending: EMERGENCY MEDICINE
Payer: MEDICAID

## 2021-05-06 ENCOUNTER — APPOINTMENT (OUTPATIENT)
Dept: GENERAL RADIOLOGY | Age: 61
End: 2021-05-06
Payer: MEDICAID

## 2021-05-06 VITALS
SYSTOLIC BLOOD PRESSURE: 87 MMHG | HEIGHT: 64 IN | BODY MASS INDEX: 45.75 KG/M2 | OXYGEN SATURATION: 100 % | HEART RATE: 87 BPM | TEMPERATURE: 98.4 F | DIASTOLIC BLOOD PRESSURE: 63 MMHG | RESPIRATION RATE: 14 BRPM | WEIGHT: 268 LBS

## 2021-05-06 DIAGNOSIS — J45.901 EXACERBATION OF ASTHMA, UNSPECIFIED ASTHMA SEVERITY, UNSPECIFIED WHETHER PERSISTENT: Primary | ICD-10-CM

## 2021-05-06 PROBLEM — F43.10 PTSD (POST-TRAUMATIC STRESS DISORDER): Status: ACTIVE | Noted: 2018-11-09

## 2021-05-06 PROBLEM — E11.9 TYPE 2 DIABETES MELLITUS (HCC): Status: ACTIVE | Noted: 2017-05-04

## 2021-05-06 PROBLEM — R29.6 FALLS FREQUENTLY: Status: ACTIVE | Noted: 2019-08-09

## 2021-05-06 PROBLEM — F33.1 MDD (MAJOR DEPRESSIVE DISORDER), RECURRENT EPISODE, MODERATE (HCC): Status: ACTIVE | Noted: 2020-09-11

## 2021-05-06 PROBLEM — H50.10 EXOTROPIA: Status: ACTIVE | Noted: 2019-05-01

## 2021-05-06 PROBLEM — I10 HYPERTENSION: Status: ACTIVE | Noted: 2021-05-06

## 2021-05-06 PROBLEM — H25.813 COMBINED FORMS OF AGE-RELATED CATARACT OF BOTH EYES: Status: ACTIVE | Noted: 2019-05-01

## 2021-05-06 PROBLEM — H40.033 ANATOMICAL NARROW ANGLE OF BOTH EYES: Status: ACTIVE | Noted: 2019-05-01

## 2021-05-06 PROBLEM — N18.9 CKD (CHRONIC KIDNEY DISEASE): Status: ACTIVE | Noted: 2017-05-04

## 2021-05-06 PROBLEM — M25.561 CHRONIC PAIN OF RIGHT KNEE: Status: ACTIVE | Noted: 2018-03-29

## 2021-05-06 PROBLEM — K90.9 INTESTINAL MALABSORPTION: Status: ACTIVE | Noted: 2021-05-06

## 2021-05-06 PROBLEM — G89.29 CHRONIC PAIN OF RIGHT KNEE: Status: ACTIVE | Noted: 2018-03-29

## 2021-05-06 PROBLEM — E78.2 MIXED HYPERLIPIDEMIA: Status: ACTIVE | Noted: 2017-05-04

## 2021-05-06 PROBLEM — D50.9 IRON DEFICIENCY ANEMIA: Status: ACTIVE | Noted: 2021-05-06

## 2021-05-06 LAB
A/G RATIO: 1.2 (ref 1.1–2.2)
ALBUMIN SERPL-MCNC: 4.2 G/DL (ref 3.4–5)
ALP BLD-CCNC: 148 U/L (ref 40–129)
ALT SERPL-CCNC: 33 U/L (ref 10–40)
ANION GAP SERPL CALCULATED.3IONS-SCNC: 10 MMOL/L (ref 3–16)
AST SERPL-CCNC: 27 U/L (ref 15–37)
BASOPHILS ABSOLUTE: 0 K/UL (ref 0–0.2)
BASOPHILS RELATIVE PERCENT: 0.7 %
BILIRUB SERPL-MCNC: <0.2 MG/DL (ref 0–1)
BUN BLDV-MCNC: 15 MG/DL (ref 7–20)
CALCIUM SERPL-MCNC: 9.4 MG/DL (ref 8.3–10.6)
CHLORIDE BLD-SCNC: 99 MMOL/L (ref 99–110)
CO2: 30 MMOL/L (ref 21–32)
CREAT SERPL-MCNC: 1.2 MG/DL (ref 0.6–1.2)
EOSINOPHILS ABSOLUTE: 0.2 K/UL (ref 0–0.6)
EOSINOPHILS RELATIVE PERCENT: 3 %
GFR AFRICAN AMERICAN: 55
GFR NON-AFRICAN AMERICAN: 46
GLOBULIN: 3.5 G/DL
GLUCOSE BLD-MCNC: 132 MG/DL (ref 70–99)
HCT VFR BLD CALC: 30.9 % (ref 36–48)
HEMOGLOBIN: 9.8 G/DL (ref 12–16)
LYMPHOCYTES ABSOLUTE: 1.3 K/UL (ref 1–5.1)
LYMPHOCYTES RELATIVE PERCENT: 19.7 %
MCH RBC QN AUTO: 25.9 PG (ref 26–34)
MCHC RBC AUTO-ENTMCNC: 31.8 G/DL (ref 31–36)
MCV RBC AUTO: 81.4 FL (ref 80–100)
MONOCYTES ABSOLUTE: 0.8 K/UL (ref 0–1.3)
MONOCYTES RELATIVE PERCENT: 12.5 %
NEUTROPHILS ABSOLUTE: 4.2 K/UL (ref 1.7–7.7)
NEUTROPHILS RELATIVE PERCENT: 64.1 %
PDW BLD-RTO: 17.2 % (ref 12.4–15.4)
PLATELET # BLD: 263 K/UL (ref 135–450)
PMV BLD AUTO: 7.3 FL (ref 5–10.5)
POTASSIUM REFLEX MAGNESIUM: 3.8 MMOL/L (ref 3.5–5.1)
PRO-BNP: 137 PG/ML (ref 0–124)
RBC # BLD: 3.79 M/UL (ref 4–5.2)
SODIUM BLD-SCNC: 139 MMOL/L (ref 136–145)
TOTAL PROTEIN: 7.7 G/DL (ref 6.4–8.2)
TROPONIN: <0.01 NG/ML
WBC # BLD: 6.6 K/UL (ref 4–11)

## 2021-05-06 PROCEDURE — 94640 AIRWAY INHALATION TREATMENT: CPT

## 2021-05-06 PROCEDURE — 80053 COMPREHEN METABOLIC PANEL: CPT

## 2021-05-06 PROCEDURE — 84484 ASSAY OF TROPONIN QUANT: CPT

## 2021-05-06 PROCEDURE — 93005 ELECTROCARDIOGRAM TRACING: CPT | Performed by: EMERGENCY MEDICINE

## 2021-05-06 PROCEDURE — 6370000000 HC RX 637 (ALT 250 FOR IP): Performed by: EMERGENCY MEDICINE

## 2021-05-06 PROCEDURE — 83880 ASSAY OF NATRIURETIC PEPTIDE: CPT

## 2021-05-06 PROCEDURE — 71046 X-RAY EXAM CHEST 2 VIEWS: CPT

## 2021-05-06 PROCEDURE — 99283 EMERGENCY DEPT VISIT LOW MDM: CPT

## 2021-05-06 PROCEDURE — 85025 COMPLETE CBC W/AUTO DIFF WBC: CPT

## 2021-05-06 RX ORDER — PREDNISONE 20 MG/1
60 TABLET ORAL ONCE
Status: COMPLETED | OUTPATIENT
Start: 2021-05-06 | End: 2021-05-06

## 2021-05-06 RX ORDER — PREDNISONE 10 MG/1
40 TABLET ORAL DAILY
Qty: 20 TABLET | Refills: 0 | Status: SHIPPED | OUTPATIENT
Start: 2021-05-07 | End: 2021-05-12

## 2021-05-06 RX ORDER — IPRATROPIUM BROMIDE AND ALBUTEROL SULFATE 2.5; .5 MG/3ML; MG/3ML
1 SOLUTION RESPIRATORY (INHALATION) ONCE
Status: COMPLETED | OUTPATIENT
Start: 2021-05-06 | End: 2021-05-06

## 2021-05-06 RX ADMIN — IPRATROPIUM BROMIDE AND ALBUTEROL SULFATE 1 AMPULE: .5; 2.5 SOLUTION RESPIRATORY (INHALATION) at 21:30

## 2021-05-06 RX ADMIN — IPRATROPIUM BROMIDE AND ALBUTEROL SULFATE 1 AMPULE: .5; 2.5 SOLUTION RESPIRATORY (INHALATION) at 22:27

## 2021-05-06 RX ADMIN — PREDNISONE 60 MG: 20 TABLET ORAL at 21:21

## 2021-05-06 ASSESSMENT — ENCOUNTER SYMPTOMS
SHORTNESS OF BREATH: 1
EYE ITCHING: 0
ABDOMINAL PAIN: 0
WHEEZING: 1
NAUSEA: 1
RHINORRHEA: 1

## 2021-05-07 LAB
EKG ATRIAL RATE: 91 BPM
EKG DIAGNOSIS: NORMAL
EKG P AXIS: 68 DEGREES
EKG P-R INTERVAL: 154 MS
EKG Q-T INTERVAL: 378 MS
EKG QRS DURATION: 86 MS
EKG QTC CALCULATION (BAZETT): 464 MS
EKG R AXIS: -6 DEGREES
EKG T AXIS: 48 DEGREES
EKG VENTRICULAR RATE: 91 BPM

## 2021-05-07 PROCEDURE — 93010 ELECTROCARDIOGRAM REPORT: CPT | Performed by: INTERNAL MEDICINE

## 2021-05-07 NOTE — ED NOTES
Patient prepared for and ready to be discharged. Patient discharged at this time in no acute distress after verbalizing understanding of discharge instructions. Patient left after receiving After Visit Summary instructions.         Roxanne Thayer RN  05/06/21 4210

## 2021-05-07 NOTE — ED PROVIDER NOTES
[address]      Pt Name: Radha Nguyen  MRN: [id number]  [place] 1960  Date of evaluation: 5/6/2021  Provider: Leona Larkin MD    CHIEF COMPLAINT       Chief Complaint   Patient presents with    Shortness of Breath    Anxiety         HISTORY OF PRESENT ILLNESS   (Location/Symptom, Timing/Onset,Context/Setting, Quality, Duration, Modifying Factors, Severity)  Note limiting factors. Radha Nguyen is a 63 y.o. female who presents to the emergency department for *shortness of breath. She has a history of asthma and over the past few days she has been more short of breath than usual.  This evening she used her inhaler several times without significant improvement although since here she does feel like she is somewhat better. She denies any chest pain abdominal pain she does feel occasionally nauseated. She has had rhinorrhea and now she has a very mild headache. She states she has increased urinary frequency secondary to being on Lasix otherwise no urinary complaints. She has not had a fever or chills. No sore throat. No seasonal allergy symptoms. Nursing notes were reviewed. REVIEW OF SYSTEMS    (2-9 systems for level 4, 10 or more for level 5)     Review of Systems   Constitutional: Negative for fever. HENT: Positive for rhinorrhea. Eyes: Negative for itching. Respiratory: Positive for shortness of breath and wheezing. Cardiovascular: Negative for chest pain. Gastrointestinal: Positive for nausea. Negative for abdominal pain. Genitourinary: Positive for frequency. Skin: Positive for wound. Allergic/Immunologic: Negative for environmental allergies. Neurological: Positive for headaches. Hematological: Bruises/bleeds easily.          PAST MEDICAL HISTORY     Past Medical History:   Diagnosis Date    Anemia     Anxiety     Arthritis     Back pain     CAD (coronary artery disease)     [facility]     CHF (congestive heart failure) (HCC)     Chronic kidney disease     Depression     Diabetes mellitus (HCC)     Esophageal reflux     Hep C w/o coma, chronic (HCC)     Hyperlipidemia     Hypertension     Leg abscess     MRSA (methicillin resistant staph aureus) culture positive 03/26/2021    left leg wound    Neuropathy     [facility]oa[facility]ive disorder ([facility]     Thyroid disease          SURGICALHISTORY       Past Surgical History:   Procedure Laterality Date    COLONOSCOPY  4/26/2021    COLONOSCOPY POLYPECTOMY SNARE/COLD BIOPSY performed by Adina Arora MD at [address]     pt unsure of laterality     UPPER GASTROINTESTINAL ENDOSCOPY N/A 4/26/2021    EGD BIOPSY performed by Adina Arora MD at [address] [facility]       Previous Medications    ALBUTEROL (PROVENTIL HFA;VENTOLIN HFA) 108 (90 BASE) MCG/ACT INHALER    Inhale 2 puffs into the lungs every 6 hours as needed for Wheezing    ARIPIPRAZOLE (ABILIFY) 10 MG TABLET    Take 10 mg by mouth daily    ASCORBIC ACID (VITAMIN C) 500 MG TABLET    Take 500 mg by mouth daily    BUPROPION (WELLBUTRIN XL) 300 MG EXTENDED RELEASE TABLET    Take 300 mg by mouth every morning    CLONAZEPAM (KLONOPIN) 0.5 MG TABLET    Take 0.5 mg by mouth daily as needed.     DIPHENHYDRAMINE (BENADRYL ALLERGY) 25 MG TABLET    Take 25 mg by mouth every 6 hours as needed for Itching    DULOXETINE (CYMBALTA) 30 MG EXTENDED RELEASE CAPSULE    Take 30 mg by mouth daily     FERROUS SULFATE (IRON 325) 325 (65 FE) MG TABLET    Take 1 tablet by mouth 2 times daily (with meals)    FLUTICASONE (FLOVENT HFA) 220 MCG/ACT INHALER    Inhale 1 puff into the lungs 2 times daily    GABAPENTIN (NEURONTIN) 300 MG CAPSULE    TAKE ONE CAPSULE BY MOUTH THREE TIMES A DAY    LEVOTHYROXINE (SYNTHROID) 125 MCG TABLET    Take 125 mcg by mouth Daily    LOPERAMIDE (IMODIUM) 2 MG CAPSULE    TAKE ONE CAPSULE BY MOUTH THREE TIMES A DAY AS NEEDED FOR DIARRHEA    METOPROLOL (LOPRESSOR) 25 MG TABLET    Take 25 mg by mouth 2 times daily    MONTELUKAST (SINGULAIR) 10 MG TABLET    Take 10 mg by mouth nightly    MULTIPLE VITAMINS-MINERALS (THERAPEUTIC MULTIVITAMIN-MINERALS) TABLET    Take 1 tablet by mouth daily    MUPIROCIN (BACTROBAN) 2 % OINTMENT    Apply topically 3 times daily Apply topically 3 times daily. NICOTINE POLACRILEX (COMMIT) 4 MG LOZENGE    Take 4 mg by mouth as needed for Smoking cessation    NITROGLYCERIN (NITROSTAT) 0.4 MG SL TABLET    Place 0.4 mg under the tongue    PANTOPRAZOLE (PROTONIX) 40 MG TABLET    Take 1 tablet by mouth daily    POTASSIUM BICARBONATE (K-LYTE) 25 MEQ DISINTEGRATING TABLET    Take 25 mEq by mouth 2 times daily    SIMVASTATIN (ZOCOR) 40 MG TABLET    Take 40 mg by mouth nightly    SPIRONOLACTONE (ALDACTONE) 25 MG TABLET    Take 25 mg by mouth daily    TORSEMIDE (DEMADEX) 20 MG TABLET    Take 1 tablet by mouth daily    TRAMADOL (ULTRAM) 50 MG TABLET    Take 50 mg by mouth every 6 hours as needed for Pain.      VITAMIN D (CHOLECALCIFEROL) 5000 UNITS CAPS CAPSULE    Take 5,000 Units by mouth daily     VITAMIN E 1000 UNITS CAPSULE    Take 1,000 Units by mouth daily       ALLERGIES     Tums [calcium carbonate antacid]    FAMILY HISTORY       Family History   Problem Relation Age of Onset    Liver Disease Brother           SOCIAL HISTORY       Social History     Socioeconomic History    Marital status: Legally      Spouse name: Not on file    Number of children: Not on file    Years of education: Not on file    Highest education level: Not on file   Occupational History    Not on file   Social Needs    Financial resource strain: Not on file    Food insecurity     Worry: Not on file     Inability: Not on file    Transportation needs     Medical: Not on file     Non-medical: Not on file   Tobacco Use    Smoking status: Former Smoker     Packs/day: 2.00     Years: 30.00     Pack years: 60.00     Types: Cigarettes     Quit date: 2021     Years since quittin.3    Smokeless tobacco: Never Used   Substance and Sexual Activity    Alcohol use: No    Drug use: No    Sexual activity: Not on file   Lifestyle    Physical activity     Days per week: Not on file     Minutes per session: Not on file    Stress: Not on file   Relationships    Social connections     Talks on phone: Not on file     Gets together: Not on file     Attends Latter day service: Not on file     Active member of club or organization: Not on file     Attends meetings of clubs or organizations: Not on file     Relationship status: Not on file    Intimate partner violence     Fear of current or ex partner: Not on file     Emotionally abused: Not on file     Physically abused: Not on file     Forced sexual activity: Not on file   Other Topics Concern    Not on file   Social History Narrative    Not on file       SCREENINGS             PHYSICAL EXAM    (up to 7 for level 4, 8 or more for level 5)     ED Triage Vitals [21]   BP Temp Temp Source Pulse Resp SpO2 Height Weight   (!) 159/82 98.4 °F (36.9 °C) Oral 94 20 97 % 5' 4\" (1.626 m) 268 lb (121.6 kg)       Physical Exam  Vitals signs and nursing note reviewed. Constitutional:       Appearance: Normal appearance. She is well-developed. She is not ill-appearing. HENT:      Head: Normocephalic and atraumatic. Right Ear: External ear normal.      Left Ear: External ear normal.      Nose: Nose normal.   Eyes:      General: No scleral icterus. Right eye: No discharge. Left eye: No discharge. Conjunctiva/sclera: Conjunctivae normal.   Neck:      Musculoskeletal: Neck supple. Cardiovascular:      Rate and Rhythm: Normal rate and regular rhythm. Heart sounds: Normal heart sounds. Pulmonary:      Effort: Pulmonary effort is normal. No respiratory distress. Breath sounds: Decreased breath sounds and wheezing present. No rales.    Abdominal: Pro- (*)     All other components within normal limits    Narrative:     Performed at:  [facility]) - [facility]  [address],  [address]   Phone [phone]   TROPONIN    Narrative:     Performed at:  [facility]) - [facility]  [address],  [address]   Phone [phone]       All other labs were within normal range or not returned as of this dictation. EMERGENCY DEPARTMENT COURSE and DIFFERENTIAL DIAGNOSIS/MDM:   Vitals:    Vitals:    05/06/21 1952 05/06/21 2130 05/06/21 2227   BP: (!) 159/82     Pulse: 94     Resp: 20 15 14   Temp: 98.4 °F (36.9 °C)     TempSrc: Oral     SpO2: 97% 100% 97%   Weight: 268 lb (121.6 kg)     Height: 5' 4\" (1.626 m)         Adult female with a history of asthma who comes in for acute asthma exacerbation. Basic laboratory studies chest x-ray and EKG ordered. She was placed on cardiac and blood pressure and pulse oximetry monitoring because she does have underlying risk factors for cardiac disease. Cardiac monitors interpreted by myself shows normal sinus rhythm. Laboratory studies chest x-ray and EKG negative for any acute process. She was given 1 DuoNeb and prednisone. She did have improvement of her symptoms but she does still have some wheezing. She is given another DuoNeb. Patient otherwise remained hemodynamically stable. The patient feels improved. Cardiac monitor is interpreted myself continues to show normal sinus rhythm. The patient will be discharged home. She will be given steroids for home. Follow-up in the primary care setting is recommended. Return instructions discussed. Patient is agreeable to treatment plan. CRITICAL CARE TIME   None       CONSULTS:  None    PROCEDURES:       Procedures    FINAL IMPRESSION      1.  Exacerbation of asthma, unspecified asthma severity, unspecified whether persistent          DISPOSITION/PLAN

## 2021-05-17 ENCOUNTER — HOSPITAL ENCOUNTER (OUTPATIENT)
Dept: WOUND CARE | Age: 61
Discharge: HOME | End: 2021-05-17
Payer: MEDICAID

## 2021-05-17 VITALS
TEMPERATURE: 97 F | RESPIRATION RATE: 16 BRPM | DIASTOLIC BLOOD PRESSURE: 88 MMHG | HEART RATE: 86 BPM | SYSTOLIC BLOOD PRESSURE: 127 MMHG

## 2021-05-17 DIAGNOSIS — I83.222 VARICOSE VEINS OF LEFT LOWER EXTREMITY WITH INFLAMMATION, WITH ULCER OF CALF WITH FAT LAYER EXPOSED (HCC): Primary | ICD-10-CM

## 2021-05-17 DIAGNOSIS — L97.222 VARICOSE VEINS OF LEFT LOWER EXTREMITY WITH INFLAMMATION, WITH ULCER OF CALF WITH FAT LAYER EXPOSED (HCC): Primary | ICD-10-CM

## 2021-05-17 PROCEDURE — 11042 DBRDMT SUBQ TIS 1ST 20SQCM/<: CPT | Performed by: SPECIALIST

## 2021-05-17 PROCEDURE — 11042 DBRDMT SUBQ TIS 1ST 20SQCM/<: CPT

## 2021-05-17 RX ORDER — LIDOCAINE HYDROCHLORIDE 20 MG/ML
JELLY TOPICAL ONCE
Status: CANCELLED | OUTPATIENT
Start: 2021-05-17 | End: 2021-05-17

## 2021-05-17 RX ORDER — BETAMETHASONE DIPROPIONATE 0.05 %
OINTMENT (GRAM) TOPICAL ONCE
Status: CANCELLED | OUTPATIENT
Start: 2021-05-17 | End: 2021-05-17

## 2021-05-17 RX ORDER — CLOBETASOL PROPIONATE 0.5 MG/G
OINTMENT TOPICAL ONCE
Status: CANCELLED | OUTPATIENT
Start: 2021-05-17 | End: 2021-05-17

## 2021-05-17 RX ORDER — LIDOCAINE 40 MG/G
CREAM TOPICAL ONCE
Status: CANCELLED | OUTPATIENT
Start: 2021-05-17 | End: 2021-05-17

## 2021-05-17 RX ORDER — BACITRACIN ZINC AND POLYMYXIN B SULFATE 500; 1000 [USP'U]/G; [USP'U]/G
OINTMENT TOPICAL ONCE
Status: CANCELLED | OUTPATIENT
Start: 2021-05-17 | End: 2021-05-17

## 2021-05-17 RX ORDER — GENTAMICIN SULFATE 1 MG/G
OINTMENT TOPICAL ONCE
Status: CANCELLED | OUTPATIENT
Start: 2021-05-17 | End: 2021-05-17

## 2021-05-17 RX ORDER — GINSENG 100 MG
CAPSULE ORAL ONCE
Status: CANCELLED | OUTPATIENT
Start: 2021-05-17 | End: 2021-05-17

## 2021-05-17 RX ORDER — LIDOCAINE 50 MG/G
OINTMENT TOPICAL ONCE
Status: CANCELLED | OUTPATIENT
Start: 2021-05-17 | End: 2021-05-17

## 2021-05-17 RX ORDER — BACITRACIN, NEOMYCIN, POLYMYXIN B 400; 3.5; 5 [USP'U]/G; MG/G; [USP'U]/G
OINTMENT TOPICAL ONCE
Status: CANCELLED | OUTPATIENT
Start: 2021-05-17 | End: 2021-05-17

## 2021-05-17 RX ORDER — LIDOCAINE HYDROCHLORIDE 40 MG/ML
SOLUTION TOPICAL ONCE
Status: CANCELLED | OUTPATIENT
Start: 2021-05-17 | End: 2021-05-17

## 2021-05-17 NOTE — PROGRESS NOTES
[address] [facility]  Progress Note and Procedure Note      Jomar Lancaster  MEDICAL RECORD NUMBER:  [id number]  AGE: 63 y.o. GENDER: female  : 1960  EPISODE DATE:  2021    Subjective:     Chief Complaint   Patient presents with    Wound Check     left lower leg         HISTORY of PRESENT ILLNESS HPI     Jomar Lancaster is a 63 y.o. female who presents today for wound/ulcer evaluation. History of Wound Context: This patient has been followed by both Dr. Marietta Velez and Dr. Bao Bishop for a full-thickness wound on the left anterior shin. She had recently been hospitalized at [facility] for cellulitis where the wound was first recognized. She states a visiting nurse has coming to the house and applying a compression wrap which apparently slipped causing increased pain and swelling of the left lower extremity  Patient missed her last wound care appointment due to illness. Pain Assessment:  Wound/Ulcer Pain Timing/Severity: none  Quality of pain: N/A  Severity:  0 / 10   Modifying Factors: None  Associated Signs/Symptoms: edema and pain    Ulcer Identification:  Ulcer Type: venous  Contributing Factors: edema, venous stasis, shear force and obesity    Objective:      /88   Pulse 86   Temp 97 °F (36.1 °C) (Temporal)   Resp 16     Wt Readings from Last 3 Encounters:   21 268 lb (121.6 kg)   21 280 lb (127 kg)   21 268 lb 11.9 oz (121.9 kg)       PHYSICAL EXAM    Extremities: no cyanosis, no clubbing, 1 + edema-  left lower extremity and small full-thickness wound with slough, granulation and epithelialization at the margins of this wound; periwound remains dry    Assessment:      1.  Varicose veins of left lower extremity with inflammation, with ulcer of calf with fat layer exposed ([address]         Procedure Note  Indications:  Based on my examination of this patient's wound(s) today, sharp excision is required to promote healing and evaluate the extent healing. Performed by: Amy Huston MD    Consent obtained? Yes    Time out taken: Yes    Pain Control: Anesthetic: 4% Lidocaine Liquid Topical     Debridement:Excisional Debridement    Using curette the wound was sharply debrided    down through and including the removal of  epidermis, dermis and subcutaneous tissue. Devitalized Tissue Debrided:  fibrin and slough      Pre Debridement Measurements:  Are located in the Wound Documentation Flow Sheet   Wound #: 1     Post  Debridement Measurements:  Wound 03/14/21 Leg Left; Lower #1 (Active)   Wound Image   05/17/21 0932   Wound Cleansed Cleansed with saline 05/17/21 0932   Dressing/Treatment Collagen 05/17/21 0952   Wound Length (cm) 1 cm 05/17/21 0932   Wound Width (cm) 0.7 cm 05/17/21 0932   Wound Depth (cm) 0.1 cm 05/17/21 0932   Wound Surface Area (cm^2) 0.7 cm^2 05/17/21 0932   Change in Wound Size % (l*w) 84.44 05/17/21 0932   Wound Volume (cm^3) 0.07 cm^3 05/17/21 0932   Wound Healing % 88 05/17/21 0932   Post-Procedure Length (cm) 1.1 cm 05/17/21 0952   Post-Procedure Width (cm) 0.8 cm 05/17/21 0952   Post-Procedure Depth (cm) 0.3 cm 05/17/21 0952   Post-Procedure Surface Area (cm^2) 0.88 cm^2 05/17/21 0952   Post-Procedure Volume (cm^3) 0.26 cm^3 05/17/21 0952   Distance Tunneling (cm) 0 cm 05/17/21 0932   Undermining Maxium Distance (cm) 0 05/17/21 0932   Wound Assessment Pink/red;Slough 05/17/21 0932   Drainage Amount Small 05/17/21 0932   Drainage Description Brown 05/17/21 0932   Odor None 05/17/21 0932   Kyra-wound Assessment Intact 05/17/21 0932   Margins Defined edges 05/17/21 0932   Wound Thickness Description not for Pressure Injury Full thickness 05/17/21 0932   Number of days: 63          Percent of Wound Debrided: 100%    Total Surface Area Debrided:  .88 sq cm    Diabetic/Pressure/Non Pressure Ulcers only:  Ulcer: Non-Pressure ulcer, fat layer exposed    Bleeding: Minimal    Hemostasis Achieved: by pressure    Procedural Pain: 0  / 10 Post Procedural Pain: 0 / 10   Wound measurements markedly improved. Will place in a spandagrip and I have asked the patient to bring her own cold compression stockings for her visit next week. Hopefully she will be ready for discharge  Response to treatment:  Well tolerated by patient. Plan:     Treatment Note: Please see attached Discharge Instructions. These instructions were given and signed by the patient or POA    New Medication(s) at this visit:   New Prescriptions    No medications on file       Other orders at this visit:   Orders Placed This Encounter   Procedures   [address] Protocol       Discharge Instructions          [address] [facility] Physician Orders and Discharge Instructions  [address] [address] [facility]  Telephone: [phone] hands with soap and water prior to and after every dressing change. Wound Cleansing: (All wounds are cleaned with 0.9% saline during your wound care visit)   · Do not scrub or use excessive force. · With each dressing change, rinse wounds with 0.9% Saline. (May use wound wash or soft contact solution. Both can be purchased at a local drug store). · If unable to obtain saline, may use a gentle soap and water. · Keep wounds dry in the shower unless otherwise instructed by the physician. · For wounds on lower legs, cast covers can be purchased at local drug stores, so that you may shower and keep the wound(s) dry. [] Instructions for Vashe Wash solution ONLY: Apply enough Vashe to soak a piece of gauze and place on wound bed for 5-10 minutes. DO NOT rinse after Vashe has been applied. Follow dressing application as instructed below. [] Vashe Wash applied during clinic visit.     Kyra wound Topical Treatments:  Do not apply lotions, creams, or ointments to the skin around the wound bed unless directed as followed:     [] Apply around the wound: [] moisturizing lotion [] Antifungal ointment [] No-Sting barrier film [] Zinc paste [] Other:       Dressings:           Wound Location: left lower leg wound      Apply Primary Dressing to wound:       Collagen ( lightly moisten with normal saline)     Cover and Secure with:     Cover and Secure with: 4X4 gauze pad  Conforming roll gauze   Avoid contact of tape with skin if possible.  When to change Dressing: [] Daily [] Every Other Day [] Once a week  [x] Three times per week: [x] Monday, Wednesday, Friday [] Tuesday, Thursday, Saturday  [] Do Not Change Dressing [] Other:       Edema Control:  Apply: [x] Compression Stocking  [] Left Lower Leg [x] Right Lower Leg     [x]  Spandagrip Strength:High compression  20-30 mm/Hg [x] Left Lower Leg [] Right Lower Leg        Apply every morning immediately when getting up. They should be applied to affected leg(s) from mid foot to knee making sure to cover the heel. Remove every night before going to bed. [x] Elevate leg(s) above the level of the heart for 30 minutes 4-5 times a day and/or when sitting. [x] Avoid prolonged standing in one place. Off Loading(Pressure Reduction) When:   []Walking []Sitting []In Bed       Dietary:  Important dietary reminders:  1. Increase Protein intake (i.e. Lean meats, fish, eggs, legumes, and yogurt)  2. No added salt  3. If diabetic, follow a diabetic diet and check glucose prior to meals or as instructed by your physician. If you are still having pain after you go home:   For wounds on lower legs or arms, elevate the affected limb.  Use over-the-counter medications you would normally use for pain as permitted by your primary care doctor.  For persistent pain not relieved by the above interventions, please call your primary care doctor.      Return Appointment:   Return Appointment: With Dr. Lewis Cerda  in  [address] [place] [address])  o Scheduled weekly until (Date):      [] Return Appointment for a Wound Assessment with a nurse on:     []DME/Wound Dressing Supplies ordered at this visit: []Yes []No  o Supplies Provided by:   o Please call them directly to reorder supplies when you run out.  o CONTINUE TO USE THE SUPPLIES YOU HAVE AVAILABLE. IT IS MOST IMPORTANT TO KEEP THE WOUND COVERED AT ALL TIMES. [x] Orders placed during your visit: No orders of the defined types were placed in this encounter. Home care: [facility]: phone: [phone], fax [phone]    Your nurse  is:  Brittany Hassan     Electronically signed by Kalani Regan RN on 5/17/2021 at 9:53 AM     [address]: Should you experience any significant changes in your wound(s) or have questions about your wound care, please contact the [address] [place] [address] at [phone]. We are open from 8:00am - 4:30p Monday thru Friday except for Wednesdays which we are closed. Please give us 24-48 hours to return your call. Call your doctor now or seek immediate medical care if:    · You have symptoms of infection, such as:  ? Increased pain, swelling, warmth, or redness. ? Red streaks leading from the area. ? Pus draining from the area. ? A fever.         Physician for this visit and orders: Luisa Soler MD    [] Patient unable to sign Discharge Instructions given to ECF/Transportation/POA        Electronically signed by Luisa Soler MD on 5/17/2021 at 10:12 AM

## 2021-05-24 ENCOUNTER — HOSPITAL ENCOUNTER (OUTPATIENT)
Dept: WOUND CARE | Age: 61
Discharge: HOME | End: 2021-05-24
Payer: MEDICAID

## 2021-05-24 VITALS
TEMPERATURE: 97.7 F | HEART RATE: 84 BPM | SYSTOLIC BLOOD PRESSURE: 135 MMHG | RESPIRATION RATE: 16 BRPM | DIASTOLIC BLOOD PRESSURE: 85 MMHG

## 2021-05-24 DIAGNOSIS — I83.222 VARICOSE VEINS OF LEFT LOWER EXTREMITY WITH INFLAMMATION, WITH ULCER OF CALF WITH FAT LAYER EXPOSED (HCC): Primary | ICD-10-CM

## 2021-05-24 DIAGNOSIS — L97.222 VARICOSE VEINS OF LEFT LOWER EXTREMITY WITH INFLAMMATION, WITH ULCER OF CALF WITH FAT LAYER EXPOSED (HCC): Primary | ICD-10-CM

## 2021-05-24 PROCEDURE — 6370000000 HC RX 637 (ALT 250 FOR IP): Performed by: PODIATRIST

## 2021-05-24 PROCEDURE — 11042 DBRDMT SUBQ TIS 1ST 20SQCM/<: CPT

## 2021-05-24 PROCEDURE — 11042 DBRDMT SUBQ TIS 1ST 20SQCM/<: CPT | Performed by: SPECIALIST

## 2021-05-24 RX ORDER — LIDOCAINE 40 MG/G
CREAM TOPICAL ONCE
Status: CANCELLED | OUTPATIENT
Start: 2021-05-24 | End: 2021-05-24

## 2021-05-24 RX ORDER — LIDOCAINE 50 MG/G
OINTMENT TOPICAL ONCE
Status: CANCELLED | OUTPATIENT
Start: 2021-05-24 | End: 2021-05-24

## 2021-05-24 RX ORDER — DICLOFENAC SODIUM 30 MG/G
GEL TOPICAL
COMMUNITY

## 2021-05-24 RX ORDER — BETAMETHASONE DIPROPIONATE 0.05 %
OINTMENT (GRAM) TOPICAL ONCE
Status: CANCELLED | OUTPATIENT
Start: 2021-05-24 | End: 2021-05-24

## 2021-05-24 RX ORDER — LIDOCAINE HYDROCHLORIDE 20 MG/ML
JELLY TOPICAL ONCE
Status: CANCELLED | OUTPATIENT
Start: 2021-05-24 | End: 2021-05-24

## 2021-05-24 RX ORDER — BACITRACIN, NEOMYCIN, POLYMYXIN B 400; 3.5; 5 [USP'U]/G; MG/G; [USP'U]/G
OINTMENT TOPICAL ONCE
Status: CANCELLED | OUTPATIENT
Start: 2021-05-24 | End: 2021-05-24

## 2021-05-24 RX ORDER — CLOBETASOL PROPIONATE 0.5 MG/G
OINTMENT TOPICAL ONCE
Status: CANCELLED | OUTPATIENT
Start: 2021-05-24 | End: 2021-05-24

## 2021-05-24 RX ORDER — GINSENG 100 MG
CAPSULE ORAL ONCE
Status: CANCELLED | OUTPATIENT
Start: 2021-05-24 | End: 2021-05-24

## 2021-05-24 RX ORDER — LIDOCAINE HYDROCHLORIDE 40 MG/ML
SOLUTION TOPICAL ONCE
Status: CANCELLED | OUTPATIENT
Start: 2021-05-24 | End: 2021-05-24

## 2021-05-24 RX ORDER — GENTAMICIN SULFATE 1 MG/G
OINTMENT TOPICAL ONCE
Status: CANCELLED | OUTPATIENT
Start: 2021-05-24 | End: 2021-05-24

## 2021-05-24 RX ORDER — BACITRACIN ZINC AND POLYMYXIN B SULFATE 500; 1000 [USP'U]/G; [USP'U]/G
OINTMENT TOPICAL ONCE
Status: CANCELLED | OUTPATIENT
Start: 2021-05-24 | End: 2021-05-24

## 2021-05-24 RX ORDER — LIDOCAINE HYDROCHLORIDE 40 MG/ML
SOLUTION TOPICAL ONCE
Status: COMPLETED | OUTPATIENT
Start: 2021-05-24 | End: 2021-05-24

## 2021-05-24 RX ADMIN — LIDOCAINE HYDROCHLORIDE: 40 SOLUTION TOPICAL at 09:03

## 2021-05-24 ASSESSMENT — PAIN DESCRIPTION - ORIENTATION: ORIENTATION: RIGHT;LEFT

## 2021-05-24 ASSESSMENT — PAIN DESCRIPTION - PAIN TYPE: TYPE: CHRONIC PAIN

## 2021-05-24 ASSESSMENT — PAIN DESCRIPTION - LOCATION: LOCATION: KNEE

## 2021-05-24 ASSESSMENT — PAIN SCALES - GENERAL: PAINLEVEL_OUTOF10: 4

## 2021-05-24 NOTE — PROGRESS NOTES
1227 [facility]  Progress Note and Procedure Note      Dunia Oliva  MEDICAL RECORD NUMBER:  [id number]  AGE: 63 y.o. GENDER: female  : 1960  EPISODE DATE:  2021    Subjective:     Chief Complaint   Patient presents with    Wound Check     left lower leg         HISTORY of PRESENT ILLNESS HPI     Dunia Oliva is a 63 y.o. female who presents today for wound/ulcer evaluation. History of Wound Context: This patient has been followed by both Dr. Ya Rolon and Dr. Rupesh Zuluaga for a full-thickness wound on the left anterior shin.  She had recently been hospitalized at [facility] for cellulitis where the wound was first recognized. Cassandra Boss states a visiting nurse has coming to the house and applying a compression wrap which apparently slipped causing increased pain and swelling of the left lower extremity  Patient has not worn her spandagrip this past week resulting in what she described as increased swelling her left lower extremity    Pain Assessment:  Wound/Ulcer Pain Timing/Severity: none  Quality of pain: N/A  Severity:  0 / 10   Modifying Factors: None  Associated Signs/Symptoms: edema and pain    Ulcer Identification:  Ulcer Type: venous  Contributing Factors: edema, venous stasis, shear force, obesity and non-adherence    Objective:      /85   Pulse 84   Temp 97.7 °F (36.5 °C) (Tympanic)   Resp 16     Wt Readings from Last 3 Encounters:   21 268 lb (121.6 kg)   21 280 lb (127 kg)   21 268 lb 11.9 oz (121.9 kg)       PHYSICAL EXAM    Extremities: no cyanosis, no clubbing, 2 + edema-  left lower extremity and small full-thickness wound with fibrin granulation tissue and epithelialization at the margins left knee    Assessment:      1.  Varicose veins of left lower extremity with inflammation, with ulcer of calf with fat layer exposed ([address]         Procedure Note  Indications:  Based on my examination of this patient's wound(s) today, sharp excision is required to promote healing and evaluate the extent healing. Performed by: Aileen Mckeon MD    Consent obtained? Yes    Time out taken: Yes    Pain Control: Anesthetic: 4% Lidocaine Liquid Topical     Debridement:Excisional Debridement    Using curette the wound was sharply debrided    down through and including the removal of  epidermis, dermis and subcutaneous tissue. Devitalized Tissue Debrided:  fibrin      Pre Debridement Measurements:  Are located in the Wound Documentation Flow Sheet   Wound #: 1     Post  Debridement Measurements:  Wound 03/14/21 Leg Left; Lower #1 (Active)   Wound Image   05/17/21 0932   Wound Cleansed Cleansed with saline 05/24/21 0900   Dressing/Treatment Collagen 05/24/21 0929   Wound Length (cm) 0.5 cm 05/24/21 0900   Wound Width (cm) 0.5 cm 05/24/21 0900   Wound Depth (cm) 0.1 cm 05/24/21 0900   Wound Surface Area (cm^2) 0.25 cm^2 05/24/21 0900   Change in Wound Size % (l*w) 94.44 05/24/21 0900   Wound Volume (cm^3) 0.02 cm^3 05/24/21 0900   Wound Healing % 96 05/24/21 0900   Post-Procedure Length (cm) 0.6 cm 05/24/21 0918   Post-Procedure Width (cm) 0.6 cm 05/24/21 0918   Post-Procedure Depth (cm) 0.3 cm 05/24/21 0918   Post-Procedure Surface Area (cm^2) 0.36 cm^2 05/24/21 0918   Post-Procedure Volume (cm^3) 0.11 cm^3 05/24/21 0918   Distance Tunneling (cm) 0 cm 05/17/21 0932   Undermining Maxium Distance (cm) 0 05/17/21 0932   Wound Assessment Pink/red;Slough 05/17/21 0932   Drainage Amount Scant 05/24/21 0900   Drainage Description Yellow 05/24/21 0900   Odor None 05/24/21 0900   Kyra-wound Assessment Intact 05/24/21 0900   Margins Defined edges 05/24/21 0900   Wound Thickness Description not for Pressure Injury Full thickness 05/24/21 0900   Number of days: 70          Percent of Wound Debrided: 100%    Total Surface Area Debrided:  .36 sq cm    Diabetic/Pressure/Non Pressure Ulcers only:  Ulcer: Non-Pressure ulcer, fat layer exposed    Bleeding: Minimal    Hemostasis Achieved: by pressure    Procedural Pain: 4  / 10     Post Procedural Pain: 0 / 10     Response to treatment:  With complaints of pain. Patient has significant more edema of involved extremity due to lack of wearing her compression stocking. Remarkably the wound itself measures smaller. Try to impress upon patient to have visiting nurse reapply the high [address] after dressing changes        Plan:     Treatment Note: Please see attached Discharge Instructions. These instructions were given and signed by the patient or POA    New Medication(s) at this visit:   New Prescriptions    No medications on file       Other orders at this visit:   Orders Placed This Encounter   Procedures   [address] Protocol       Discharge Instructions         [facility] Physician Orders and Discharge Instructions  The [address] [place] [address] E. [address] [facility]. [address] [place]  Telephone: [phone]  NAME:  Jamison Delacruz  YOB: 1960  MEDICAL RECORD NUMBER:  [id number]  DATE:  5/24/2021    Home Health Care:  [facility]: phone: [phone], fax [phone]    Medically necessary services: [x] Skilled Nursing [] PT [] OT [] Social Work [] Dietician [] Other:    Wound Cleansing:   []0.9% Saline  []Vashe (Soak piece of gauze and place on wound bed for 5 minutes) []Other:    Kyra Wound Topical Treatments:  [] moisturizing lotion [] antifungal ointment [] No-Sting barrier film [] zinc paste [] Other:     Dressings:           Wound Location: left lower leg     [x] Apply Primary Dressing to wound:       Collagen     [x] Cover and Secure with:     Cover and Secure with: Other mepilex border or gauze and paper tape   Avoid contact of tape with skin if possible.     [x] Change dressing: [] Daily (until the following date:____)   [] Every Other Day [] Once a week [] Do Not Change Dressing [] Other:  [x] Three times per week: [x] Monday, Wednesday, Friday [] Tuesday, Thursday, Saturday     Edema Control:  Apply: [x] Compression Stocking- 20-30mmHG  [x] Left Lower Leg [x] Right Lower Leg     Apply every morning immediately when getting up. They should be applied to affected leg(s) from mid foot to knee making sure to cover the heel. Remove every night before going to bed. [x] Elevate leg(s) above the level of the heart for 30 minutes 4-5 times a day and/or when sitting. [x] Avoid prolonged standing in one place. Dietary:  Important dietary reminders:  1. Increase Protein intake (i.e. Lean meats, fish, eggs, legumes, and yogurt)  2. No added salt  3. If diabetic, follow a diabetic diet and check glucose prior to meals or as instructed by your physician. If you are still having pain after you go home:   For wounds on lower legs or arms, elevate the affected limb.  Use over-the-counter medications you would normally use for pain as permitted by your primary care doctor.  For persistent pain not relieved by the above interventions, please call your primary care doctor. Return Appointment:  [facility] Return Appointment: With Dr. Brandy Orta next Thursday 01/03/2021  o Schedule weekly until (Date):        [x] Orders placed during your visit:   Orders Placed This Encounter   Procedures    Initiate Outpatient Wound Care Protocol       Your nurse  is:  Jodean Dubin     Electronically signed by Jez Mueller RN on 5/24/2021 at 9:19 AM     [facility] Information: Should you experience any significant changes in your wound(s) or have questions about your wound care, please contact the [address] [place] [address] at [phone]. Our hours vary so please leave a message. Please give us 24-48 hours to return your call. If you need help with your wounds and cannot wait until we are available, contact your PCP or go to the hospital emergency room.      Call your doctor now or seek immediate medical care if:    · You have symptoms

## 2021-06-07 ENCOUNTER — HOSPITAL ENCOUNTER (OUTPATIENT)
Dept: WOUND CARE | Age: 61
Discharge: HOME | End: 2021-06-07
Payer: MEDICAID

## 2021-06-08 ENCOUNTER — HOSPITAL ENCOUNTER (OUTPATIENT)
Dept: WOUND CARE | Age: 61
Discharge: HOME | End: 2021-06-08
Payer: MEDICAID

## 2021-06-08 VITALS
RESPIRATION RATE: 16 BRPM | DIASTOLIC BLOOD PRESSURE: 103 MMHG | TEMPERATURE: 97.1 F | SYSTOLIC BLOOD PRESSURE: 144 MMHG | HEART RATE: 112 BPM

## 2021-06-08 DIAGNOSIS — L97.222 VARICOSE VEINS OF LEFT LOWER EXTREMITY WITH INFLAMMATION, WITH ULCER OF CALF WITH FAT LAYER EXPOSED (HCC): Primary | ICD-10-CM

## 2021-06-08 DIAGNOSIS — I83.222 VARICOSE VEINS OF LEFT LOWER EXTREMITY WITH INFLAMMATION, WITH ULCER OF CALF WITH FAT LAYER EXPOSED (HCC): Primary | ICD-10-CM

## 2021-06-08 PROCEDURE — 11042 DBRDMT SUBQ TIS 1ST 20SQCM/<: CPT

## 2021-06-08 RX ORDER — BACITRACIN ZINC AND POLYMYXIN B SULFATE 500; 1000 [USP'U]/G; [USP'U]/G
OINTMENT TOPICAL ONCE
Status: CANCELLED | OUTPATIENT
Start: 2021-06-08 | End: 2021-06-08

## 2021-06-08 RX ORDER — GINSENG 100 MG
CAPSULE ORAL ONCE
Status: CANCELLED | OUTPATIENT
Start: 2021-06-08 | End: 2021-06-08

## 2021-06-08 RX ORDER — LIDOCAINE 40 MG/G
CREAM TOPICAL ONCE
Status: CANCELLED | OUTPATIENT
Start: 2021-06-08 | End: 2021-06-08

## 2021-06-08 RX ORDER — LIDOCAINE HYDROCHLORIDE 20 MG/ML
JELLY TOPICAL ONCE
Status: CANCELLED | OUTPATIENT
Start: 2021-06-08 | End: 2021-06-08

## 2021-06-08 RX ORDER — BACITRACIN, NEOMYCIN, POLYMYXIN B 400; 3.5; 5 [USP'U]/G; MG/G; [USP'U]/G
OINTMENT TOPICAL ONCE
Status: CANCELLED | OUTPATIENT
Start: 2021-06-08 | End: 2021-06-08

## 2021-06-08 RX ORDER — GENTAMICIN SULFATE 1 MG/G
OINTMENT TOPICAL ONCE
Status: CANCELLED | OUTPATIENT
Start: 2021-06-08 | End: 2021-06-08

## 2021-06-08 RX ORDER — LIDOCAINE 50 MG/G
OINTMENT TOPICAL ONCE
Status: CANCELLED | OUTPATIENT
Start: 2021-06-08 | End: 2021-06-08

## 2021-06-08 RX ORDER — LIDOCAINE HYDROCHLORIDE 40 MG/ML
SOLUTION TOPICAL ONCE
Status: CANCELLED | OUTPATIENT
Start: 2021-06-08 | End: 2021-06-08

## 2021-06-08 RX ORDER — LIDOCAINE HYDROCHLORIDE 40 MG/ML
SOLUTION TOPICAL ONCE
Status: DISCONTINUED | OUTPATIENT
Start: 2021-06-08 | End: 2021-06-09 | Stop reason: HOSPADM

## 2021-06-08 RX ORDER — BETAMETHASONE DIPROPIONATE 0.05 %
OINTMENT (GRAM) TOPICAL ONCE
Status: CANCELLED | OUTPATIENT
Start: 2021-06-08 | End: 2021-06-08

## 2021-06-08 RX ORDER — CLOBETASOL PROPIONATE 0.5 MG/G
OINTMENT TOPICAL ONCE
Status: CANCELLED | OUTPATIENT
Start: 2021-06-08 | End: 2021-06-08

## 2021-06-08 ASSESSMENT — PAIN DESCRIPTION - LOCATION: LOCATION: LEG

## 2021-06-08 ASSESSMENT — PAIN DESCRIPTION - PAIN TYPE: TYPE: CHRONIC PAIN

## 2021-06-08 ASSESSMENT — PAIN DESCRIPTION - FREQUENCY: FREQUENCY: CONTINUOUS

## 2021-06-08 ASSESSMENT — PAIN SCALES - GENERAL: PAINLEVEL_OUTOF10: 4

## 2021-06-08 ASSESSMENT — PAIN DESCRIPTION - DESCRIPTORS: DESCRIPTORS: ACHING

## 2021-06-08 ASSESSMENT — PAIN DESCRIPTION - ORIENTATION: ORIENTATION: RIGHT;LEFT

## 2021-06-08 NOTE — PROGRESS NOTES
[address]   Progress Note and Procedure Note      Donny Nixon  MEDICAL RECORD NUMBER:  [id number]  AGE: 63 y.o. GENDER: female  : 1960  EPISODE DATE:  2021    Subjective:     Chief Complaint   Patient presents with    Wound Check     LEFT LEG         HISTORY of PRESENT ILLNESS HPI     Donny Nixon is a 63 y.o. female who presents today for wound/ulcer evaluation. History of Wound Context: Patient presents today for follow-up of a left lower extremity ulceration. She missed her appointment yesterday and presents today for evaluation. She denies any constitutional symptoms.   Wound/Ulcer Pain Timing/Severity: none  Quality of pain: N/A  Severity:  0 / 10   Modifying Factors: None  Associated Signs/Symptoms: edema    Ulcer Identification:  Ulcer Type: venous    Contributing Factors: edema and venous stasis    Acute Wound: N/A    PAST MEDICAL HISTORY        Diagnosis Date    Anemia     Anxiety     Arthritis     Back pain     CAD (coronary artery disease)     Cancer (HCC)     CHF (congestive heart failure) (HCC)     Chronic kidney disease     Depression     Diabetes mellitus (HCC)     Esophageal reflux     Hep C w/o coma, chronic (HCC)     Hyperlipidemia     Hypertension     Leg abscess     MRSA (methicillin resistant staph aureus) culture positive 2021    left leg wound    Neuropathy     Schizoaffective disorder ([facility] [address]     Thyroid disease        PAST SURGICAL HISTORY    Past Surgical History:   Procedure Laterality Date    COLONOSCOPY  2021    COLONOSCOPY POLYPECTOMY SNARE/COLD BIOPSY performed by Hay Lakhani MD at [facility] unsure of laterality     UPPER GASTROINTESTINAL ENDOSCOPY N/A 2021    EGD BIOPSY performed by Hay Lakhani MD at [facility] HISTORY    Family History   Problem Relation Age of Onset    Liver Disease Brother SOCIAL HISTORY    Social History     Tobacco Use    Smoking status: Former Smoker     Packs/day: 2.00     Years: 30.00     Pack years: 60.00     Types: Cigarettes     Quit date: 2021     Years since quittin.4    Smokeless tobacco: Never Used   Vaping Use    Vaping Use: Never used   Substance Use Topics    Alcohol use: No    Drug use: No       ALLERGIES    Allergies   Allergen Reactions    Tums [Calcium Carbonate Antacid] Nausea Only       MEDICATIONS    Current Outpatient Medications on File Prior to Encounter   Medication Sig Dispense Refill    potassium bicarbonate (K-LYTE) 25 MEQ disintegrating tablet Take 25 mEq by mouth 2 times daily      ferrous sulfate (IRON 325) 325 (65 Fe) MG tablet Take 1 tablet by mouth 2 times daily (with meals) 30 tablet 3    torsemide (DEMADEX) 20 MG tablet Take 1 tablet by mouth daily (Patient taking differently: Take 100 mg by mouth daily ) 30 tablet 1    montelukast (SINGULAIR) 10 MG tablet Take 10 mg by mouth nightly      pantoprazole (PROTONIX) 40 MG tablet Take 1 tablet by mouth daily 90 tablet 1    clonazePAM (KLONOPIN) 0.5 MG tablet Take 0.5 mg by mouth daily as needed.  buPROPion (WELLBUTRIN XL) 300 MG extended release tablet Take 300 mg by mouth every morning      levothyroxine (SYNTHROID) 125 MCG tablet Take 125 mcg by mouth Daily      ARIPiprazole (ABILIFY) 10 MG tablet Take 10 mg by mouth daily      DULoxetine (CYMBALTA) 30 MG extended release capsule Take 30 mg by mouth daily       fluticasone (FLOVENT HFA) 220 MCG/ACT inhaler Inhale 1 puff into the lungs 2 times daily      gabapentin (NEURONTIN) 300 MG capsule TAKE ONE CAPSULE BY MOUTH THREE TIMES A DAY      traMADol (ULTRAM) 50 MG tablet Take 50 mg by mouth every 6 hours as needed for Pain.        albuterol (PROVENTIL HFA;VENTOLIN HFA) 108 (90 BASE) MCG/ACT inhaler Inhale 2 puffs into the lungs every 6 hours as needed for Wheezing      metoprolol (LOPRESSOR) 25 MG tablet Take 25 mg by mouth 2 times daily      nicotine polacrilex (COMMIT) 4 MG lozenge Take 4 mg by mouth as needed for Smoking cessation      mupirocin (BACTROBAN) 2 % ointment Apply topically 3 times daily Apply topically 3 times daily.  simvastatin (ZOCOR) 40 MG tablet Take 40 mg by mouth nightly      spironolactone (ALDACTONE) 25 MG tablet Take 25 mg by mouth daily      Diclofenac Sodium 3 % GEL Diclofenac Topical Gel 1 %    PRN    Active      diphenhydrAMINE (BENADRYL ALLERGY) 25 MG tablet Take 25 mg by mouth every 6 hours as needed for Itching      nitroGLYCERIN (NITROSTAT) 0.4 MG SL tablet Place 0.4 mg under the tongue      loperamide (IMODIUM) 2 MG capsule TAKE ONE CAPSULE BY MOUTH THREE TIMES A DAY AS NEEDED FOR DIARRHEA      ascorbic acid (VITAMIN C) 500 MG tablet Take 500 mg by mouth daily      vitamin D (CHOLECALCIFEROL) 5000 UNITS CAPS capsule Take 5,000 Units by mouth daily       Multiple Vitamins-Minerals (THERAPEUTIC MULTIVITAMIN-MINERALS) tablet Take 1 tablet by mouth daily      vitamin E 1000 UNITS capsule Take 1,000 Units by mouth daily       No current facility-administered medications on file prior to encounter. REVIEW OF SYSTEMS  Review of Systems    Pertinent items are noted in HPI. Review of Systems: A 12 point review of symptoms is unremarkable with the exception of the chief complaint. Patient specifically denies nausea, fever, vomiting, chills, shortness of breath, chest pain, abdominal pain, constipation or difficulty urinating. Objective:      BP (!) 144/103   Pulse 112   Temp 97.1 °F (36.2 °C) (Temporal)   Resp 16     Wt Readings from Last 3 Encounters:   05/06/21 268 lb (121.6 kg)   04/26/21 280 lb (127 kg)   04/06/21 268 lb 11.9 oz (121.9 kg)       PHYSICAL EXAM  Physical Exam    DP/PT pulses palpable bilaterally. CFT brisk to all digits. Digits are pink and warm to the touch. Hair growth is normal in appearance. edema noted bilaterally.  No calf pain with palpation noted. Patient has multiple varicose veins noted on her bilateral lower extremities. Epicritic sensation is grossly intact bilaterally. Negative clonus and babinski reflex is down going. Ulcer noted on the anterior aspect of the left lower leg. Wound has fibrotic and nonviable tissue that extends down through and includes the subcutaneous tissue. After debridement the wound has a granular base. There is no surrounding erythema, edema, warmth or malodor noted. The wound does not probe or track to bone. Patient's muscle strength for dorsiflexion plantarflexion inversion and eversion is intact and symmetrical bilaterally. Assessment:        Problem List Items Addressed This Visit     Varicose veins of left lower extremity with inflammation, with ulcer of calf with fat layer exposed ([address] - Primary    Relevant Medications    lidocaine (XYLOCAINE) 4 % external solution    Other Relevant Orders    Initiate Outpatient Wound Care Protocol           Procedure Note  Indications:  Based on my examination of this patient's wound(s)/ulcer(s) today, debridement is required to promote healing and evaluate the wound base. Performed by: Ayde Pierre DPM    Consent obtained:  Yes    Time out taken:  Yes    Pain Control: Anesthetic  Anesthetic: 4% Lidocaine Liquid Topical       Debridement: Excisional Debridement    Using curette the wound(s)/ulcer(s) was/were debrided down through and including the removal of epidermis, dermis and subcutaneous tissue. Devitalized Tissue Debrided:  fibrin and slough    Pre Debridement Measurements:  Are located in the [facility]  Documentation Flow Sheet    Diabetic/Pressure/Non Pressure Ulcers only:  Ulcer: Non-Pressure ulcer, fat layer exposed     Wound/Ulcer #: 1    Post Debridement Measurements:  Wound/Ulcer Descriptions are Pre Debridement except measurements:    Wound 03/14/21 Leg Left; Lower #1 (Active)   Wound Image   05/17/21 0997   Wound Cleansed Cleansed with saline [address] [facility] [id number] [phone]  NAME:  Brannon Caal  YOB: 1960  MEDICAL RECORD NUMBER:  [id number]  DATE:  6/8/2021    Home Health Care:  [facility]: phone: [phone], fax [phone]     Medically necessary services:        [x]? Skilled Nursing       []? PT   []? OT  []? Social Work   []? Dietician     []? Other:     Wound Cleansing:   [x]?0.9% Saline             []?Vashe (Soak piece of gauze and place on wound bed for 5 minutes)         []? Other:     Kyra Wound Topical Treatments:  []? moisturizing lotion  []? antifungal ointment            []? No-Sting barrier film          []? zinc paste  []? Other:                Dressings:                  Wound Location: left lower leg                [x]? Apply Primary Dressing to wound:                                             Collagen      [x]? Cover and Secure with:                Cover and Secure with: Other mepilex border or gauze and paper tape              Avoid contact of tape with skin if possible.     [x]? Change dressing:            []? Daily (until the following date:____)          []? Every Other Day       []? Once a week          []? Do Not Change Dressing  []? Other:  [x]? Three times per week:       [x]? Monday, Wednesday, Friday        []? Tuesday, Thursday, Saturday                Edema Control:  Apply:  [x]? Compression Stocking- 20-30mmHG        [x]? Left Lower Leg      [x]? Right Lower Leg                Apply every morning immediately when getting up. They should be applied to affected leg(s) from mid foot to knee making sure to cover the heel. Remove every night before going to bed.                  [x]? Elevate leg(s) above the level of the heart for 30 minutes 4-5 times a day and/or when sitting. [x]? Avoid prolonged standing in one place.         Dietary:  Important dietary reminders:  1. Increase Protein intake (i.e. Lean meats, fish, eggs, legumes, and yogurt)  2. No added salt  3.  If diabetic, follow a diabetic diet and check glucose prior to meals or as instructed by your physician.        If you are still having pain after you go home:  · For wounds on lower legs or arms, elevate the affected limb. · Use over-the-counter medications you would normally use for pain as permitted by your primary care doctor. · For persistent pain not relieved by the above interventions, please call your primary care doctor. Return Appointment:   Return Appointment: With Dr. Priscella Pallas  in  [address] [place] [address])  o Schedule weekly until (Date):        [x] Orders placed during your visit:   Orders Placed This Encounter   Procedures    Initiate Outpatient Wound Care Protocol       Your nurse  is:  Segundo Ramírez     Electronically signed by Patria Moncada RN on 6/8/2021 at [address] Information: Should you experience any significant changes in your wound(s) or have questions about your wound care, please contact the [address] [place] [address] at [phone]. Our hours vary so please leave a message. Please give us 24-48 hours to return your call. If you need help with your wounds and cannot wait until we are available, contact your PCP or go to the hospital emergency room. Call your doctor now or seek immediate medical care if:    · You have symptoms of infection, such as:  ? Increased pain, swelling, warmth, or redness. ? Red streaks leading from the area. ? Pus draining from the area. ? A fever.        [facility] Physician for this visit and orders: KIRILL [facility] 6/8/2021    [] Patient unable to sign Discharge Instructions given to ECF/Transportation/POA          Electronically signed by Vanesa Zhong DPM on 6/8/2021 at 4:14 PM

## 2021-06-15 ENCOUNTER — HOSPITAL ENCOUNTER (OUTPATIENT)
Dept: WOUND CARE | Age: 61
Discharge: HOME | End: 2021-06-15
Payer: MEDICAID

## 2021-06-15 VITALS
RESPIRATION RATE: 16 BRPM | DIASTOLIC BLOOD PRESSURE: 92 MMHG | HEART RATE: 94 BPM | TEMPERATURE: 97.4 F | SYSTOLIC BLOOD PRESSURE: 127 MMHG

## 2021-06-15 DIAGNOSIS — I83.222 VARICOSE VEINS OF LEFT LOWER EXTREMITY WITH INFLAMMATION, WITH ULCER OF CALF WITH FAT LAYER EXPOSED (HCC): Primary | ICD-10-CM

## 2021-06-15 DIAGNOSIS — L97.222 VARICOSE VEINS OF LEFT LOWER EXTREMITY WITH INFLAMMATION, WITH ULCER OF CALF WITH FAT LAYER EXPOSED (HCC): Primary | ICD-10-CM

## 2021-06-15 PROCEDURE — 6370000000 HC RX 637 (ALT 250 FOR IP): Performed by: PODIATRIST

## 2021-06-15 PROCEDURE — 99212 OFFICE O/P EST SF 10 MIN: CPT

## 2021-06-15 RX ORDER — LIDOCAINE HYDROCHLORIDE 20 MG/ML
JELLY TOPICAL ONCE
Status: CANCELLED | OUTPATIENT
Start: 2021-06-15 | End: 2021-06-15

## 2021-06-15 RX ORDER — BACITRACIN, NEOMYCIN, POLYMYXIN B 400; 3.5; 5 [USP'U]/G; MG/G; [USP'U]/G
OINTMENT TOPICAL ONCE
Status: CANCELLED | OUTPATIENT
Start: 2021-06-15 | End: 2021-06-15

## 2021-06-15 RX ORDER — BETAMETHASONE DIPROPIONATE 0.05 %
OINTMENT (GRAM) TOPICAL ONCE
Status: CANCELLED | OUTPATIENT
Start: 2021-06-15 | End: 2021-06-15

## 2021-06-15 RX ORDER — LIDOCAINE 50 MG/G
OINTMENT TOPICAL ONCE
Status: CANCELLED | OUTPATIENT
Start: 2021-06-15 | End: 2021-06-15

## 2021-06-15 RX ORDER — GENTAMICIN SULFATE 1 MG/G
OINTMENT TOPICAL ONCE
Status: CANCELLED | OUTPATIENT
Start: 2021-06-15 | End: 2021-06-15

## 2021-06-15 RX ORDER — BACITRACIN ZINC AND POLYMYXIN B SULFATE 500; 1000 [USP'U]/G; [USP'U]/G
OINTMENT TOPICAL ONCE
Status: CANCELLED | OUTPATIENT
Start: 2021-06-15 | End: 2021-06-15

## 2021-06-15 RX ORDER — LIDOCAINE HYDROCHLORIDE 40 MG/ML
SOLUTION TOPICAL ONCE
Status: CANCELLED | OUTPATIENT
Start: 2021-06-15 | End: 2021-06-15

## 2021-06-15 RX ORDER — LIDOCAINE 40 MG/G
CREAM TOPICAL ONCE
Status: CANCELLED | OUTPATIENT
Start: 2021-06-15 | End: 2021-06-15

## 2021-06-15 RX ORDER — LIDOCAINE HYDROCHLORIDE 40 MG/ML
SOLUTION TOPICAL ONCE
Status: COMPLETED | OUTPATIENT
Start: 2021-06-15 | End: 2021-06-15

## 2021-06-15 RX ORDER — GINSENG 100 MG
CAPSULE ORAL ONCE
Status: CANCELLED | OUTPATIENT
Start: 2021-06-15 | End: 2021-06-15

## 2021-06-15 RX ORDER — CLOBETASOL PROPIONATE 0.5 MG/G
OINTMENT TOPICAL ONCE
Status: CANCELLED | OUTPATIENT
Start: 2021-06-15 | End: 2021-06-15

## 2021-06-15 RX ADMIN — LIDOCAINE HYDROCHLORIDE: 40 SOLUTION TOPICAL at 14:39

## 2021-06-15 NOTE — PROGRESS NOTES
[address] [facility] [place], [place]  Progress Note and Procedure Note      Lai Cui  MEDICAL RECORD NUMBER:  [id number]  AGE: 63 y.o. GENDER: female  : 1960  EPISODE DATE:  6/15/2021    Subjective:     Chief Complaint   Patient presents with    Wound Check     left lower leg         HISTORY of PRESENT ILLNESS HPI     Lai Cui is a 63 y.o. female who presents today for wound/ulcer evaluation. History of Wound Context: Follow up for LEFT anterior shin wound with VERY nice progression, granulation and decreased size.  Requests nail debridement  Wound/Ulcer Pain Timing/Severity: mild  Quality of pain: aching  Severity:  2 / 10   Modifying Factors: Pain worsens with debridement  Associated Signs/Symptoms: edema and pain    Ulcer Identification:  Ulcer Type: traumatic    Contributing Factors: edema, decreased mobility, shear force and obesity    Acute Wound: Abrasion        PAST MEDICAL HISTORY        Diagnosis Date    Anemia     Anxiety     Arthritis     Back pain     CAD (coronary artery disease)     Cancer (HCC)     CHF (congestive heart failure) (HCC)     Chronic kidney disease     Depression     Diabetes mellitus (HCC)     Esophageal reflux     Hep C w/o coma, chronic (HCC)     Hyperlipidemia     Hypertension     Leg abscess     MRSA (methicillin resistant staph aureus) culture positive 2021    left leg wound    Neuropathy     [facility]oaffective disorder ([facility] [address]     Thyroid disease        PAST SURGICAL HISTORY    Past Surgical History:   Procedure Laterality Date    COLONOSCOPY  2021    COLONOSCOPY POLYPECTOMY SNARE/COLD BIOPSY performed by Joselin Garrett MD at [facility]     pt unsure of laterality     UPPER GASTROINTESTINAL ENDOSCOPY N/A 2021    EGD BIOPSY performed by Joselin Garrett MD at [facility] HISTORY    Family History   Problem Relation Age of Onset    Liver Disease Brother SOCIAL HISTORY    Social History     Tobacco Use    Smoking status: Former Smoker     Packs/day: 2.00     Years: 30.00     Pack years: 60.00     Types: Cigarettes     Quit date: 2021     Years since quittin.4    Smokeless tobacco: Never Used   Vaping Use    Vaping Use: Never used   Substance Use Topics    Alcohol use: No    Drug use: No       ALLERGIES    Allergies   Allergen Reactions    Tums [Calcium Carbonate Antacid] Nausea Only       MEDICATIONS    Current Outpatient Medications on File Prior to Encounter   Medication Sig Dispense Refill    Diclofenac Sodium 3 % GEL Diclofenac Topical Gel 1 %    PRN    Active      potassium bicarbonate (K-LYTE) 25 MEQ disintegrating tablet Take 25 mEq by mouth 2 times daily      ferrous sulfate (IRON 325) 325 (65 Fe) MG tablet Take 1 tablet by mouth 2 times daily (with meals) 30 tablet 3    torsemide (DEMADEX) 20 MG tablet Take 1 tablet by mouth daily (Patient taking differently: Take 100 mg by mouth daily ) 30 tablet 1    montelukast (SINGULAIR) 10 MG tablet Take 10 mg by mouth nightly      pantoprazole (PROTONIX) 40 MG tablet Take 1 tablet by mouth daily 90 tablet 1    clonazePAM (KLONOPIN) 0.5 MG tablet Take 0.5 mg by mouth daily as needed.  buPROPion (WELLBUTRIN XL) 300 MG extended release tablet Take 300 mg by mouth every morning      levothyroxine (SYNTHROID) 125 MCG tablet Take 125 mcg by mouth Daily      ARIPiprazole (ABILIFY) 10 MG tablet Take 10 mg by mouth daily      DULoxetine (CYMBALTA) 30 MG extended release capsule Take 30 mg by mouth daily       fluticasone (FLOVENT HFA) 220 MCG/ACT inhaler Inhale 1 puff into the lungs 2 times daily      gabapentin (NEURONTIN) 300 MG capsule TAKE ONE CAPSULE BY MOUTH THREE TIMES A DAY      traMADol (ULTRAM) 50 MG tablet Take 50 mg by mouth every 6 hours as needed for Pain.        albuterol (PROVENTIL HFA;VENTOLIN HFA) 108 (90 BASE) MCG/ACT inhaler Inhale 2 puffs into the lungs every 6 hours as needed for Wheezing      ascorbic acid (VITAMIN C) 500 MG tablet Take 500 mg by mouth daily      vitamin D (CHOLECALCIFEROL) 5000 UNITS CAPS capsule Take 5,000 Units by mouth daily       metoprolol (LOPRESSOR) 25 MG tablet Take 25 mg by mouth 2 times daily      Multiple Vitamins-Minerals (THERAPEUTIC MULTIVITAMIN-MINERALS) tablet Take 1 tablet by mouth daily      nicotine polacrilex (COMMIT) 4 MG lozenge Take 4 mg by mouth as needed for Smoking cessation      mupirocin (BACTROBAN) 2 % ointment Apply topically 3 times daily Apply topically 3 times daily.  simvastatin (ZOCOR) 40 MG tablet Take 40 mg by mouth nightly      spironolactone (ALDACTONE) 25 MG tablet Take 25 mg by mouth daily      vitamin E 1000 UNITS capsule Take 1,000 Units by mouth daily      diphenhydrAMINE (BENADRYL ALLERGY) 25 MG tablet Take 25 mg by mouth every 6 hours as needed for Itching      nitroGLYCERIN (NITROSTAT) 0.4 MG SL tablet Place 0.4 mg under the tongue      loperamide (IMODIUM) 2 MG capsule TAKE ONE CAPSULE BY MOUTH THREE TIMES A DAY AS NEEDED FOR DIARRHEA       No current facility-administered medications on file prior to encounter. REVIEW OF SYSTEMS    Pertinent items are noted in HPI. Last Q2X if applicable:   Hemoglobin A1C   Date Value Ref Range Status   03/14/2021 5.4 See comment % Final     Comment:     Comment:  Diagnosis of Diabetes: > or = 6.5%  Increased risk of diabetes (Prediabetes): 5.7-6.4%  Glycemic Control: Nonpregnant Adults: <7.0%                    Pregnant: <6.0%           Objective:      BP (!) 127/92   Pulse 94   Temp 97.4 °F (36.3 °C) (Temporal)   Resp 16     Wt Readings from Last 3 Encounters:   05/06/21 268 lb (121.6 kg)   04/26/21 280 lb (127 kg)   04/06/21 268 lb 11.9 oz (121.9 kg)       PHYSICAL EXAM    General Appearance: alert and oriented to person, place and time, well-developed and well-nourished, in no acute distress      Assessment:      1.  Varicose veins of left lower [address] [place] [address] Albert Lacey  Telephone: [phone]    NAME:  Dunia Oliva  YOB: 1960  MEDICAL RECORD NUMBER:  [id number]  DATE:  6/15/2021      Congratulations! You have completed your treatment program!    To prevent Venous Wounds from recurring again:  · Elevate your legs at least parallel to the ground while sitting. · Wear your prescription support stockings everyday and remove at night while you sleep. · Replace your stockings every 3-6 months so that your legs continue to get the support they need. · Inspect your legs and feet daily and report any increased swelling, unusual redness or new wounds to your physician. · Wash your legs and feet regularly with mild soap and water and moisturize daily. · Continue to use compression pumps if prescribed by your physician. · Avoid overeating. Extra weight adds pressure on the veins in your legs. · Limit salty foods as they promote swelling or fluid retention in your legs. Additional instructions for healed wound/skin care:     Call the [address] [place] [address] Road if your wound reopens, if new wounds occur, or if you have any other questions about your follow up care [phone].      [] Patient unable to sign Discharge Instructions given to ECF/Transportation/POA    Electronically signed by Jacob Hodgson RN on 6/15/2021 at 3:26 PM              Electronically signed by Jannie Martinez DPM on 6/15/2021 at 3:29 PM

## 2021-06-15 NOTE — PLAN OF CARE
Problem: Wound:  Goal: Will show signs of wound healing; wound closure and no evidence of infection  Description: Will show signs of wound healing; wound closure and no evidence of infection  Outcome: Completed     Problem: Venous:  Goal: Signs of wound healing will improve  Description: Signs of wound healing will improve  Outcome: Completed     Problem: Compression therapy:  Goal: Will be free from complications associated with compression therapy  Description: Will be free from complications associated with compression therapy  Outcome: Completed

## 2021-06-16 ENCOUNTER — OFFICE VISIT (OUTPATIENT)
Dept: PRIMARY CARE CLINIC | Age: 61
End: 2021-06-16
Payer: MEDICAID

## 2021-06-16 DIAGNOSIS — Z01.818 PRE-OP EXAMINATION: Primary | ICD-10-CM

## 2021-06-16 PROCEDURE — 99211 OFF/OP EST MAY X REQ PHY/QHP: CPT | Performed by: NURSE PRACTITIONER

## 2021-06-17 LAB — SARS-COV-2: NOT DETECTED

## 2021-06-18 ENCOUNTER — ANESTHESIA EVENT (OUTPATIENT)
Dept: ENDOSCOPY | Age: 61
End: 2021-06-18
Payer: MEDICAID

## 2021-06-21 ENCOUNTER — HOSPITAL ENCOUNTER (OUTPATIENT)
Age: 61
Setting detail: OUTPATIENT SURGERY
Discharge: HOME | End: 2021-06-21
Attending: INTERNAL MEDICINE | Admitting: INTERNAL MEDICINE
Payer: MEDICAID

## 2021-06-21 ENCOUNTER — ANESTHESIA (OUTPATIENT)
Dept: ENDOSCOPY | Age: 61
End: 2021-06-21
Payer: MEDICAID

## 2021-06-21 VITALS — SYSTOLIC BLOOD PRESSURE: 222 MMHG | DIASTOLIC BLOOD PRESSURE: 182 MMHG | OXYGEN SATURATION: 95 %

## 2021-06-21 VITALS
SYSTOLIC BLOOD PRESSURE: 146 MMHG | OXYGEN SATURATION: 95 % | WEIGHT: 262 LBS | DIASTOLIC BLOOD PRESSURE: 82 MMHG | TEMPERATURE: 97.2 F | HEIGHT: 64 IN | BODY MASS INDEX: 44.73 KG/M2 | RESPIRATION RATE: 16 BRPM | HEART RATE: 87 BPM

## 2021-06-21 LAB
GLUCOSE BLD-MCNC: 98 MG/DL (ref 70–99)
PERFORMED ON: NORMAL

## 2021-06-21 PROCEDURE — 6360000002 HC RX W HCPCS: Performed by: ANESTHESIOLOGY

## 2021-06-21 PROCEDURE — 7100000011 HC PHASE II RECOVERY - ADDTL 15 MIN: Performed by: INTERNAL MEDICINE

## 2021-06-21 PROCEDURE — 6360000002 HC RX W HCPCS: Performed by: NURSE ANESTHETIST, CERTIFIED REGISTERED

## 2021-06-21 PROCEDURE — 3700000001 HC ADD 15 MINUTES (ANESTHESIA): Performed by: INTERNAL MEDICINE

## 2021-06-21 PROCEDURE — 3700000000 HC ANESTHESIA ATTENDED CARE: Performed by: INTERNAL MEDICINE

## 2021-06-21 PROCEDURE — 2709999900 HC NON-CHARGEABLE SUPPLY: Performed by: INTERNAL MEDICINE

## 2021-06-21 PROCEDURE — 3609017100 HC EGD: Performed by: INTERNAL MEDICINE

## 2021-06-21 PROCEDURE — 7100000010 HC PHASE II RECOVERY - FIRST 15 MIN: Performed by: INTERNAL MEDICINE

## 2021-06-21 PROCEDURE — 94640 AIRWAY INHALATION TREATMENT: CPT

## 2021-06-21 PROCEDURE — 2500000003 HC RX 250 WO HCPCS: Performed by: NURSE ANESTHETIST, CERTIFIED REGISTERED

## 2021-06-21 PROCEDURE — 2580000003 HC RX 258: Performed by: ANESTHESIOLOGY

## 2021-06-21 RX ORDER — OXYCODONE HYDROCHLORIDE 5 MG/1
10 TABLET ORAL PRN
Status: DISCONTINUED | OUTPATIENT
Start: 2021-06-21 | End: 2021-06-21 | Stop reason: HOSPADM

## 2021-06-21 RX ORDER — OXYCODONE HYDROCHLORIDE 5 MG/1
5 TABLET ORAL PRN
Status: DISCONTINUED | OUTPATIENT
Start: 2021-06-21 | End: 2021-06-21 | Stop reason: HOSPADM

## 2021-06-21 RX ORDER — DIPHENHYDRAMINE HYDROCHLORIDE 50 MG/ML
12.5 INJECTION INTRAMUSCULAR; INTRAVENOUS
Status: DISCONTINUED | OUTPATIENT
Start: 2021-06-21 | End: 2021-06-21 | Stop reason: HOSPADM

## 2021-06-21 RX ORDER — SODIUM CHLORIDE, SODIUM LACTATE, POTASSIUM CHLORIDE, CALCIUM CHLORIDE 600; 310; 30; 20 MG/100ML; MG/100ML; MG/100ML; MG/100ML
INJECTION, SOLUTION INTRAVENOUS CONTINUOUS
Status: DISCONTINUED | OUTPATIENT
Start: 2021-06-21 | End: 2021-06-21 | Stop reason: HOSPADM

## 2021-06-21 RX ORDER — SODIUM CHLORIDE 9 MG/ML
25 INJECTION, SOLUTION INTRAVENOUS PRN
Status: DISCONTINUED | OUTPATIENT
Start: 2021-06-21 | End: 2021-06-21 | Stop reason: HOSPADM

## 2021-06-21 RX ORDER — METOCLOPRAMIDE HYDROCHLORIDE 5 MG/ML
10 INJECTION INTRAMUSCULAR; INTRAVENOUS
Status: DISCONTINUED | OUTPATIENT
Start: 2021-06-21 | End: 2021-06-21 | Stop reason: HOSPADM

## 2021-06-21 RX ORDER — PROPOFOL 10 MG/ML
INJECTION, EMULSION INTRAVENOUS PRN
Status: DISCONTINUED | OUTPATIENT
Start: 2021-06-21 | End: 2021-06-21 | Stop reason: SDUPTHER

## 2021-06-21 RX ORDER — ESMOLOL HYDROCHLORIDE 10 MG/ML
INJECTION INTRAVENOUS PRN
Status: DISCONTINUED | OUTPATIENT
Start: 2021-06-21 | End: 2021-06-21 | Stop reason: SDUPTHER

## 2021-06-21 RX ORDER — LIDOCAINE HYDROCHLORIDE 20 MG/ML
INJECTION, SOLUTION INTRAVENOUS PRN
Status: DISCONTINUED | OUTPATIENT
Start: 2021-06-21 | End: 2021-06-21 | Stop reason: SDUPTHER

## 2021-06-21 RX ORDER — PROMETHAZINE HYDROCHLORIDE 25 MG/ML
6.25 INJECTION, SOLUTION INTRAMUSCULAR; INTRAVENOUS
Status: DISCONTINUED | OUTPATIENT
Start: 2021-06-21 | End: 2021-06-21 | Stop reason: HOSPADM

## 2021-06-21 RX ORDER — MORPHINE SULFATE 4 MG/ML
1 INJECTION, SOLUTION INTRAMUSCULAR; INTRAVENOUS EVERY 5 MIN PRN
Status: DISCONTINUED | OUTPATIENT
Start: 2021-06-21 | End: 2021-06-21 | Stop reason: HOSPADM

## 2021-06-21 RX ORDER — ALBUTEROL SULFATE 2.5 MG/3ML
2.5 SOLUTION RESPIRATORY (INHALATION) ONCE
Status: COMPLETED | OUTPATIENT
Start: 2021-06-21 | End: 2021-06-21

## 2021-06-21 RX ORDER — LIDOCAINE HYDROCHLORIDE 10 MG/ML
1 INJECTION, SOLUTION EPIDURAL; INFILTRATION; INTRACAUDAL; PERINEURAL
Status: DISCONTINUED | OUTPATIENT
Start: 2021-06-21 | End: 2021-06-21 | Stop reason: HOSPADM

## 2021-06-21 RX ORDER — SODIUM CHLORIDE 0.9 % (FLUSH) 0.9 %
5-40 SYRINGE (ML) INJECTION PRN
Status: DISCONTINUED | OUTPATIENT
Start: 2021-06-21 | End: 2021-06-21 | Stop reason: HOSPADM

## 2021-06-21 RX ORDER — HYDRALAZINE HYDROCHLORIDE 20 MG/ML
5 INJECTION INTRAMUSCULAR; INTRAVENOUS EVERY 10 MIN PRN
Status: DISCONTINUED | OUTPATIENT
Start: 2021-06-21 | End: 2021-06-21 | Stop reason: HOSPADM

## 2021-06-21 RX ORDER — MEPERIDINE HYDROCHLORIDE 25 MG/ML
12.5 INJECTION INTRAMUSCULAR; INTRAVENOUS; SUBCUTANEOUS EVERY 5 MIN PRN
Status: DISCONTINUED | OUTPATIENT
Start: 2021-06-21 | End: 2021-06-21 | Stop reason: HOSPADM

## 2021-06-21 RX ORDER — SODIUM CHLORIDE 0.9 % (FLUSH) 0.9 %
5-40 SYRINGE (ML) INJECTION EVERY 12 HOURS SCHEDULED
Status: DISCONTINUED | OUTPATIENT
Start: 2021-06-21 | End: 2021-06-21 | Stop reason: HOSPADM

## 2021-06-21 RX ORDER — LABETALOL HYDROCHLORIDE 5 MG/ML
5 INJECTION, SOLUTION INTRAVENOUS EVERY 10 MIN PRN
Status: DISCONTINUED | OUTPATIENT
Start: 2021-06-21 | End: 2021-06-21 | Stop reason: HOSPADM

## 2021-06-21 RX ADMIN — PROPOFOL 50 MG: 10 INJECTION, EMULSION INTRAVENOUS at 12:22

## 2021-06-21 RX ADMIN — PROPOFOL 50 MG: 10 INJECTION, EMULSION INTRAVENOUS at 12:26

## 2021-06-21 RX ADMIN — LIDOCAINE HYDROCHLORIDE 100 MG: 20 INJECTION, SOLUTION INTRAVENOUS at 12:22

## 2021-06-21 RX ADMIN — PROPOFOL 50 MG: 10 INJECTION, EMULSION INTRAVENOUS at 12:23

## 2021-06-21 RX ADMIN — SODIUM CHLORIDE 25 ML: 9 INJECTION, SOLUTION INTRAVENOUS at 11:57

## 2021-06-21 RX ADMIN — ESMOLOL HYDROCHLORIDE 30 MG: 10 INJECTION, SOLUTION INTRAVENOUS at 12:29

## 2021-06-21 RX ADMIN — ALBUTEROL SULFATE 2.5 MG: 2.5 SOLUTION RESPIRATORY (INHALATION) at 11:48

## 2021-06-21 RX ADMIN — PROPOFOL 50 MG: 10 INJECTION, EMULSION INTRAVENOUS at 12:24

## 2021-06-21 ASSESSMENT — PULMONARY FUNCTION TESTS
PIF_VALUE: 0
PIF_VALUE: 1
PIF_VALUE: 1
PIF_VALUE: 0
PIF_VALUE: 1
PIF_VALUE: 0

## 2021-06-21 ASSESSMENT — PAIN - FUNCTIONAL ASSESSMENT
PAIN_FUNCTIONAL_ASSESSMENT: 0-10

## 2021-06-21 NOTE — ANESTHESIA PRE PROCEDURE
Department of Anesthesiology  Preprocedure Note       Name:  Anish Adamson   Age:  63 y.o.  :  1960                                          MRN:  [id number]         Date:  2021      Surgeon: Brady Sherwood):  Gema Dowling MD    Procedure: Procedure(s):  ESOPHAGOGASTRODUODENOSCOPY    Medications prior to admission:   Prior to Admission medications    Medication Sig Start Date End Date Taking? Authorizing Provider   Diclofenac Sodium 3 % GEL Diclofenac Topical Gel 1 %    PRN    Active   Yes Historical Provider, MD   potassium bicarbonate (K-LYTE) 25 MEQ disintegrating tablet Take 25 mEq by mouth 2 times daily   Yes Historical Provider, MD   ferrous sulfate (IRON 325) 325 (65 Fe) MG tablet Take 1 tablet by mouth 2 times daily (with meals) 3/28/21  Yes Mert Hines MD   torsemide (DEMADEX) 20 MG tablet Take 1 tablet by mouth daily  Patient taking differently: Take 100 mg by mouth daily  3/28/21  Yes Mert Hines MD   montelukast (SINGULAIR) 10 MG tablet Take 10 mg by mouth nightly   Yes Historical Provider, MD   pantoprazole (PROTONIX) 40 MG tablet Take 1 tablet by mouth daily 3/16/21  Yes Pallavi Subramanian MD   clonazePAM (KLONOPIN) 0.5 MG tablet Take 0.5 mg by mouth daily as needed.    Yes Historical Provider, MD   buPROPion (WELLBUTRIN XL) 300 MG extended release tablet Take 300 mg by mouth every morning   Yes Historical Provider, MD   levothyroxine (SYNTHROID) 125 MCG tablet Take 125 mcg by mouth Daily   Yes Historical Provider, MD   ARIPiprazole (ABILIFY) 10 MG tablet Take 10 mg by mouth daily   Yes Historical Provider, MD   diphenhydrAMINE (BENADRYL ALLERGY) 25 MG tablet Take 25 mg by mouth every 6 hours as needed for Itching   Yes Historical Provider, MD   DULoxetine (CYMBALTA) 30 MG extended release capsule Take 30 mg by mouth daily    Yes Historical Provider, MD   fluticasone (FLOVENT HFA) 220 MCG/ACT inhaler Inhale 1 puff into the lungs 2 times daily   Yes Historical Provider, MD gabapentin (NEURONTIN) 300 MG capsule TAKE ONE CAPSULE BY MOUTH THREE TIMES A DAY 7/5/19  Yes Historical Provider, MD   loperamide (IMODIUM) 2 MG capsule TAKE ONE CAPSULE BY MOUTH THREE TIMES A DAY AS NEEDED FOR DIARRHEA 4/11/19  Yes Historical Provider, MD   traMADol (ULTRAM) 50 MG tablet Take 50 mg by mouth every 6 hours as needed for Pain. 12/30/19  Yes Historical Provider, MD   albuterol (PROVENTIL HFA;VENTOLIN HFA) 108 (90 BASE) MCG/ACT inhaler Inhale 2 puffs into the lungs every 6 hours as needed for Wheezing   Yes Historical Provider, MD   ascorbic acid (VITAMIN C) 500 MG tablet Take 500 mg by mouth daily   Yes Historical Provider, MD   vitamin D (CHOLECALCIFEROL) 5000 UNITS CAPS capsule Take 5,000 Units by mouth daily    Yes Historical Provider, MD   metoprolol (LOPRESSOR) 25 MG tablet Take 25 mg by mouth 2 times daily   Yes Historical Provider, MD   Multiple Vitamins-Minerals (THERAPEUTIC MULTIVITAMIN-MINERALS) tablet Take 1 tablet by mouth daily   Yes Historical Provider, MD   nicotine polacrilex (COMMIT) 4 MG lozenge Take 4 mg by mouth as needed for Smoking cessation   Yes Historical Provider, MD   mupirocin (BACTROBAN) 2 % ointment Apply topically 3 times daily Apply topically 3 times daily.    Yes Historical Provider, MD   simvastatin (ZOCOR) 40 MG tablet Take 40 mg by mouth nightly   Yes Historical Provider, MD   spironolactone (ALDACTONE) 25 MG tablet Take 25 mg by mouth daily   Yes Historical Provider, MD   vitamin E 1000 UNITS capsule Take 1,000 Units by mouth daily   Yes Historical Provider, MD   nitroGLYCERIN (NITROSTAT) 0.4 MG SL tablet Place 0.4 mg under the tongue 8/16/18   Historical Provider, MD       Current medications:    Current Facility-Administered Medications   Medication Dose Route Frequency Provider Last Rate Last Admin    lactated ringers infusion   Intravenous Continuous Los Dutta MD        sodium chloride flush 0.9 % injection 5-40 mL  5-40 mL Intravenous 2 times per day Maylin Page MD        sodium chloride flush 0.9 % injection 5-40 mL  5-40 mL Intravenous PRN Maylin Page MD        0.9 % sodium chloride infusion  25 mL Intravenous PRN Maylin Page  mL/hr at 06/21/21 1157 25 mL at 06/21/21 1157    lidocaine PF 1 % injection 1 mL  1 mL Intradermal Once PRN Maylin Page MD        HYDROmorphone (DILAUDID) injection 0.25 mg  0.25 mg Intravenous Q5 Min PRN Maylin Page MD        HYDROmorphone (DILAUDID) injection 0.5 mg  0.5 mg Intravenous Q5 Min PRN Maylin Page MD        morphine injection 1 mg  1 mg Intravenous Q5 Min PRN Maylin Page MD        HYDROmorphone (DILAUDID) injection 0.5 mg  0.5 mg Intravenous Q5 Min PRN Maylin Page MD        oxyCODONE (ROXICODONE) immediate release tablet 5 mg  5 mg Oral PRN Maylin Page MD        Or    oxyCODONE (ROXICODONE) immediate release tablet 10 mg  10 mg Oral PRN Maylin Page MD        diphenhydrAMINE (BENADRYL) injection 12.5 mg  12.5 mg Intravenous Once PRN Maylin Page MD        metoclopramide (REGLAN) injection 10 mg  10 mg Intravenous Once PRN Maylin Page MD        promethazine (PHENERGAN) injection 6.25 mg  6.25 mg Intravenous Once PRN Maylin Page MD        labetalol (NORMODYNE;TRANDATE) injection 5 mg  5 mg Intravenous Q10 Min PRN Maylin Page MD        hydrALAZINE (APRESOLINE) injection 5 mg  5 mg Intravenous Q10 Min PRN Maylin Page MD        meperidine (DEMEROL) injection 12.5 mg  12.5 mg Intravenous Q5 Min PRN Maylin Page MD           Allergies:     Allergies   Allergen Reactions    Tums [Calcium Carbonate Antacid] Nausea Only       Problem List:    Patient Active Problem List   Diagnosis Code    Anemia D64.9    Cellulitis L03.90    Varicose veins of left lower extremity with inflammation, with ulcer of calf with fat layer exposed ([facility] I83.222, L97.222    Anatomical narrow angle of both eyes H40.033    Asthma J45.909    Benign neoplasm of Social History     Tobacco Use    Smoking status: Former Smoker     Packs/day: 2.00     Years: 30.00     Pack years: 60.00     Types: Cigarettes     Quit date: 2021     Years since quittin.4    Smokeless tobacco: Never Used   Substance Use Topics    Alcohol use: No                                Counseling given: Not Answered      Vital Signs (Current):   Vitals:    21 1139   BP: 139/83   Pulse: 82   Resp: 20   Temp: 97.4 °F (36.3 °C)   TempSrc: Temporal   SpO2: 92%   Weight: 262 lb (118.8 kg)   Height: 5' 4\" (1.626 m)                                              BP Readings from Last 3 Encounters:   21 139/83   06/15/21 (!) 127/92   21 (!) 144/103       NPO Status: Time of last liquid consumption:                         Time of last solid consumption:                         Date of last liquid consumption: 21                        Date of last solid food consumption: 21    BMI:   Wt Readings from Last 3 Encounters:   21 262 lb (118.8 kg)   21 268 lb (121.6 kg)   21 280 lb (127 kg)     Body mass index is 44.97 kg/m². CBC:   Lab Results   Component Value Date    WBC 6.6 2021    RBC 3.79 2021    HGB 9.8 2021    HCT 30.9 2021    MCV 81.4 2021    RDW 17.2 2021     2021       CMP:   Lab Results   Component Value Date     2021    K 3.8 2021    CL 99 2021    CO2 30 2021    BUN 15 2021    CREATININE 1.2 2021    GFRAA 55 2021    AGRATIO 1.2 2021    LABGLOM 46 2021    GLUCOSE 132 2021    PROT 7.7 2021    CALCIUM 9.4 2021    BILITOT <0.2 2021    ALKPHOS 148 2021    AST 27 2021    ALT 33 2021       POC Tests: No results for input(s): POCGLU, POCNA, POCK, POCCL, POCBUN, POCHEMO, POCHCT in the last 72 hours.     Coags:   Lab Results   Component Value Date    PROTIME 14.3 01/10/2020    INR 1.23 01/10/2020 APTT 26.7 01/10/2020       HCG (If Applicable): No results found for: PREGTESTUR, PREGSERUM, HCG, HCGQUANT     ABGs: No results found for: PHART, PO2ART, EXF5IKR, KOP7UKZ, BEART, J8NKDAVI     Type & Screen (If Applicable):  No results found for: LABABO, LABRH    Drug/Infectious Status (If Applicable):  No results found for: HIV, HEPCAB    COVID-19 Screening (If Applicable):   Lab Results   Component Value Date    COVID19 Not Detected 06/16/2021           Anesthesia Evaluation  Patient summary reviewed and Nursing notes reviewed no history of anesthetic complications:   Airway: Mallampati: II  TM distance: >3 FB   Neck ROM: full  Mouth opening: > = 3 FB Dental:          Pulmonary:   (+) asthma:                            Cardiovascular:    (+) hypertension:, CAD:, CHF:,                   Neuro/Psych:   (+) psychiatric history:            GI/Hepatic/Renal:   (+) GERD:, hepatitis:, liver disease:,           Endo/Other:    (+) DiabetesType II DM, , hypothyroidism::., .                 Abdominal:           Vascular:                                        Anesthesia Plan      general     ASA 1    (58-year-old female presents for esophagogastroduodenoscopy. Plan general anesthesia with ASA standard monitors. Questions answered. Patient agreeable with anesthetic plan.  )  Induction: intravenous. Anesthetic plan and risks discussed with patient. Plan discussed with CRNA.     Attending anesthesiologist reviewed and agrees with Pre Eval content        Pineda Montague MD   6/21/2021

## 2021-06-21 NOTE — H&P
History and Physical / Pre-Sedation Assessment    Destiny Traylor is a 63 y.o. female who presents today for EGD procedure. PMHx:    Past Medical History:   Diagnosis Date    Anemia     Anxiety     Arthritis     Back pain     CAD (coronary artery disease)     Cancer ([facility]     CHF (congestive heart failure) (HCC)     Chronic kidney disease     Depression     Diabetes mellitus (HCC)     Esophageal reflux     Hep C w/o coma, chronic (HCC)     Hyperlipidemia     Hypertension     Leg abscess     MRSA (methicillin resistant staph aureus) culture positive 03/26/2021    left leg wound    Neuropathy     Schizoaffective disorder ([facility]     Thyroid disease        Medications:    Prior to Admission medications    Medication Sig Start Date End Date Taking? Authorizing Provider   Diclofenac Sodium 3 % GEL Diclofenac Topical Gel 1 %    PRN    Active   Yes Historical Provider, MD   potassium bicarbonate (K-LYTE) 25 MEQ disintegrating tablet Take 25 mEq by mouth 2 times daily   Yes Historical Provider, MD   ferrous sulfate (IRON 325) 325 (65 Fe) MG tablet Take 1 tablet by mouth 2 times daily (with meals) 3/28/21  Yes Gudelia Sadler MD   torsemide (DEMADEX) 20 MG tablet Take 1 tablet by mouth daily  Patient taking differently: Take 100 mg by mouth daily  3/28/21  Yes Gudelia Sadler MD   montelukast (SINGULAIR) 10 MG tablet Take 10 mg by mouth nightly   Yes Historical Provider, MD   pantoprazole (PROTONIX) 40 MG tablet Take 1 tablet by mouth daily 3/16/21  Yes Radha Sheldon MD   clonazePAM (KLONOPIN) 0.5 MG tablet Take 0.5 mg by mouth daily as needed.    Yes Historical Provider, MD   buPROPion (WELLBUTRIN XL) 300 MG extended release tablet Take 300 mg by mouth every morning   Yes Historical Provider, MD   levothyroxine (SYNTHROID) 125 MCG tablet Take 125 mcg by mouth Daily   Yes Historical Provider, MD   ARIPiprazole (ABILIFY) 10 MG tablet Take 10 mg by mouth daily   Yes Historical Provider, MD diphenhydrAMINE (BENADRYL ALLERGY) 25 MG tablet Take 25 mg by mouth every 6 hours as needed for Itching   Yes Historical Provider, MD   DULoxetine (CYMBALTA) 30 MG extended release capsule Take 30 mg by mouth daily    Yes Historical Provider, MD   fluticasone (FLOVENT HFA) 220 MCG/ACT inhaler Inhale 1 puff into the lungs 2 times daily   Yes Historical Provider, MD   gabapentin (NEURONTIN) 300 MG capsule TAKE ONE CAPSULE BY MOUTH THREE TIMES A DAY 7/5/19  Yes Historical Provider, MD   loperamide (IMODIUM) 2 MG capsule TAKE ONE CAPSULE BY MOUTH THREE TIMES A DAY AS NEEDED FOR DIARRHEA 4/11/19  Yes Historical Provider, MD   traMADol (ULTRAM) 50 MG tablet Take 50 mg by mouth every 6 hours as needed for Pain. 12/30/19  Yes Historical Provider, MD   albuterol (PROVENTIL HFA;VENTOLIN HFA) 108 (90 BASE) MCG/ACT inhaler Inhale 2 puffs into the lungs every 6 hours as needed for Wheezing   Yes Historical Provider, MD   ascorbic acid (VITAMIN C) 500 MG tablet Take 500 mg by mouth daily   Yes Historical Provider, MD   vitamin D (CHOLECALCIFEROL) 5000 UNITS CAPS capsule Take 5,000 Units by mouth daily    Yes Historical Provider, MD   metoprolol (LOPRESSOR) 25 MG tablet Take 25 mg by mouth 2 times daily   Yes Historical Provider, MD   Multiple Vitamins-Minerals (THERAPEUTIC MULTIVITAMIN-MINERALS) tablet Take 1 tablet by mouth daily   Yes Historical Provider, MD   nicotine polacrilex (COMMIT) 4 MG lozenge Take 4 mg by mouth as needed for Smoking cessation   Yes Historical Provider, MD   mupirocin (BACTROBAN) 2 % ointment Apply topically 3 times daily Apply topically 3 times daily.    Yes Historical Provider, MD   simvastatin (ZOCOR) 40 MG tablet Take 40 mg by mouth nightly   Yes Historical Provider, MD   spironolactone (ALDACTONE) 25 MG tablet Take 25 mg by mouth daily   Yes Historical Provider, MD   vitamin E 1000 UNITS capsule Take 1,000 Units by mouth daily   Yes Historical Provider, MD   nitroGLYCERIN (NITROSTAT) 0.4 MG SL tablet Place 0.4 mg under the tongue 18   Historical Provider, MD       Allergies: Allergies   Allergen Reactions    Tums [Calcium Carbonate Antacid] Nausea Only       PSHx:    Past Surgical History:   Procedure Laterality Date    COLONOSCOPY  2021    COLONOSCOPY POLYPECTOMY SNARE/COLD BIOPSY performed by Merry Morris MD at [facility] N/A     pt unsure of laterality     UPPER GASTROINTESTINAL ENDOSCOPY N/A 2021    EGD BIOPSY performed by Merry Morris MD at [address] [facility] Hx:    Social History     Socioeconomic History    Marital status: Legally      Spouse name: Not on file    Number of children: Not on file    Years of education: Not on file    Highest education level: Not on file   Occupational History    Not on file   Tobacco Use    Smoking status: Former Smoker     Packs/day: 2.00     Years: 30.00     Pack years: 60.00     Types: Cigarettes     Quit date: 2021     Years since quittin.4    Smokeless tobacco: Never Used   Vaping Use    Vaping Use: Never used   Substance and Sexual Activity    Alcohol use: No    Drug use: No    Sexual activity: Not on file   Other Topics Concern    Not on file   Social History Narrative    Not on file     Social Determinants of Health     Financial Resource Strain:     Difficulty of Paying Living Expenses:    Food Insecurity:     Worried About Running Out of Food in the Last Year:     Ran Out of Food in the Last Year:    Transportation Needs:     Lack of Transportation (Medical):      Lack of Transportation (Non-Medical):    Physical Activity:     Days of Exercise per Week:     Minutes of Exercise per Session:    Stress:     Feeling of Stress :    Social Connections:     Frequency of Communication with Friends and Family:     Frequency of Social Gatherings with Friends and Family:     Attends [employer] Services:     Active Member of Clubs or Organizations:     Attends Club or Organization Meetings:     Marital Status:    Intimate Partner Violence:     Fear of Current or Ex-Partner:     Emotionally Abused:     Physically Abused:     Sexually Abused:        Family Hx:   Family History   Problem Relation Age of Onset    Liver Disease Brother        Physical Exam:  Vital Signs: /83   Pulse 82   Temp 97.4 °F (36.3 °C) (Temporal)   Resp 20   Ht 5' 4\" (1.626 m)   Wt 262 lb (118.8 kg)   SpO2 92%   BMI 44.97 kg/m²    Pulmonary: Normal  Cardiac: Normal  Abdomen: Normal    Pre-Procedure Assessment / Plan:  ASA Classification: Class 2 - A normal healthy patient with mild systemic disease  Level of Sedation Plan: Deep sedation   Mallampati Score: I (soft palate, uvula, fauces, tonsillar pillars visible)  Post Procedure plan: Return to same level of care    . .  I assessed the patient and find that the patient is in satisfactory condition to proceed with the planned procedure and sedation plan. Risks/benefits/alternatives of procedure discussed with patient and any present family members. Risks including, but not limited to: bleeding, perforation, post polypectomy syndrome, splenic injury, need for additional procedures or surgery, risks of anesthesia. Patient understands it is their responsibility to call office for pathology results if they do not hear from my office within 1-2 weeks. All questions answered.     Ana Valverde MD  6/21/2021

## 2021-06-21 NOTE — BRIEF OP NOTE
Brief Postoperative Note      Patient: Kamilah Lock  YOB: 1960  MRN: [id number]    Date of Procedure: 6/21/2021    Pre-Op Diagnosis: Gastric ulcer, unspecified chronicity, unspecified whether gastric ulcer hemorrhage or perforation present [K25.9]    Post-Op Diagnosis: Same       Procedure(s):  EGD ESOPHAGOGASTRODUODENOSCOPY    Surgeon(s):  Gallo Lee MD    Assistant:  * No surgical staff found *    Anesthesia: * No anesthesia type entered *    Estimated Blood Loss (mL): Minimal    Complications: None    Specimens:   * No specimens in log *    Implants:  * No implants in log *      Drains: * No LDAs found *    Findings: Healed Gastric ulcer    Electronically signed by Stephenie Yan MD on 6/21/2021 at 12:32 PM

## 2021-06-21 NOTE — ANESTHESIA POSTPROCEDURE EVALUATION
Department of Anesthesiology  Postprocedure Note    Patient: Yvonne Metz  MRN: [id number]  YOB: 1960  Date of evaluation: 6/21/2021  Time:  1:37 PM     Procedure Summary     Date: 06/21/21 Room / Location: [address] [place] [address] Beatriz Sanchez  / [facility]    Anesthesia Start: 1218 Anesthesia Stop: 6699    Procedure: EGD DIAGNOSTIC ONLY (N/A ) Diagnosis:       Gastric ulcer, unspecified chronicity, unspecified whether gastric ulcer hemorrhage or perforation present      (Gastric ulcer, unspecified chronicity, unspecified whether gastric ulcer hemorrhage or perforation present [K25.9])    Surgeons: Gaetano Wheat MD Responsible Provider: Priscila Ramos MD    Anesthesia Type: general ASA Status: 3          Anesthesia Type: general    Eris Phase I: Eris Score: 10    Eris Phase II: Eris Score: 9    Last vitals: Reviewed and per EMR flowsheets.        Anesthesia Post Evaluation    Patient location during evaluation: PACU  Patient participation: complete - patient participated  Level of consciousness: awake and alert  Pain score: 0  Airway patency: patent  Nausea & Vomiting: no nausea and no vomiting  Complications: no  Cardiovascular status: hemodynamically stable  Respiratory status: acceptable  Hydration status: euvolemic

## 2021-08-06 ENCOUNTER — HOSPITAL ENCOUNTER (EMERGENCY)
Age: 61
Discharge: HOME | End: 2021-08-06
Attending: EMERGENCY MEDICINE
Payer: MEDICAID

## 2021-08-06 ENCOUNTER — APPOINTMENT (OUTPATIENT)
Dept: GENERAL RADIOLOGY | Age: 61
End: 2021-08-06
Payer: MEDICAID

## 2021-08-06 VITALS
HEIGHT: 64 IN | HEART RATE: 81 BPM | WEIGHT: 264 LBS | BODY MASS INDEX: 45.07 KG/M2 | OXYGEN SATURATION: 96 % | TEMPERATURE: 98.6 F | RESPIRATION RATE: 16 BRPM | SYSTOLIC BLOOD PRESSURE: 152 MMHG | DIASTOLIC BLOOD PRESSURE: 84 MMHG

## 2021-08-06 DIAGNOSIS — S90.32XA CONTUSION OF LEFT FOOT, INITIAL ENCOUNTER: ICD-10-CM

## 2021-08-06 PROCEDURE — 73630 X-RAY EXAM OF FOOT: CPT

## 2021-08-06 PROCEDURE — 99283 EMERGENCY DEPT VISIT LOW MDM: CPT

## 2021-08-06 RX ORDER — TRAMADOL HYDROCHLORIDE 50 MG/1
50 TABLET ORAL 2 TIMES DAILY PRN
Qty: 10 TABLET | Refills: 0 | Status: SHIPPED | OUTPATIENT
Start: 2021-08-06 | End: 2021-08-12

## 2021-08-06 ASSESSMENT — PAIN DESCRIPTION - LOCATION: LOCATION: FOOT

## 2021-08-06 ASSESSMENT — PAIN SCALES - GENERAL
PAINLEVEL_OUTOF10: 8
PAINLEVEL_OUTOF10: 7

## 2021-08-06 ASSESSMENT — PAIN DESCRIPTION - ORIENTATION: ORIENTATION: LEFT

## 2021-08-06 ASSESSMENT — PAIN DESCRIPTION - PAIN TYPE
TYPE: ACUTE PAIN
TYPE: ACUTE PAIN

## 2021-08-06 NOTE — ED NOTES
Patient given prescription,  discharge instructions verbal and written, patient verbalized understanding. Alert/oriented X4, Clear speech. Patient exhibits no distress, ambulates with steady gait per self leaving unit, no further request.  Nursing assisted patient to lobby with wheelchair.      Lambert Delacruz RN  08/06/21 8113

## 2021-08-06 NOTE — ED NOTES
Patient to ed with complaints of a right foot injury after a trip and fall on Monday with her walker, left foot is bruised and swollen patient is able to walk.      Otis Hensley RN  08/06/21 8047

## 2021-08-06 NOTE — ED PROVIDER NOTES
Emergency Department Provider Note  Location: [address] [place] [address]  8/6/2021     Patient Identification  Yue Leos is a 63 y.o. female    Chief Complaint  Foot Injury (left)      Mode of Arrival  private car    HPI  (History provided by patient)  This is a 63 y.o. female with a PMH significant for DM, CHF presented today for left foot pain. Patient said her metal walker fell on top of her left foot on Monday night. Since then, she had increasing swelling and pain and developed a bruise on her left foot. She is able to walk but walking makes the pain worse. She took ibuprofen without relief. She denies numbness or tingling of the toes. Denies injury anywhere else. Denies laceration. ROS  Review of Systems   Cardiovascular: Positive for leg swelling (Left foot more swollen than usual after her walker struck her foot). Musculoskeletal: Positive for arthralgias (Left foot) and gait problem (Patient states due to the pain in her left foot, she walks with a limp). Negative for back pain. Skin: Positive for color change (Patient states she has chronic venous stasis and in addition she now has a bruise on her left foot). Neurological: Negative for tremors, weakness and headaches. Psychiatric/Behavioral: Negative for confusion. I have reviewed the following nursing documentation:  Allergies: Allergies   Allergen Reactions    Tums [Calcium Carbonate Antacid] Nausea Only       Past medical history:  has a past medical history of Anemia, Anxiety, Arthritis, Back pain, CAD (coronary artery disease), Cancer ([address] CHF (congestive heart failure) ([address] Chronic kidney disease, Depression, Diabetes mellitus ([address] Esophageal reflux, Hep C w/o coma, chronic ([address] Hyperlipidemia, Hypertension, Leg abscess, MRSA (methicillin resistant staph aureus) culture positive (03/26/2021), Neuropathy, Schizoaffective disorder ([address] and Thyroid disease.     Past surgical history:  has a past surgical history that includes lobectomy (N/A); Dental surgery; Upper gastrointestinal endoscopy (N/A, 4/26/2021); Colonoscopy (4/26/2021); and Upper gastrointestinal endoscopy (N/A, 6/21/2021). Home medications:   Prior to Admission medications    Medication Sig Start Date End Date Taking? Authorizing Provider   potassium bicarbonate (K-LYTE) 25 MEQ disintegrating tablet Take 25 mEq by mouth 2 times daily   Yes Historical Provider, MD   ferrous sulfate (IRON 325) 325 (65 Fe) MG tablet Take 1 tablet by mouth 2 times daily (with meals) 3/28/21  Yes Dominga Barney MD   torsemide (DEMADEX) 20 MG tablet Take 1 tablet by mouth daily  Patient taking differently: Take 100 mg by mouth daily  3/28/21  Yes Dominga Barney MD   montelukast (SINGULAIR) 10 MG tablet Take 10 mg by mouth nightly   Yes Historical Provider, MD   pantoprazole (PROTONIX) 40 MG tablet Take 1 tablet by mouth daily 3/16/21  Yes Ellis Burton MD   clonazePAM (KLONOPIN) 0.5 MG tablet Take 0.5 mg by mouth daily as needed. Yes Historical Provider, MD   buPROPion (WELLBUTRIN XL) 300 MG extended release tablet Take 300 mg by mouth every morning   Yes Historical Provider, MD   levothyroxine (SYNTHROID) 125 MCG tablet Take 125 mcg by mouth Daily   Yes Historical Provider, MD   ARIPiprazole (ABILIFY) 10 MG tablet Take 10 mg by mouth daily   Yes Historical Provider, MD   DULoxetine (CYMBALTA) 30 MG extended release capsule Take 30 mg by mouth daily    Yes Historical Provider, MD   fluticasone (FLOVENT HFA) 220 MCG/ACT inhaler Inhale 1 puff into the lungs 2 times daily   Yes Historical Provider, MD   gabapentin (NEURONTIN) 300 MG capsule TAKE ONE CAPSULE BY MOUTH THREE TIMES A DAY 7/5/19  Yes Historical Provider, MD   traMADol (ULTRAM) 50 MG tablet Take 50 mg by mouth every 6 hours as needed for Pain.   12/30/19  Yes Historical Provider, MD   albuterol (PROVENTIL HFA;VENTOLIN HFA) 108 (90 BASE) MCG/ACT inhaler Inhale 2 puffs into the lungs every 6 hours as needed for Wheezing   Yes Historical Provider, MD   ascorbic acid (VITAMIN C) 500 MG tablet Take 500 mg by mouth daily   Yes Historical Provider, MD   vitamin D (CHOLECALCIFEROL) 5000 UNITS CAPS capsule Take 5,000 Units by mouth daily    Yes Historical Provider, MD   metoprolol (LOPRESSOR) 25 MG tablet Take 25 mg by mouth 2 times daily   Yes Historical Provider, MD   Multiple Vitamins-Minerals (THERAPEUTIC MULTIVITAMIN-MINERALS) tablet Take 1 tablet by mouth daily   Yes Historical Provider, MD   simvastatin (ZOCOR) 40 MG tablet Take 40 mg by mouth nightly   Yes Historical Provider, MD   spironolactone (ALDACTONE) 25 MG tablet Take 25 mg by mouth daily   Yes Historical Provider, MD   vitamin E 1000 UNITS capsule Take 1,000 Units by mouth daily   Yes Historical Provider, MD   Diclofenac Sodium 3 % GEL Diclofenac Topical Gel 1 %    PRN    Active    Historical Provider, MD   diphenhydrAMINE (BENADRYL ALLERGY) 25 MG tablet Take 25 mg by mouth every 6 hours as needed for Itching    Historical Provider, MD   nitroGLYCERIN (NITROSTAT) 0.4 MG SL tablet Place 0.4 mg under the tongue 8/16/18   Historical Provider, MD   loperamide (IMODIUM) 2 MG capsule TAKE ONE CAPSULE BY MOUTH THREE TIMES A DAY AS NEEDED FOR DIARRHEA 4/11/19   Historical Provider, MD   nicotine polacrilex (COMMIT) 4 MG lozenge Take 4 mg by mouth as needed for Smoking cessation    Historical Provider, MD   mupirocin (BACTROBAN) 2 % ointment Apply topically 3 times daily Apply topically 3 times daily. Historical Provider, MD       Social history:  reports that she quit smoking about 7 months ago. Her smoking use included cigarettes. She has a 60.00 pack-year smoking history. She has never used smokeless tobacco. She reports that she does not drink alcohol and does not use drugs.     Family history:    Family History   Problem Relation Age of Onset    Liver Disease Brother        Exam  ED Triage Vitals [08/06/21 1248]   BP Temp Temp Source Pulse Resp SpO2 Height Weight   (!) 152/84 98.6 °F (37 °C) Oral 81 16 96 % 5' 4\" (1.626 m) 264 lb (119.7 kg)   Physical Exam  Vitals and nursing note reviewed. Constitutional:       General: She is not in acute distress. Appearance: Normal appearance. She is well-developed. She is not diaphoretic. HENT:      Head: Normocephalic and atraumatic. Eyes:      General: No scleral icterus. Right eye: No discharge. Left eye: No discharge. Neck:      Trachea: No tracheal deviation. Cardiovascular:      Comments: Palpable pedal pulses  Pulmonary:      Effort: Pulmonary effort is normal. No respiratory distress. Breath sounds: No stridor. Musculoskeletal:      Right lower leg: Edema present. Left lower leg: Edema (Left foot edema worse than the right) present. Feet:      Comments: Left foot swollen with bruising  Skin:     General: Skin is warm and dry. Coloration: Skin is not pale. Findings: Bruising and erythema (Venous stasis noted) present. No laceration. Neurological:      General: No focal deficit present. Mental Status: She is alert and oriented to person, place, and time. Cranial Nerves: No dysarthria or facial asymmetry. Sensory: No sensory deficit (Sensation intact in left foot). Motor: No weakness. Psychiatric:         Mood and Affect: Mood normal.         Behavior: Behavior normal.         MDM/ED Course  Radiology  XR FOOT LEFT (MIN 3 VIEWS)    Result Date: 8/6/2021  EXAM: LEFT FOOT 3 VIEWS INDICATION: Fall. Left foot pain. COMPARISON: 8/6/21 FINDINGS: No acute fracture or dislocation. Moderate to severe multifocal degenerative changes redemonstrated within the midfoot and most pronounced at the 2nd, 3rd and 4th tarsometatarsal joints and talonavicular joint with prominent subchondral cysts noted. Mild  osteoarthritis in the 1st metatarsophalangeal joint. Mild osteoarthritis in the lesser interphalangeal joints.  Soft tissues demonstrate generalized swelling. No soft tissue gas or radiopaque foreign body. 1.  No acute osseous findings. 2.  Moderate-severe midfoot osteoarthritis as may be in the spectrum of neuropathic disease. 3.  Generalized swelling or edema.      - Patient seen and evaluated. 63 y.o. female presented for left foot pain after a walker landed on her left foot on Monday. She has edema and bruising. I also note is venous stasis but patient tells me that is baseline in nature. She is neurovascularly intact distally. X-ray did not show acute fracture or dislocation. Patient is able to ambulate. Discharge home, continue with elevation, outpatient follow-up with PCP.  - Return precautions also discussed. patient verbalized understanding of care plan and agreed to follow-up with PCP as advised. I estimate there is LOW risk for COMPARTMENT SYNDROME, DEEP VENOUS THROMBOSIS, SEPTIC ARTHRITIS, TENDON OR NEUROVASCULAR INJURY, thus I consider the discharge disposition reasonable. Mirella Sultana and I have discussed the diagnosis and risks, and we agree with discharging home to follow-up with PCP. We also discussed returning to the Emergency Department immediately if new or worsening symptoms occur. We have discussed the symptoms which are most concerning (e.g., changing or worsening pain, numbness, weakness) that necessitate immediate return. Clinical Impression:  1. Contusion of left foot, initial encounter          Disposition:  Discharge to home in good condition. Blood pressure (!) 152/84, pulse 81, temperature 98.6 °F (37 °C), temperature source Oral, resp. rate 16, height 5' 4\" (1.626 m), weight 264 lb (119.7 kg), SpO2 96 %. Patient was given scripts for the following medications. I counseled patient how to take these medications.    Discharge Medication List as of 8/6/2021  2:52 PM      START taking these medications    Details   !! traMADol (ULTRAM) 50 MG tablet Take 1 tablet by mouth 2 times daily as needed for Pain for up to 6 days. Add this to your previous script such that you are taking 100mg total in the morning and 100mg total at night. Intended supply: 3 days. Take lowest dose possible to manage pain, Di sp-10 tablet, R-0Print       !! - Potential duplicate medications found. Please discuss with provider. Disposition referral (if applicable):  Cristofer Vizcaino    Schedule an appointment as soon as possible for a visit in 1 week       This chart was generated in part by using Dragon Dictation system and may contain errors related to that system including errors in grammar, punctuation, and spelling, as well as words and phrases that may be inappropriate. If there are any questions or concerns please feel free to contact the dictating provider for clarification.      Stacy Szymanski MD  [address] [place] [address] Rufus Fischer MD  08/07/21 2346

## 2021-08-07 ASSESSMENT — ENCOUNTER SYMPTOMS
COLOR CHANGE: 1
BACK PAIN: 0

## 2021-08-13 ENCOUNTER — HOSPITAL ENCOUNTER (EMERGENCY)
Age: 61
Discharge: HOME | End: 2021-08-13
Attending: EMERGENCY MEDICINE
Payer: MEDICAID

## 2021-08-13 ENCOUNTER — HOSPITAL ENCOUNTER (OUTPATIENT)
Dept: VASCULAR LAB | Age: 61
Discharge: HOME | End: 2021-08-13
Payer: MEDICAID

## 2021-08-13 VITALS
HEART RATE: 87 BPM | TEMPERATURE: 98.3 F | SYSTOLIC BLOOD PRESSURE: 133 MMHG | RESPIRATION RATE: 18 BRPM | WEIGHT: 267.06 LBS | HEIGHT: 64 IN | DIASTOLIC BLOOD PRESSURE: 78 MMHG | BODY MASS INDEX: 45.59 KG/M2 | OXYGEN SATURATION: 97 %

## 2021-08-13 DIAGNOSIS — M79.89 LEFT LEG SWELLING: ICD-10-CM

## 2021-08-13 DIAGNOSIS — M79.89 LEFT LEG SWELLING: Primary | ICD-10-CM

## 2021-08-13 DIAGNOSIS — L03.116 CELLULITIS OF LEFT LOWER EXTREMITY: ICD-10-CM

## 2021-08-13 PROCEDURE — 6370000000 HC RX 637 (ALT 250 FOR IP): Performed by: EMERGENCY MEDICINE

## 2021-08-13 PROCEDURE — 93971 EXTREMITY STUDY: CPT

## 2021-08-13 RX ORDER — CEPHALEXIN 500 MG/1
1000 CAPSULE ORAL ONCE
Status: COMPLETED | OUTPATIENT
Start: 2021-08-13 | End: 2021-08-13

## 2021-08-13 RX ORDER — CEPHALEXIN 500 MG/1
500 CAPSULE ORAL 4 TIMES DAILY
Qty: 40 CAPSULE | Refills: 0 | Status: SHIPPED | OUTPATIENT
Start: 2021-08-13 | End: 2021-08-23

## 2021-08-13 RX ADMIN — CEPHALEXIN 1000 MG: 500 CAPSULE ORAL at 15:07

## 2021-08-13 ASSESSMENT — PAIN DESCRIPTION - PAIN TYPE: TYPE: ACUTE PAIN

## 2021-08-13 ASSESSMENT — PAIN DESCRIPTION - LOCATION: LOCATION: LEG

## 2021-08-13 ASSESSMENT — PAIN DESCRIPTION - ORIENTATION: ORIENTATION: LEFT

## 2021-08-13 ASSESSMENT — PAIN SCALES - GENERAL: PAINLEVEL_OUTOF10: 6

## 2021-08-13 NOTE — ED PROVIDER NOTES
Emergency Physician Note        Note Open Time: 2:41 PM EDT    Chief Complaint  Leg Swelling       History of Present Illness  Penny Foster is a 63 y.o. female who presents to the ED for left leg swelling. Patient reports that about a week ago her left foot was injured when her walker fell on it and she had x-rays obtained which showed no fracture. Since that time she had increasing left leg swelling and erythema. She called her doctor 3 days ago and was told to go to the ER. She has transportation difficulties but is here today with her significant other. She denies any chest pain or shortness of breath. No fevers, chills or sweats. No further injuries to that lower extremity. She states she had a DVT as a young person while taking birth control. 10 systems reviewed, pertinent positives per HPI otherwise noted to be negative    I have reviewed the following from the nursing documentation:      Prior to Admission medications    Medication Sig Start Date End Date Taking? Authorizing Provider   Diclofenac Sodium 3 % GEL Diclofenac Topical Gel 1 %    PRN    Active    Historical Provider, MD   potassium bicarbonate (K-LYTE) 25 MEQ disintegrating tablet Take 25 mEq by mouth 2 times daily    Historical Provider, MD   ferrous sulfate (IRON 325) 325 (65 Fe) MG tablet Take 1 tablet by mouth 2 times daily (with meals) 3/28/21   Hermelinda Ramirez MD   torsemide (DEMADEX) 20 MG tablet Take 1 tablet by mouth daily  Patient taking differently: Take 100 mg by mouth daily  3/28/21   Hermelinda Ramirez MD   montelukast (SINGULAIR) 10 MG tablet Take 10 mg by mouth nightly    Historical Provider, MD   pantoprazole (PROTONIX) 40 MG tablet Take 1 tablet by mouth daily 3/16/21   Lemuel Douglas MD   clonazePAM (KLONOPIN) 0.5 MG tablet Take 0.5 mg by mouth daily as needed.     Historical Provider, MD   buPROPion (WELLBUTRIN XL) 300 MG extended release tablet Take 300 mg by mouth every morning    Historical Provider, MD levothyroxine (SYNTHROID) 125 MCG tablet Take 125 mcg by mouth Daily    Historical Provider, MD   ARIPiprazole (ABILIFY) 10 MG tablet Take 10 mg by mouth daily    Historical Provider, MD   diphenhydrAMINE (BENADRYL ALLERGY) 25 MG tablet Take 25 mg by mouth every 6 hours as needed for Itching    Historical Provider, MD   DULoxetine (CYMBALTA) 30 MG extended release capsule Take 30 mg by mouth daily     Historical Provider, MD   fluticasone (FLOVENT HFA) 220 MCG/ACT inhaler Inhale 1 puff into the lungs 2 times daily    Historical Provider, MD   nitroGLYCERIN (NITROSTAT) 0.4 MG SL tablet Place 0.4 mg under the tongue 8/16/18   Historical Provider, MD   gabapentin (NEURONTIN) 300 MG capsule TAKE ONE CAPSULE BY MOUTH THREE TIMES A DAY 7/5/19   Historical Provider, MD   loperamide (IMODIUM) 2 MG capsule TAKE ONE CAPSULE BY MOUTH THREE TIMES A DAY AS NEEDED FOR DIARRHEA 4/11/19   Historical Provider, MD   traMADol (ULTRAM) 50 MG tablet Take 50 mg by mouth every 6 hours as needed for Pain. 12/30/19   Historical Provider, MD   albuterol (PROVENTIL HFA;VENTOLIN HFA) 108 (90 BASE) MCG/ACT inhaler Inhale 2 puffs into the lungs every 6 hours as needed for Wheezing    Historical Provider, MD   ascorbic acid (VITAMIN C) 500 MG tablet Take 500 mg by mouth daily    Historical Provider, MD   vitamin D (CHOLECALCIFEROL) 5000 UNITS CAPS capsule Take 5,000 Units by mouth daily     Historical Provider, MD   metoprolol (LOPRESSOR) 25 MG tablet Take 25 mg by mouth 2 times daily    Historical Provider, MD   Multiple Vitamins-Minerals (THERAPEUTIC MULTIVITAMIN-MINERALS) tablet Take 1 tablet by mouth daily    Historical Provider, MD   nicotine polacrilex (COMMIT) 4 MG lozenge Take 4 mg by mouth as needed for Smoking cessation    Historical Provider, MD   mupirocin (BACTROBAN) 2 % ointment Apply topically 3 times daily Apply topically 3 times daily.     Historical Provider, MD   simvastatin (ZOCOR) 40 MG tablet Take 40 mg by mouth nightly Historical Provider, MD   spironolactone (ALDACTONE) 25 MG tablet Take 25 mg by mouth daily    Historical Provider, MD   vitamin E 1000 UNITS capsule Take 1,000 Units by mouth daily    Historical Provider, MD       Allergies as of 08/13/2021 - Fully Reviewed 08/06/2021   Allergen Reaction Noted    Tums [calcium carbonate antacid] Nausea Only 05/03/2015       Past Medical History:   Diagnosis Date    Anemia     Anxiety     Arthritis     Back pain     CAD (coronary artery disease)     Cancer ([facility]     CHF (congestive heart failure) (HCC)     Chronic kidney disease     Depression     [facility]     Esophageal reflux     Hep C w/o coma, chronic (HCC)     Hyperlipidemia     Hypertension     Leg abscess     MRSA (methicillin resistant staph aureus) culture positive 03/26/2021    left leg wound    Neuropathy     Schizoaffective disorder ([facility]     Thyroid disease         Surgical History:   Past Surgical History:   Procedure Laterality Date    COLONOSCOPY  4/26/2021    COLONOSCOPY POLYPECTOMY SNARE/COLD BIOPSY performed by Jay Velasquez MD at [address]     pt unsure of laterality     UPPER GASTROINTESTINAL ENDOSCOPY N/A 4/26/2021    EGD BIOPSY performed by Jay Velasquez MD at [address] [place] N/A 6/21/2021    EGD DIAGNOSTIC ONLY performed by Jay Velasquez MD at [facility] ENDOSCOPY        Family History:    Family History   Problem Relation Age of Onset    Liver Disease Brother        Social History     Socioeconomic History    Marital status: Legally      Spouse name: Not on file    Number of children: Not on file    Years of education: Not on file    Highest education level: Not on file   Occupational History    Not on file   Tobacco Use    Smoking status: Former Smoker     Packs/day: 2.00     Years: 30.00     Pack years: 60.00     Types: Cigarettes     Quit date: 1/1/2021 Years since quittin.6    Smokeless tobacco: Never Used   Vaping Use    Vaping Use: Never used   Substance and Sexual Activity    Alcohol use: No    Drug use: No    Sexual activity: Not on file   Other Topics Concern    Not on file   Social History Narrative    Not on file     Social Determinants of Health     Financial Resource Strain:     Difficulty of Paying Living Expenses:    Food Insecurity:     Worried About Running Out of Food in the Last Year:     Ran Out of Food in the Last Year:    Transportation Needs:     Lack of Transportation (Medical):  Lack of Transportation (Non-Medical):    Physical Activity:     Days of Exercise per Week:     Minutes of Exercise per Session:    Stress:     Feeling of Stress :    Social Connections:     Frequency of Communication with Friends and Family:     Frequency of Social Gatherings with Friends and Family:     Attends [employer] Services:     Active Member of Clubs or Organizations:     Attends Club or Organization Meetings:     Marital Status:    Intimate Partner Violence:     Fear of Current or Ex-Partner:     Emotionally Abused:     Physically Abused:     Sexually Abused:        Nursing notes reviewed. ED Triage Vitals [21 1432]   Enc Vitals Group      /78      Pulse 87      Resp 18      Temp 98.3 °F (36.8 °C)      Temp Source Oral      SpO2 97 %      Weight 267 lb 1 oz (121.1 kg)      Height 5' 4\" (1.626 m)      Head Circumference       Peak Flow       Pain Score       Pain Loc       Pain Edu? Excl. in [address]? GENERAL:  Awake, alert. Well developed, obese with no apparent distress. HENT:  Normocephalic, Atraumatic, moist mucous membranes. EYES:  Pupils equal round and reactive to light, Conjunctiva normal, extraocular movements normal.  NECK:  No meningeal signs, Supple. CHEST:  Regular rate and rhythm, chest wall non-tender. LUNGS:  Clear to auscultation bilaterally.   ABDOMEN:  Soft, non-tender, no rebound, rigidity or guarding, non-distended, normal bowel sounds. No costovertebral angle tenderness to palpation. BACK:  No tenderness. EXTREMITIES:  Normal range of motion, left lower extremity edema with circumferential erythema and warmth from the upper calf distally over the remainder of the extremity, no bony tenderness, no deformity, distal pulses present. SKIN: Warm, dry and intact. No wounds noted on the left lower extremity. NEUROLOGIC: Normal mental status. Moving all extremities to command. MEDICAL DECISION MAKING     Advised patient this facility does not have ultrasound and we are arranging transportation to get her to [facility]. where she will have the ultrasound performed today. I advised her if it is positive she should check into the ER for further treatment but if it is negative to take antibiotics as prescribed for cellulitis. I advised the patient to return to the emergency department immediately for any new or worsening symptoms, such as chest pain, shortness of breath or fever. The patient voiced agreement and understanding of the treatment plan. No results found for this visit on 08/13/21. I estimate there is LOW risk for COMPARTMENT SYNDROME, DEEP VENOUS THROMBOSIS, SEPTIC ARTHRITIS, TENDON OR NEUROVASCULAR INJURY, thus I consider the discharge disposition reasonable. Hortensia Ashton and I have discussed the diagnosis and risks, and we agree with discharging home to follow-up with their primary doctor or the referral orthopedist. We also discussed returning to the Emergency Department immediately if new or worsening symptoms occur. We have discussed the symptoms which are most concerning (e.g., changing or worsening pain, numbness, weakness) that necessitate immediate return. Final Impression    1. Left leg swelling    2. Cellulitis of left lower extremity        Blood pressure 133/78, pulse 87, temperature 98.3 °F (36.8 °C), temperature source Oral, resp.  rate 18, height 5' 4\" (1.626 m), weight 267 lb 1 oz (121.1 kg), SpO2 97 %. Patient was given scripts for the following medications. I counseled patient how to take these medications. Discharge Medication List as of 8/13/2021  3:03 PM      START taking these medications    Details   cephALEXin (KEFLEX) 500 MG capsule Take 1 capsule by mouth 4 times daily for 10 days, Disp-40 capsule, R-0Normal             Disposition  Pt is in good condition upon Discharge to home. This chart was generated using the [address] [place] [address] dictation system. I created this record but it may contain dictation errors.          Tristen Bhakta MD  08/13/21 9415

## 2021-08-13 NOTE — ED TRIAGE NOTES
Hit left foot on walker about 1 week ago and developed redness and swelling to left lower leg about 3 days ago. Denies shortness of breath and chest pain.

## 2021-08-13 NOTE — ED NOTES
Patient sent in [employer] to [facility] for vascular study. When study complete patient will be set up with [employer] to be transported home.      Jeffrey Fillers, RN  08/13/21 4613

## 2021-08-13 NOTE — ED NOTES
Spoke with Armand Marroquin in vascular lab to see if patient can be seen today. She states patient can be added in at 4 PM. Patient does not have a ride to [facility] because her boyfriend does not drive to places \"that aren't local.\" Able to send patient in [place] to [facility]. Armand Marroquin is checking to see how patient can be transported back home.      Edouard Liang RN  08/13/21 4619

## 2021-08-31 ENCOUNTER — OFFICE VISIT (OUTPATIENT)
Dept: CARDIOLOGY CLINIC | Age: 61
End: 2021-08-31
Payer: MEDICAID

## 2021-08-31 VITALS
BODY MASS INDEX: 46.11 KG/M2 | DIASTOLIC BLOOD PRESSURE: 84 MMHG | HEART RATE: 68 BPM | WEIGHT: 268.6 LBS | SYSTOLIC BLOOD PRESSURE: 138 MMHG

## 2021-08-31 DIAGNOSIS — I50.32 CHRONIC HEART FAILURE WITH PRESERVED EJECTION FRACTION (HFPEF) (HCC): ICD-10-CM

## 2021-08-31 DIAGNOSIS — I10 HYPERTENSION, UNSPECIFIED TYPE: Primary | ICD-10-CM

## 2021-08-31 PROCEDURE — 99244 OFF/OP CNSLTJ NEW/EST MOD 40: CPT | Performed by: INTERNAL MEDICINE

## 2021-08-31 PROCEDURE — 93000 ELECTROCARDIOGRAM COMPLETE: CPT | Performed by: INTERNAL MEDICINE

## 2021-08-31 NOTE — PROGRESS NOTES
Cc: HFpEF    HPI:     Patient is a 62 yo woman with h/o HTN, HLP, DM, morbid obesity, thyroid disease, ROSARIO, HFpEF, PUD, smoker. She used to follow with Dr Reagan Soria at [facility] but would like to consolidate her care at [facility]. Echo 02/2020: normal, no mentioning of diastolic function. DSE 09/2018: normal    ECG 8/31/21: NSR, normal ECG    Patient reports occasional chest pains since she restarted smoking. They are unrelated to activity, last a few seconds, self-limited. She also has very mild bilateral leg edema. She is compliant with torsemide 20 daily. Labs 5/6/21: Cr 1.2, K 3.8, bic 30. Histories     Past Medical History:   has a past medical history of Anemia, Anxiety, Arthritis, Back pain, CAD (coronary artery disease), Cancer ([address] CHF (congestive heart failure) ([address] Chronic kidney disease, Depression, Diabetes mellitus ([address] Esophageal reflux, Hep C w/o coma, chronic ([address] Hyperlipidemia, Hypertension, Leg abscess, MRSA (methicillin resistant staph aureus) culture positive, Neuropathy, Schizoaffective disorder ([address] and Thyroid disease. Surgical History:   has a past surgical history that includes lobectomy (N/A); Dental surgery; Upper gastrointestinal endoscopy (N/A, 4/26/2021); Colonoscopy (4/26/2021); and Upper gastrointestinal endoscopy (N/A, 6/21/2021). Social History:   reports that she quit smoking about 7 months ago. Her smoking use included cigarettes. She has a 60.00 pack-year smoking history. She has never used smokeless tobacco. She reports that she does not drink alcohol and does not use drugs. Family History:  No evidence for sudden cardiac death or premature CAD      Medications:     Home medications were reviewed and are listed below    Prior to Admission medications    Medication Sig Start Date End Date Taking?  Authorizing Provider   Diclofenac Sodium 3 % GEL Diclofenac Topical Gel 1 %    PRN    Active   Yes Historical Provider, MD   torsemide (DEMADEX) 20 MG tablet Take 1 tablet by mouth daily  Patient taking differently: Take 100 mg by mouth daily  3/28/21  Yes Mark Juarez MD   montelukast (SINGULAIR) 10 MG tablet Take 10 mg by mouth nightly   Yes Historical Provider, MD   pantoprazole (PROTONIX) 40 MG tablet Take 1 tablet by mouth daily 3/16/21  Yes Noemy Holder MD   clonazePAM (KLONOPIN) 0.5 MG tablet Take 0.5 mg by mouth daily as needed. Yes Historical Provider, MD   buPROPion (WELLBUTRIN XL) 300 MG extended release tablet Take 300 mg by mouth every morning   Yes Historical Provider, MD   levothyroxine (SYNTHROID) 125 MCG tablet Take 125 mcg by mouth Daily   Yes Historical Provider, MD   ARIPiprazole (ABILIFY) 10 MG tablet Take 10 mg by mouth daily   Yes Historical Provider, MD   diphenhydrAMINE (BENADRYL ALLERGY) 25 MG tablet Take 25 mg by mouth every 6 hours as needed for Itching   Yes Historical Provider, MD   DULoxetine (CYMBALTA) 30 MG extended release capsule Take 30 mg by mouth daily    Yes Historical Provider, MD   fluticasone (FLOVENT HFA) 220 MCG/ACT inhaler Inhale 1 puff into the lungs 2 times daily   Yes Historical Provider, MD   nitroGLYCERIN (NITROSTAT) 0.4 MG SL tablet Place 0.4 mg under the tongue 8/16/18  Yes Historical Provider, MD   gabapentin (NEURONTIN) 300 MG capsule TAKE ONE CAPSULE BY MOUTH THREE TIMES A DAY 7/5/19  Yes Historical Provider, MD   loperamide (IMODIUM) 2 MG capsule TAKE ONE CAPSULE BY MOUTH THREE TIMES A DAY AS NEEDED FOR DIARRHEA 4/11/19  Yes Historical Provider, MD   traMADol (ULTRAM) 50 MG tablet Take 50 mg by mouth every 6 hours as needed for Pain.   12/30/19  Yes Historical Provider, MD   albuterol (PROVENTIL HFA;VENTOLIN HFA) 108 (90 BASE) MCG/ACT inhaler Inhale 2 puffs into the lungs every 6 hours as needed for Wheezing   Yes Historical Provider, MD   ascorbic acid (VITAMIN C) 500 MG tablet Take 500 mg by mouth daily   Yes Historical Provider, MD   vitamin D (CHOLECALCIFEROL) 5000 UNITS CAPS capsule Take 5,000 Units by mouth hematuria. MUSCULOSKELETAL:  No gait disturbance, weakness or joint complaints. NEUROLOGICAL: No headache, diplopia, change in muscle strength, numbness or tingling. No change in gait, balance, coordination, mood, affect, memory, mentation, behavior. PSHYCH: No anxiety, loss of interest, change in sexual behavior, feelings of self-harm, or confusion. ENDOCRINE: No excessive thirst, fluid intake, or urination. No tremor. HEMATOLOGIC: No abnormal bruising or bleeding. ALLERGY: No nasal congestion or hives.       Physical Examination:     Vitals:    08/31/21 1517   BP: 138/84   Pulse: 68   Weight: 268 lb 9.6 oz (121.8 kg)       Wt Readings from Last 3 Encounters:   08/31/21 268 lb 9.6 oz (121.8 kg)   08/13/21 267 lb 1 oz (121.1 kg)   08/06/21 264 lb (119.7 kg)         General Appearance:  Alert, cooperative, no distress, appears stated age Appropriate weight   Head:  Normocephalic, without obvious abnormality, atraumatic   Eyes:  PERRL, conjunctiva/corneas clear EOM intact  Ears normal   Throat no lesions       Nose: Nares normal, no drainage or sinus tenderness   Throat: Lips, mucosa, and tongue normal   Neck: Supple, symmetrical, trachea midline, no adenopathy, thyroid: not enlarged, symmetric, no tenderness/mass/nodules, no carotid bruit       Lungs:   Clear to auscultation bilaterally, respirations unlabored   Chest Wall:  No tenderness or deformity   Heart:  Regular rhythm, rate is controlled, S1, S2 normal, there is no murmur, there is no rub or gallop, no jvd, min bilateral lower extremity edema   Abdomen:   Soft, non-tender, bowel sounds active all four quadrants,  no masses, no organomegaly       Extremities: Extremities normal, atraumatic, no cyanosis   Pulses: 2+ and symmetric   Skin: Skin color, texture, turgor normal, no rashes or lesions   Pysch: Normal mood and affect   Neurologic: Normal gross motor and sensory exam.  Cranial nerves intact        Labs:     Lab Results   Component Value Date    WBC 6.6 05/06/2021    HGB 9.8 (L) 05/06/2021    HCT 30.9 (L) 05/06/2021    MCV 81.4 05/06/2021     05/06/2021     Lab Results   Component Value Date     05/06/2021    K 3.8 05/06/2021    CL 99 05/06/2021    CO2 30 05/06/2021    BUN 15 05/06/2021    CREATININE 1.2 05/06/2021    GLUCOSE 132 (H) 05/06/2021    CALCIUM 9.4 05/06/2021    PROT 7.7 05/06/2021    LABALBU 4.2 05/06/2021    BILITOT <0.2 05/06/2021    ALKPHOS 148 (H) 05/06/2021    AST 27 05/06/2021    ALT 33 05/06/2021    LABGLOM 46 (A) 05/06/2021    GFRAA 55 (A) 05/06/2021    AGRATIO 1.2 05/06/2021    GLOB 3.5 05/06/2021         No results found for: CHOL  No results found for: TRIG  No results found for: HDL  No results found for: LDLCHOLESTEROL, LDLCALC  No results found for: LABVLDL, VLDL  No results found for: [facility]    Lab Results   Component Value Date    INR 1.23 (H) 01/10/2020    PROTIME 14.3 (H) 01/10/2020       The ASCVD Risk score (Alba Briggs et al., 2013) failed to calculate for the following reasons:    Cannot find a previous HDL lab    Cannot find a previous total cholesterol lab      Assessment / Plan:      Diagnosis Orders   1. Hypertension, unspecified type  EKG 12 Lead   2. Chronic heart failure with preserved ejection fraction (HFpEF) ([facility]          1. Chronic HFpEF:   Patient is euvolemic and hemo stable.     -Low salt diet  -Cw torsemide 20 daily; may take extra dose for increasing edema. 2. HTN:   BP is borderline normal.     -Low salt diet  -BP cuff to check BP frequently; if elevated, call our office for med. 3. HLP:  Patient is not on a statin.     -Will need a FLP prior to starting statin. Return in about 6 months (around 2/28/2022). I have spent 62 minutes of face to face time with the patient with more than 50% spent counseling and coordinating care.        I have personally reviewed the reports and images of labs, radiological studies, cardiac studies including ECG's and telemetry, current and

## 2021-12-01 RX ORDER — SIMVASTATIN 40 MG
40 TABLET ORAL NIGHTLY
Qty: 90 TABLET | Refills: 3 | Status: SHIPPED | OUTPATIENT
Start: 2021-12-01 | End: 2022-08-10

## 2021-12-01 NOTE — TELEPHONE ENCOUNTER
Requested Prescriptions     Pending Prescriptions Disp Refills    simvastatin (ZOCOR) 40 MG tablet 90 tablet 3     Sig: Take 1 tablet by mouth nightly              Last office Visit: 8/31/2021     Next Office Visit: 3/2/2022

## 2021-12-07 ENCOUNTER — TELEPHONE (OUTPATIENT)
Dept: CARDIOLOGY CLINIC | Age: 61
End: 2021-12-07

## 2022-02-05 ENCOUNTER — APPOINTMENT (OUTPATIENT)
Dept: CT IMAGING | Age: 62
DRG: 720 | End: 2022-02-05
Payer: MEDICAID

## 2022-02-05 ENCOUNTER — HOSPITAL ENCOUNTER (INPATIENT)
Age: 62
LOS: 4 days | Discharge: HOME HEALTH | DRG: 720 | End: 2022-02-09
Attending: EMERGENCY MEDICINE | Admitting: INTERNAL MEDICINE
Payer: MEDICAID

## 2022-02-05 DIAGNOSIS — R09.02 HYPOXIA: ICD-10-CM

## 2022-02-05 DIAGNOSIS — J10.1 INFLUENZA A: Primary | ICD-10-CM

## 2022-02-05 DIAGNOSIS — R06.02 SHORTNESS OF BREATH: ICD-10-CM

## 2022-02-05 LAB
A/G RATIO: 1.2 (ref 1.1–2.2)
ALBUMIN SERPL-MCNC: 4.5 G/DL (ref 3.4–5)
ALP BLD-CCNC: 170 U/L (ref 40–129)
ALT SERPL-CCNC: 42 U/L (ref 10–40)
ANION GAP SERPL CALCULATED.3IONS-SCNC: 12 MMOL/L (ref 3–16)
AST SERPL-CCNC: 35 U/L (ref 15–37)
BASE EXCESS VENOUS: 5.6 MMOL/L (ref -3–3)
BASOPHILS ABSOLUTE: 0 K/UL (ref 0–0.2)
BASOPHILS RELATIVE PERCENT: 0.4 %
BILIRUB SERPL-MCNC: 0.4 MG/DL (ref 0–1)
BILIRUBIN URINE: NEGATIVE
BLOOD, URINE: NEGATIVE
BUN BLDV-MCNC: 15 MG/DL (ref 7–20)
CALCIUM SERPL-MCNC: 9.8 MG/DL (ref 8.3–10.6)
CHLORIDE BLD-SCNC: 95 MMOL/L (ref 99–110)
CLARITY: CLEAR
CO2: 28 MMOL/L (ref 21–32)
COLOR: YELLOW
CREAT SERPL-MCNC: 1 MG/DL (ref 0.6–1.2)
EOSINOPHILS ABSOLUTE: 0.1 K/UL (ref 0–0.6)
EOSINOPHILS RELATIVE PERCENT: 0.9 %
GFR AFRICAN AMERICAN: >60
GFR NON-AFRICAN AMERICAN: 56
GLUCOSE BLD-MCNC: 103 MG/DL (ref 70–99)
GLUCOSE BLD-MCNC: 125 MG/DL (ref 70–99)
GLUCOSE URINE: NEGATIVE MG/DL
HCO3 VENOUS: 30.9 MMOL/L (ref 23–29)
HCT VFR BLD CALC: 39.1 % (ref 36–48)
HEMOGLOBIN: 13.1 G/DL (ref 12–16)
KETONES, URINE: NEGATIVE MG/DL
LACTIC ACID, SEPSIS: 1 MMOL/L (ref 0.4–1.9)
LEUKOCYTE ESTERASE, URINE: NEGATIVE
LYMPHOCYTES ABSOLUTE: 0.6 K/UL (ref 1–5.1)
LYMPHOCYTES RELATIVE PERCENT: 7.3 %
MCH RBC QN AUTO: 27.7 PG (ref 26–34)
MCHC RBC AUTO-ENTMCNC: 33.6 G/DL (ref 31–36)
MCV RBC AUTO: 82.4 FL (ref 80–100)
MICROSCOPIC EXAMINATION: NORMAL
MONOCYTES ABSOLUTE: 1.4 K/UL (ref 0–1.3)
MONOCYTES RELATIVE PERCENT: 16.5 %
NEUTROPHILS ABSOLUTE: 6.2 K/UL (ref 1.7–7.7)
NEUTROPHILS RELATIVE PERCENT: 74.9 %
NITRITE, URINE: NEGATIVE
O2 SAT, VEN: 92 %
O2 THERAPY: ABNORMAL
PCO2, VEN: 53.9 MMHG (ref 40–50)
PDW BLD-RTO: 15.1 % (ref 12.4–15.4)
PERFORMED ON: ABNORMAL
PH UA: 6.5 (ref 5–8)
PH VENOUS: 7.37 (ref 7.35–7.45)
PLATELET # BLD: 227 K/UL (ref 135–450)
PMV BLD AUTO: 7.3 FL (ref 5–10.5)
PO2, VEN: 66.2 MMHG (ref 25–40)
POTASSIUM REFLEX MAGNESIUM: 4.1 MMOL/L (ref 3.5–5.1)
PRO-BNP: 159 PG/ML (ref 0–124)
PROTEIN UA: NEGATIVE MG/DL
RAPID INFLUENZA  B AGN: NEGATIVE
RAPID INFLUENZA A AGN: POSITIVE
RBC # BLD: 4.75 M/UL (ref 4–5.2)
SARS-COV-2, NAAT: NOT DETECTED
SODIUM BLD-SCNC: 135 MMOL/L (ref 136–145)
SPECIFIC GRAVITY UA: <=1.005 (ref 1–1.03)
TCO2 CALC VENOUS: 31 MMOL/L
TOTAL PROTEIN: 8.3 G/DL (ref 6.4–8.2)
TROPONIN: <0.01 NG/ML
URINE TYPE: NORMAL
UROBILINOGEN, URINE: 0.2 E.U./DL
WBC # BLD: 8.3 K/UL (ref 4–11)

## 2022-02-05 PROCEDURE — 6360000004 HC RX CONTRAST MEDICATION: Performed by: EMERGENCY MEDICINE

## 2022-02-05 PROCEDURE — 6370000000 HC RX 637 (ALT 250 FOR IP): Performed by: EMERGENCY MEDICINE

## 2022-02-05 PROCEDURE — 94640 AIRWAY INHALATION TREATMENT: CPT

## 2022-02-05 PROCEDURE — 84484 ASSAY OF TROPONIN QUANT: CPT

## 2022-02-05 PROCEDURE — 83880 ASSAY OF NATRIURETIC PEPTIDE: CPT

## 2022-02-05 PROCEDURE — 6360000002 HC RX W HCPCS: Performed by: INTERNAL MEDICINE

## 2022-02-05 PROCEDURE — 36415 COLL VENOUS BLD VENIPUNCTURE: CPT

## 2022-02-05 PROCEDURE — 85025 COMPLETE CBC W/AUTO DIFF WBC: CPT

## 2022-02-05 PROCEDURE — 6370000000 HC RX 637 (ALT 250 FOR IP): Performed by: INTERNAL MEDICINE

## 2022-02-05 PROCEDURE — 87635 SARS-COV-2 COVID-19 AMP PRB: CPT

## 2022-02-05 PROCEDURE — 96374 THER/PROPH/DIAG INJ IV PUSH: CPT

## 2022-02-05 PROCEDURE — 94761 N-INVAS EAR/PLS OXIMETRY MLT: CPT

## 2022-02-05 PROCEDURE — 82803 BLOOD GASES ANY COMBINATION: CPT

## 2022-02-05 PROCEDURE — 2580000003 HC RX 258: Performed by: INTERNAL MEDICINE

## 2022-02-05 PROCEDURE — 71260 CT THORAX DX C+: CPT

## 2022-02-05 PROCEDURE — 80053 COMPREHEN METABOLIC PANEL: CPT

## 2022-02-05 PROCEDURE — 1200000000 HC SEMI PRIVATE

## 2022-02-05 PROCEDURE — 87804 INFLUENZA ASSAY W/OPTIC: CPT

## 2022-02-05 PROCEDURE — 81003 URINALYSIS AUTO W/O SCOPE: CPT

## 2022-02-05 PROCEDURE — 83605 ASSAY OF LACTIC ACID: CPT

## 2022-02-05 PROCEDURE — 2700000000 HC OXYGEN THERAPY PER DAY

## 2022-02-05 PROCEDURE — 99284 EMERGENCY DEPT VISIT MOD MDM: CPT

## 2022-02-05 PROCEDURE — 93005 ELECTROCARDIOGRAM TRACING: CPT | Performed by: EMERGENCY MEDICINE

## 2022-02-05 PROCEDURE — 6360000002 HC RX W HCPCS: Performed by: EMERGENCY MEDICINE

## 2022-02-05 RX ORDER — ONDANSETRON 2 MG/ML
4 INJECTION INTRAMUSCULAR; INTRAVENOUS EVERY 6 HOURS PRN
Status: DISCONTINUED | OUTPATIENT
Start: 2022-02-05 | End: 2022-02-09 | Stop reason: HOSPADM

## 2022-02-05 RX ORDER — DEXTROSE MONOHYDRATE 100 MG/ML
12.5 INJECTION, SOLUTION INTRAVENOUS PRN
Status: DISCONTINUED | OUTPATIENT
Start: 2022-02-05 | End: 2022-02-09 | Stop reason: HOSPADM

## 2022-02-05 RX ORDER — ALBUTEROL SULFATE 2.5 MG/3ML
2.5 SOLUTION RESPIRATORY (INHALATION) EVERY 4 HOURS PRN
Status: DISCONTINUED | OUTPATIENT
Start: 2022-02-05 | End: 2022-02-09 | Stop reason: HOSPADM

## 2022-02-05 RX ORDER — ACETAMINOPHEN 650 MG/1
650 SUPPOSITORY RECTAL EVERY 6 HOURS PRN
Status: DISCONTINUED | OUTPATIENT
Start: 2022-02-05 | End: 2022-02-09 | Stop reason: HOSPADM

## 2022-02-05 RX ORDER — OSELTAMIVIR PHOSPHATE 75 MG/1
75 CAPSULE ORAL ONCE
Status: COMPLETED | OUTPATIENT
Start: 2022-02-05 | End: 2022-02-05

## 2022-02-05 RX ORDER — SPIRONOLACTONE 25 MG/1
25 TABLET ORAL DAILY
Status: DISCONTINUED | OUTPATIENT
Start: 2022-02-06 | End: 2022-02-09 | Stop reason: HOSPADM

## 2022-02-05 RX ORDER — FUROSEMIDE 10 MG/ML
40 INJECTION INTRAMUSCULAR; INTRAVENOUS ONCE
Status: COMPLETED | OUTPATIENT
Start: 2022-02-05 | End: 2022-02-05

## 2022-02-05 RX ORDER — SODIUM CHLORIDE 0.9 % (FLUSH) 0.9 %
5-40 SYRINGE (ML) INJECTION EVERY 12 HOURS SCHEDULED
Status: DISCONTINUED | OUTPATIENT
Start: 2022-02-05 | End: 2022-02-09 | Stop reason: HOSPADM

## 2022-02-05 RX ORDER — DEXTROSE MONOHYDRATE 50 MG/ML
100 INJECTION, SOLUTION INTRAVENOUS PRN
Status: DISCONTINUED | OUTPATIENT
Start: 2022-02-05 | End: 2022-02-09 | Stop reason: HOSPADM

## 2022-02-05 RX ORDER — POLYETHYLENE GLYCOL 3350 17 G/17G
17 POWDER, FOR SOLUTION ORAL DAILY PRN
Status: DISCONTINUED | OUTPATIENT
Start: 2022-02-05 | End: 2022-02-09 | Stop reason: HOSPADM

## 2022-02-05 RX ORDER — OSELTAMIVIR PHOSPHATE 75 MG/1
75 CAPSULE ORAL 2 TIMES DAILY
Status: DISCONTINUED | OUTPATIENT
Start: 2022-02-06 | End: 2022-02-09 | Stop reason: HOSPADM

## 2022-02-05 RX ORDER — IPRATROPIUM BROMIDE AND ALBUTEROL SULFATE 2.5; .5 MG/3ML; MG/3ML
1 SOLUTION RESPIRATORY (INHALATION)
Status: DISCONTINUED | OUTPATIENT
Start: 2022-02-05 | End: 2022-02-08

## 2022-02-05 RX ORDER — TORSEMIDE 20 MG/1
20 TABLET ORAL DAILY
Status: DISCONTINUED | OUTPATIENT
Start: 2022-02-06 | End: 2022-02-09 | Stop reason: HOSPADM

## 2022-02-05 RX ORDER — SODIUM CHLORIDE 9 MG/ML
25 INJECTION, SOLUTION INTRAVENOUS PRN
Status: DISCONTINUED | OUTPATIENT
Start: 2022-02-05 | End: 2022-02-09 | Stop reason: HOSPADM

## 2022-02-05 RX ORDER — LEVOTHYROXINE SODIUM 0.12 MG/1
125 TABLET ORAL
Status: DISCONTINUED | OUTPATIENT
Start: 2022-02-06 | End: 2022-02-09 | Stop reason: HOSPADM

## 2022-02-05 RX ORDER — GABAPENTIN 300 MG/1
300 CAPSULE ORAL 3 TIMES DAILY
Status: DISCONTINUED | OUTPATIENT
Start: 2022-02-05 | End: 2022-02-09 | Stop reason: HOSPADM

## 2022-02-05 RX ORDER — NICOTINE POLACRILEX 4 MG
15 LOZENGE BUCCAL PRN
Status: DISCONTINUED | OUTPATIENT
Start: 2022-02-05 | End: 2022-02-09 | Stop reason: HOSPADM

## 2022-02-05 RX ORDER — MONTELUKAST SODIUM 10 MG/1
10 TABLET ORAL NIGHTLY
Status: DISCONTINUED | OUTPATIENT
Start: 2022-02-05 | End: 2022-02-09 | Stop reason: HOSPADM

## 2022-02-05 RX ORDER — CLONAZEPAM 0.5 MG/1
0.5 TABLET ORAL DAILY PRN
Status: DISCONTINUED | OUTPATIENT
Start: 2022-02-05 | End: 2022-02-09 | Stop reason: HOSPADM

## 2022-02-05 RX ORDER — INSULIN LISPRO 100 [IU]/ML
0-3 INJECTION, SOLUTION INTRAVENOUS; SUBCUTANEOUS NIGHTLY
Status: DISCONTINUED | OUTPATIENT
Start: 2022-02-05 | End: 2022-02-07

## 2022-02-05 RX ORDER — ONDANSETRON 2 MG/ML
4 INJECTION INTRAMUSCULAR; INTRAVENOUS ONCE
Status: COMPLETED | OUTPATIENT
Start: 2022-02-05 | End: 2022-02-05

## 2022-02-05 RX ORDER — SODIUM CHLORIDE 0.9 % (FLUSH) 0.9 %
5-40 SYRINGE (ML) INJECTION PRN
Status: DISCONTINUED | OUTPATIENT
Start: 2022-02-05 | End: 2022-02-09 | Stop reason: HOSPADM

## 2022-02-05 RX ORDER — PREDNISONE 20 MG/1
40 TABLET ORAL DAILY
Status: DISCONTINUED | OUTPATIENT
Start: 2022-02-06 | End: 2022-02-09 | Stop reason: HOSPADM

## 2022-02-05 RX ORDER — ARIPIPRAZOLE 5 MG/1
10 TABLET ORAL DAILY
Status: DISCONTINUED | OUTPATIENT
Start: 2022-02-06 | End: 2022-02-09 | Stop reason: HOSPADM

## 2022-02-05 RX ORDER — BUPROPION HYDROCHLORIDE 150 MG/1
300 TABLET ORAL EVERY MORNING
Status: DISCONTINUED | OUTPATIENT
Start: 2022-02-06 | End: 2022-02-09 | Stop reason: HOSPADM

## 2022-02-05 RX ORDER — MECOBALAMIN 5000 MCG
5 TABLET,DISINTEGRATING ORAL NIGHTLY PRN
Status: DISCONTINUED | OUTPATIENT
Start: 2022-02-05 | End: 2022-02-09 | Stop reason: HOSPADM

## 2022-02-05 RX ORDER — ATORVASTATIN CALCIUM 20 MG/1
20 TABLET, FILM COATED ORAL DAILY
Refills: 3 | Status: DISCONTINUED | OUTPATIENT
Start: 2022-02-05 | End: 2022-02-09 | Stop reason: HOSPADM

## 2022-02-05 RX ORDER — ONDANSETRON 4 MG/1
4 TABLET, ORALLY DISINTEGRATING ORAL EVERY 8 HOURS PRN
Status: DISCONTINUED | OUTPATIENT
Start: 2022-02-05 | End: 2022-02-09 | Stop reason: HOSPADM

## 2022-02-05 RX ORDER — BENZONATATE 100 MG/1
100 CAPSULE ORAL 3 TIMES DAILY PRN
Status: DISCONTINUED | OUTPATIENT
Start: 2022-02-05 | End: 2022-02-09 | Stop reason: HOSPADM

## 2022-02-05 RX ORDER — ACETAMINOPHEN 325 MG/1
650 TABLET ORAL EVERY 6 HOURS PRN
Status: DISCONTINUED | OUTPATIENT
Start: 2022-02-05 | End: 2022-02-09 | Stop reason: HOSPADM

## 2022-02-05 RX ORDER — IPRATROPIUM BROMIDE AND ALBUTEROL SULFATE 2.5; .5 MG/3ML; MG/3ML
1 SOLUTION RESPIRATORY (INHALATION)
Status: COMPLETED | OUTPATIENT
Start: 2022-02-05 | End: 2022-02-05

## 2022-02-05 RX ORDER — PANTOPRAZOLE SODIUM 40 MG/1
40 TABLET, DELAYED RELEASE ORAL
Status: DISCONTINUED | OUTPATIENT
Start: 2022-02-06 | End: 2022-02-09 | Stop reason: HOSPADM

## 2022-02-05 RX ORDER — GUAIFENESIN/DEXTROMETHORPHAN 100-10MG/5
10 SYRUP ORAL EVERY 4 HOURS PRN
Status: DISCONTINUED | OUTPATIENT
Start: 2022-02-05 | End: 2022-02-09 | Stop reason: HOSPADM

## 2022-02-05 RX ORDER — INSULIN LISPRO 100 [IU]/ML
0-6 INJECTION, SOLUTION INTRAVENOUS; SUBCUTANEOUS
Status: DISCONTINUED | OUTPATIENT
Start: 2022-02-06 | End: 2022-02-07

## 2022-02-05 RX ORDER — ACETAMINOPHEN 500 MG
1000 TABLET ORAL ONCE
Status: COMPLETED | OUTPATIENT
Start: 2022-02-05 | End: 2022-02-05

## 2022-02-05 RX ORDER — DULOXETIN HYDROCHLORIDE 30 MG/1
30 CAPSULE, DELAYED RELEASE ORAL DAILY
Status: DISCONTINUED | OUTPATIENT
Start: 2022-02-06 | End: 2022-02-09 | Stop reason: HOSPADM

## 2022-02-05 RX ADMIN — IPRATROPIUM BROMIDE AND ALBUTEROL SULFATE 1 AMPULE: .5; 2.5 SOLUTION RESPIRATORY (INHALATION) at 15:27

## 2022-02-05 RX ADMIN — OSELTAMIVIR PHOSPHATE 75 MG: 75 CAPSULE ORAL at 18:28

## 2022-02-05 RX ADMIN — GABAPENTIN 300 MG: 300 CAPSULE ORAL at 23:15

## 2022-02-05 RX ADMIN — ATORVASTATIN CALCIUM 20 MG: 20 TABLET, FILM COATED ORAL at 23:16

## 2022-02-05 RX ADMIN — ONDANSETRON 4 MG: 2 INJECTION INTRAMUSCULAR; INTRAVENOUS at 16:39

## 2022-02-05 RX ADMIN — IOPAMIDOL 80 ML: 755 INJECTION, SOLUTION INTRAVENOUS at 16:08

## 2022-02-05 RX ADMIN — ACETAMINOPHEN 1000 MG: 500 TABLET ORAL at 15:46

## 2022-02-05 RX ADMIN — ENOXAPARIN SODIUM 40 MG: 100 INJECTION SUBCUTANEOUS at 20:55

## 2022-02-05 RX ADMIN — IPRATROPIUM BROMIDE AND ALBUTEROL SULFATE 1 AMPULE: .5; 2.5 SOLUTION RESPIRATORY (INHALATION) at 15:14

## 2022-02-05 RX ADMIN — IPRATROPIUM BROMIDE AND ALBUTEROL SULFATE 1 AMPULE: 2.5; .5 SOLUTION RESPIRATORY (INHALATION) at 21:25

## 2022-02-05 RX ADMIN — FUROSEMIDE 40 MG: 10 INJECTION, SOLUTION INTRAMUSCULAR; INTRAVENOUS at 20:56

## 2022-02-05 RX ADMIN — MONTELUKAST 10 MG: 10 TABLET, FILM COATED ORAL at 23:15

## 2022-02-05 RX ADMIN — AZITHROMYCIN MONOHYDRATE 500 MG: 500 INJECTION, POWDER, LYOPHILIZED, FOR SOLUTION INTRAVENOUS at 20:48

## 2022-02-05 RX ADMIN — IPRATROPIUM BROMIDE AND ALBUTEROL SULFATE 1 AMPULE: .5; 2.5 SOLUTION RESPIRATORY (INHALATION) at 15:30

## 2022-02-05 RX ADMIN — BENZONATATE 100 MG: 100 CAPSULE ORAL at 23:15

## 2022-02-05 NOTE — ED NOTES
Patient report called to [facility] at [facility], patient to transfer to room [phone], [facility] to transport eta now.      Emiliano Irizarry RN  02/05/22 [id number]

## 2022-02-05 NOTE — ED PROVIDER NOTES
CHIEF COMPLAINT  Shortness of Breath and Cough      HISTORY OF PRESENT ILLNESS  Trish Nelson is a 57 y.o. female with a history of CAD, CHF, depression, diabetes, obesity, schizoaffective disorder presenting for evaluation of shortness of breath. She comes in by EMS. Patient states that she has had worsening shortness of breath over the past several days. She denies any chest pain at this time. She states that she stopped smoking about a week ago. She did not take any Tylenol or ibuprofen today for her fever. She states that she was recently tested for Covid and this was negative. She states that she is on water pills but did not take her medicines this morning. She otherwise has been compliant with her home medicines. She denies any significant increase swelling in her legs. She states that she has had a cough. She is concerned whether her cancer in her lung is back she had a resection in 2011.   Past Medical History:   Diagnosis Date    Anemia     Anxiety     Arthritis     Back pain     CAD (coronary artery disease)     [facility]     CHF (congestive heart failure) (HCC)     Chronic kidney disease     Depression     Diabetes mellitus (HCC)     Esophageal reflux     Hep C w/o coma, chronic (HCC)     Hyperlipidemia     Hypertension     Leg abscess     MRSA (methicillin resistant staph aureus) culture positive 03/26/2021    left leg wound    Neuropathy     Schizoaffective disorder ([facility]     Thyroid disease      Past Surgical History:   Procedure Laterality Date    COLONOSCOPY  4/26/2021    COLONOSCOPY POLYPECTOMY SNARE/COLD BIOPSY performed by Trina Joshi MD at [address] [place] [address] unsure of laterality     UPPER GASTROINTESTINAL ENDOSCOPY N/A 4/26/2021    [facility]    EGD DIAGNOSTIC ONLY performed by Trina Joshi MD at [facility] ENDOSCOPY     Family History   Problem Relation Age of Onset    Liver Disease Brother      Social History     Socioeconomic History    Marital status: Legally      Spouse name: Not on file    Number of children: Not on file    Years of education: Not on file    Highest education level: Not on file   Occupational History    Not on file   Tobacco Use    Smoking status: Former Smoker     Packs/day: 2.00     Years: 30.00     Pack years: 60.00     Types: Cigarettes     Quit date: 2021     Years since quittin.0    Smokeless tobacco: Never Used   Vaping Use    Vaping Use: Never used   Substance and Sexual Activity    Alcohol use: No    Drug use: No    Sexual activity: Not on file   Other Topics Concern    Not on file   Social History Narrative    Not on file     Social Determinants of Health     Financial Resource Strain:     Difficulty of Paying Living Expenses: Not on file   Food Insecurity:     Worried About [address] in the Last Year: Not on file    [address] in the Last Year: Not on file   Transportation Needs:     Lack of Transportation (Medical): Not on file    Lack of Transportation (Non-Medical):  Not on file   Physical Activity:     Days of Exercise per Week: Not on file    Minutes of Exercise per Session: Not on file   Stress:     Feeling of Stress : Not on file   Social Connections:     Frequency of Communication with Friends and Family: Not on file    Frequency of Social Gatherings with Friends and Family: Not on file    Attends Advent Services: Not on file    Active Member of Clubs or Organizations: Not on file    Attends Club or Organization Meetings: Not on file    Marital Status: Not on file   Intimate Partner Violence:     Fear of Current or Ex-Partner: Not on file    Emotionally Abused: Not on file    Physically Abused: Not on file    Sexually Abused: Not on file   Housing Stability:     Unable to Pay for Housing in the Last Year: Not on file    Number of Places Lived in the Last Year: Not on file    Unstable Housing in the Last Year: Not on file     Current Facility-Administered Medications   Medication Dose Route Frequency Provider Last Rate Last Admin    oseltamivir (TAMIFLU) capsule 75 mg  75 mg Oral Once Wm Acevedo MD         Current Outpatient Medications   Medication Sig Dispense Refill    simvastatin (ZOCOR) 40 MG tablet Take 1 tablet by mouth nightly 90 tablet 3    Diclofenac Sodium 3 % GEL Diclofenac Topical Gel 1 %    PRN    Active      potassium bicarbonate (K-LYTE) 25 MEQ disintegrating tablet Take 25 mEq by mouth 2 times daily (Patient not taking: Reported on 8/31/2021)      ferrous sulfate (IRON 325) 325 (65 Fe) MG tablet Take 1 tablet by mouth 2 times daily (with meals) (Patient not taking: Reported on 8/31/2021) 30 tablet 3    torsemide (DEMADEX) 20 MG tablet Take 1 tablet by mouth daily (Patient taking differently: Take 100 mg by mouth daily ) 30 tablet 1    montelukast (SINGULAIR) 10 MG tablet Take 10 mg by mouth nightly      pantoprazole (PROTONIX) 40 MG tablet Take 1 tablet by mouth daily 90 tablet 1    clonazePAM (KLONOPIN) 0.5 MG tablet Take 0.5 mg by mouth daily as needed.       buPROPion (WELLBUTRIN XL) 300 MG extended release tablet Take 300 mg by mouth every morning      levothyroxine (SYNTHROID) 125 MCG tablet Take 125 mcg by mouth Daily      ARIPiprazole (ABILIFY) 10 MG tablet Take 10 mg by mouth daily      diphenhydrAMINE (BENADRYL ALLERGY) 25 MG tablet Take 25 mg by mouth every 6 hours as needed for Itching      DULoxetine (CYMBALTA) 30 MG extended release capsule Take 30 mg by mouth daily       fluticasone (FLOVENT HFA) 220 MCG/ACT inhaler Inhale 1 puff into the lungs 2 times daily      nitroGLYCERIN (NITROSTAT) 0.4 MG SL tablet Place 0.4 mg under the tongue      gabapentin (NEURONTIN) 300 MG capsule TAKE ONE CAPSULE BY MOUTH THREE TIMES A DAY      loperamide (IMODIUM) 2 MG capsule TAKE ONE CAPSULE BY MOUTH THREE TIMES A DAY AS NEEDED FOR DIARRHEA      traMADol (ULTRAM) 50 MG tablet Take 50 mg by mouth every 6 hours as needed for Pain.  albuterol (PROVENTIL HFA;VENTOLIN HFA) 108 (90 BASE) MCG/ACT inhaler Inhale 2 puffs into the lungs every 6 hours as needed for Wheezing      ascorbic acid (VITAMIN C) 500 MG tablet Take 500 mg by mouth daily      vitamin D (CHOLECALCIFEROL) 5000 UNITS CAPS capsule Take 5,000 Units by mouth daily       metoprolol (LOPRESSOR) 25 MG tablet Take 25 mg by mouth 2 times daily      Multiple Vitamins-Minerals (THERAPEUTIC MULTIVITAMIN-MINERALS) tablet Take 1 tablet by mouth daily      nicotine polacrilex (COMMIT) 4 MG lozenge Take 4 mg by mouth as needed for Smoking cessation (Patient not taking: Reported on 8/31/2021)      mupirocin (BACTROBAN) 2 % ointment Apply topically 3 times daily Apply topically 3 times daily.  spironolactone (ALDACTONE) 25 MG tablet Take 25 mg by mouth daily (Patient not taking: Reported on 8/31/2021)      vitamin E 1000 UNITS capsule Take 1,000 Units by mouth daily       Allergies   Allergen Reactions    Tums [Calcium Carbonate Antacid] Nausea Only       REVIEW OF SYSTEMS  Positive and pertinent negatives as per HPI. All other systems were reviewed and are negative. PHYSICAL EXAM  BP (!) 157/77   Pulse 107   Temp 100.7 °F (38.2 °C)   Resp 20   Ht 5' 4\" (1.626 m)   Wt (!) 362 lb (164.2 kg)   SpO2 95%   BMI 62.14 kg/m²   GENERAL APPEARANCE: Awake and alert. Cooperative. HEAD: Normocephalic. Atraumatic. EYES: PERRL. EOM's grossly intact. HEART: Tachycardic. No harsh murmurs. Intact radial pulses 2+ bilaterally. LUNGS: Respirations are mildly labored, mild accessory muscle use, coarse wheezing bilaterally. Harjinder Case Speaking comfortably in full sentences. ABDOMEN: Soft. Non-distended. Non-tender. No guarding or rebound. EXTREMITIES: Brawny induration of the bilateral lower extremities. 1+ edema.   No acute deformities. SKIN: Warm and dry. No acute rashes. NEUROLOGICAL: Alert and oriented X 3. No focal deficits    LABS  I have reviewed all labs for this visit.    Results for orders placed or performed during the hospital encounter of 02/05/22   COVID-19, Rapid    Specimen: Nasopharyngeal Swab   Result Value Ref Range    SARS-CoV-2, NAAT Not Detected Not Detected   Rapid influenza A/B antigens    Specimen: Nasopharyngeal   Result Value Ref Range    Rapid Influenza A Ag POSITIVE (A) Negative    Rapid Influenza B Ag Negative Negative   CBC Auto Differential   Result Value Ref Range    WBC 8.3 4.0 - 11.0 K/uL    RBC 4.75 4.00 - 5.20 M/uL    Hemoglobin 13.1 12.0 - 16.0 g/dL    Hematocrit 39.1 36.0 - 48.0 %    MCV 82.4 80.0 - 100.0 fL    MCH 27.7 26.0 - 34.0 pg    MCHC 33.6 31.0 - 36.0 g/dL    RDW 15.1 12.4 - 15.4 %    Platelets 166 793 - 226 K/uL    MPV 7.3 5.0 - 10.5 fL    Neutrophils % 74.9 %    Lymphocytes % 7.3 %    Monocytes % 16.5 %    Eosinophils % 0.9 %    Basophils % 0.4 %    Neutrophils Absolute 6.2 1.7 - 7.7 K/uL    Lymphocytes Absolute 0.6 (L) 1.0 - 5.1 K/uL    Monocytes Absolute 1.4 (H) 0.0 - 1.3 K/uL    Eosinophils Absolute 0.1 0.0 - 0.6 K/uL    Basophils Absolute 0.0 0.0 - 0.2 K/uL   Comprehensive Metabolic Panel w/ Reflex to MG   Result Value Ref Range    Sodium 135 (L) 136 - 145 mmol/L    Potassium reflex Magnesium 4.1 3.5 - 5.1 mmol/L    Chloride 95 (L) 99 - 110 mmol/L    CO2 28 21 - 32 mmol/L    Anion Gap 12 3 - 16    Glucose 125 (H) 70 - 99 mg/dL    BUN 15 7 - 20 mg/dL    CREATININE 1.0 0.6 - 1.2 mg/dL    GFR Non- 56 (A) >60    GFR African American >60 >60    Calcium 9.8 8.3 - 10.6 mg/dL    Total Protein 8.3 (H) 6.4 - 8.2 g/dL    Albumin 4.5 3.4 - 5.0 g/dL    Albumin/Globulin Ratio 1.2 1.1 - 2.2    Total Bilirubin 0.4 0.0 - 1.0 mg/dL    Alkaline Phosphatase 170 (H) 40 - 129 U/L    ALT 42 (H) 10 - 40 U/L    AST 35 15 - 37 U/L   Troponin   Result Value Ref Range    Troponin <0.01 <0.01 ng/mL   Brain Natriuretic Peptide   Result Value Ref Range    Pro- (H) 0 - 124 pg/mL   Blood Gas, Venous   Result Value Ref Range    pH, [place] 7.366 7.350 - 7.450    pCO2, [place] 53.9 (H) 40.0 - 50.0 mmHg    pO2, [place] 66.2 (H) 25.0 - 40.0 mmHg    HCO3, Venous 30.9 (H) 23.0 - 29.0 mmol/L    Base Excess, [place] 5.6 (H) -3.0 - 3.0 mmol/L    O2 Sat, [place] 92 Not Established %    TC02 (Calc), [place] 31 Not Established mmol/L    O2 Therapy Unknown    Lactate, Sepsis   Result Value Ref Range    Lactic Acid, Sepsis 1.0 0.4 - 1.9 mmol/L       EKG  The Ekg interpreted by myself in the emergency department in the absence of a cardiologist.  sinus tachycardia, xbqb=822 with a rate of 106  Axis is   Normal  QTc is  within an acceptable range  Intervals and Durations are unremarkable. No specific ST-T wave changes appreciated. No evidence of acute ischemia. No significant change from prior EKG dated 8/31/2021      RADIOLOGY  X-RAYS:  I have reviewed radiologic plain film image(s). ALL OTHER NON-PLAIN FILM IMAGES SUCH AS CT, ULTRASOUND AND MRI HAVE BEEN READ BY THE RADIOLOGIST. CT CHEST PULMONARY EMBOLISM W CONTRAST   Final Result      No CT evidence of pulmonary embolism. Prominent central main pulmonary arteries. This can be seen with pulmonary arterial hypertension. No other acute findings in the chest.               Critical Care: Total critical care time is 40 minutes, which excludes separately billable procedures and updating family. Time spent is specifically for management of the presenting complaint and symptoms initially, direct bedside care, reevaluation, review of records, and consultation. There was a high probability of clinically significant life-threatening deterioration in the patient's condition, which required my urgent intervention.        PROCEDURES:    [address] [facility] limited cardiac exam  Procedure note:  Indication:  shortness of breath, tachycardia  Billable study: yes    Views:  Subxiphoid (4 chamber): Reduced  Parasternal long axis: Reduced  Parasternal short axis: Reduced  Subxiphoid (long axis, IVC view): Adequate  Apical four-chamber: Adequate    Images obtained with findings:   Global ventricular function: normal  Pericardial effusion: Absent   Right ventricular size: normal  IVC: normal  IVC collapsibility: less than 50%    Impression:   Cardiac dysfunction: Absent   Pericardial effusion: Absent   Evidence of cardiac tamponade: Absent   Cardiac activity: normal  RV dilation: Absent   Sonographic evidence of volume depletion: Absent   Dilated IVC: Absent      Images obtained by me, interpreted by me. Images were saved to ultrasound machine. ED COURSE/MDM  Patient seen and evaluated. Old records reviewed. Labs and imaging reviewed and results discussed with patient. 40-year-old female presenting for shortness of breath, cough. Upon arrival, patient found to be febrile to 100.7 °F, tachycardic. She is saturating 83% on room air, placed on supplemental O2 with improvement to the mid 90s. She does have mild respiratory distress. Bedside echocardiogram was performed which was unremarkable. ED evaluation will include basic labs, cardiac panel, EKG. We will obtain CT chest to rule out pulmonary embolism, recurrence of lung cancer. She will receive DuoNeb breathing treatments while diagnostic work-up is pending. Patient is positive for influenza A. Covid test was negative. Sodium 135. Venous blood gas without significant CO2 retention. Normal pH is 7.36. Troponin is negative at less than 0.01, lactic acid normal at 1.0. CT of the chest reveals no pulmonary embolism. On clarification of her symptoms, patient states that she has been feeling unwell for the past 1 week however symptoms worsened in the past 1 to 2 days.   She could be a candidate for Tamiflu however we only have access to  Tamiflu at this time and she will require this after transfer to [facility]    Patient still requires 4 L nasal cannula and is this is a new oxygen requirement in the setting of influenza A, she will require admission for continued management of hypoxia in the setting of influenza A. Signed out to medicine in stable condition. CLINICAL IMPRESSION  1. Influenza A    2. Hypoxia    3. Shortness of breath        Blood pressure (!) 157/77, pulse 107, temperature 100.7 °F (38.2 °C), resp. rate 20, height 5' 4\" (1.626 m), weight (!) 362 lb (164.2 kg), SpO2 95 %. DISPOSITION  Dunia Oliva was admitted in stable condition. This chart was generated in part by using Dragon Dictation system and may contain errors related to that system including errors in grammar, punctuation, and spelling, as well as words and phrases that may be inappropriate. If there are any questions or concerns please feel free to contact the dictating provider for clarification.      Essence Lawrence MD  [phone]

## 2022-02-06 LAB
ANION GAP SERPL CALCULATED.3IONS-SCNC: 8 MMOL/L (ref 3–16)
BUN BLDV-MCNC: 11 MG/DL (ref 7–20)
CALCIUM SERPL-MCNC: 9.4 MG/DL (ref 8.3–10.6)
CHLORIDE BLD-SCNC: 95 MMOL/L (ref 99–110)
CO2: 31 MMOL/L (ref 21–32)
CREAT SERPL-MCNC: 0.8 MG/DL (ref 0.6–1.2)
EKG ATRIAL RATE: 106 BPM
EKG DIAGNOSIS: NORMAL
EKG P AXIS: 67 DEGREES
EKG P-R INTERVAL: 146 MS
EKG Q-T INTERVAL: 328 MS
EKG QRS DURATION: 78 MS
EKG QTC CALCULATION (BAZETT): 435 MS
EKG R AXIS: -13 DEGREES
EKG T AXIS: 61 DEGREES
EKG VENTRICULAR RATE: 106 BPM
GFR AFRICAN AMERICAN: >60
GFR NON-AFRICAN AMERICAN: >60
GLUCOSE BLD-MCNC: 104 MG/DL (ref 70–99)
GLUCOSE BLD-MCNC: 127 MG/DL (ref 70–99)
GLUCOSE BLD-MCNC: 130 MG/DL (ref 70–99)
GLUCOSE BLD-MCNC: 149 MG/DL (ref 70–99)
GLUCOSE BLD-MCNC: 213 MG/DL (ref 70–99)
PERFORMED ON: ABNORMAL
POTASSIUM REFLEX MAGNESIUM: 4.5 MMOL/L (ref 3.5–5.1)
SODIUM BLD-SCNC: 134 MMOL/L (ref 136–145)

## 2022-02-06 PROCEDURE — 93010 ELECTROCARDIOGRAM REPORT: CPT | Performed by: INTERNAL MEDICINE

## 2022-02-06 PROCEDURE — 94640 AIRWAY INHALATION TREATMENT: CPT

## 2022-02-06 PROCEDURE — 36415 COLL VENOUS BLD VENIPUNCTURE: CPT

## 2022-02-06 PROCEDURE — 6370000000 HC RX 637 (ALT 250 FOR IP): Performed by: INTERNAL MEDICINE

## 2022-02-06 PROCEDURE — 6360000002 HC RX W HCPCS: Performed by: INTERNAL MEDICINE

## 2022-02-06 PROCEDURE — 2700000000 HC OXYGEN THERAPY PER DAY

## 2022-02-06 PROCEDURE — 83036 HEMOGLOBIN GLYCOSYLATED A1C: CPT

## 2022-02-06 PROCEDURE — 94760 N-INVAS EAR/PLS OXIMETRY 1: CPT

## 2022-02-06 PROCEDURE — 1200000000 HC SEMI PRIVATE

## 2022-02-06 PROCEDURE — 80048 BASIC METABOLIC PNL TOTAL CA: CPT

## 2022-02-06 PROCEDURE — 2580000003 HC RX 258: Performed by: INTERNAL MEDICINE

## 2022-02-06 RX ADMIN — GABAPENTIN 300 MG: 300 CAPSULE ORAL at 20:41

## 2022-02-06 RX ADMIN — BENZONATATE 100 MG: 100 CAPSULE ORAL at 09:04

## 2022-02-06 RX ADMIN — IPRATROPIUM BROMIDE AND ALBUTEROL SULFATE 1 AMPULE: 2.5; .5 SOLUTION RESPIRATORY (INHALATION) at 16:35

## 2022-02-06 RX ADMIN — OSELTAMIVIR PHOSPHATE 75 MG: 75 CAPSULE ORAL at 09:08

## 2022-02-06 RX ADMIN — INSULIN LISPRO 1 UNITS: 100 INJECTION, SOLUTION INTRAVENOUS; SUBCUTANEOUS at 21:03

## 2022-02-06 RX ADMIN — ARIPIPRAZOLE 10 MG: 5 TABLET ORAL at 09:04

## 2022-02-06 RX ADMIN — GUAIFENESIN SYRUP AND DEXTROMETHORPHAN 10 ML: 100; 10 SYRUP ORAL at 06:21

## 2022-02-06 RX ADMIN — ACETAMINOPHEN 650 MG: 325 TABLET ORAL at 21:18

## 2022-02-06 RX ADMIN — AZITHROMYCIN MONOHYDRATE 500 MG: 500 INJECTION, POWDER, LYOPHILIZED, FOR SOLUTION INTRAVENOUS at 20:43

## 2022-02-06 RX ADMIN — GABAPENTIN 300 MG: 300 CAPSULE ORAL at 09:04

## 2022-02-06 RX ADMIN — ATORVASTATIN CALCIUM 20 MG: 20 TABLET, FILM COATED ORAL at 09:05

## 2022-02-06 RX ADMIN — SPIRONOLACTONE 25 MG: 25 TABLET, FILM COATED ORAL at 09:05

## 2022-02-06 RX ADMIN — ACETAMINOPHEN 650 MG: 325 TABLET ORAL at 09:04

## 2022-02-06 RX ADMIN — SODIUM CHLORIDE, PRESERVATIVE FREE 5 ML: 5 INJECTION INTRAVENOUS at 09:00

## 2022-02-06 RX ADMIN — DULOXETINE HYDROCHLORIDE 30 MG: 30 CAPSULE, DELAYED RELEASE ORAL at 09:05

## 2022-02-06 RX ADMIN — BUPROPION HYDROCHLORIDE 300 MG: 150 TABLET, FILM COATED, EXTENDED RELEASE ORAL at 09:04

## 2022-02-06 RX ADMIN — GUAIFENESIN SYRUP AND DEXTROMETHORPHAN 10 ML: 100; 10 SYRUP ORAL at 13:19

## 2022-02-06 RX ADMIN — ENOXAPARIN SODIUM 40 MG: 100 INJECTION SUBCUTANEOUS at 20:41

## 2022-02-06 RX ADMIN — MONTELUKAST 10 MG: 10 TABLET, FILM COATED ORAL at 20:41

## 2022-02-06 RX ADMIN — TORSEMIDE 20 MG: 20 TABLET ORAL at 09:05

## 2022-02-06 RX ADMIN — GABAPENTIN 300 MG: 300 CAPSULE ORAL at 13:19

## 2022-02-06 RX ADMIN — PREDNISONE 40 MG: 20 TABLET ORAL at 09:04

## 2022-02-06 RX ADMIN — IPRATROPIUM BROMIDE AND ALBUTEROL SULFATE 1 AMPULE: 2.5; .5 SOLUTION RESPIRATORY (INHALATION) at 20:37

## 2022-02-06 RX ADMIN — LEVOTHYROXINE SODIUM 125 MCG: 125 TABLET ORAL at 06:22

## 2022-02-06 RX ADMIN — PANTOPRAZOLE SODIUM 40 MG: 40 TABLET, DELAYED RELEASE ORAL at 06:20

## 2022-02-06 RX ADMIN — BENZONATATE 100 MG: 100 CAPSULE ORAL at 17:43

## 2022-02-06 ASSESSMENT — PAIN SCALES - GENERAL
PAINLEVEL_OUTOF10: 4
PAINLEVEL_OUTOF10: 4
PAINLEVEL_OUTOF10: 2

## 2022-02-06 NOTE — PROGRESS NOTES
Hospitalist Progress Note      PCP: Claire Odell    Date of Admission: 2/5/2022    Hospital Course: 78-year-old female who presented with shortness of breath congestion with productive cough for last 1 week. Not take her diuretics because she has been weak for a week. Subjective: Patient seen and examined  Feeling better today  No new complaint  Rapid flu positive      Medications:  Reviewed    Infusion Medications    sodium chloride      dextrose      dextrose       Scheduled Medications    ARIPiprazole  10 mg Oral Daily    buPROPion  300 mg Oral QAM    DULoxetine  30 mg Oral Daily    gabapentin  300 mg Oral TID    levothyroxine  125 mcg Oral QAM AC    montelukast  10 mg Oral Nightly    pantoprazole  40 mg Oral QAM AC    atorvastatin  20 mg Oral Daily    spironolactone  25 mg Oral Daily    torsemide  20 mg Oral Daily    sodium chloride flush  5-40 mL IntraVENous 2 times per day    enoxaparin  40 mg SubCUTAneous Daily    ipratropium-albuterol  1 ampule Inhalation Q4H WA    predniSONE  40 mg Oral Daily    azithromycin  500 mg IntraVENous Q24H    oseltamivir  75 mg Oral BID    insulin lispro  0-6 Units SubCUTAneous TID WC    insulin lispro  0-3 Units SubCUTAneous Nightly     PRN Meds: clonazePAM, sodium chloride flush, sodium chloride, ondansetron **OR** ondansetron, polyethylene glycol, acetaminophen **OR** acetaminophen, albuterol, glucose, dextrose, glucagon (rDNA), dextrose, guaiFENesin-dextromethorphan, benzonatate, melatonin, perflutren lipid microspheres    No intake or output data in the 24 hours ending 02/06/22 1145    Physical Exam Performed:    /75   Pulse 87   Temp 101.4 °F (38.6 °C) (Oral)   Resp 16   Ht 5' 4\" (1.626 m)   Wt (!) 366 lb (166 kg)   SpO2 92%   BMI 62.82 kg/m²     General appearance: In moderate distress due to shortness of breath and cough, appears stated age and cooperative.   Morbidly obese, on supplemental oxygen via nasal cannula-4 L/min  HEENT:  Normal cephalic, atraumatic without obvious deformity. Pupils equal, round, and reactive to light. Extra ocular muscles intact. Conjunctivae/corneas clear. Neck: Supple, with full range of motion. No jugular venous distention. Trachea midline. Respiratory: Mildly labored respiratory effort. Diminished breath sounds diffusely. Cardiovascular: Tachycardic rate and rhythm with normal S1/S2 without murmurs, rubs or gallops. Abdomen: Soft, non-tender, non-distended with normal bowel sounds. Musculoskeletal:  No clubbing, cyanosis. Bilateral pitting edema 1+ with chronic skin changes bilaterally. Full range of motion without deformity. Skin: Skin color, texture, turgor normal.  No rashes or lesions. Neurologic:  Neurovascularly intact without any focal sensory/motor deficits. Cranial nerves: II-XII intact, grossly non-focal.  Psychiatric:  Alert and oriented, thought content appropriate, normal insight  Capillary Refill: Brisk,3 seconds, normal  Peripheral Pulses: +2 palpable, equal bilaterally     Labs:   Recent Labs     02/05/22  1509   WBC 8.3   HGB 13.1   HCT 39.1        Recent Labs     02/05/22  1517 02/06/22  0752   * 134*   K 4.1 4.5   CL 95* 95*   CO2 28 31   BUN 15 11   CREATININE 1.0 0.8   CALCIUM 9.8 9.4     Recent Labs     02/05/22  1517   AST 35   ALT 42*   BILITOT 0.4   ALKPHOS 170*     No results for input(s): INR in the last 72 hours. Recent Labs     02/05/22  1517   TROPONINI <0.01       Urinalysis:      Lab Results   Component Value Date    NITRU Negative 02/05/2022    WBCUA 21-50 03/26/2021    BACTERIA 1+ 03/26/2021    RBCUA 3-4 03/26/2021    BLOODU Negative 02/05/2022    SPECGRAV <=1.005 02/05/2022    GLUCOSEU Negative 02/05/2022       Radiology:  CT CHEST PULMONARY EMBOLISM W CONTRAST   Final Result      No CT evidence of pulmonary embolism. Prominent central main pulmonary arteries. This can be seen with pulmonary arterial hypertension.       No other acute findings in the chest.              Assessment/Plan:    Active Hospital Problems    Diagnosis     Influenza A [J10.1]        #Acute respiratory failure with hypoxia and hypercarbia-multifactorial: Undiagnosed COPD given tobacco smoking for many years, OHS with restrictive lung disease/ROSARIO, pulmonary hypertension, CHF-type unknown  -Continue on supplemental oxygen, bronchodilator therapy, steroids  -Continue on Tamiflu and droplet isolation  -Continue on azithromycin for URI  -Lasix 40 mg x 1 as patient has not taken her diuretics today  -Monitor VBG/ABG  -Limited options for IV fluids given history of CHF  -Antitussives as needed  -2D echocardiogram, the study from 2 years ago as above  -Discussed CTA findings and counseled on importance of repeating the sleep study and compliance with CPAP      #Influenza A infection, COVID-19 PCR-negative    #SIRS secondary to influenza and URI    #Tobacco abuse disorder    #Morbid obesity with BMI of 62    #ROSARIO, noncompliant with CPAP  -Check A1c  -Started on low-dose SSI as patient will be on p.o. steroids and possible metabolic syndrome with increased BMI      #Pulmonary arterial hypertension noted on CTA chest     DVT Prophylaxis: lovenox  Diet: ADULT DIET; Regular; 4 carb choices (60 gm/meal); Low Fat/Low Chol/High Fiber/2 gm Na  Code Status: Full Code    PT/OT Eval Status:   Will order    Sergio Mina MD

## 2022-02-06 NOTE — PROGRESS NOTES
Pt noted to have large amount of incontinence. She states it's because she was told not to get out of bed. She c/o feeling weak and fatigued. Pt given bath and purewick applied to monitor output.

## 2022-02-06 NOTE — H&P
Hospital Medicine History & Physical      PCP: Sherwin Hernandez    Date of Admission: 2/5/2022    Date of Service: Pt seen/examined on 2/5/2022 and Admitted to Inpatient with expected LOS greater than two midnights due to medical therapy. Chief Complaint: Shortness of breath  Patient is transferred from [address] [place] [address] ED for further evaluation and management of shortness of breath      History Of Present Illness:   57 y.o. female who presents with complaints of shortness of breath, congestion with associated productive cough with white phlegm for the past 1 week. Patient has multiple comorbid conditions that can lead to her symptoms in addition that she was diagnosed with influenza A today at [place] ED. Patient has been febrile to 100.7 °F and tachycardic. Patient feels short of breath all the time, and has history of CHF for which she is on Aldactone and torsemide daily which she has not taken since she was feeling too sick. Patient denies any weight gain, worsening leg swelling or orthopnea. Patient is currently on 4 L of oxygen via nasal cannula, states that she does not use oxygen at home. Patient also states that she had sleep studies long time ago and a CPAP machine which she does not use. States that she is not motivated to use it  Patient is a chronic smoker, states quit few days ago. Has been trying to quit many times, has relapsed and has been smoking for the past 3 months again. Has been smoking for many years with intermittent attempts of quitting for few months.       Past Medical History:          Diagnosis Date    Anemia     Anxiety     Arthritis     CAD (coronary artery disease)     [facility]     CHF (congestive heart failure) (HCC)     diastolic    Chronic kidney disease     Depression     Diabetes mellitus ([facility]     Hep C w/o coma, chronic ([facility]     Hyperlipidemia     Hypertension     MRSA (methicillin resistant staph aureus) culture positive 03/26/2021    left leg wound    Neuropathy     Schizoaffective disorder (HCC)     Thyroid disease        Past Surgical History:          Procedure Laterality Date    COLONOSCOPY  4/26/2021    COLONOSCOPY POLYPECTOMY SNARE/COLD BIOPSY performed by Hay Lakhani MD at [address]     pt unsure of laterality     UPPER GASTROINTESTINAL ENDOSCOPY N/A 4/26/2021    EGD BIOPSY performed by Hay Lakhani MD at [address] [facility] 6/21/2021    EGD DIAGNOSTIC ONLY performed by Hay Lakhani MD at [facility] ENDOSCOPY       Medications Prior to Admission:      Prior to Admission medications    Medication Sig Start Date End Date Taking? Authorizing Provider   simvastatin (ZOCOR) 40 MG tablet Take 1 tablet by mouth nightly 12/1/21   Krishna Coppola MD   Diclofenac Sodium 3 % GEL Diclofenac Topical Gel 1 %    PRN    Active    Historical Provider, MD   potassium bicarbonate (K-LYTE) 25 MEQ disintegrating tablet Take 25 mEq by mouth 2 times daily  Patient not taking: Reported on 8/31/2021    Historical Provider, MD   ferrous sulfate (IRON 325) 325 (65 Fe) MG tablet Take 1 tablet by mouth 2 times daily (with meals)  Patient not taking: Reported on 8/31/2021 3/28/21   Samantha Teixeira MD   torsemide (DEMADEX) 20 MG tablet Take 1 tablet by mouth daily  Patient taking differently: Take 100 mg by mouth daily  3/28/21   Samantha Teixeira MD   montelukast (SINGULAIR) 10 MG tablet Take 10 mg by mouth nightly    Historical Provider, MD   pantoprazole (PROTONIX) 40 MG tablet Take 1 tablet by mouth daily 3/16/21   Varun Mcqueen MD   clonazePAM (KLONOPIN) 0.5 MG tablet Take 0.5 mg by mouth daily as needed.     Historical Provider, MD   buPROPion (WELLBUTRIN XL) 300 MG extended release tablet Take 300 mg by mouth every morning    Historical Provider, MD   levothyroxine (SYNTHROID) 125 MCG tablet Take 125 mcg by mouth Daily    Historical Provider, MD   ARIPiprazole (ABILIFY) 10 MG tablet Take 10 mg by mouth daily    Historical Provider, MD   diphenhydrAMINE (BENADRYL ALLERGY) 25 MG tablet Take 25 mg by mouth every 6 hours as needed for Itching    Historical Provider, MD   DULoxetine (CYMBALTA) 30 MG extended release capsule Take 30 mg by mouth daily     Historical Provider, MD   fluticasone (FLOVENT HFA) 220 MCG/ACT inhaler Inhale 1 puff into the lungs 2 times daily    Historical Provider, MD   nitroGLYCERIN (NITROSTAT) 0.4 MG SL tablet Place 0.4 mg under the tongue 8/16/18   Historical Provider, MD   gabapentin (NEURONTIN) 300 MG capsule TAKE ONE CAPSULE BY MOUTH THREE TIMES A DAY 7/5/19   Historical Provider, MD   loperamide (IMODIUM) 2 MG capsule TAKE ONE CAPSULE BY MOUTH THREE TIMES A DAY AS NEEDED FOR DIARRHEA 4/11/19   Historical Provider, MD   traMADol (ULTRAM) 50 MG tablet Take 50 mg by mouth every 6 hours as needed for Pain. 12/30/19   Historical Provider, MD   albuterol (PROVENTIL HFA;VENTOLIN HFA) 108 (90 BASE) MCG/ACT inhaler Inhale 2 puffs into the lungs every 6 hours as needed for Wheezing    Historical Provider, MD   ascorbic acid (VITAMIN C) 500 MG tablet Take 500 mg by mouth daily    Historical Provider, MD   vitamin D (CHOLECALCIFEROL) 5000 UNITS CAPS capsule Take 5,000 Units by mouth daily     Historical Provider, MD   metoprolol (LOPRESSOR) 25 MG tablet Take 25 mg by mouth 2 times daily    Historical Provider, MD   Multiple Vitamins-Minerals (THERAPEUTIC MULTIVITAMIN-MINERALS) tablet Take 1 tablet by mouth daily    Historical Provider, MD   nicotine polacrilex (COMMIT) 4 MG lozenge Take 4 mg by mouth as needed for Smoking cessation  Patient not taking: Reported on 8/31/2021    Historical Provider, MD   mupirocin (BACTROBAN) 2 % ointment Apply topically 3 times daily Apply topically 3 times daily.     Historical Provider, MD   spironolactone (ALDACTONE) 25 MG tablet Take 25 mg by mouth daily  Patient not taking: Reported on 8/31/2021    Historical Provider, Neurovascularly intact without any focal sensory/motor deficits. Cranial nerves: II-XII intact, grossly non-focal.  Psychiatric:  Alert and oriented, thought content appropriate, normal insight  Capillary Refill: Brisk,3 seconds, normal  Peripheral Pulses: +2 palpable, equal bilaterally       Labs:     Recent Labs     02/05/22  1509   WBC 8.3   HGB 13.1   HCT 39.1        Recent Labs     02/05/22  1517   *   K 4.1   CL 95*   CO2 28   BUN 15   CREATININE 1.0   CALCIUM 9.8     Recent Labs     02/05/22  1517   AST 35   ALT 42*   BILITOT 0.4   ALKPHOS 170*     No results for input(s): INR in the last 72 hours. Recent Labs     02/05/22  1517   TROPONINI <0.01       Urinalysis:      Lab Results   Component Value Date    NITRU Negative 02/05/2022    WBCUA 21-50 03/26/2021    BACTERIA 1+ 03/26/2021    RBCUA 3-4 03/26/2021    BLOODU Negative 02/05/2022    SPECGRAV <=1.005 02/05/2022    GLUCOSEU Negative 02/05/2022       Radiology:     CXR: I have reviewed the CXR with the following interpretation: N/A  EKG:  I have reviewed the EKG with the following interpretation: Sinus tachycardia, VR = 106, no acute ST-T changes    CT CHEST PULMONARY EMBOLISM W CONTRAST   Final Result      No CT evidence of pulmonary embolism. Prominent central main pulmonary arteries. This can be seen with pulmonary arterial hypertension. No other acute findings in the chest.      2D echocardiogram done on February 2020  Left ventricle: The cavity size is normal. Wall thickness is normal. Systolic function was normal.     The estimated ejection fraction was in the range of 55% to 60%. Wall motion was normal; there were     no regional wall motion abnormalities. - Right ventricle: Systolic function was normal by objective interpretation. TAPSE: 2cm.      ASSESSMENT:    Active Hospital Problems    Diagnosis Date Noted    Influenza A [J10.1] 02/05/2022   #Acute respiratory failure with hypoxia and hypercarbia-multifactorial: Undiagnosed COPD given tobacco smoking for many years, OHS with restrictive lung disease/ROSARIO, pulmonary hypertension, CHF-type unknown  #Influenza A infection, COVID-19 PCR-negative  #SIRS secondary to influenza and URI  #Tobacco abuse disorder  #Morbid obesity with BMI of 62  #ROSARIO, noncompliant with CPAP  #Pulmonary arterial hypertension noted on CTA chest    PLAN:  -Continue on supplemental oxygen, bronchodilator therapy, steroids  -Continue on Tamiflu and droplet isolation  -Continue on azithromycin for URI  -Lasix 40 mg x 1 as patient has not taken her diuretics today  -Monitor VBG/ABG  -Limited options for IV fluids given history of CHF  -Antitussives as needed  -2D echocardiogram, the study from 2 years ago as above  -Discussed CTA findings and counseled on importance of repeating the sleep study and compliance with CPAP  -Will continue home medications appropriately  -Check A1c  -Started on low-dose SSI as patient will be on p.o. steroids and possible metabolic syndrome with increased BMI  -Monitor electrolytes and renal function  -Supportive therapy      DVT Prophylaxis: Lovenox  Diet: ADULT DIET; Regular; 4 carb choices (60 gm/meal); Low Fat/Low Chol/High Fiber/2 gm Na  Code Status: Full Code    PT/OT Eval Status: As tolerated with fall precautions    Dispo -GMF with telemetry, droplet isolation       Verena Raman MD    Thank you Ashley Villafuerte for the opportunity to be involved in this patient's care. If you have any questions or concerns please feel free to contact me at [phone].

## 2022-02-06 NOTE — PLAN OF CARE
Problem: Falls - Risk of:  Goal: Will remain free from falls  Description: Will remain free from falls  2/6/2022 0057 by Abbi Campos RN  Note: Safety measures in place

## 2022-02-07 LAB
ESTIMATED AVERAGE GLUCOSE: 122.6 MG/DL
GLUCOSE BLD-MCNC: 116 MG/DL (ref 70–99)
GLUCOSE BLD-MCNC: 131 MG/DL (ref 70–99)
GLUCOSE BLD-MCNC: 167 MG/DL (ref 70–99)
GLUCOSE BLD-MCNC: 99 MG/DL (ref 70–99)
HBA1C MFR BLD: 5.9 %
LV EF: 55 %
LVEF MODALITY: NORMAL
PERFORMED ON: ABNORMAL
PERFORMED ON: NORMAL

## 2022-02-07 PROCEDURE — 1200000000 HC SEMI PRIVATE

## 2022-02-07 PROCEDURE — 97116 GAIT TRAINING THERAPY: CPT

## 2022-02-07 PROCEDURE — 93306 TTE W/DOPPLER COMPLETE: CPT

## 2022-02-07 PROCEDURE — 97162 PT EVAL MOD COMPLEX 30 MIN: CPT

## 2022-02-07 PROCEDURE — 97530 THERAPEUTIC ACTIVITIES: CPT

## 2022-02-07 PROCEDURE — 97166 OT EVAL MOD COMPLEX 45 MIN: CPT

## 2022-02-07 PROCEDURE — 2700000000 HC OXYGEN THERAPY PER DAY

## 2022-02-07 PROCEDURE — 6370000000 HC RX 637 (ALT 250 FOR IP): Performed by: INTERNAL MEDICINE

## 2022-02-07 PROCEDURE — 6360000002 HC RX W HCPCS: Performed by: INTERNAL MEDICINE

## 2022-02-07 PROCEDURE — 94761 N-INVAS EAR/PLS OXIMETRY MLT: CPT

## 2022-02-07 PROCEDURE — 94640 AIRWAY INHALATION TREATMENT: CPT

## 2022-02-07 PROCEDURE — 97535 SELF CARE MNGMENT TRAINING: CPT

## 2022-02-07 PROCEDURE — 2580000003 HC RX 258: Performed by: INTERNAL MEDICINE

## 2022-02-07 RX ADMIN — SODIUM CHLORIDE, PRESERVATIVE FREE 10 ML: 5 INJECTION INTRAVENOUS at 20:27

## 2022-02-07 RX ADMIN — IPRATROPIUM BROMIDE AND ALBUTEROL SULFATE 1 AMPULE: 2.5; .5 SOLUTION RESPIRATORY (INHALATION) at 07:59

## 2022-02-07 RX ADMIN — PREDNISONE 40 MG: 20 TABLET ORAL at 09:05

## 2022-02-07 RX ADMIN — OSELTAMIVIR PHOSPHATE 75 MG: 75 CAPSULE ORAL at 00:22

## 2022-02-07 RX ADMIN — SODIUM CHLORIDE, PRESERVATIVE FREE 10 ML: 5 INJECTION INTRAVENOUS at 09:06

## 2022-02-07 RX ADMIN — SPIRONOLACTONE 25 MG: 25 TABLET, FILM COATED ORAL at 09:05

## 2022-02-07 RX ADMIN — OSELTAMIVIR PHOSPHATE 75 MG: 75 CAPSULE ORAL at 09:05

## 2022-02-07 RX ADMIN — ATORVASTATIN CALCIUM 20 MG: 20 TABLET, FILM COATED ORAL at 09:05

## 2022-02-07 RX ADMIN — BENZONATATE 100 MG: 100 CAPSULE ORAL at 17:45

## 2022-02-07 RX ADMIN — ACETAMINOPHEN 650 MG: 325 TABLET ORAL at 05:29

## 2022-02-07 RX ADMIN — IPRATROPIUM BROMIDE AND ALBUTEROL SULFATE 1 AMPULE: 2.5; .5 SOLUTION RESPIRATORY (INHALATION) at 14:55

## 2022-02-07 RX ADMIN — ENOXAPARIN SODIUM 40 MG: 100 INJECTION SUBCUTANEOUS at 20:24

## 2022-02-07 RX ADMIN — GABAPENTIN 300 MG: 300 CAPSULE ORAL at 09:05

## 2022-02-07 RX ADMIN — IPRATROPIUM BROMIDE AND ALBUTEROL SULFATE 1 AMPULE: 2.5; .5 SOLUTION RESPIRATORY (INHALATION) at 21:42

## 2022-02-07 RX ADMIN — Medication 5 MG: at 20:39

## 2022-02-07 RX ADMIN — GUAIFENESIN SYRUP AND DEXTROMETHORPHAN 10 ML: 100; 10 SYRUP ORAL at 20:39

## 2022-02-07 RX ADMIN — GABAPENTIN 300 MG: 300 CAPSULE ORAL at 14:46

## 2022-02-07 RX ADMIN — DULOXETINE HYDROCHLORIDE 30 MG: 30 CAPSULE, DELAYED RELEASE ORAL at 09:05

## 2022-02-07 RX ADMIN — BUPROPION HYDROCHLORIDE 300 MG: 150 TABLET, FILM COATED, EXTENDED RELEASE ORAL at 09:05

## 2022-02-07 RX ADMIN — ARIPIPRAZOLE 10 MG: 5 TABLET ORAL at 09:05

## 2022-02-07 RX ADMIN — ACETAMINOPHEN 650 MG: 325 TABLET ORAL at 17:45

## 2022-02-07 RX ADMIN — GABAPENTIN 300 MG: 300 CAPSULE ORAL at 20:24

## 2022-02-07 RX ADMIN — OSELTAMIVIR PHOSPHATE 75 MG: 75 CAPSULE ORAL at 20:26

## 2022-02-07 RX ADMIN — LEVOTHYROXINE SODIUM 125 MCG: 125 TABLET ORAL at 05:29

## 2022-02-07 RX ADMIN — GUAIFENESIN SYRUP AND DEXTROMETHORPHAN 10 ML: 100; 10 SYRUP ORAL at 05:32

## 2022-02-07 RX ADMIN — MONTELUKAST 10 MG: 10 TABLET, FILM COATED ORAL at 20:26

## 2022-02-07 RX ADMIN — TORSEMIDE 20 MG: 20 TABLET ORAL at 09:05

## 2022-02-07 RX ADMIN — PANTOPRAZOLE SODIUM 40 MG: 40 TABLET, DELAYED RELEASE ORAL at 05:29

## 2022-02-07 ASSESSMENT — PAIN SCALES - GENERAL
PAINLEVEL_OUTOF10: 4
PAINLEVEL_OUTOF10: 0
PAINLEVEL_OUTOF10: 0
PAINLEVEL_OUTOF10: 2
PAINLEVEL_OUTOF10: 0
PAINLEVEL_OUTOF10: 0
PAINLEVEL_OUTOF10: 2
PAINLEVEL_OUTOF10: 0

## 2022-02-07 ASSESSMENT — PAIN DESCRIPTION - FREQUENCY: FREQUENCY: INTERMITTENT

## 2022-02-07 ASSESSMENT — PAIN DESCRIPTION - PROGRESSION: CLINICAL_PROGRESSION: GRADUALLY WORSENING

## 2022-02-07 ASSESSMENT — PAIN DESCRIPTION - LOCATION: LOCATION: HEAD

## 2022-02-07 ASSESSMENT — PAIN DESCRIPTION - PAIN TYPE: TYPE: ACUTE PAIN

## 2022-02-07 ASSESSMENT — PAIN DESCRIPTION - ONSET: ONSET: ON-GOING

## 2022-02-07 ASSESSMENT — PAIN - FUNCTIONAL ASSESSMENT: PAIN_FUNCTIONAL_ASSESSMENT: ACTIVITIES ARE NOT PREVENTED

## 2022-02-07 ASSESSMENT — PAIN DESCRIPTION - DESCRIPTORS: DESCRIPTORS: HEADACHE

## 2022-02-07 NOTE — PROGRESS NOTES
Occupational Therapy   Occupational Therapy Initial Assessment/Treatment  Date: 2022   Patient Name: Jim Davila  MRN: [id number]     : 1960    Date of Service: 2022    Discharge Recommendations: Jim Davila scored a 18/24 on the AM-PAC ADL Inpatient form. Current research shows that an AM-PAC score of 18 or greater is typically associated with a discharge to the patient's home setting. Based on the patient's AM-PAC score, and their current ADL deficits, it is recommended that the patient have 2-3 sessions per week of Occupational Therapy at d/c to increase the patient's independence. At this time, this patient demonstrates the endurance and safety to discharge home with home health services and a follow up treatment frequency of 2-3x/wk. Please see assessment section for further patient specific details. If patient discharges prior to next session this note will serve as a discharge summary. Please see below for the latest assessment towards goals. OT Equipment Recommendations  Equipment Needed: No    Assessment   Performance deficits / Impairments: Decreased functional mobility ; Decreased endurance;Decreased ADL status; Decreased balance  Assessment: Pt is a 58 y/o F who presented to ED with shortness of breath and tested positive for Influenza A currently requiring 4 L O2. Pt reports steady decline with ADLs and IADLs due to worsening arthritis and receives assistance from [place] on aging and meals on wheels to assist with housekeeping. Pt will benefit from skilled OT during inpt stay in order to increase activity tolerance for safe d/c.   Treatment Diagnosis: decreased ADL status, funcitonal mobility and functional transfers 2/2 debility  Prognosis: Fair  Decision Making: Medium Complexity  OT Education: OT Role;Plan of Care;ADL Adaptive Strategies;Transfer Training;Energy Conservation  Patient Education: pt educated on PLB, encouraging ambulation to/from bathroom with staff for improved activity tolerance-pt verb understanding  REQUIRES OT FOLLOW UP: Yes  Activity Tolerance  Activity Tolerance: Patient Tolerated treatment well;Patient limited by fatigue  Activity Tolerance: Pt on 4 L via NC and dropped to 88% with stance but quickly recovered, pt demo STEVENS with donning/doffing socks req rest break to recover  Safety Devices  Safety Devices in place: Yes  Type of devices: Left in chair;Nurse notified;Call light within reach; Chair alarm in place           Patient Diagnosis(es): The primary encounter diagnosis was Influenza A. Diagnoses of Hypoxia and Shortness of breath were also pertinent to this visit. has a past medical history of Anemia, Anxiety, Arthritis, CAD (coronary artery disease), Cancer ([facility] [address] CHF (congestive heart failure) ([facility] [address] Chronic kidney disease, Depression, Diabetes mellitus ([facility] [address] Hep C w/o coma, chronic ([facility] [address] Hyperlipidemia, Hypertension, MRSA (methicillin resistant staph aureus) culture positive, Neuropathy, Schizoaffective disorder ([facility] [address] and Thyroid disease. has a past surgical history that includes lobectomy (N/A); Dental surgery; Upper gastrointestinal endoscopy (N/A, 4/26/2021); Colonoscopy (4/26/2021); and Upper gastrointestinal endoscopy (N/A, 6/21/2021). Treatment Diagnosis: decreased ADL status, funcitonal mobility and functional transfers 2/2 debility      Restrictions  Position Activity Restriction  Other position/activity restrictions: droplet, contact precautions    Subjective   General  Chart Reviewed: Yes  Additional Pertinent Hx: 70-year-old female presenting for shortness of breath, cough and mild respiratory distress. Bedside echocardiogram was performed which was unremarkable.  Positive for Influenza A, CXR neg, Chest CT neg for pumonary embolism; pt with PMHx:  CAD, CHF, depression, diabetes, obesity, schizoaffective disorder  Family / Caregiver Present: No  Referring Practitioner: Kemi Tadeo MD  Diagnosis: shortness of breath  Subjective  Subjective: Pt sitting EOB with RN present, pleasant and agreeable to OT evaluation requesting to get up to chair.   Patient Currently in Pain: Yes (4/10 frontal HA)  Pain Assessment  Pain Assessment: 0-10  Pain Level: 0  Vital Signs  Temp: 98 °F (36.7 °C)  Temp Source: Oral  Pulse: 77  Heart Rate Source: Monitor  Resp: 18  BP: 102/66  BP Location: Right upper arm  Patient Position: Semi fowlers  Level of Consciousness: Alert (0)  MEWS Score: 1  Patient Currently in Pain: Yes (4/10 frontal HA)  Oxygen Therapy  SpO2: 93 %  O2 Device: Nasal cannula  O2 Flow Rate (L/min): 3 L/min     Social/Functional History  Social/Functional History  Lives With: Significant other  Type of Home: Apartment  Home Layout: One level  Home Access: Stairs to enter with rails  Entrance Stairs - Number of Steps: 12 x 2 with a landing  Entrance Stairs - Rails: Right  Bathroom Shower/Tub: Tub/Shower unit,Shower chair with back  Bathroom Toilet: Standard (has a raised toilet seat but does not use)  Bathroom Equipment: Shower chair,Hand-held shower,Grab bars in shower,Toilet raiser  Bathroom Accessibility: Not accessible (leaves walker outside of bathroom)  Home Equipment: Rolling walker,Grab bars,Reacher  Receives Help From: Home health  ADL Assistance:  (reports independence with dressing but it has been becoming more difficult and aid or boyfriend will assist)  Homemaking Assistance: Needs assistance  Meal Prep:  (uses meals on wheels)  Laundry:  (boyfriend's aid does laundry)  Ambulation Assistance: Independent (uses RW and furniture walks)  Active : No  Patient's  Info: uses passport via [place] on aging to set up transportation  Leisure & Hobbies: read  Additional Comments: denies falls in past 3 months \" some close calls\"       Objective   Vision: Within Functional Limits  Hearing: Exceptions to [facility]  Hearing Exceptions: Hard of hearing/hearing concerns (per pt report)    Orientation  Overall Orientation Status: Within Functional Limits     Balance  Sitting Balance: Supervision (sitting EOB)  Standing Balance: Stand by assistance  Standing Balance  Time: ~3 minutes total  Activity: functional transfers, functional mobility, standing marches  Comment: Pt completed x20 standing marches with BUE support on RW with CGA in order to assess SPO2 with activity, pt initially dropped to 88% but quickly recovered to 92% in 10 seconds while on 4 L  Functional Mobility  Functional - Mobility Device: Rolling Walker  Activity: Other (bed<chair)  Assist Level: Contact guard assistance  ADL  Grooming:  (declined oral hygiene)  UE Bathing: Setup (via bath wipes while seated)  LE Dressing: Stand by assistance (pt doffed/donned socks with + time via trunk flexion, + time and beame SOB req rest break between doffing and donning)  Tone RUE  RUE Tone: Normotonic  Tone LUE  LUE Tone: Normotonic  Coordination  Movements Are Fluid And Coordinated: Yes        Transfers  Sit to stand: Contact guard assistance (from EOB and recliner to RW, cues for hand placement)  Stand to sit: Contact guard assistance     Cognition  Overall Cognitive Status: [facility]                                        Plan   Plan  Times per week: 2-5  Current Treatment Recommendations: Strengthening,Patient/Caregiver Education & Training,Balance Training,Endurance Training,Self-Care / ADL,Safety Education & Training    G-Code     OutComes Score                                                  AM-PAC Score        AM-[facility] Inpatient Daily Activity Raw Score: 18 (02/07/22 1004)  AM-PAC Inpatient ADL T-Scale Score : 38.66 (02/07/22 1004)  ADL Inpatient CMS 0-100% Score: 46.65 (02/07/22 1004)  ADL Inpatient CMS G-Code Modifier : CK (02/07/22 1004)    Goals  Short term goals  Time Frame for Short term goals: by d/c  Short term goal 1: Pt will complete toileting and toilet transfer with spvn  Short term goal 2: Pt will tolerate 5 minutes in stance for ADL routine  Short term goal 3: Pt will complete 3 x 15 BUE HEP in order to increase activity tolerance  Short term goal 4: Pt will complete LB dressing with SBA  Patient Goals   Patient goals : to go home       Therapy Time   Individual Concurrent Group Co-treatment   Time In 0919         Time Out 1003         Minutes 44         Timed Code Treatment Minutes: [address], OT

## 2022-02-07 NOTE — PLAN OF CARE
Patient up and out of bed to chair. Encouraged ambulating to bathroom. Pure wick catheter removed. She is incontinent at times. She calls appropriately to get up. Call light in reach. Oxygen decreased to 2L, pulse ox remains in the 90's. She was 87% on room air.

## 2022-02-07 NOTE — PROGRESS NOTES
Hospitalist Progress Note      PCP: Herberth Bonilla    Date of Admission: 2/5/2022      Hospital Course: 79-year-old female who presented with shortness of breath congestion with productive cough for last 1 week. Not take her diuretics because she has been weak for a week. Subjective:   Patient seen and examined  Feeling better today; h/e fever yesterday; still needing Oxygen   No new complaint  Rapid flu positive      Medications:  Reviewed    Infusion Medications    sodium chloride      dextrose      dextrose       Scheduled Medications    ARIPiprazole  10 mg Oral Daily    buPROPion  300 mg Oral QAM    DULoxetine  30 mg Oral Daily    gabapentin  300 mg Oral TID    levothyroxine  125 mcg Oral QAM AC    montelukast  10 mg Oral Nightly    pantoprazole  40 mg Oral QAM AC    atorvastatin  20 mg Oral Daily    spironolactone  25 mg Oral Daily    torsemide  20 mg Oral Daily    sodium chloride flush  5-40 mL IntraVENous 2 times per day    enoxaparin  40 mg SubCUTAneous Daily    ipratropium-albuterol  1 ampule Inhalation Q4H WA    predniSONE  40 mg Oral Daily    azithromycin  500 mg IntraVENous Q24H    oseltamivir  75 mg Oral BID    insulin lispro  0-6 Units SubCUTAneous TID WC    insulin lispro  0-3 Units SubCUTAneous Nightly     PRN Meds: clonazePAM, sodium chloride flush, sodium chloride, ondansetron **OR** ondansetron, polyethylene glycol, acetaminophen **OR** acetaminophen, albuterol, glucose, dextrose, glucagon (rDNA), dextrose, guaiFENesin-dextromethorphan, benzonatate, melatonin, perflutren lipid microspheres      Intake/Output Summary (Last 24 hours) at 2/7/2022 1346  Last data filed at 2/7/2022 0909  Gross per 24 hour   Intake 240 ml   Output 1450 ml   Net -1210 ml       Physical Exam Performed:    /83   Pulse 114   Temp 99.1 °F (37.3 °C) (Oral)   Resp 18   Ht 5' 4\" (1.626 m)   Wt (!) 366 lb (166 kg)   SpO2 94%   BMI 62.82 kg/m²     General appearance:  Morbidly obese; Not in  Distress  HEENT:  Normal cephalic, atraumatic without obvious deformity. Neck: Supple, with full range of motion. No jugular venous distention. [place] Respiratory:   Diminished breath sounds diffusely. Cardiovascular: Tachycardic rate and rhythm with normal S1/S2 without murmurs, rubs or gallops. Abdomen: Soft, non-tender, non-distended with normal bowel sounds. Musculoskeletal:  No clubbing, cyanosis. Bilateral pitting edema 1+ with chronic skin changes bilaterally. Full range of motion without deformity. Skin: Skin color, texture, turgor normal.  No rashes or lesions. Neurologic:  grossly non-focal.  Psychiatric:  Alert and oriented, thought content appropriate, normal insight  Capillary Refill: Brisk,3 seconds, normal  Peripheral Pulses: +2 palpable, equal bilaterally     Labs:   Recent Labs     02/05/22  1509   WBC 8.3   HGB 13.1   HCT 39.1        Recent Labs     02/05/22  1517 02/06/22  0752   * 134*   K 4.1 4.5   CL 95* 95*   CO2 28 31   BUN 15 11   CREATININE 1.0 0.8   CALCIUM 9.8 9.4     Recent Labs     02/05/22  1517   AST 35   ALT 42*   BILITOT 0.4   ALKPHOS 170*     No results for input(s): INR in the last 72 hours. Recent Labs     02/05/22  1517   TROPONINI <0.01       Urinalysis:      Lab Results   Component Value Date    NITRU Negative 02/05/2022    WBCUA 21-50 03/26/2021    BACTERIA 1+ 03/26/2021    RBCUA 3-4 03/26/2021    BLOODU Negative 02/05/2022    SPECGRAV <=1.005 02/05/2022    GLUCOSEU Negative 02/05/2022       Radiology:  CT CHEST PULMONARY EMBOLISM W CONTRAST   Final Result      No CT evidence of pulmonary embolism. Prominent central main pulmonary arteries. This can be seen with pulmonary arterial hypertension.       No other acute findings in the chest.              Assessment/Plan:    #Acute respiratory failure with hypoxia and hypercarbia-multifactorial:   - Influenza A  - Also felt possibly Undiagnosed COPD given tobacco smoking for many years, OHS with restrictive lung disease/ROSARIO, pulmonary hypertension  - CTA: No PE  - Was started on supplemental oxygen, bronchodilator therapy, steroids  - Continue on Tamiflu and droplet isolation  - Was given some Lasix 40 mg x 1 IV. On Torsemide and spironolactone at home. Does not appear fluid overloaded at this time  - 2D echocardiogram, report noted. EF 55; Normal diastolic fusnction  - Antitussives as needed  - O/p PFT  - Compliance with CPAP emphasized. - Wean Oxygen as able: I decreased her to 2L  - Bronchodilators  - Incentive spirometry      #Influenza A infection  - COVID-19 PCR-negative  - Continue on Tamiflu and droplet isolation      #Sepsis: POA  - Secondary to influenza and URI  - Resolving    #Tobacco abuse disorder:  - Possibly Undiagnosed COPD    - Counseled to quit  - O/p PFT      #Morbid obesity with BMI of 62    #ROSARIO, noncompliant with CPAP  - Compliance re-inforced       DVT Prophylaxis: lovenox  Diet: ADULT DIET; Regular; 4 carb choices (60 gm/meal); Low Fat/Low Chol/High Fiber/2 gm Na  Code Status: Full Code    PT/OT Eval Status: Will order    Disposition - inpatient   Wean /oxygen  Possible DC tomorrow.     Charlotte Borja MD

## 2022-02-07 NOTE — PROGRESS NOTES
Physical Therapy    Facility/Department: [address] [facility] [address]  Initial Assessment/Treatment    NAME: Laisha Bullard  : 1960  MRN: [id number]    Date of Service: 2022    Discharge Recommendations:    Laisha Bullard scored a 18/24 on the AM-PAC short mobility form. Current research shows that an AM-PAC score of 18 or greater is typically associated with a discharge to the patient's home setting. Based on the patient's AM-PAC score and their current functional mobility deficits, it is recommended that the patient have 2-3 sessions per week of Physical Therapy at d/c to increase the patient's independence. Please see assessment section for further patient specific details. If patient discharges prior to next session this note will serve as a discharge summary. Please see below for the latest assessment towards goals. PT Equipment Recommendations  Equipment Needed: No    Assessment   Body structures, Functions, Activity limitations: Decreased endurance  Assessment: Pt functioning close to baseline at this time. Anticipate pt will go home with PRN assist upon D/C. Will cont PT while here to maximize independence. Treatment Diagnosis: decreased endurance  Prognosis: Good  Decision Making: Medium Complexity  PT Education: Goals;PT Role;Plan of Care;General Safety;Gait Training  REQUIRES PT FOLLOW UP: Yes  Activity Tolerance  Activity Tolerance: Patient limited by fatigue;Patient limited by endurance       Patient Diagnosis(es): The primary encounter diagnosis was Influenza A. Diagnoses of Hypoxia and Shortness of breath were also pertinent to this visit.      has a past medical history of Anemia, Anxiety, Arthritis, CAD (coronary artery disease), Cancer ([facility] [address] CHF (congestive heart failure) ([facility] [address] Chronic kidney disease, Depression, Diabetes mellitus ([facility] [address] Hep C w/o coma, chronic ([facility] [address] Hyperlipidemia, Hypertension, MRSA (methicillin resistant staph aureus) culture positive, Neuropathy, Schizoaffective disorder ([facility] [address] and Thyroid disease. has a past surgical history that includes lobectomy (N/A); Dental surgery; Upper gastrointestinal endoscopy (N/A, 4/26/2021); Colonoscopy (4/26/2021); and Upper gastrointestinal endoscopy (N/A, 6/21/2021). Restrictions  Position Activity Restriction  Other position/activity restrictions: droplet, contact precautions  Vision/Hearing  Vision: Within Functional Limits  Hearing: Within functional limits     Subjective  General  Chart Reviewed: Yes  Patient assessed for rehabilitation services?: Yes  Additional Pertinent Hx: PMH: CAD, CHF, DM, schizoaffective disorder. Pt admiited wth SOB, hypoxia. Dx: influenza A, acute respiratory failure. Family / Caregiver Present: No  Referring Practitioner: Padmini Mcallister  Referral Date : 02/06/22  Diagnosis: influenza A  Follows Commands: Within Functional Limits  Subjective  Subjective: Pt seated in chair & agreeable to PT.   Pain Screening  Patient Currently in Pain:  (pt c/o headache, not rated.)  Vital Signs  Patient Currently in Pain:  (pt c/o headache, not rated.)       Orientation  Orientation  Overall Orientation Status: Within Normal Limits  Social/Functional History  Social/Functional History  Lives With: Significant other  Type of Home: Apartment  Home Layout: One level  Home Access:  (pt reports she puts RW \"on my shoulder\" so she can hold bilat rails.)  Entrance Stairs - Number of Steps: 12 x 2 with a landing  Entrance Stairs - Rails: Right  Bathroom Shower/Tub: Tub/Shower unit,Shower chair with back  Bathroom Toilet: Standard (has a raised toilet seat but does not use)  Bathroom Equipment: Shower chair,Hand-held shower,Grab bars in shower,Toilet raiser  Bathroom Accessibility: Not accessible (leaves walker outside of bathroom)  Home Equipment: Rolling walker,Grab bars,Reacher  Receives Help From: Home health  ADL Assistance:  (reports independence with dressing but it has been becoming more difficult and aid or boyfriend will assist)  Homemaking Assistance: Needs assistance  Meal Prep:  (uses meals on wheels)  Laundry:  (boyfriend's aid does laundry)  Ambulation Assistance: Independent (uses RW and furniture walks)  Active : No  Patient's  Info: uses passport via [employer] on aging to set up transportation  [address]: read  Additional Comments: denies falls in past 3 months \" some close calls\". pt & boyfriend each have HHA 3 x wk, MOW (7 per week). Objective          AROM RLE (degrees)  RLE AROM: WNL  AROM LLE (degrees)  LLE AROM : WNL  Strength RLE  Strength RLE: WFL  Strength LLE  Strength LLE: WFL           Transfers  Sit to Stand: Stand by assistance (from chair)  Stand to sit: Stand by assistance  Ambulation  Ambulation?: Yes  Ambulation 1  Surface: level tile  Device: Rolling Walker  Other Apparatus: O2 (2 L)  Assistance: Stand by assistance  Gait Deviations: Slow Kelsie;Decreased step length;Decreased step height  Distance: 75 ft  Comments: steady with no LOB. distance limited by SOB, fatigue. Stairs/Curb  Stairs?: No (pt in isolation for flu)     Balance  Sitting - Static: Good  Sitting - Dynamic: Good  Standing - Static: Good (with RW)  Standing - Dynamic: Good; - (with RW)        Plan   Plan  Times per week: 2-5  Current Treatment Recommendations: Strengthening,Transfer Training,Endurance Training,Gait Training,Stair training,Safety Education & Training  Safety Devices  Type of devices: Call light within reach,Chair alarm in place,Left in chair,Nurse notified                                                       AM-PAC Score  AM-PAC Inpatient Mobility Raw Score : 18 (02/07/22 1308)  AM-PAC Inpatient T-Scale Score : 43.63 (02/07/22 1308)  Mobility Inpatient CMS 0-100% Score: 46.58 (02/07/22 1308)  Mobility Inpatient CMS G-Code Modifier : CK (02/07/22 1308)          Goals  Short term goals  Time Frame for Short term goals: D/C  Short term goal 1: supine<->sit IND  Short term goal 2: sit<->stand SUPV  Short term goal 3: Amb 100 ft with RW SUPV  Patient Goals   Patient goals : to go home       Therapy Time   Individual Concurrent Group Co-treatment   Time In 1225         Time Out 1305         Minutes 40                 Orlin Mcqueen, PT

## 2022-02-08 LAB
ANION GAP SERPL CALCULATED.3IONS-SCNC: 12 MMOL/L (ref 3–16)
BASOPHILS ABSOLUTE: 0.1 K/UL (ref 0–0.2)
BASOPHILS RELATIVE PERCENT: 1.2 %
BUN BLDV-MCNC: 29 MG/DL (ref 7–20)
CALCIUM SERPL-MCNC: 9.8 MG/DL (ref 8.3–10.6)
CHLORIDE BLD-SCNC: 94 MMOL/L (ref 99–110)
CO2: 29 MMOL/L (ref 21–32)
CREAT SERPL-MCNC: 1.1 MG/DL (ref 0.6–1.2)
EOSINOPHILS ABSOLUTE: 0 K/UL (ref 0–0.6)
EOSINOPHILS RELATIVE PERCENT: 0.7 %
GFR AFRICAN AMERICAN: >60
GFR NON-AFRICAN AMERICAN: 50
GLUCOSE BLD-MCNC: 124 MG/DL (ref 70–99)
GLUCOSE BLD-MCNC: 128 MG/DL (ref 70–99)
GLUCOSE BLD-MCNC: 132 MG/DL (ref 70–99)
GLUCOSE BLD-MCNC: 97 MG/DL (ref 70–99)
HCT VFR BLD CALC: 38.5 % (ref 36–48)
HEMOGLOBIN: 12.9 G/DL (ref 12–16)
LYMPHOCYTES ABSOLUTE: 1.1 K/UL (ref 1–5.1)
LYMPHOCYTES RELATIVE PERCENT: 20.9 %
MCH RBC QN AUTO: 28.2 PG (ref 26–34)
MCHC RBC AUTO-ENTMCNC: 33.4 G/DL (ref 31–36)
MCV RBC AUTO: 84.3 FL (ref 80–100)
MONOCYTES ABSOLUTE: 0.5 K/UL (ref 0–1.3)
MONOCYTES RELATIVE PERCENT: 10.4 %
NEUTROPHILS ABSOLUTE: 3.4 K/UL (ref 1.7–7.7)
NEUTROPHILS RELATIVE PERCENT: 66.8 %
PDW BLD-RTO: 15.1 % (ref 12.4–15.4)
PERFORMED ON: ABNORMAL
PERFORMED ON: ABNORMAL
PERFORMED ON: NORMAL
PLATELET # BLD: 214 K/UL (ref 135–450)
PMV BLD AUTO: 7.1 FL (ref 5–10.5)
POTASSIUM REFLEX MAGNESIUM: 3.8 MMOL/L (ref 3.5–5.1)
RBC # BLD: 4.56 M/UL (ref 4–5.2)
SODIUM BLD-SCNC: 135 MMOL/L (ref 136–145)
WBC # BLD: 5.1 K/UL (ref 4–11)

## 2022-02-08 PROCEDURE — 97535 SELF CARE MNGMENT TRAINING: CPT

## 2022-02-08 PROCEDURE — 94761 N-INVAS EAR/PLS OXIMETRY MLT: CPT

## 2022-02-08 PROCEDURE — 6370000000 HC RX 637 (ALT 250 FOR IP): Performed by: INTERNAL MEDICINE

## 2022-02-08 PROCEDURE — 94799 UNLISTED PULMONARY SVC/PX: CPT

## 2022-02-08 PROCEDURE — 2580000003 HC RX 258: Performed by: INTERNAL MEDICINE

## 2022-02-08 PROCEDURE — 85025 COMPLETE CBC W/AUTO DIFF WBC: CPT

## 2022-02-08 PROCEDURE — 94150 VITAL CAPACITY TEST: CPT

## 2022-02-08 PROCEDURE — 94640 AIRWAY INHALATION TREATMENT: CPT

## 2022-02-08 PROCEDURE — 6360000002 HC RX W HCPCS: Performed by: INTERNAL MEDICINE

## 2022-02-08 PROCEDURE — 36415 COLL VENOUS BLD VENIPUNCTURE: CPT

## 2022-02-08 PROCEDURE — 80048 BASIC METABOLIC PNL TOTAL CA: CPT

## 2022-02-08 PROCEDURE — 97530 THERAPEUTIC ACTIVITIES: CPT

## 2022-02-08 PROCEDURE — 2700000000 HC OXYGEN THERAPY PER DAY

## 2022-02-08 PROCEDURE — 94618 PULMONARY STRESS TESTING: CPT

## 2022-02-08 PROCEDURE — 1200000000 HC SEMI PRIVATE

## 2022-02-08 RX ORDER — PREDNISONE 20 MG/1
40 TABLET ORAL DAILY
Qty: 6 TABLET | Refills: 0 | Status: SHIPPED | OUTPATIENT
Start: 2022-02-09 | End: 2022-02-12

## 2022-02-08 RX ORDER — OSELTAMIVIR PHOSPHATE 75 MG/1
75 CAPSULE ORAL 2 TIMES DAILY
Qty: 6 CAPSULE | Refills: 0 | Status: SHIPPED | OUTPATIENT
Start: 2022-02-08 | End: 2022-02-11

## 2022-02-08 RX ORDER — GUAIFENESIN/DEXTROMETHORPHAN 100-10MG/5
10 SYRUP ORAL EVERY 4 HOURS PRN
Qty: 120 ML | Refills: 1 | Status: SHIPPED | OUTPATIENT
Start: 2022-02-08 | End: 2022-02-18

## 2022-02-08 RX ORDER — BENZONATATE 100 MG/1
100 CAPSULE ORAL 3 TIMES DAILY PRN
Qty: 10 CAPSULE | Refills: 1 | Status: SHIPPED | OUTPATIENT
Start: 2022-02-08 | End: 2022-02-15

## 2022-02-08 RX ORDER — IPRATROPIUM BROMIDE AND ALBUTEROL SULFATE 2.5; .5 MG/3ML; MG/3ML
1 SOLUTION RESPIRATORY (INHALATION) 2 TIMES DAILY
Status: DISCONTINUED | OUTPATIENT
Start: 2022-02-08 | End: 2022-02-09 | Stop reason: HOSPADM

## 2022-02-08 RX ADMIN — ARIPIPRAZOLE 10 MG: 5 TABLET ORAL at 08:46

## 2022-02-08 RX ADMIN — SPIRONOLACTONE 25 MG: 25 TABLET, FILM COATED ORAL at 08:47

## 2022-02-08 RX ADMIN — IPRATROPIUM BROMIDE AND ALBUTEROL SULFATE 1 AMPULE: 2.5; .5 SOLUTION RESPIRATORY (INHALATION) at 20:55

## 2022-02-08 RX ADMIN — IPRATROPIUM BROMIDE AND ALBUTEROL SULFATE 1 AMPULE: 2.5; .5 SOLUTION RESPIRATORY (INHALATION) at 07:43

## 2022-02-08 RX ADMIN — GABAPENTIN 300 MG: 300 CAPSULE ORAL at 14:20

## 2022-02-08 RX ADMIN — ENOXAPARIN SODIUM 40 MG: 100 INJECTION SUBCUTANEOUS at 21:49

## 2022-02-08 RX ADMIN — SODIUM CHLORIDE, PRESERVATIVE FREE 10 ML: 5 INJECTION INTRAVENOUS at 21:50

## 2022-02-08 RX ADMIN — PANTOPRAZOLE SODIUM 40 MG: 40 TABLET, DELAYED RELEASE ORAL at 06:10

## 2022-02-08 RX ADMIN — ATORVASTATIN CALCIUM 20 MG: 20 TABLET, FILM COATED ORAL at 08:47

## 2022-02-08 RX ADMIN — GABAPENTIN 300 MG: 300 CAPSULE ORAL at 08:47

## 2022-02-08 RX ADMIN — GUAIFENESIN SYRUP AND DEXTROMETHORPHAN 10 ML: 100; 10 SYRUP ORAL at 14:21

## 2022-02-08 RX ADMIN — TORSEMIDE 20 MG: 20 TABLET ORAL at 08:47

## 2022-02-08 RX ADMIN — BUPROPION HYDROCHLORIDE 300 MG: 150 TABLET, FILM COATED, EXTENDED RELEASE ORAL at 08:47

## 2022-02-08 RX ADMIN — SODIUM CHLORIDE, PRESERVATIVE FREE 10 ML: 5 INJECTION INTRAVENOUS at 08:47

## 2022-02-08 RX ADMIN — OSELTAMIVIR PHOSPHATE 75 MG: 75 CAPSULE ORAL at 08:47

## 2022-02-08 RX ADMIN — GABAPENTIN 300 MG: 300 CAPSULE ORAL at 21:49

## 2022-02-08 RX ADMIN — MONTELUKAST 10 MG: 10 TABLET, FILM COATED ORAL at 21:49

## 2022-02-08 RX ADMIN — DULOXETINE HYDROCHLORIDE 30 MG: 30 CAPSULE, DELAYED RELEASE ORAL at 08:47

## 2022-02-08 RX ADMIN — PREDNISONE 40 MG: 20 TABLET ORAL at 08:47

## 2022-02-08 RX ADMIN — LEVOTHYROXINE SODIUM 125 MCG: 125 TABLET ORAL at 06:10

## 2022-02-08 RX ADMIN — OSELTAMIVIR PHOSPHATE 75 MG: 75 CAPSULE ORAL at 22:59

## 2022-02-08 ASSESSMENT — PAIN SCALES - GENERAL
PAINLEVEL_OUTOF10: 0

## 2022-02-08 NOTE — DISCHARGE SUMMARY
Hospital Discharge Summary    Patient's PCP: Antonio Burns  Admit Date: 2/5/2022   Discharge Date: 2/9/2022    Admitting Physician: Dr. Noris Perez MD  Discharge Physician: Dr. Jesi Mccartney MD   Consults: none    HPI:   57 y.o. female who presents with complaints of shortness of breath, congestion with associated productive cough with white phlegm for the past 1 week. Patient has multiple comorbid conditions that can lead to her symptoms in addition that she was diagnosed with influenza A today at [place] ED. Patient has been febrile to 100.7 °F and tachycardic.     Patient feels short of breath all the time, and has history of CHF for which she is on Aldactone and torsemide daily which she has not taken since she was feeling too sick. Patient denies any weight gain, worsening leg swelling or orthopnea.     Patient is currently on 4 L of oxygen via nasal cannula, states that she does not use oxygen at home.     Patient also states that she had sleep studies long time ago and a CPAP machine which she does not use. States that she is not motivated to use it  Patient is a chronic smoker, states quit few days ago. Has been trying to quit many times, has relapsed and has been smoking for the past 3 months again. Has been smoking for many years with intermittent attempts of quitting for few months. Brief hospital course:  Given the concern of the patients presentation and the concern of the possible multi-factorial etiology contributing to patients symptomatology.  Patient was admitted and evaluated and found to have:         Discharge Diagnoses:    #Acute respiratory failure with hypoxia and hypercarbia-multifactorial:   - Influenza A  - Also felt possibly Undiagnosed COPD given tobacco smoking for many years, OHS with restrictive lung disease/ROSARIO, pulmonary hypertension  - CTA: No PE  - Was started on supplemental oxygen, bronchodilator therapy, steroids  - Continue on Tamiflu and droplet isolation  - Was given some Lasix 40 mg x 1 IV. On Torsemide and spironolactone at home. Does not appear fluid overloaded at this time  - 2D echocardiogram, report noted. EF 55; Normal diastolic fusnction  - Antitussives as needed  - O/p PFT  - Compliance with CPAP emphasized. - Wean Oxygen as able: Home O2: 1 l with activity  - Bronchodilators. Add ICS/LABA  - Incentive spirometry  - Counseled to quit: smokes 1PPD  - O/p PFT: follows with Pulmonary at : advised to make appointment.         #Influenza A infection  - COVID-19 PCR-negative  - Continue on Tamiflu and droplet isolation        #Sepsis: POA  - Secondary to influenza and URI  - Resolved       #Tobacco abuse disorder:   COPD: POA    - Counseled to quit: smokes 1PPD  - O/p PFT: follows with Pulmonary at : advised to make appointment.         #Morbid obesity with BMI of 62       #ROSARIO, noncompliant with CPAP  - Compliance re-inforced              Physical Exam: /75   Pulse 82   Temp 98.6 °F (37 °C) (Oral)   Resp 20   Ht 5' 4\" (1.626 m)   Wt 296 lb 8.3 oz (134.5 kg)   SpO2 90%   BMI 50.90 kg/m²     Recent Labs     02/07/22  2209 02/08/22  0824 02/08/22  1137 02/08/22  1655 02/09/22  1135   POCGLU 131* 97 124* 132* 130*     General appearance: Morbidly obese; Not in  Distress  HEENT:  Normal cephalic, atraumatic without obvious deformity. Neck: Supple, with full range of motion. No jugular venous distention. [place] Awkward Respiratory:   Diminished breath sounds diffusely.    Cardiovascular: Tachycardic rate and rhythm with normal S1/S2 without murmurs, rubs or gallops. Abdomen: Soft, non-tender, non-distended with normal bowel sounds. Musculoskeletal:  No clubbing, cyanosis.  Bilateral pitting edema 1+ with chronic skin changes bilaterally.  Full range of motion without deformity. Skin: Skin color, texture, turgor normal.  No rashes or lesions.   Neurologic:  grossly non-focal.  Psychiatric:  Alert and oriented, thought content appropriate, normal insight  Capillary Refill: Brisk,3 seconds, normal  Peripheral Pulses: +2 palpable, equal bilaterally            LABS:  Recent Labs     02/09/22  0724   WBC 6.4   HGB 12.5         Recent Labs     02/09/22  0724      K 3.7   CL 94*   CO2 36*   BUN 28*   CREATININE 0.9   GLUCOSE 85     No results for input(s): INR in the last 72 hours. Discharge Medications:  Current Discharge Medication List           Details   oseltamivir (TAMIFLU) 75 MG capsule Take 1 capsule by mouth 2 times daily for 3 days  Qty: 6 capsule, Refills: 0      benzonatate (TESSALON) 100 MG capsule Take 1 capsule by mouth 3 times daily as needed for Cough  Qty: 10 capsule, Refills: 1      guaiFENesin-dextromethorphan (ROBITUSSIN DM) 100-10 MG/5ML syrup Take 10 mLs by mouth every 4 hours as needed for Cough  Qty: 120 mL, Refills: 1      predniSONE (DELTASONE) 20 MG tablet Take 2 tablets by mouth daily for 3 days  Qty: 6 tablet, Refills: 0      mometasone-formoterol (DULERA) 100-5 MCG/ACT inhaler Inhale 2 puffs into the lungs 2 times daily  Qty: 1 each, Refills: 2              Details   simvastatin (ZOCOR) 40 MG tablet Take 1 tablet by mouth nightly  Qty: 90 tablet, Refills: 3      pantoprazole (PROTONIX) 40 MG tablet Take 1 tablet by mouth daily  Qty: 90 tablet, Refills: 1      clonazePAM (KLONOPIN) 0.5 MG tablet Take 0.5 mg by mouth daily as needed.       buPROPion (WELLBUTRIN XL) 300 MG extended release tablet Take 300 mg by mouth every morning      levothyroxine (SYNTHROID) 125 MCG tablet Take 125 mcg by mouth Daily      ARIPiprazole (ABILIFY) 10 MG tablet Take 10 mg by mouth daily      diphenhydrAMINE (BENADRYL ALLERGY) 25 MG tablet Take 25 mg by mouth every 6 hours as needed for Itching      DULoxetine (CYMBALTA) 30 MG extended release capsule Take 30 mg by mouth daily       fluticasone (FLOVENT HFA) 220 MCG/ACT inhaler Inhale 1 puff into the lungs 2 times daily      gabapentin (NEURONTIN) 300 MG capsule TAKE ONE CAPSULE BY MOUTH THREE TIMES A DAY      loperamide (IMODIUM) 2 MG capsule TAKE ONE CAPSULE BY MOUTH THREE TIMES A DAY AS NEEDED FOR DIARRHEA      albuterol (PROVENTIL HFA;VENTOLIN HFA) 108 (90 BASE) MCG/ACT inhaler Inhale 2 puffs into the lungs every 6 hours as needed for Wheezing      Multiple Vitamins-Minerals (THERAPEUTIC MULTIVITAMIN-MINERALS) tablet Take 1 tablet by mouth daily      Diclofenac Sodium 3 % GEL Diclofenac Topical Gel 1 %    PRN    Active      potassium bicarbonate (K-LYTE) 25 MEQ disintegrating tablet Take 25 mEq by mouth 2 times daily      torsemide (DEMADEX) 20 MG tablet Take 1 tablet by mouth daily  Qty: 30 tablet, Refills: 1      montelukast (SINGULAIR) 10 MG tablet Take 10 mg by mouth nightly      nitroGLYCERIN (NITROSTAT) 0.4 MG SL tablet Place 0.4 mg under the tongue      traMADol (ULTRAM) 50 MG tablet Take 50 mg by mouth every 6 hours as needed for Pain. ascorbic acid (VITAMIN C) 500 MG tablet Take 500 mg by mouth daily      vitamin D (CHOLECALCIFEROL) 5000 UNITS CAPS capsule Take 5,000 Units by mouth daily       metoprolol (LOPRESSOR) 25 MG tablet Take 25 mg by mouth 2 times daily      nicotine polacrilex (COMMIT) 4 MG lozenge Take 4 mg by mouth as needed for Smoking cessation      mupirocin (BACTROBAN) 2 % ointment Apply topically 3 times daily Apply topically 3 times daily. vitamin E 1000 UNITS capsule Take 1,000 Units by mouth daily           Activity: activity as tolerated  Diet: regular diet  Wound Care: none needed    Disposition: home  Discharged Condition: Stable  Follow Up: Primary Care Physician in one week    Total time spent on discharge, finalizing medications, referrals and arranging outpatient follow up was more than 30 minutes    Thank you Dr. Tamera Castillo for the opportunity to be involved in this patients care. If you have any questions or concerns please feel free to contact me at [phone].

## 2022-02-08 NOTE — CARE COORDINATION
Orders faxed to [facility] [phone], called office to inform at [phone].  [facility] by 2/10  Electronically signed by Harpal Dickinson LPN on 1/0/3508 at 7:24 PM

## 2022-02-08 NOTE — PROGRESS NOTES
Physician Progress Note      PATIENT:               Alie Solis  CSN #:                  [id number]  :                       1960  ADMIT DATE:       2022 2:58 PM  [address] [place] DATE:  RESPONDING  PROVIDER #:        Walker Gamino MD          QUERY TEXT:    Pt admitted with SOB and has CHF documented. If possible, please document in   progress notes and discharge summary further specificity regarding the type   and acuity of CHF:    The medical record reflects the following:  Risk Factors: 56 yo w/ history of diastolic CHF  Clinical Indicators: Per H&P: Acute respiratory failure with hypoxia and   hypercarbia-multifactorial: , CHF-type unknown. Pro-. Per PN : Does   not appear fluid overloaded at this time. ECHO : EF 55%. No regional wall   motion abnormalities are seen. Diastolic filling parameters suggests normal   diastolic function. Treatment: IV Lasix 40 mg X1, ECHO, Demadex 20 mg PO daily, Spironolactone 25   mg PO daily. Options provided:  -- Chronic Diastolic CHF/HFpEF  -- Acute on Chronic Diastolic CHF/HFpEF  -- Other - I will add my own diagnosis  -- Disagree - Not applicable / Not valid  -- Disagree - Clinically unable to determine / Unknown  -- Refer to Clinical Documentation Reviewer    PROVIDER RESPONSE TEXT:    This patient has chronic diastolic CHF/HFpEF.     Query created by: Loreto Núñez on 2022 10:08 AM      Electronically signed by:  Walker Gamino MD 2022 4:18 PM

## 2022-02-08 NOTE — CARE COORDINATION
Case Management Assessment            Discharge Note                    Date / Time of Note: 2/8/2022 3:37 PM                  Discharge Note Completed by: PABLO Randall LSW    Patient Name: Sanjeev Lancaster   YOB: 1960  Diagnosis: Shortness of breath [R06.02]  Influenza A [J10.1]  Hypoxia [R09.02]   Date / Time: 2/5/2022  2:58 PM    Current PCP: Pino Porter patient: No    Hospitalization in the last 30 days: No    Advance Directives:  Code Status: Full Code  [place] DNR form completed and on chart: Yes    Financial:  Payor: Goran Orellana / Plan: [address] [place] [address] DEPT OF JOB / Product Type: *No Product type* /      Pharmacy:    [facility] [phone] - F [phone]  [address]  [address] [address]  Phone: [phone] Fax: [phone]    ZJFYMJPO'N IYFQFVNV [facility] [phone] - F [phone]  [facility] [address]  Phone: [phone] Fax: [phone]    JLMGNWZRS TAUFUKJQ-SK  [facility]  [address]  [place] [address]  Phone: [phone] Fax: [phone]      Assistance purchasing medications?:    Assistance provided by Case Management: None at this time    Does patient want to participate in local refill/ meds to beds program?:      Meds To Beds General Rules:  1. Can ONLY be done Monday- Friday between 8:30am-5pm  2. Prescription(s) must be in pharmacy by 3pm to be filled same day  3. Copy of patient's insurance/ prescription drug card and patient face sheet must be sent along with the prescription(s)  4. Cost of Rx cannot be added to hospital bill. If financial assistance is needed, please contact unit  or ;  or  CANNOT provide pharmacy voucher for patients co-pays  5.  Patients can then  the prescription on their way out of the hospital at discharge, or pharmacy can deliver to the bedside if staff is available. (payment due at time of pick-up or delivery - cash, check, or card accepted)     Able to afford home medications/ co-pay costs: Yes    ADLS:  Current PT AM-PAC Score: 18 /24  Current OT AM-PAC Score: 19 /24      DISCHARGE Disposition: [address]: PT/OT     LOC at discharge: Not Applicable  PHUONG Completed: Not Indicated    Notification completed in HENS/PAS?:  Not Applicable    IMM Completed:   Not Indicated    Transportation:  Transportation PLAN for discharge: Lyft   Mode of Transport: [address]:  [address] ordered at discharge: Yes  [address]: Don Sheeahn [address] [facility].  Phone: [phone]  Fax: Brenda Díaz Orders faxed: Yes    Durable Medical Equipment:  Equipment obtained during hospitalization: NA    Home Oxygen and Respiratory Equipment:  Oxygen needed at discharge?: Yes  [address]   Phone: [phone]  Portable tank available for discharge?: Yes    Dialysis:  Dialysis patient: No      Referrals made at [facility] for outpatient continued care:  Not Applicable    Additional CM Notes:  CM met with pt at bedside. Pt from home with boyfriend. Both are connected with [facility] and get aide services and MOW. Pt interested in PT/OT with , who boyfriend is connected with. Karlos Hayes liaison following. ALYSSIA called [facility] [phone] to submit home O2 order; does not accept medicaid. CM faxed referral to [address] at [phone]. CM will coordinate Lyft time with RN after O2 tank is delivered.         The Plan for Transition of Care is related to the following treatment goals of Shortness of breath [R06.02]  Influenza A [J10.1]  Hypoxia [R09.02]    The Patient and/or patient representative Ana Nevarez and her family were provided with a choice of provider and agrees with the discharge plan Yes    Freedom of choice list was provided with basic dialogue that supports the patient's individualized plan of care/goals and shares the quality data associated with the providers.  Yes    Care Transitions patient: No    PABLO Arroyo, [facility]  Case Management Department  Ph: [phone]  Fax: [phone]

## 2022-02-08 NOTE — PROGRESS NOTES
Occupational Therapy  Daily Treatment Note  Patient Name: Priyanka Thomas  MRN: [id number]     Assessment: Pt progressing - SBA for functional mobility with walker but with new O2 requirement and needs cues/assist to manage tubing. Pt also needs increased assist with lower body ADLs but feels she will be more independent in her home environment. Recommend 24 hr A, home OT/PT at d/c. Discharge Recommendations: Priyanka Thomas scored a 19/24 on the AM-PAC ADL Inpatient form. Current research shows that an AM-PAC score of 18 or greater is typically associated with a discharge to the patient's home setting. Based on the patient's AM-PAC score, and their current ADL deficits, it is recommended that the patient have 2-3 sessions per week of Occupational Therapy at d/c to increase the patient's independence. At this time, this patient demonstrates the endurance and safety to discharge home with 24 hr A, home OT and a follow up treatment frequency of 2-3x/wk. Please see assessment section for further patient specific details. Equipment Needs:  No    Chart Reviewed: Yes     Other position/activity restrictions: droplet, contact precautions   Additional Pertinent Hx: PMH: CAD, CHF, DM, schizoaffective disorder. Pt admiited wth SOB, hypoxia. Dx: influenza A, acute respiratory failure. Diagnosis: shortness of breath  Treatment Diagnosis: decreased ADL status, funcitonal mobility and functional transfers 2/2 debility    Subjective: Pt in chair on arrival. Pleasant and cooperative. A little nervous about her new O2 but otherwise feels better and wants to go home. Pain: No c/o pain    Objective:    Cognition/Orientation: at baseline    Functional Mobility   Sit to Stand: SBA  Stand to Sit: SBA  Chair Transfer: SBA with walker  Commode Transfer: SBA with walker  Other: pt performed functional mobility around room to practice managing O2 tubing - needed min assist and/or cues to do so but only SBA for ambulation. ADLs   UB dressing: Mod I, set up (cues for threading O2)  LB dressing: Mod assist (thread feet through brief and pants; feels she will be more independent in her home environment)  Toileting: SBA, cues (dealing with urgency/frequency, wearing absorbent brief)    Activity Tolerance: Tolerated session well. On 2L O2. spO2 94-95% seated in chair after above activity. Patient Education: Activity promotion, O2 tubing management, ADLs, d/c planning - verb understanding, will benefit from reinforcement of home OT/PT     Safety Devices in Place: left in chair with alarm on and needs in reach, RN aware        Goals:  Short term goals  Time Frame for Short term goals: by d/c  Short term goal 1: Pt will complete toileting and toilet transfer with spvn - Not met  Short term goal 2: Pt will tolerate 5 minutes in stance for ADL routine  Short term goal 3: Pt will complete 3 x 15 BUE HEP in order to increase activity tolerance  Short term goal 4: Pt will complete LB dressing with SBA         Plan:      Times per week: 2-5        If patient is discharged prior to next treatment, this note will serve as the discharge summary.       Therapy Time   Individual Concurrent Group Co-treatment   Time In 1415         Time Out 1453         Minutes 38           Timed Code Treatment Minutes: 38  Total Treatment Time: 5360 Ludwig Avila, OT

## 2022-02-08 NOTE — RT PROTOCOL NOTE

## 2022-02-08 NOTE — PROGRESS NOTES
02/08/22 1219   Resting (Room Air)   SpO2 92   HR 75   Resting (On O2)   SpO2 93   HR 74   O2 Device Nasal cannula   O2 Flow Rate (l/min) 1 l/min   FIO2 (%) 24   During Walk (Room Air)   SpO2 87   HR 72   Walk/Assistance Device Walker   Rate of Dyspnea 0   During Walk (On O2)   SpO2 91   HR 75   O2 Device Nasal cannula   O2 Flow Rate (l/min) 1 l/min   Need Additional O2 Flow Rate Rows No   Walk/Assistance Device Walker   Rate of Dyspnea 0   After Walk   SpO2 92   HR 72   O2 Device Nasal cannula   O2 Flow Rate (l/min) 1 l/min   FIO2 (%) 24   Rate of Dyspnea 0   Does the Patient Qualify for Home O2 Yes   Liter Flow at Rest 0   Liter Flow on Exertion 1   Does the Patient Need Portable Oxygen Tanks Yes

## 2022-02-08 NOTE — CARE COORDINATION
ALYSSIA left vm with Shadia Boland Numbers [phone] to follow up about home O2 referral.        Electronically signed by PABLO Paul, MANDEEPW on 2/8/2022 at 5:15 PM

## 2022-02-09 VITALS
RESPIRATION RATE: 20 BRPM | SYSTOLIC BLOOD PRESSURE: 119 MMHG | TEMPERATURE: 98.6 F | DIASTOLIC BLOOD PRESSURE: 75 MMHG | OXYGEN SATURATION: 90 % | HEART RATE: 82 BPM | HEIGHT: 64 IN | BODY MASS INDEX: 50.02 KG/M2 | WEIGHT: 293 LBS

## 2022-02-09 LAB
ANION GAP SERPL CALCULATED.3IONS-SCNC: 6 MMOL/L (ref 3–16)
BASOPHILS ABSOLUTE: 0 K/UL (ref 0–0.2)
BASOPHILS RELATIVE PERCENT: 0.7 %
BUN BLDV-MCNC: 28 MG/DL (ref 7–20)
CALCIUM SERPL-MCNC: 9.7 MG/DL (ref 8.3–10.6)
CHLORIDE BLD-SCNC: 94 MMOL/L (ref 99–110)
CO2: 36 MMOL/L (ref 21–32)
CREAT SERPL-MCNC: 0.9 MG/DL (ref 0.6–1.2)
EOSINOPHILS ABSOLUTE: 0 K/UL (ref 0–0.6)
EOSINOPHILS RELATIVE PERCENT: 0.5 %
GFR AFRICAN AMERICAN: >60
GFR NON-AFRICAN AMERICAN: >60
GLUCOSE BLD-MCNC: 130 MG/DL (ref 70–99)
GLUCOSE BLD-MCNC: 85 MG/DL (ref 70–99)
HCT VFR BLD CALC: 38 % (ref 36–48)
HEMOGLOBIN: 12.5 G/DL (ref 12–16)
LYMPHOCYTES ABSOLUTE: 1.3 K/UL (ref 1–5.1)
LYMPHOCYTES RELATIVE PERCENT: 19.7 %
MCH RBC QN AUTO: 27.9 PG (ref 26–34)
MCHC RBC AUTO-ENTMCNC: 33 G/DL (ref 31–36)
MCV RBC AUTO: 84.5 FL (ref 80–100)
MONOCYTES ABSOLUTE: 0.9 K/UL (ref 0–1.3)
MONOCYTES RELATIVE PERCENT: 13.6 %
NEUTROPHILS ABSOLUTE: 4.2 K/UL (ref 1.7–7.7)
NEUTROPHILS RELATIVE PERCENT: 65.5 %
PDW BLD-RTO: 15.2 % (ref 12.4–15.4)
PERFORMED ON: ABNORMAL
PLATELET # BLD: 221 K/UL (ref 135–450)
PMV BLD AUTO: 7.4 FL (ref 5–10.5)
POTASSIUM REFLEX MAGNESIUM: 3.7 MMOL/L (ref 3.5–5.1)
RBC # BLD: 4.5 M/UL (ref 4–5.2)
SODIUM BLD-SCNC: 136 MMOL/L (ref 136–145)
WBC # BLD: 6.4 K/UL (ref 4–11)

## 2022-02-09 PROCEDURE — 36415 COLL VENOUS BLD VENIPUNCTURE: CPT

## 2022-02-09 PROCEDURE — 2580000003 HC RX 258: Performed by: INTERNAL MEDICINE

## 2022-02-09 PROCEDURE — 80048 BASIC METABOLIC PNL TOTAL CA: CPT

## 2022-02-09 PROCEDURE — 6370000000 HC RX 637 (ALT 250 FOR IP): Performed by: INTERNAL MEDICINE

## 2022-02-09 PROCEDURE — 94640 AIRWAY INHALATION TREATMENT: CPT

## 2022-02-09 PROCEDURE — 94761 N-INVAS EAR/PLS OXIMETRY MLT: CPT

## 2022-02-09 PROCEDURE — 85025 COMPLETE CBC W/AUTO DIFF WBC: CPT

## 2022-02-09 RX ADMIN — PANTOPRAZOLE SODIUM 40 MG: 40 TABLET, DELAYED RELEASE ORAL at 05:26

## 2022-02-09 RX ADMIN — IPRATROPIUM BROMIDE AND ALBUTEROL SULFATE 1 AMPULE: 2.5; .5 SOLUTION RESPIRATORY (INHALATION) at 08:17

## 2022-02-09 RX ADMIN — SODIUM CHLORIDE, PRESERVATIVE FREE 10 ML: 5 INJECTION INTRAVENOUS at 08:42

## 2022-02-09 RX ADMIN — ARIPIPRAZOLE 10 MG: 5 TABLET ORAL at 08:41

## 2022-02-09 RX ADMIN — LEVOTHYROXINE SODIUM 125 MCG: 125 TABLET ORAL at 05:26

## 2022-02-09 RX ADMIN — ATORVASTATIN CALCIUM 20 MG: 20 TABLET, FILM COATED ORAL at 08:41

## 2022-02-09 RX ADMIN — OSELTAMIVIR PHOSPHATE 75 MG: 75 CAPSULE ORAL at 08:42

## 2022-02-09 RX ADMIN — TORSEMIDE 20 MG: 20 TABLET ORAL at 08:41

## 2022-02-09 RX ADMIN — GABAPENTIN 300 MG: 300 CAPSULE ORAL at 08:41

## 2022-02-09 RX ADMIN — BUPROPION HYDROCHLORIDE 300 MG: 150 TABLET, FILM COATED, EXTENDED RELEASE ORAL at 08:41

## 2022-02-09 RX ADMIN — DULOXETINE HYDROCHLORIDE 30 MG: 30 CAPSULE, DELAYED RELEASE ORAL at 08:41

## 2022-02-09 RX ADMIN — SPIRONOLACTONE 25 MG: 25 TABLET, FILM COATED ORAL at 08:41

## 2022-02-09 RX ADMIN — PREDNISONE 40 MG: 20 TABLET ORAL at 08:41

## 2022-02-09 ASSESSMENT — PAIN SCALES - GENERAL
PAINLEVEL_OUTOF10: 0

## 2022-02-09 NOTE — CARE COORDINATION
Arnold confirmed receiving referral and is sending staff to pt's room with O2 tank. Staff spoke with pt's boyfriend at the home to confirm address and time to deliver tanks. CM will set up Lyft when O2 tank arrives. Electronically signed by PABLO Nova LSW on 2/9/2022 at 11:09 AM    _________________________________________________________________    CM called [employer] again, no answer/response. CM faxed referral to [address] at [phone]. CM updated pt.         Electronically signed by PABLO Nova LSW on 2/9/2022 at 9:32 AM

## 2022-02-09 NOTE — PROGRESS NOTES
Patient is alert and oriented and has discharge orders in place. Patient agrees with all discharge plans. Education complete. Patient taken off tele. All IV access removed. Patient has orders for at home oxygen. Patient went home with oxygen and will have concentrator brought to home later today. Patient significant other is able to meet patient at home. Patient taken to [address] ride by nurse. Patient able to transport self into car independently. All belongings and paperwork with patient.

## 2022-03-07 PROBLEM — J10.1 INFLUENZA A: Status: RESOLVED | Noted: 2022-02-05 | Resolved: 2022-03-07

## 2022-03-15 ENCOUNTER — APPOINTMENT (OUTPATIENT)
Dept: GENERAL RADIOLOGY | Age: 62
End: 2022-03-15
Payer: MEDICAID

## 2022-03-15 ENCOUNTER — HOSPITAL ENCOUNTER (EMERGENCY)
Age: 62
Discharge: HOME | End: 2022-03-15
Attending: EMERGENCY MEDICINE
Payer: MEDICAID

## 2022-03-15 VITALS
WEIGHT: 293 LBS | SYSTOLIC BLOOD PRESSURE: 151 MMHG | HEIGHT: 64 IN | RESPIRATION RATE: 18 BRPM | DIASTOLIC BLOOD PRESSURE: 81 MMHG | OXYGEN SATURATION: 94 % | HEART RATE: 90 BPM | TEMPERATURE: 98.5 F | BODY MASS INDEX: 50.02 KG/M2

## 2022-03-15 DIAGNOSIS — R10.13 EPIGASTRIC PAIN: ICD-10-CM

## 2022-03-15 DIAGNOSIS — R06.02 SHORTNESS OF BREATH: ICD-10-CM

## 2022-03-15 DIAGNOSIS — R11.2 NON-INTRACTABLE VOMITING WITH NAUSEA, UNSPECIFIED VOMITING TYPE: Primary | ICD-10-CM

## 2022-03-15 LAB
A/G RATIO: 1.1 (ref 1.1–2.2)
ALBUMIN SERPL-MCNC: 3.9 G/DL (ref 3.4–5)
ALP BLD-CCNC: 159 U/L (ref 40–129)
ALT SERPL-CCNC: 36 U/L (ref 10–40)
ANION GAP SERPL CALCULATED.3IONS-SCNC: 8 MMOL/L (ref 3–16)
AST SERPL-CCNC: 30 U/L (ref 15–37)
BASOPHILS ABSOLUTE: 0.1 K/UL (ref 0–0.2)
BASOPHILS RELATIVE PERCENT: 0.8 %
BILIRUB SERPL-MCNC: 0.3 MG/DL (ref 0–1)
BILIRUBIN URINE: NEGATIVE
BLOOD, URINE: NEGATIVE
BUN BLDV-MCNC: 16 MG/DL (ref 7–20)
CALCIUM SERPL-MCNC: 9.3 MG/DL (ref 8.3–10.6)
CHLORIDE BLD-SCNC: 98 MMOL/L (ref 99–110)
CLARITY: CLEAR
CO2: 32 MMOL/L (ref 21–32)
COLOR: YELLOW
CREAT SERPL-MCNC: 1.1 MG/DL (ref 0.6–1.2)
EKG ATRIAL RATE: 88 BPM
EKG DIAGNOSIS: NORMAL
EKG P AXIS: 71 DEGREES
EKG P-R INTERVAL: 160 MS
EKG Q-T INTERVAL: 394 MS
EKG QRS DURATION: 82 MS
EKG QTC CALCULATION (BAZETT): 476 MS
EKG R AXIS: -4 DEGREES
EKG T AXIS: 58 DEGREES
EKG VENTRICULAR RATE: 88 BPM
EOSINOPHILS ABSOLUTE: 0.2 K/UL (ref 0–0.6)
EOSINOPHILS RELATIVE PERCENT: 2.2 %
GFR AFRICAN AMERICAN: >60
GFR NON-AFRICAN AMERICAN: 50
GLUCOSE BLD-MCNC: 125 MG/DL (ref 70–99)
GLUCOSE URINE: NEGATIVE MG/DL
HCT VFR BLD CALC: 36.8 % (ref 36–48)
HEMOGLOBIN: 12.4 G/DL (ref 12–16)
KETONES, URINE: NEGATIVE MG/DL
LACTIC ACID: 1.1 MMOL/L (ref 0.4–2)
LEUKOCYTE ESTERASE, URINE: NEGATIVE
LIPASE: 38 U/L (ref 13–60)
LYMPHOCYTES ABSOLUTE: 1.8 K/UL (ref 1–5.1)
LYMPHOCYTES RELATIVE PERCENT: 22.8 %
MAGNESIUM: 1.8 MG/DL (ref 1.8–2.4)
MCH RBC QN AUTO: 27.5 PG (ref 26–34)
MCHC RBC AUTO-ENTMCNC: 33.5 G/DL (ref 31–36)
MCV RBC AUTO: 82 FL (ref 80–100)
MICROSCOPIC EXAMINATION: NORMAL
MONOCYTES ABSOLUTE: 1 K/UL (ref 0–1.3)
MONOCYTES RELATIVE PERCENT: 13 %
NEUTROPHILS ABSOLUTE: 4.7 K/UL (ref 1.7–7.7)
NEUTROPHILS RELATIVE PERCENT: 61.2 %
NITRITE, URINE: NEGATIVE
PDW BLD-RTO: 15.2 % (ref 12.4–15.4)
PH UA: 5.5 (ref 5–8)
PLATELET # BLD: 296 K/UL (ref 135–450)
PMV BLD AUTO: 7.4 FL (ref 5–10.5)
POTASSIUM REFLEX MAGNESIUM: 3.5 MMOL/L (ref 3.5–5.1)
PRO-BNP: 28 PG/ML (ref 0–124)
PROTEIN UA: NEGATIVE MG/DL
RBC # BLD: 4.49 M/UL (ref 4–5.2)
SODIUM BLD-SCNC: 138 MMOL/L (ref 136–145)
SPECIFIC GRAVITY UA: 1.01 (ref 1–1.03)
TOTAL PROTEIN: 7.4 G/DL (ref 6.4–8.2)
TROPONIN: <0.01 NG/ML
URINE REFLEX TO CULTURE: NORMAL
URINE TYPE: NORMAL
UROBILINOGEN, URINE: 0.2 E.U./DL
WBC # BLD: 7.7 K/UL (ref 4–11)

## 2022-03-15 PROCEDURE — 93010 ELECTROCARDIOGRAM REPORT: CPT | Performed by: INTERNAL MEDICINE

## 2022-03-15 PROCEDURE — 81003 URINALYSIS AUTO W/O SCOPE: CPT

## 2022-03-15 PROCEDURE — 85025 COMPLETE CBC W/AUTO DIFF WBC: CPT

## 2022-03-15 PROCEDURE — 83735 ASSAY OF MAGNESIUM: CPT

## 2022-03-15 PROCEDURE — 83605 ASSAY OF LACTIC ACID: CPT

## 2022-03-15 PROCEDURE — 80053 COMPREHEN METABOLIC PANEL: CPT

## 2022-03-15 PROCEDURE — 83880 ASSAY OF NATRIURETIC PEPTIDE: CPT

## 2022-03-15 PROCEDURE — 99283 EMERGENCY DEPT VISIT LOW MDM: CPT

## 2022-03-15 PROCEDURE — 83690 ASSAY OF LIPASE: CPT

## 2022-03-15 PROCEDURE — 84484 ASSAY OF TROPONIN QUANT: CPT

## 2022-03-15 PROCEDURE — 93005 ELECTROCARDIOGRAM TRACING: CPT | Performed by: EMERGENCY MEDICINE

## 2022-03-15 PROCEDURE — 71045 X-RAY EXAM CHEST 1 VIEW: CPT

## 2022-03-15 RX ORDER — ONDANSETRON 4 MG/1
4 TABLET, ORALLY DISINTEGRATING ORAL 4 TIMES DAILY PRN
Qty: 15 TABLET | Refills: 0 | Status: SHIPPED | OUTPATIENT
Start: 2022-03-15 | End: 2022-03-20

## 2022-03-15 NOTE — ED NOTES
Pt dc/d with instructions in stable condition, to lobby per wc, calling for ride home.      Ed Jerez, RN  03/15/22 0074

## 2022-03-15 NOTE — ED PROVIDER NOTES
Emergency Physician Note        Note Open Time: 6:04 AM EDT    Chief Complaint  Other (cont flu like symptoms)       History of Present Illness  Mehreen Garcia is a 57 y.o. female who presents to the ED for malaise. Patient reports about 2 weeks of malaise that has been associated with occasional vomiting, she vomited yesterday. As well as general lack of energy and occasional shortness of breath. She has a history of heart failure and states that she will get short of breath at night. She states that tonight she was taking her shirt off and accidentally hit her life alert button. When EMS arrived she decided to come to the hospital anyway because she is just been feeling poorly for a long time. She did not intend to call them however. She denies any chest pain or any fevers, chills or sweats. She is not currently short of breath. She denies any diarrhea or any urinary or vaginal symptoms. She states the mild swelling she has currently is typical for her. 10 systems reviewed, pertinent positives per HPI otherwise noted to be negative    I have reviewed the following from the nursing documentation:      Prior to Admission medications    Medication Sig Start Date End Date Taking?  Authorizing Provider   mometasone-formoterol [facility]) 100-5 MCG/ACT inhaler Inhale 2 puffs into the lungs 2 times daily 2/8/22   Bill Bergman MD   simvastatin (ZOCOR) 40 MG tablet Take 1 tablet by mouth nightly 12/1/21   Alex Vidales MD   Diclofenac Sodium 3 % GEL Diclofenac Topical Gel 1 %    PRN    Active    Historical Provider, MD   potassium bicarbonate (K-LYTE) 25 MEQ disintegrating tablet Take 25 mEq by mouth 2 times daily  Patient not taking: Reported on 8/31/2021    Historical Provider, MD   torsemide (DEMADEX) 20 MG tablet Take 1 tablet by mouth daily  Patient taking differently: Take 100 mg by mouth daily  3/28/21   Bekah Eldridge MD   montelukast (SINGULAIR) 10 MG tablet Take 10 mg by mouth nightly Historical Provider, MD   pantoprazole (PROTONIX) 40 MG tablet Take 1 tablet by mouth daily 3/16/21   Oli Wilson MD   clonazePAM (KLONOPIN) 0.5 MG tablet Take 0.5 mg by mouth daily as needed. Historical Provider, MD   buPROPion (WELLBUTRIN XL) 300 MG extended release tablet Take 300 mg by mouth every morning    Historical Provider, MD   levothyroxine (SYNTHROID) 125 MCG tablet Take 125 mcg by mouth Daily    Historical Provider, MD   ARIPiprazole (ABILIFY) 10 MG tablet Take 10 mg by mouth daily    Historical Provider, MD   diphenhydrAMINE (BENADRYL ALLERGY) 25 MG tablet Take 25 mg by mouth every 6 hours as needed for Itching    Historical Provider, MD   DULoxetine (CYMBALTA) 30 MG extended release capsule Take 30 mg by mouth daily     Historical Provider, MD   fluticasone (FLOVENT HFA) 220 MCG/ACT inhaler Inhale 1 puff into the lungs 2 times daily    Historical Provider, MD   nitroGLYCERIN (NITROSTAT) 0.4 MG SL tablet Place 0.4 mg under the tongue 8/16/18   Historical Provider, MD   gabapentin (NEURONTIN) 300 MG capsule TAKE ONE CAPSULE BY MOUTH THREE TIMES A DAY 7/5/19   Historical Provider, MD   loperamide (IMODIUM) 2 MG capsule TAKE ONE CAPSULE BY MOUTH THREE TIMES A DAY AS NEEDED FOR DIARRHEA 4/11/19   Historical Provider, MD   traMADol (ULTRAM) 50 MG tablet Take 50 mg by mouth every 6 hours as needed for Pain.   12/30/19   Historical Provider, MD   albuterol (PROVENTIL HFA;VENTOLIN HFA) 108 (90 BASE) MCG/ACT inhaler Inhale 2 puffs into the lungs every 6 hours as needed for Wheezing    Historical Provider, MD   ascorbic acid (VITAMIN C) 500 MG tablet Take 500 mg by mouth daily    Historical Provider, MD   vitamin D (CHOLECALCIFEROL) 5000 UNITS CAPS capsule Take 5,000 Units by mouth daily     Historical Provider, MD   metoprolol (LOPRESSOR) 25 MG tablet Take 25 mg by mouth 2 times daily    Historical Provider, MD   Multiple Vitamins-Minerals (THERAPEUTIC MULTIVITAMIN-MINERALS) tablet Take 1 tablet by mouth daily    Historical Provider, MD   nicotine polacrilex (COMMIT) 4 MG lozenge Take 4 mg by mouth as needed for Smoking cessation  Patient not taking: Reported on 8/31/2021    Historical Provider, MD   mupirocin (BACTROBAN) 2 % ointment Apply topically 3 times daily Apply topically 3 times daily.     Historical Provider, MD   vitamin E 1000 UNITS capsule Take 1,000 Units by mouth daily    Historical Provider, MD       Allergies as of 03/15/2022 - Fully Reviewed 03/15/2022   Allergen Reaction Noted    Tums [calcium carbonate antacid] Nausea Only 05/03/2015       Past Medical History:   Diagnosis Date    Anemia     Anxiety     Arthritis     CAD (coronary artery disease)     [facility]     CHF (congestive heart failure) (HCC)     diastolic    Chronic kidney disease     Depression     Diabetes mellitus ([facility]     Hep C w/o coma, chronic ([facility]     Hyperlipidemia     Hypertension     MRSA (methicillin resistant staph aureus) culture positive 03/26/2021    left leg wound    Neuropathy     [facility]oaffective disorder [facility]     Thyroid disease         Surgical History:   Past Surgical History:   Procedure Laterality Date    COLONOSCOPY  4/26/2021    COLONOSCOPY POLYPECTOMY SNARE/COLD BIOPSY performed by Marsha Osullivan MD at [address]     pt unsure of laterality     UPPER GASTROINTESTINAL ENDOSCOPY N/A 4/26/2021    EGD BIOPSY performed by Marsha Osullivan MD at [address] N/A 6/21/2021    EGD DIAGNOSTIC ONLY performed by Marsha Osullivan MD at [facility] ENDOSCOPY        Family History:    Family History   Problem Relation Age of Onset    Liver Disease Brother        Social History     Socioeconomic History    Marital status: Legally      Spouse name: Not on file    Number of children: Not on file    Years of education: Not on file    Highest education level: Not on file   Occupational History    Not on file   Tobacco Use    Smoking status: Former Smoker     Packs/day: 2.00     Years: 30.00     Pack years: 60.00     Types: Cigarettes     Quit date: 2021     Years since quittin.2    Smokeless tobacco: Never Used   Vaping Use    Vaping Use: Never used   Substance and Sexual Activity    Alcohol use: No    Drug use: No    Sexual activity: Not on file   Other Topics Concern    Not on file   Social History Narrative    Not on file     Social Determinants of Health     Financial Resource Strain:     Difficulty of Paying Living Expenses: Not on file   Food Insecurity:     Worried About Running Out of Food in the Last Year: Not on file    Renato of Food in the Last Year: Not on file   Transportation Needs:     Lack of Transportation (Medical): Not on file    Lack of Transportation (Non-Medical): Not on file   Physical Activity:     Days of Exercise per Week: Not on file    Minutes of Exercise per Session: Not on file   Stress:     Feeling of Stress : Not on file   Social Connections:     Frequency of Communication with Friends and Family: Not on file    Frequency of Social Gatherings with Friends and Family: Not on file    Attends Latter-day Services: Not on file    Active Member of [address] [place] [address] [employer] or Organizations: Not on file    Attends Club or Organization Meetings: Not on file    Marital Status: Not on file   Intimate Partner Violence:     Fear of Current or Ex-Partner: Not on file    Emotionally Abused: Not on file    Physically Abused: Not on file    Sexually Abused: Not on file   Housing Stability:     Unable to Pay for Housing in the Last Year: Not on file    Number of [place] in the Last Year: Not on file    Unstable Housing in the Last Year: Not on file       Nursing notes reviewed.     ED Triage Vitals [03/15/22 0410]   Enc Vitals Group      BP (!) 151/81      Pulse 90      Resp 18      Temp 98.5 °F (36.9 °C)      Temp Source Oral      SpO2 94 %      Weight 296 lb (134.3 kg) CULTURE   LACTIC ACID   MAGNESIUM       MEDICAL DECISION MAKING        I advised the patient to return to the emergency department immediately for any new or worsening symptoms, such as fever, chest pain or any bleeding. The patient voiced agreement and understanding of the treatment plan. I estimate there is LOW risk for ACUTE CORONARY SYNDROME, INTRACRANIAL HEMORRHAGE, MALIGNANT DYSRHYTHMIA, MENINGITIS, PNEUMONIA, PULMONARY EMBOLISM, SEPSIS, SUBARACHNOID HEMORRHAGE, SUBDURAL HEMATOMA, STROKE, or URINARY TRACT INFECTION, thus I consider the discharge disposition reasonable. Clark Dias and I have discussed the diagnosis and risks, and we agree with discharging home to follow-up with their primary doctor. We also discussed returning to the Emergency Department immediately if new or worsening symptoms occur. We have discussed the symptoms which are most concerning (e.g., changing or worsening pain, weakness, vomiting, fever) that necessitate immediate return. Final Impression    1. Non-intractable vomiting with nausea, unspecified vomiting type    2. Epigastric pain    3. Shortness of breath        Blood pressure (!) 151/81, pulse 90, temperature 98.5 °F (36.9 °C), temperature source Oral, resp. rate 18, height 5' 4\" (1.626 m), weight 296 lb (134.3 kg), SpO2 94 %. Patient was given scripts for the following medications. I counseled patient how to take these medications. Discharge Medication List as of 3/15/2022  6:27 AM      START taking these medications    Details   ondansetron (ZOFRAN ODT) 4 MG disintegrating tablet Take 1 tablet by mouth 4 times daily as needed for Nausea or Vomiting, Disp-15 tablet, R-0Normal             Disposition  Pt is in good condition upon Discharge to home. This chart was generated using the [address] [place] [address] dictation system. I created this record but it may contain dictation errors.           Alma Walters MD  03/15/22 0464

## 2022-03-22 ENCOUNTER — TELEPHONE (OUTPATIENT)
Dept: CARDIOLOGY CLINIC | Age: 62
End: 2022-03-22

## 2022-03-22 NOTE — TELEPHONE ENCOUNTER
Pt called stating she needed to cancel her appointment due to her feeling ill she was concerned about swelling in her legs and feet but refused to come in.

## 2022-04-20 ENCOUNTER — HOSPITAL ENCOUNTER (OUTPATIENT)
Dept: GENERAL RADIOLOGY | Age: 62
Discharge: HOME | End: 2022-04-20
Payer: MEDICAID

## 2022-04-20 ENCOUNTER — HOSPITAL ENCOUNTER (OUTPATIENT)
Age: 62
Discharge: HOME | End: 2022-04-20
Payer: MEDICAID

## 2022-04-20 DIAGNOSIS — M54.50 LOW BACK PAIN, UNSPECIFIED BACK PAIN LATERALITY, UNSPECIFIED CHRONICITY, UNSPECIFIED WHETHER SCIATICA PRESENT: ICD-10-CM

## 2022-04-20 DIAGNOSIS — M17.0 ARTHRITIS OF BOTH KNEES: ICD-10-CM

## 2022-04-20 PROCEDURE — 73560 X-RAY EXAM OF KNEE 1 OR 2: CPT

## 2022-04-20 PROCEDURE — 72110 X-RAY EXAM L-2 SPINE 4/>VWS: CPT

## 2022-08-10 ENCOUNTER — OFFICE VISIT (OUTPATIENT)
Dept: CARDIOLOGY CLINIC | Age: 62
End: 2022-08-10
Payer: MEDICAID

## 2022-08-10 VITALS
HEART RATE: 83 BPM | BODY MASS INDEX: 49.61 KG/M2 | WEIGHT: 289 LBS | SYSTOLIC BLOOD PRESSURE: 120 MMHG | DIASTOLIC BLOOD PRESSURE: 70 MMHG

## 2022-08-10 DIAGNOSIS — I10 HYPERTENSION, UNSPECIFIED TYPE: Primary | ICD-10-CM

## 2022-08-10 DIAGNOSIS — I50.32 CHRONIC HEART FAILURE WITH PRESERVED EJECTION FRACTION (HFPEF) (HCC): ICD-10-CM

## 2022-08-10 DIAGNOSIS — Z79.899 LONG TERM USE OF DRUG: ICD-10-CM

## 2022-08-10 PROCEDURE — 99214 OFFICE O/P EST MOD 30 MIN: CPT | Performed by: INTERNAL MEDICINE

## 2022-08-10 RX ORDER — TORSEMIDE 20 MG/1
40 TABLET ORAL 2 TIMES DAILY
Qty: 360 TABLET | Refills: 3 | Status: SHIPPED | OUTPATIENT
Start: 2022-08-10

## 2022-08-10 RX ORDER — ATORVASTATIN CALCIUM 20 MG/1
20 TABLET, FILM COATED ORAL DAILY
Qty: 90 TABLET | Refills: 3 | Status: SHIPPED | OUTPATIENT
Start: 2022-08-10

## 2022-08-10 RX ORDER — ATORVASTATIN CALCIUM 40 MG/1
20 TABLET, FILM COATED ORAL DAILY
COMMUNITY
End: 2022-08-10

## 2022-08-10 NOTE — PROGRESS NOTES
Cc: HFpEF    HPI:     Patient is a 58 yo woman with h/o HTN, HLP, DM, morbid obesity, thyroid disease, ROSARIO, HFpEF, PUD, smoker. She used to follow with Dr Jared Doll at [facility] but wanted to consolidate her care at [facility]. Echo 02/2020: normal, no mentioning of diastolic function. DSE 09/2018: normal     ECG 8/31/21: NSR, normal ECG    Patient was admitted at [facility] 02/2022 for hypoxia due to influenza infection. Echo 02/2022: normal echo. Patient is here for a follow up. She continues to smoke. She had one episode of midsternal chest pain lasting a few minutes while sitting for which she took 2 nitrostat with relief. She denies any exertional symptoms. Her torsemide was increased to 40 mg bid from 20 daily a few weeks ago due to weight gain and edema. Her symptoms have now resolved. Histories     Past Medical History:   has a past medical history of Anemia, Anxiety, Arthritis, CAD (coronary artery disease), Cancer ([address] CHF (congestive heart failure) ([address] Chronic kidney disease, Depression, Diabetes mellitus ([address] Hep C w/o coma, chronic ([address] Hyperlipidemia, Hypertension, MRSA (methicillin resistant staph aureus) culture positive, Neuropathy, Schizoaffective disorder ([address] and Thyroid disease. Surgical History:   has a past surgical history that includes Lung lobectomy (N/A); Dental surgery; Upper gastrointestinal endoscopy (N/A, 4/26/2021); Colonoscopy (4/26/2021); and Upper gastrointestinal endoscopy (N/A, 6/21/2021). Social History:   reports that she quit smoking about 19 months ago. Her smoking use included cigarettes. She has a 60.00 pack-year smoking history. She has never used smokeless tobacco. She reports that she does not drink alcohol and does not use drugs.      Family History:  No evidence for sudden cardiac death or premature CAD      Medications:     Home medications were reviewed and are listed below    Prior to Admission medications    Medication Sig Start Date End Date Taking? Authorizing Provider   torsemide (DEMADEX) 20 MG tablet Take 2 tablets by mouth in the morning and 2 tablets before bedtime. 8/10/22  Yes Michelle Hill MD   atorvastatin (LIPITOR) 20 MG tablet Take 1 tablet by mouth in the morning. 8/10/22  Yes Michelle Hill MD   mometasone-formoterol [facility]) 100-5 MCG/ACT inhaler Inhale 2 puffs into the lungs 2 times daily 2/8/22  Yes Esau Wilks MD   Diclofenac Sodium 3 % GEL Diclofenac Topical Gel 1 %    PRN    Active   Yes Historical Provider, MD   montelukast (SINGULAIR) 10 MG tablet Take 10 mg by mouth nightly   Yes Historical Provider, MD   pantoprazole (PROTONIX) 40 MG tablet Take 1 tablet by mouth daily 3/16/21  Yes Aminah Kimble MD   clonazePAM (KLONOPIN) 0.5 MG tablet Take 0.5 mg by mouth daily as needed. Yes Historical Provider, MD   buPROPion (WELLBUTRIN XL) 300 MG extended release tablet Take 300 mg by mouth every morning   Yes Historical Provider, MD   levothyroxine (SYNTHROID) 125 MCG tablet Take 125 mcg by mouth Daily   Yes Historical Provider, MD   ARIPiprazole (ABILIFY) 10 MG tablet Take 10 mg by mouth daily   Yes Historical Provider, MD   diphenhydrAMINE (BENADRYL) 25 MG tablet Take 25 mg by mouth every 6 hours as needed for Itching   Yes Historical Provider, MD   DULoxetine (CYMBALTA) 30 MG extended release capsule Take 30 mg by mouth daily    Yes Historical Provider, MD   fluticasone (FLOVENT HFA) 220 MCG/ACT inhaler Inhale 1 puff into the lungs 2 times daily   Yes Historical Provider, MD   nitroGLYCERIN (NITROSTAT) 0.4 MG SL tablet Place 0.4 mg under the tongue 8/16/18  Yes Historical Provider, MD   gabapentin (NEURONTIN) 300 MG capsule TAKE ONE CAPSULE BY MOUTH THREE TIMES A DAY 7/5/19  Yes Historical Provider, MD   loperamide (IMODIUM) 2 MG capsule TAKE ONE CAPSULE BY MOUTH THREE TIMES A DAY AS NEEDED FOR DIARRHEA 4/11/19  Yes Historical Provider, MD   traMADol (ULTRAM) 50 MG tablet Take 50 mg by mouth every 6 hours as needed for Pain. 12/30/19  Yes Historical Provider, MD   albuterol (PROVENTIL HFA;VENTOLIN HFA) 108 (90 BASE) MCG/ACT inhaler Inhale 2 puffs into the lungs every 6 hours as needed for Wheezing   Yes Historical Provider, MD   ascorbic acid (VITAMIN C) 500 MG tablet Take 500 mg by mouth daily   Yes Historical Provider, MD   vitamin D (CHOLECALCIFEROL) 5000 UNITS CAPS capsule Take 5,000 Units by mouth daily    Yes Historical Provider, MD   metoprolol (LOPRESSOR) 25 MG tablet Take 25 mg by mouth 2 times daily   Yes Historical Provider, MD   Multiple Vitamins-Minerals (THERAPEUTIC MULTIVITAMIN-MINERALS) tablet Take 1 tablet by mouth daily   Yes Historical Provider, MD   nicotine polacrilex (COMMIT) 4 MG lozenge Take 4 mg by mouth as needed for Smoking cessation   Yes Historical Provider, MD   mupirocin (BACTROBAN) 2 % ointment Apply topically 3 times daily Apply topically 3 times daily. Yes Historical Provider, MD   vitamin E 1000 UNITS capsule Take 1,000 Units by mouth daily   Yes Historical Provider, MD   potassium bicarbonate (K-LYTE) 25 MEQ disintegrating tablet Take 25 mEq by mouth 2 times daily  Patient not taking: No sig reported    Historical Provider, MD          Allergy:     Tums [calcium carbonate antacid]       Review of Systems:     All 12 point review of symptoms completed. Pertinent positives identified in the HPI, all other review of symptoms negative as below. CONSTITUTIONAL: No fatigue  SKIN: No rash or pruritis. EYES: No visual changes or diplopia. No scleral icterus. ENT: No Headaches, hearing loss or vertigo. No mouth sores or sore throat. CARDIOVASCULAR: No chest pain/chest pressure/chest discomfort. No palpitations. No edema. RESPIRATORY: No dyspnea. No cough or wheezing, no sputum production. GASTROINTESTINAL: No N/V/D. No abdominal pain, appetite loss, blood in stools. GENITOURINARY: No dysuria, trouble voiding, or hematuria.   MUSCULOSKELETAL:  No gait disturbance, weakness or joint complaints. NEUROLOGICAL: No headache, diplopia, change in muscle strength, numbness or tingling. No change in gait, balance, coordination, mood, affect, memory, mentation, behavior. PSHYCH: No anxiety, loss of interest, change in sexual behavior, feelings of self-harm, or confusion. ENDOCRINE: No excessive thirst, fluid intake, or urination. No tremor. HEMATOLOGIC: No abnormal bruising or bleeding. ALLERGY: No nasal congestion or hives.       Physical Examination:     Vitals:    08/10/22 1126   BP: 120/70   Pulse: 83   Weight: 289 lb (131.1 kg)       Wt Readings from Last 3 Encounters:   08/10/22 289 lb (131.1 kg)   03/15/22 296 lb (134.3 kg)   02/08/22 296 lb 8.3 oz (134.5 kg)         General Appearance:  Alert, cooperative, no distress, appears stated age Appropriate weight   Head:  Normocephalic, without obvious abnormality, atraumatic   Eyes:  PERRL, conjunctiva/corneas clear EOM intact  Ears normal   Throat no lesions       Nose: Nares normal, no drainage or sinus tenderness   Throat: Lips, mucosa, and tongue normal   Neck: Supple, symmetrical, trachea midline, no adenopathy, thyroid: not enlarged, symmetric, no tenderness/mass/nodules, no carotid bruit       Lungs:   Clear to auscultation bilaterally, respirations unlabored   Chest Wall:  No tenderness or deformity   Heart:  Regular rhythm, rate is controlled, S1, S2 normal, there is no murmur, there is no rub or gallop, cannot assess jvd, min bilateral lower extremity edema   Abdomen:   Soft, non-tender, bowel sounds active all four quadrants,  no masses, no organomegaly       Extremities: Extremities normal, atraumatic, no cyanosis   Pulses: 2+ and symmetric   Skin: Skin color, texture, turgor normal, no rashes or lesions   Pysch: Normal mood and affect   Neurologic: Normal gross motor and sensory exam.  Cranial nerves intact        Labs:     Lab Results   Component Value Date    WBC 7.7 03/15/2022    HGB 12.4 03/15/2022    HCT 36.8 03/15/2022    MCV 82.0 03/15/2022     03/15/2022     Lab Results   Component Value Date     03/15/2022    K 3.5 03/15/2022    CL 98 (L) 03/15/2022    CO2 32 03/15/2022    BUN 16 03/15/2022    CREATININE 1.1 03/15/2022    GLUCOSE 125 (H) 03/15/2022    CALCIUM 9.3 03/15/2022    PROT 7.4 03/15/2022    LABALBU 3.9 03/15/2022    BILITOT 0.3 03/15/2022    ALKPHOS 159 (H) 03/15/2022    AST 30 03/15/2022    ALT 36 03/15/2022    LABGLOM 50 (A) 03/15/2022    GFRAA >60 03/15/2022    AGRATIO 1.1 03/15/2022    GLOB 3.5 05/06/2021         No results found for: CHOL  No results found for: TRIG  No results found for: HDL  No results found for: LDLCHOLESTEROL, LDLCALC  No results found for: LABVLDL, VLDL  No results found for: [facility]    Lab Results   Component Value Date    INR 1.23 (H) 01/10/2020    PROTIME 14.3 (H) 01/10/2020       The ASCVD Risk score (Varun Garza., et al., 2013) failed to calculate for the following reasons:    Cannot find a previous HDL lab    Cannot find a previous total cholesterol lab      Assessment / Plan:      Diagnosis Orders   1. Hypertension, unspecified type  Lipid, Fasting      2. Chronic heart failure with preserved ejection fraction (HFpEF) ([facility])  Basic Metabolic Panel      3. Long term use of drug  Basic Metabolic Panel    Lipid, Fasting           1. Chronic HFpEF:   Patient is euvolemic and hemo stable.     -Low salt diet  -Cw torsemide bid for now; will check a BMP to make any adjustments. 2. HTN:   BP is normal.     -Low salt diet  -BP cuff to check BP frequently; if elevated, call our office for med. 3. HLP:  Patient is on statin     -Cw lipitor 20 daily  -Check a FLP; if high, will increase lipitor. 4. Atypical chest pain:   Most likely due to mucous plugging from smoking and chronic bronchitis.     -Will monitor    5. Smoking:   - Patient was counseled regarding the detrimental consequences of smoking and was advised to quit.            We will schedule a follow up visit in 6 months      I have spent 35 minutes of face to face time with the patient with more than 50% spent counseling and coordinating care. I have personally reviewed the reports and images of labs, radiological studies, cardiac studies including ECG's and telemetry, current and old medical records. The note was completed using EMR and Dragon dictation system. Every effort was made to ensure accuracy; however, inadvertent computerized transcription errors may be present. All questions and concerns were addressed to the patient/family. Alternatives to my treatment were discussed. I would like to thank you for providing me the opportunity to participate in the care of your patient. If you have any questions, please do not hesitate to contact me.      Kalia Bean MD, [address] Office Phone: [phone]  Fax: [phone]

## 2022-08-16 ENCOUNTER — HOSPITAL ENCOUNTER (OUTPATIENT)
Dept: GENERAL RADIOLOGY | Age: 62
Discharge: HOME | End: 2022-08-16
Payer: MEDICAID

## 2022-08-16 ENCOUNTER — HOSPITAL ENCOUNTER (OUTPATIENT)
Age: 62
Discharge: HOME | End: 2022-08-16
Payer: MEDICAID

## 2022-08-16 DIAGNOSIS — M25.40 EFFUSION OF JOINT, MULTIPLE SITES: ICD-10-CM

## 2022-08-16 DIAGNOSIS — M25.50 PAIN IN JOINT, MULTIPLE SITES: ICD-10-CM

## 2022-08-16 DIAGNOSIS — M19.90 ACUTE ARTHRITIS: ICD-10-CM

## 2022-08-16 DIAGNOSIS — I50.32 CHRONIC HEART FAILURE WITH PRESERVED EJECTION FRACTION (HFPEF) (HCC): ICD-10-CM

## 2022-08-16 DIAGNOSIS — Z79.899 LONG TERM USE OF DRUG: ICD-10-CM

## 2022-08-16 DIAGNOSIS — I10 HYPERTENSION, UNSPECIFIED TYPE: ICD-10-CM

## 2022-08-16 PROCEDURE — 73130 X-RAY EXAM OF HAND: CPT

## 2022-08-16 PROCEDURE — 73630 X-RAY EXAM OF FOOT: CPT

## 2022-08-17 LAB
ANION GAP SERPL CALCULATED.3IONS-SCNC: 18 MMOL/L (ref 3–16)
BUN BLDV-MCNC: 30 MG/DL (ref 7–20)
CALCIUM SERPL-MCNC: 9.7 MG/DL (ref 8.3–10.6)
CHLORIDE BLD-SCNC: 93 MMOL/L (ref 99–110)
CHOLESTEROL, FASTING: 189 MG/DL (ref 0–199)
CO2: 30 MMOL/L (ref 21–32)
CREAT SERPL-MCNC: 1.1 MG/DL (ref 0.6–1.2)
GFR AFRICAN AMERICAN: >60
GFR NON-AFRICAN AMERICAN: 50
GLUCOSE BLD-MCNC: 90 MG/DL (ref 70–99)
HDLC SERPL-MCNC: 68 MG/DL (ref 40–60)
LDL CHOLESTEROL CALCULATED: 96 MG/DL
POTASSIUM SERPL-SCNC: 3.9 MMOL/L (ref 3.5–5.1)
SODIUM BLD-SCNC: 141 MMOL/L (ref 136–145)
TRIGLYCERIDE, FASTING: 126 MG/DL (ref 0–150)
VLDLC SERPL CALC-MCNC: 25 MG/DL

## 2022-08-18 NOTE — RESULT ENCOUNTER NOTE
Please let patient know that I reviewed her labs including cholesterol. They are acceptable. She needs to continue her Lipitor at 20 mg p.o. daily.   Thank you

## 2022-09-29 ENCOUNTER — HOSPITAL ENCOUNTER (EMERGENCY)
Age: 62
Discharge: HOME | End: 2022-09-29
Attending: EMERGENCY MEDICINE
Payer: MEDICAID

## 2022-09-29 VITALS
RESPIRATION RATE: 18 BRPM | WEIGHT: 291.44 LBS | SYSTOLIC BLOOD PRESSURE: 153 MMHG | BODY MASS INDEX: 49.75 KG/M2 | TEMPERATURE: 98.1 F | DIASTOLIC BLOOD PRESSURE: 72 MMHG | HEIGHT: 64 IN | HEART RATE: 97 BPM | OXYGEN SATURATION: 95 %

## 2022-09-29 DIAGNOSIS — N39.0 URINARY TRACT INFECTION WITHOUT HEMATURIA, SITE UNSPECIFIED: Primary | ICD-10-CM

## 2022-09-29 LAB
ANION GAP SERPL CALCULATED.3IONS-SCNC: 9 MMOL/L (ref 3–16)
BACTERIA: ABNORMAL /HPF
BASOPHILS ABSOLUTE: 0.1 K/UL (ref 0–0.2)
BASOPHILS RELATIVE PERCENT: 0.7 %
BILIRUBIN URINE: NEGATIVE
BLOOD, URINE: ABNORMAL
BUN BLDV-MCNC: 13 MG/DL (ref 7–20)
CALCIUM SERPL-MCNC: 9.7 MG/DL (ref 8.3–10.6)
CHLORIDE BLD-SCNC: 98 MMOL/L (ref 99–110)
CLARITY: ABNORMAL
CO2: 33 MMOL/L (ref 21–32)
COLOR: YELLOW
CREAT SERPL-MCNC: 0.9 MG/DL (ref 0.6–1.2)
EOSINOPHILS ABSOLUTE: 0.2 K/UL (ref 0–0.6)
EOSINOPHILS RELATIVE PERCENT: 1.9 %
EPITHELIAL CELLS, UA: ABNORMAL /HPF (ref 0–5)
GFR AFRICAN AMERICAN: >60
GFR NON-AFRICAN AMERICAN: >60
GLUCOSE BLD-MCNC: 113 MG/DL (ref 70–99)
GLUCOSE URINE: NEGATIVE MG/DL
HCT VFR BLD CALC: 40.7 % (ref 36–48)
HEMOGLOBIN: 13.5 G/DL (ref 12–16)
KETONES, URINE: NEGATIVE MG/DL
LEUKOCYTE ESTERASE, URINE: ABNORMAL
LYMPHOCYTES ABSOLUTE: 1.4 K/UL (ref 1–5.1)
LYMPHOCYTES RELATIVE PERCENT: 14 %
MAGNESIUM: 1.9 MG/DL (ref 1.8–2.4)
MCH RBC QN AUTO: 27.8 PG (ref 26–34)
MCHC RBC AUTO-ENTMCNC: 33.2 G/DL (ref 31–36)
MCV RBC AUTO: 83.7 FL (ref 80–100)
MICROSCOPIC EXAMINATION: YES
MONOCYTES ABSOLUTE: 0.9 K/UL (ref 0–1.3)
MONOCYTES RELATIVE PERCENT: 9.6 %
NEUTROPHILS ABSOLUTE: 7.3 K/UL (ref 1.7–7.7)
NEUTROPHILS RELATIVE PERCENT: 73.8 %
NITRITE, URINE: NEGATIVE
PDW BLD-RTO: 13.9 % (ref 12.4–15.4)
PH UA: 7 (ref 5–8)
PLATELET # BLD: 309 K/UL (ref 135–450)
PMV BLD AUTO: 7.2 FL (ref 5–10.5)
POTASSIUM REFLEX MAGNESIUM: 3.5 MMOL/L (ref 3.5–5.1)
PROTEIN UA: NEGATIVE MG/DL
RBC # BLD: 4.87 M/UL (ref 4–5.2)
RBC UA: ABNORMAL /HPF (ref 0–4)
SODIUM BLD-SCNC: 140 MMOL/L (ref 136–145)
SPECIFIC GRAVITY UA: <=1.005 (ref 1–1.03)
URINE TYPE: ABNORMAL
UROBILINOGEN, URINE: 0.2 E.U./DL
WBC # BLD: 9.9 K/UL (ref 4–11)
WBC UA: >100 /HPF (ref 0–5)

## 2022-09-29 PROCEDURE — 85025 COMPLETE CBC W/AUTO DIFF WBC: CPT

## 2022-09-29 PROCEDURE — 99283 EMERGENCY DEPT VISIT LOW MDM: CPT

## 2022-09-29 PROCEDURE — 83735 ASSAY OF MAGNESIUM: CPT

## 2022-09-29 PROCEDURE — 81001 URINALYSIS AUTO W/SCOPE: CPT

## 2022-09-29 PROCEDURE — 80048 BASIC METABOLIC PNL TOTAL CA: CPT

## 2022-09-29 PROCEDURE — 6370000000 HC RX 637 (ALT 250 FOR IP): Performed by: EMERGENCY MEDICINE

## 2022-09-29 RX ORDER — CEPHALEXIN 500 MG/1
500 CAPSULE ORAL 4 TIMES DAILY
Qty: 20 CAPSULE | Refills: 0 | Status: SHIPPED | OUTPATIENT
Start: 2022-09-29 | End: 2022-10-04

## 2022-09-29 RX ORDER — PHENAZOPYRIDINE HYDROCHLORIDE 100 MG/1
200 TABLET, FILM COATED ORAL ONCE
Status: COMPLETED | OUTPATIENT
Start: 2022-09-29 | End: 2022-09-29

## 2022-09-29 RX ORDER — ONDANSETRON 4 MG/1
4 TABLET, ORALLY DISINTEGRATING ORAL ONCE
Status: COMPLETED | OUTPATIENT
Start: 2022-09-29 | End: 2022-09-29

## 2022-09-29 RX ORDER — CEPHALEXIN 500 MG/1
500 CAPSULE ORAL EVERY 6 HOURS SCHEDULED
Status: DISCONTINUED | OUTPATIENT
Start: 2022-09-29 | End: 2022-09-29 | Stop reason: HOSPADM

## 2022-09-29 RX ORDER — METHOCARBAMOL 500 MG/1
500 TABLET, FILM COATED ORAL 4 TIMES DAILY
COMMUNITY

## 2022-09-29 RX ADMIN — PHENAZOPYRIDINE HYDROCHLORIDE 200 MG: 100 TABLET ORAL at 17:08

## 2022-09-29 RX ADMIN — CEPHALEXIN 500 MG: 500 CAPSULE ORAL at 17:08

## 2022-09-29 RX ADMIN — ONDANSETRON 4 MG: 4 TABLET, ORALLY DISINTEGRATING ORAL at 16:18

## 2022-09-29 NOTE — Clinical Note
Alek North was seen and treated in our emergency department on 9/29/2022. She may return to work on 10/03/2022. If you have any questions or concerns, please don't hesitate to call.       [address] [facility], DO

## 2022-09-30 ASSESSMENT — ENCOUNTER SYMPTOMS
DIARRHEA: 0
VOMITING: 0
BACK PAIN: 0
COUGH: 0
SHORTNESS OF BREATH: 0
RHINORRHEA: 0
NAUSEA: 1
ABDOMINAL PAIN: 0
CHEST TIGHTNESS: 0

## 2022-09-30 NOTE — ED PROVIDER NOTES
EMERGENCY DEPARTMENT PROVIDER NOTE      CHIEF COMPLAINT  Dysuria        HISTORY OF PRESENT ILLNESS  Meenu Gilbert  is a 60 y.o. female with a significant PMHX of CAD, diabetes, and multiple other comorbidities as listed below, that presents the emergency department today with concerns of dysuria and some cloudy urine starting about 4 to 5 days ago. She is slightly nauseated today when they were at a restaurant prior to arrival, but has not vomited. Denies any other concerns; no chest pain, shortness of breath, vomiting, diarrhea. No abdominal pain. Has needed antibiotics in the past for multiple UTIs. Says she is never been admitted for UTI. Says otherwise she feels fine. No recent antibiotics, no recent pain medication    There are no other complaints, modifying factors or associated symptoms. Nursing notes reviewed. Past medical history:  has a past medical history of Anemia, Anxiety, Arthritis, CAD (coronary artery disease), Cancer ([facility] CHF (congestive heart failure) ([facility] Chronic kidney disease, Depression, Diabetes mellitus ([facility] Hep C w/o coma, chronic ([facility] Hyperlipidemia, Hypertension, MRSA (methicillin resistant staph aureus) culture positive (03/26/2021), Neuropathy, Schizoaffective disorder ([facility] and Thyroid disease. Past surgical history:  has a past surgical history that includes Lung lobectomy (N/A); Dental surgery; Upper gastrointestinal endoscopy (N/A, 4/26/2021); Colonoscopy (4/26/2021); and Upper gastrointestinal endoscopy (N/A, 6/21/2021). Home medications:   Prior to Admission medications    Medication Sig Start Date End Date Taking?  Authorizing Provider   methocarbamol (ROBAXIN) 500 MG tablet Take 500 mg by mouth 4 times daily   Yes Historical Provider, MD   cephALEXin (KEFLEX) 500 MG capsule Take 1 capsule by mouth 4 times daily for 5 days 9/29/22 10/4/22 Yes Danielle Alfaro,    torsemide (DEMADEX) 20 MG tablet Take 2 tablets by mouth in the morning and 2 tablets before bedtime. 8/10/22   Mary Loja MD   atorvastatin (LIPITOR) 20 MG tablet Take 1 tablet by mouth in the morning. 8/10/22   Mary Loja MD   mometasone-formoterol [facility]) 100-5 MCG/ACT inhaler Inhale 2 puffs into the lungs 2 times daily 2/8/22   Elizabeth Keller MD   Diclofenac Sodium 3 % GEL Diclofenac Topical Gel 1 %    PRN    Active    Historical Provider, MD   potassium bicarbonate (K-LYTE) 25 MEQ disintegrating tablet Take 25 mEq by mouth 2 times daily  Patient not taking: No sig reported    Historical Provider, MD   montelukast (SINGULAIR) 10 MG tablet Take 10 mg by mouth nightly    Historical Provider, MD   pantoprazole (PROTONIX) 40 MG tablet Take 1 tablet by mouth daily 3/16/21   Mery Espinoza MD   clonazePAM (KLONOPIN) 0.5 MG tablet Take 0.5 mg by mouth daily as needed.     Historical Provider, MD   buPROPion (WELLBUTRIN XL) 300 MG extended release tablet Take 300 mg by mouth every morning    Historical Provider, MD   levothyroxine (SYNTHROID) 125 MCG tablet Take 125 mcg by mouth Daily    Historical Provider, MD   ARIPiprazole (ABILIFY) 10 MG tablet Take 10 mg by mouth daily    Historical Provider, MD   diphenhydrAMINE (BENADRYL) 25 MG tablet Take 25 mg by mouth every 6 hours as needed for Itching    Historical Provider, MD   DULoxetine (CYMBALTA) 30 MG extended release capsule Take 30 mg by mouth daily     Historical Provider, MD   fluticasone (FLOVENT HFA) 220 MCG/ACT inhaler Inhale 1 puff into the lungs 2 times daily    Historical Provider, MD   nitroGLYCERIN (NITROSTAT) 0.4 MG SL tablet Place 0.4 mg under the tongue 8/16/18   Historical Provider, MD   gabapentin (NEURONTIN) 300 MG capsule TAKE ONE CAPSULE BY MOUTH THREE TIMES A DAY 7/5/19   Historical Provider, MD   loperamide (IMODIUM) 2 MG capsule TAKE ONE CAPSULE BY MOUTH THREE TIMES A DAY AS NEEDED FOR DIARRHEA 4/11/19   Historical Provider, MD   traMADol (ULTRAM) 50 MG tablet Take 50 mg by mouth every 6 hours as needed for Pain.  12/30/19   Historical Provider, MD   albuterol (PROVENTIL HFA;VENTOLIN HFA) 108 (90 BASE) MCG/ACT inhaler Inhale 2 puffs into the lungs every 6 hours as needed for Wheezing    Historical Provider, MD   ascorbic acid (VITAMIN C) 500 MG tablet Take 500 mg by mouth daily    Historical Provider, MD   vitamin D (CHOLECALCIFEROL) 5000 UNITS CAPS capsule Take 5,000 Units by mouth daily     Historical Provider, MD   metoprolol (LOPRESSOR) 25 MG tablet Take 25 mg by mouth 2 times daily    Historical Provider, MD   Multiple Vitamins-Minerals (THERAPEUTIC MULTIVITAMIN-MINERALS) tablet Take 1 tablet by mouth daily    Historical Provider, MD   nicotine polacrilex (COMMIT) 4 MG lozenge Take 4 mg by mouth as needed for Smoking cessation    Historical Provider, MD   mupirocin (BACTROBAN) 2 % ointment Apply topically 3 times daily Apply topically 3 times daily. Historical Provider, MD   vitamin E 1000 UNITS capsule Take 1,000 Units by mouth daily    Historical Provider, MD       Allergies   Allergen Reactions    Tums [Calcium Carbonate Antacid] Nausea Only       Social history:  reports that she quit smoking about 20 months ago. Her smoking use included cigarettes. She has a 60.00 pack-year smoking history. She has never used smokeless tobacco. She reports that she does not drink alcohol and does not use drugs. Family history:    Family History   Problem Relation Age of Onset    Liver Disease Brother        REVIEW OF SYSTEMS  6 systems reviewed, pertinent positives per HPI otherwise noted to be negative    Review of Systems   Constitutional:  Negative for activity change, appetite change, fatigue and fever. HENT:  Negative for congestion and rhinorrhea. Respiratory:  Negative for cough, chest tightness and shortness of breath. Cardiovascular:  Negative for chest pain and leg swelling. Gastrointestinal:  Positive for nausea. Negative for abdominal pain, diarrhea and vomiting.    Genitourinary:  Positive for dysuria. Negative for flank pain and pelvic pain. Musculoskeletal:  Negative for back pain and neck pain. Skin:  Negative for rash and wound. PHYSICAL EXAM  Vitals:    09/29/22 1514   BP: (!) 153/72   Pulse: 97   Resp: 18   Temp: 98.1 °F (36.7 °C)   SpO2: 95%       Physical Exam  Vitals reviewed. Constitutional:       General: She is not in acute distress. Appearance: Normal appearance. She is not ill-appearing. HENT:      Head: Normocephalic and atraumatic. Right Ear: External ear normal.      Mouth/Throat:      Mouth: Mucous membranes are moist.      Pharynx: Oropharynx is clear. Eyes:      General:         Right eye: No discharge. Left eye: No discharge. Conjunctiva/sclera: Conjunctivae normal.   Cardiovascular:      Rate and Rhythm: Normal rate and regular rhythm. Pulmonary:      Effort: Pulmonary effort is normal. No respiratory distress. Abdominal:      General: Abdomen is flat. There is no distension. Palpations: Abdomen is soft. Tenderness: There is no abdominal tenderness. There is no right CVA tenderness or left CVA tenderness. Musculoskeletal:      Cervical back: Normal range of motion and neck supple. Skin:     General: Skin is warm and dry. Neurological:      General: No focal deficit present. Mental Status: She is alert and oriented to person, place, and time. ED COURSE/MDM  Nursing notes reviewed. Pt was given the following medications or treatments in the ED:   Medications   ondansetron (ZOFRAN-ODT) disintegrating tablet 4 mg (4 mg Oral Given 9/29/22 1618)   phenazopyridine (PYRIDIUM) tablet 200 mg (200 mg Oral Given 9/29/22 1708)       Patient is an otherwise well-appearing 70-year-old female presenting emergency department with a little bit of nausea after eating at a restaurant, and concerns of dysuria and burning on urination.   Basic labs were obtained as patient does have a history of CKD, however renal function is normal today.  Reassuring electrolytes. No leukocytosis. Urine with large leukocytes, cloudy, plus bacteria and greater than 100 WBCs with only 2 epithelial cells. Will treat for UTI. Discharged home with Keflex. Patient has Zofran at home from a previous UTI. No signs of pyelonephritis. Ambulating at baseline. Heart rate slightly elevated at 97, however tolerating p.o. Not septic no 2 SIRS criteria. Discharged home in stable condition. Clinical Impression  Based on the presenting complaint, history, and physical exam, multiple diagnoses were considered. Exam and workup here most c/w:      1. Urinary tract infection without hematuria, site unspecified        I discussed with Alek North the results of evaluation in the ED, diagnosis, care, and prognosis. The plan is to discharge to home. Patient is in agreement with plan and questions have been answered. Patient was instructed to return to the ED should they experience any concerning new or worsening symptoms. Instructed patient to follow up with their PCP in 1-3 days or as soon as possible for follow up. Patient understands and agrees with the discharge plan, and I have answered all their questions at this time. Patient is stable for discharge home from the ED at this time. Patient will be started on the following medications from the ED:  Discharge Medication List as of 9/29/2022  5:09 PM        START taking these medications    Details   cephALEXin (KEFLEX) 500 MG capsule Take 1 capsule by mouth 4 times daily for 5 days, Disp-20 capsule, R-0Normal             Disposition  Pt is discharged in stable condition.     Disposition Vitals:  BP (!) 153/72   Pulse 97   Temp 98.1 °F (36.7 °C) (Oral)   Resp 18   Ht 5' 4\" (1.626 m)   Wt 291 lb 7 oz (132.2 kg)   SpO2 95%   BMI 50.03 kg/m²       FERNANDO DIXON, DO        [facility], DO  09/30/22 0930

## 2022-12-19 ENCOUNTER — APPOINTMENT (OUTPATIENT)
Dept: GENERAL RADIOLOGY | Age: 62
DRG: 380 | End: 2022-12-19
Payer: MEDICAID

## 2022-12-19 ENCOUNTER — HOSPITAL ENCOUNTER (INPATIENT)
Age: 62
LOS: 7 days | Discharge: HOME HEALTH | DRG: 380 | End: 2022-12-26
Attending: EMERGENCY MEDICINE | Admitting: INTERNAL MEDICINE
Payer: MEDICAID

## 2022-12-19 DIAGNOSIS — I83.009 CHRONIC CUTANEOUS VENOUS STASIS ULCER (HCC): ICD-10-CM

## 2022-12-19 DIAGNOSIS — L97.909 CHRONIC CUTANEOUS VENOUS STASIS ULCER (HCC): ICD-10-CM

## 2022-12-19 DIAGNOSIS — L03.115 CELLULITIS OF RIGHT LOWER EXTREMITY: Primary | ICD-10-CM

## 2022-12-19 DIAGNOSIS — L03.115 CELLULITIS OF RIGHT LEG: ICD-10-CM

## 2022-12-19 LAB
ANION GAP SERPL CALCULATED.3IONS-SCNC: 12 MMOL/L (ref 3–16)
BASOPHILS ABSOLUTE: 0.1 K/UL (ref 0–0.2)
BASOPHILS RELATIVE PERCENT: 0.5 %
BUN BLDV-MCNC: 9 MG/DL (ref 7–20)
CALCIUM SERPL-MCNC: 10 MG/DL (ref 8.3–10.6)
CHLORIDE BLD-SCNC: 92 MMOL/L (ref 99–110)
CO2: 32 MMOL/L (ref 21–32)
CREAT SERPL-MCNC: 1 MG/DL (ref 0.6–1.2)
EOSINOPHILS ABSOLUTE: 0.2 K/UL (ref 0–0.6)
EOSINOPHILS RELATIVE PERCENT: 1.9 %
GFR SERPL CREATININE-BSD FRML MDRD: >60 ML/MIN/{1.73_M2}
GLUCOSE BLD-MCNC: 100 MG/DL (ref 70–99)
HCT VFR BLD CALC: 40.9 % (ref 36–48)
HEMOGLOBIN: 14.1 G/DL (ref 12–16)
LYMPHOCYTES ABSOLUTE: 1.2 K/UL (ref 1–5.1)
LYMPHOCYTES RELATIVE PERCENT: 11.3 %
MAGNESIUM: 2.1 MG/DL (ref 1.8–2.4)
MCH RBC QN AUTO: 29 PG (ref 26–34)
MCHC RBC AUTO-ENTMCNC: 34.4 G/DL (ref 31–36)
MCV RBC AUTO: 84.2 FL (ref 80–100)
MONOCYTES ABSOLUTE: 1 K/UL (ref 0–1.3)
MONOCYTES RELATIVE PERCENT: 9.5 %
NEUTROPHILS ABSOLUTE: 8.3 K/UL (ref 1.7–7.7)
NEUTROPHILS RELATIVE PERCENT: 76.8 %
PDW BLD-RTO: 15 % (ref 12.4–15.4)
PLATELET # BLD: 289 K/UL (ref 135–450)
PMV BLD AUTO: 7.7 FL (ref 5–10.5)
POTASSIUM REFLEX MAGNESIUM: 3.5 MMOL/L (ref 3.5–5.1)
RBC # BLD: 4.85 M/UL (ref 4–5.2)
SODIUM BLD-SCNC: 136 MMOL/L (ref 136–145)
WBC # BLD: 10.8 K/UL (ref 4–11)

## 2022-12-19 PROCEDURE — 96365 THER/PROPH/DIAG IV INF INIT: CPT

## 2022-12-19 PROCEDURE — 85025 COMPLETE CBC W/AUTO DIFF WBC: CPT

## 2022-12-19 PROCEDURE — 80048 BASIC METABOLIC PNL TOTAL CA: CPT

## 2022-12-19 PROCEDURE — 2580000003 HC RX 258: Performed by: EMERGENCY MEDICINE

## 2022-12-19 PROCEDURE — 73590 X-RAY EXAM OF LOWER LEG: CPT

## 2022-12-19 PROCEDURE — 2500000003 HC RX 250 WO HCPCS: Performed by: EMERGENCY MEDICINE

## 2022-12-19 PROCEDURE — 36415 COLL VENOUS BLD VENIPUNCTURE: CPT

## 2022-12-19 PROCEDURE — 1200000000 HC SEMI PRIVATE

## 2022-12-19 PROCEDURE — 6370000000 HC RX 637 (ALT 250 FOR IP): Performed by: EMERGENCY MEDICINE

## 2022-12-19 PROCEDURE — 6360000002 HC RX W HCPCS: Performed by: EMERGENCY MEDICINE

## 2022-12-19 PROCEDURE — 83735 ASSAY OF MAGNESIUM: CPT

## 2022-12-19 PROCEDURE — 99285 EMERGENCY DEPT VISIT HI MDM: CPT

## 2022-12-19 RX ORDER — SODIUM CHLORIDE 9 MG/ML
INJECTION, SOLUTION INTRAVENOUS PRN
Status: DISCONTINUED | OUTPATIENT
Start: 2022-12-19 | End: 2022-12-26 | Stop reason: HOSPADM

## 2022-12-19 RX ORDER — ENOXAPARIN SODIUM 100 MG/ML
30 INJECTION SUBCUTANEOUS DAILY
Status: DISCONTINUED | OUTPATIENT
Start: 2022-12-20 | End: 2022-12-20

## 2022-12-19 RX ORDER — SODIUM CHLORIDE 0.9 % (FLUSH) 0.9 %
5-40 SYRINGE (ML) INJECTION PRN
Status: DISCONTINUED | OUTPATIENT
Start: 2022-12-19 | End: 2022-12-26 | Stop reason: HOSPADM

## 2022-12-19 RX ORDER — ACETAMINOPHEN 650 MG/1
650 SUPPOSITORY RECTAL EVERY 6 HOURS PRN
Status: DISCONTINUED | OUTPATIENT
Start: 2022-12-19 | End: 2022-12-26 | Stop reason: HOSPADM

## 2022-12-19 RX ORDER — SODIUM CHLORIDE 0.9 % (FLUSH) 0.9 %
5-40 SYRINGE (ML) INJECTION EVERY 12 HOURS SCHEDULED
Status: DISCONTINUED | OUTPATIENT
Start: 2022-12-19 | End: 2022-12-26 | Stop reason: HOSPADM

## 2022-12-19 RX ORDER — ONDANSETRON 2 MG/ML
4 INJECTION INTRAMUSCULAR; INTRAVENOUS EVERY 6 HOURS PRN
Status: DISCONTINUED | OUTPATIENT
Start: 2022-12-19 | End: 2022-12-26 | Stop reason: HOSPADM

## 2022-12-19 RX ORDER — OXYCODONE HYDROCHLORIDE AND ACETAMINOPHEN 5; 325 MG/1; MG/1
1 TABLET ORAL ONCE
Status: COMPLETED | OUTPATIENT
Start: 2022-12-19 | End: 2022-12-19

## 2022-12-19 RX ORDER — POLYETHYLENE GLYCOL 3350 17 G/17G
17 POWDER, FOR SOLUTION ORAL DAILY PRN
Status: DISCONTINUED | OUTPATIENT
Start: 2022-12-19 | End: 2022-12-26 | Stop reason: HOSPADM

## 2022-12-19 RX ORDER — ACETAMINOPHEN 325 MG/1
650 TABLET ORAL EVERY 6 HOURS PRN
Status: DISCONTINUED | OUTPATIENT
Start: 2022-12-19 | End: 2022-12-26 | Stop reason: HOSPADM

## 2022-12-19 RX ORDER — ONDANSETRON 4 MG/1
4 TABLET, ORALLY DISINTEGRATING ORAL EVERY 8 HOURS PRN
Status: DISCONTINUED | OUTPATIENT
Start: 2022-12-19 | End: 2022-12-26 | Stop reason: HOSPADM

## 2022-12-19 RX ADMIN — OXYCODONE AND ACETAMINOPHEN 1 TABLET: 5; 325 TABLET ORAL at 21:58

## 2022-12-19 RX ADMIN — DOXYCYCLINE 100 MG: 100 INJECTION, POWDER, LYOPHILIZED, FOR SOLUTION INTRAVENOUS at 22:49

## 2022-12-19 RX ADMIN — CEFAZOLIN 1000 MG: 1 INJECTION, POWDER, FOR SOLUTION INTRAMUSCULAR; INTRAVENOUS at 21:59

## 2022-12-19 ASSESSMENT — PAIN DESCRIPTION - ORIENTATION
ORIENTATION: RIGHT
ORIENTATION: RIGHT

## 2022-12-19 ASSESSMENT — PAIN DESCRIPTION - LOCATION
LOCATION: LEG
LOCATION: LEG

## 2022-12-19 ASSESSMENT — PAIN - FUNCTIONAL ASSESSMENT: PAIN_FUNCTIONAL_ASSESSMENT: 0-10

## 2022-12-19 ASSESSMENT — PAIN SCALES - GENERAL
PAINLEVEL_OUTOF10: 7
PAINLEVEL_OUTOF10: 7

## 2022-12-19 NOTE — ED NOTES
[facility] Emergency Department  MEDICAL SCREENING EXAM    Date of Service: 12/19/2022    Reason for Visit: Cellulitis (States said PCP sent in for poss iv abx, R leg swelling/redness/pain)      Patient History, Brief Exam, and Initial Assessment     Abbreviated HPI: Markus Singer is a 60 y.o. female presenting with right lower extremity cellulitis. Patient's cellulitis has been progressively worsening despite being on oral Keflex. Patient has wounds to her right leg for which she sees outpatient wound care though has had this worsening cellulitis despite being on oral antibiotics. No fevers or chills. Has had increased pain. No trauma to the leg. Was sent in by her primary care provider for presumed IV antibiotics and admission. INITIAL VITALS: BP: 139/88, Temp: 98.3 °F (36.8 °C), Heart Rate: 85, Resp: 18, SpO2: 96 %    Plan     Patient was evaluated in the REU for a medical screening exam.  To further evaluate the presenting complaints, the following orders have been placed:   Labs Reviewed   CBC WITH AUTO DIFFERENTIAL   BASIC METABOLIC PANEL W/ REFLEX TO MG FOR LOW K     XR TIBIA FIBULA RIGHT (2 VIEWS)    (Results Pending)      See primary provider's note for full details and final disposition. Current Facility-Administered Medications:   No orders of the defined types were placed in this encounter. REU Dispo     Stable for lobby while awaiting ED bed.     Relevant Medical History     Past Medical History:   Diagnosis Date    Anemia     Anxiety     Arthritis     CAD (coronary artery disease)     Cancer (HCC)     CHF (congestive heart failure) (HCC)     diastolic    Chronic kidney disease     Depression     Diabetes mellitus (HCC)     Hep C w/o coma, chronic (HCC)     Hyperlipidemia     Hypertension     MRSA (methicillin resistant staph aureus) culture positive 03/26/2021    left leg wound    Neuropathy     Schizoaffective disorder (HCC)     Thyroid disease      Past Surgical History:

## 2022-12-20 ENCOUNTER — APPOINTMENT (OUTPATIENT)
Dept: VASCULAR LAB | Age: 62
DRG: 380 | End: 2022-12-20
Payer: MEDICAID

## 2022-12-20 PROBLEM — I87.2 VENOUS STASIS DERMATITIS OF BOTH LOWER EXTREMITIES: Status: ACTIVE | Noted: 2022-12-20

## 2022-12-20 LAB
ANION GAP SERPL CALCULATED.3IONS-SCNC: 10 MMOL/L (ref 3–16)
BASOPHILS ABSOLUTE: 0.1 K/UL (ref 0–0.2)
BASOPHILS RELATIVE PERCENT: 0.7 %
BUN BLDV-MCNC: 9 MG/DL (ref 7–20)
CALCIUM SERPL-MCNC: 9.9 MG/DL (ref 8.3–10.6)
CHLORIDE BLD-SCNC: 95 MMOL/L (ref 99–110)
CO2: 33 MMOL/L (ref 21–32)
CREAT SERPL-MCNC: 1 MG/DL (ref 0.6–1.2)
EOSINOPHILS ABSOLUTE: 0.2 K/UL (ref 0–0.6)
EOSINOPHILS RELATIVE PERCENT: 1.9 %
GFR SERPL CREATININE-BSD FRML MDRD: >60 ML/MIN/{1.73_M2}
GLUCOSE BLD-MCNC: 127 MG/DL (ref 70–99)
HCT VFR BLD CALC: 38.4 % (ref 36–48)
HEMOGLOBIN: 12.9 G/DL (ref 12–16)
LYMPHOCYTES ABSOLUTE: 0.9 K/UL (ref 1–5.1)
LYMPHOCYTES RELATIVE PERCENT: 9.7 %
MAGNESIUM: 2 MG/DL (ref 1.8–2.4)
MCH RBC QN AUTO: 29 PG (ref 26–34)
MCHC RBC AUTO-ENTMCNC: 33.6 G/DL (ref 31–36)
MCV RBC AUTO: 86.4 FL (ref 80–100)
MONOCYTES ABSOLUTE: 1 K/UL (ref 0–1.3)
MONOCYTES RELATIVE PERCENT: 10.8 %
NEUTROPHILS ABSOLUTE: 7.3 K/UL (ref 1.7–7.7)
NEUTROPHILS RELATIVE PERCENT: 76.9 %
PDW BLD-RTO: 14.6 % (ref 12.4–15.4)
PLATELET # BLD: 262 K/UL (ref 135–450)
PMV BLD AUTO: 7 FL (ref 5–10.5)
POTASSIUM SERPL-SCNC: 3.7 MMOL/L (ref 3.5–5.1)
PRO-BNP: 28 PG/ML (ref 0–124)
RBC # BLD: 4.45 M/UL (ref 4–5.2)
SODIUM BLD-SCNC: 138 MMOL/L (ref 136–145)
WBC # BLD: 9.6 K/UL (ref 4–11)

## 2022-12-20 PROCEDURE — 87205 SMEAR GRAM STAIN: CPT

## 2022-12-20 PROCEDURE — 6370000000 HC RX 637 (ALT 250 FOR IP): Performed by: INTERNAL MEDICINE

## 2022-12-20 PROCEDURE — 2580000003 HC RX 258: Performed by: INTERNAL MEDICINE

## 2022-12-20 PROCEDURE — 6360000002 HC RX W HCPCS: Performed by: INTERNAL MEDICINE

## 2022-12-20 PROCEDURE — 80048 BASIC METABOLIC PNL TOTAL CA: CPT

## 2022-12-20 PROCEDURE — 87075 CULTR BACTERIA EXCEPT BLOOD: CPT

## 2022-12-20 PROCEDURE — 93970 EXTREMITY STUDY: CPT

## 2022-12-20 PROCEDURE — 83735 ASSAY OF MAGNESIUM: CPT

## 2022-12-20 PROCEDURE — 85025 COMPLETE CBC W/AUTO DIFF WBC: CPT

## 2022-12-20 PROCEDURE — 97530 THERAPEUTIC ACTIVITIES: CPT

## 2022-12-20 PROCEDURE — 99255 IP/OBS CONSLTJ NEW/EST HI 80: CPT | Performed by: INTERNAL MEDICINE

## 2022-12-20 PROCEDURE — 87070 CULTURE OTHR SPECIMN AEROBIC: CPT

## 2022-12-20 PROCEDURE — 83036 HEMOGLOBIN GLYCOSYLATED A1C: CPT

## 2022-12-20 PROCEDURE — 83880 ASSAY OF NATRIURETIC PEPTIDE: CPT

## 2022-12-20 PROCEDURE — 97162 PT EVAL MOD COMPLEX 30 MIN: CPT

## 2022-12-20 PROCEDURE — 1200000000 HC SEMI PRIVATE

## 2022-12-20 PROCEDURE — 97116 GAIT TRAINING THERAPY: CPT

## 2022-12-20 PROCEDURE — 36415 COLL VENOUS BLD VENIPUNCTURE: CPT

## 2022-12-20 PROCEDURE — 97166 OT EVAL MOD COMPLEX 45 MIN: CPT

## 2022-12-20 RX ORDER — M-VIT,TX,IRON,MINS/CALC/FOLIC 27MG-0.4MG
1 TABLET ORAL DAILY
Status: DISCONTINUED | OUTPATIENT
Start: 2022-12-20 | End: 2022-12-26 | Stop reason: HOSPADM

## 2022-12-20 RX ORDER — DULOXETIN HYDROCHLORIDE 30 MG/1
30 CAPSULE, DELAYED RELEASE ORAL DAILY
Status: DISCONTINUED | OUTPATIENT
Start: 2022-12-20 | End: 2022-12-26 | Stop reason: HOSPADM

## 2022-12-20 RX ORDER — MONTELUKAST SODIUM 10 MG/1
10 TABLET ORAL NIGHTLY
Status: DISCONTINUED | OUTPATIENT
Start: 2022-12-20 | End: 2022-12-26 | Stop reason: HOSPADM

## 2022-12-20 RX ORDER — LEVOTHYROXINE SODIUM 0.12 MG/1
125 TABLET ORAL DAILY
Status: DISCONTINUED | OUTPATIENT
Start: 2022-12-20 | End: 2022-12-26 | Stop reason: HOSPADM

## 2022-12-20 RX ORDER — BUPROPION HYDROCHLORIDE 75 MG/1
75 TABLET ORAL DAILY
COMMUNITY

## 2022-12-20 RX ORDER — FUROSEMIDE 10 MG/ML
40 INJECTION INTRAMUSCULAR; INTRAVENOUS DAILY
Status: COMPLETED | OUTPATIENT
Start: 2022-12-20 | End: 2022-12-21

## 2022-12-20 RX ORDER — CLONAZEPAM 0.5 MG/1
0.5 TABLET ORAL DAILY PRN
Status: DISCONTINUED | OUTPATIENT
Start: 2022-12-20 | End: 2022-12-26 | Stop reason: HOSPADM

## 2022-12-20 RX ORDER — POTASSIUM BICARBONATE 25 MEQ/1
25 TABLET, EFFERVESCENT ORAL 2 TIMES DAILY
COMMUNITY

## 2022-12-20 RX ORDER — GABAPENTIN 300 MG/1
300 CAPSULE ORAL 3 TIMES DAILY
Status: DISCONTINUED | OUTPATIENT
Start: 2022-12-20 | End: 2022-12-26 | Stop reason: HOSPADM

## 2022-12-20 RX ORDER — ENOXAPARIN SODIUM 100 MG/ML
30 INJECTION SUBCUTANEOUS 2 TIMES DAILY
Status: DISCONTINUED | OUTPATIENT
Start: 2022-12-20 | End: 2022-12-26 | Stop reason: HOSPADM

## 2022-12-20 RX ORDER — DULOXETIN HYDROCHLORIDE 60 MG/1
60 CAPSULE, DELAYED RELEASE ORAL EVERY EVENING
COMMUNITY

## 2022-12-20 RX ORDER — BUPROPION HYDROCHLORIDE 150 MG/1
300 TABLET ORAL EVERY MORNING
Status: DISCONTINUED | OUTPATIENT
Start: 2022-12-20 | End: 2022-12-26 | Stop reason: HOSPADM

## 2022-12-20 RX ORDER — ARIPIPRAZOLE 5 MG/1
10 TABLET ORAL DAILY
Status: DISCONTINUED | OUTPATIENT
Start: 2022-12-20 | End: 2022-12-26 | Stop reason: HOSPADM

## 2022-12-20 RX ORDER — PRAZOSIN HYDROCHLORIDE 2 MG/1
2 CAPSULE ORAL NIGHTLY
COMMUNITY

## 2022-12-20 RX ORDER — PANTOPRAZOLE SODIUM 40 MG/1
40 TABLET, DELAYED RELEASE ORAL DAILY
Status: DISCONTINUED | OUTPATIENT
Start: 2022-12-20 | End: 2022-12-26 | Stop reason: HOSPADM

## 2022-12-20 RX ORDER — LINEZOLID 600 MG/1
600 TABLET, FILM COATED ORAL EVERY 12 HOURS SCHEDULED
Status: DISCONTINUED | OUTPATIENT
Start: 2022-12-20 | End: 2022-12-20

## 2022-12-20 RX ORDER — IBUPROFEN 800 MG/1
800 TABLET ORAL EVERY 8 HOURS PRN
Status: ON HOLD | COMMUNITY
End: 2022-12-23 | Stop reason: HOSPADM

## 2022-12-20 RX ORDER — ATORVASTATIN CALCIUM 20 MG/1
20 TABLET, FILM COATED ORAL DAILY
Status: DISCONTINUED | OUTPATIENT
Start: 2022-12-20 | End: 2022-12-26 | Stop reason: HOSPADM

## 2022-12-20 RX ORDER — OXYCODONE HYDROCHLORIDE AND ACETAMINOPHEN 5; 325 MG/1; MG/1
1 TABLET ORAL EVERY 6 HOURS PRN
Status: DISCONTINUED | OUTPATIENT
Start: 2022-12-20 | End: 2022-12-26 | Stop reason: HOSPADM

## 2022-12-20 RX ORDER — ENOXAPARIN SODIUM 100 MG/ML
40 INJECTION SUBCUTANEOUS DAILY
Status: DISCONTINUED | OUTPATIENT
Start: 2022-12-20 | End: 2022-12-20

## 2022-12-20 RX ORDER — METHOCARBAMOL 500 MG/1
500 TABLET, FILM COATED ORAL 4 TIMES DAILY
Status: DISCONTINUED | OUTPATIENT
Start: 2022-12-20 | End: 2022-12-26 | Stop reason: HOSPADM

## 2022-12-20 RX ADMIN — CEFEPIME 2000 MG: 2 INJECTION, POWDER, FOR SOLUTION INTRAVENOUS at 04:01

## 2022-12-20 RX ADMIN — MONTELUKAST SODIUM 10 MG: 10 TABLET, FILM COATED ORAL at 20:25

## 2022-12-20 RX ADMIN — CEFAZOLIN SODIUM 3000 MG: 1 POWDER, FOR SOLUTION INTRAMUSCULAR; INTRAVENOUS at 20:34

## 2022-12-20 RX ADMIN — GABAPENTIN 300 MG: 300 CAPSULE ORAL at 20:25

## 2022-12-20 RX ADMIN — SODIUM CHLORIDE, PRESERVATIVE FREE 10 ML: 5 INJECTION INTRAVENOUS at 09:42

## 2022-12-20 RX ADMIN — METHOCARBAMOL 500 MG: 500 TABLET ORAL at 17:03

## 2022-12-20 RX ADMIN — MONTELUKAST SODIUM 10 MG: 10 TABLET, FILM COATED ORAL at 01:29

## 2022-12-20 RX ADMIN — METHOCARBAMOL 500 MG: 500 TABLET ORAL at 01:29

## 2022-12-20 RX ADMIN — OXYCODONE AND ACETAMINOPHEN 1 TABLET: 5; 325 TABLET ORAL at 07:34

## 2022-12-20 RX ADMIN — SODIUM CHLORIDE, PRESERVATIVE FREE 10 ML: 5 INJECTION INTRAVENOUS at 00:34

## 2022-12-20 RX ADMIN — Medication 1 TABLET: at 09:44

## 2022-12-20 RX ADMIN — CEFEPIME 2000 MG: 2 INJECTION, POWDER, FOR SOLUTION INTRAVENOUS at 13:58

## 2022-12-20 RX ADMIN — ENOXAPARIN SODIUM 40 MG: 100 INJECTION SUBCUTANEOUS at 09:45

## 2022-12-20 RX ADMIN — GABAPENTIN 300 MG: 300 CAPSULE ORAL at 13:49

## 2022-12-20 RX ADMIN — OXYCODONE AND ACETAMINOPHEN 1 TABLET: 5; 325 TABLET ORAL at 13:49

## 2022-12-20 RX ADMIN — VANCOMYCIN HYDROCHLORIDE 1500 MG: 10 INJECTION, POWDER, LYOPHILIZED, FOR SOLUTION INTRAVENOUS at 01:48

## 2022-12-20 RX ADMIN — METOPROLOL TARTRATE 25 MG: 25 TABLET, FILM COATED ORAL at 20:25

## 2022-12-20 RX ADMIN — METHOCARBAMOL 500 MG: 500 TABLET ORAL at 20:25

## 2022-12-20 RX ADMIN — METHOCARBAMOL 500 MG: 500 TABLET ORAL at 13:42

## 2022-12-20 RX ADMIN — METOPROLOL TARTRATE 25 MG: 25 TABLET, FILM COATED ORAL at 09:42

## 2022-12-20 RX ADMIN — ENOXAPARIN SODIUM 30 MG: 100 INJECTION SUBCUTANEOUS at 20:25

## 2022-12-20 RX ADMIN — FUROSEMIDE 40 MG: 10 INJECTION, SOLUTION INTRAMUSCULAR; INTRAVENOUS at 09:42

## 2022-12-20 RX ADMIN — OXYCODONE AND ACETAMINOPHEN 1 TABLET: 5; 325 TABLET ORAL at 20:25

## 2022-12-20 RX ADMIN — METHOCARBAMOL 500 MG: 500 TABLET ORAL at 09:44

## 2022-12-20 RX ADMIN — SODIUM CHLORIDE, PRESERVATIVE FREE 10 ML: 5 INJECTION INTRAVENOUS at 20:25

## 2022-12-20 ASSESSMENT — PAIN DESCRIPTION - LOCATION
LOCATION: LEG

## 2022-12-20 ASSESSMENT — PAIN DESCRIPTION - PAIN TYPE
TYPE: CHRONIC PAIN
TYPE: ACUTE PAIN
TYPE: ACUTE PAIN

## 2022-12-20 ASSESSMENT — PAIN DESCRIPTION - DESCRIPTORS
DESCRIPTORS: SPASM;ACHING
DESCRIPTORS: BURNING
DESCRIPTORS: SHARP
DESCRIPTORS: SPASM;ACHING;DISCOMFORT
DESCRIPTORS: SPASM;ACHING
DESCRIPTORS: ACHING

## 2022-12-20 ASSESSMENT — PAIN DESCRIPTION - ORIENTATION
ORIENTATION: RIGHT

## 2022-12-20 ASSESSMENT — PAIN - FUNCTIONAL ASSESSMENT

## 2022-12-20 ASSESSMENT — PAIN SCALES - GENERAL
PAINLEVEL_OUTOF10: 4
PAINLEVEL_OUTOF10: 6
PAINLEVEL_OUTOF10: 5
PAINLEVEL_OUTOF10: 5
PAINLEVEL_OUTOF10: 6
PAINLEVEL_OUTOF10: 7
PAINLEVEL_OUTOF10: 5
PAINLEVEL_OUTOF10: 5
PAINLEVEL_OUTOF10: 7

## 2022-12-20 ASSESSMENT — PAIN DESCRIPTION - FREQUENCY
FREQUENCY: CONTINUOUS

## 2022-12-20 ASSESSMENT — PAIN DESCRIPTION - ONSET
ONSET: ON-GOING
ONSET: ON-GOING

## 2022-12-20 NOTE — PROGRESS NOTES
Arriaza placed for urinary retention. RN educated pt multiple times r/t need for arriaza. Pt verbalized understanding but would continue to repeat the same questions. Report given to [facility], no questions at this time.

## 2022-12-20 NOTE — PROGRESS NOTES
Trina Watson with [facility] called for pt update. Per  [facility] Burnetta Cooks NP, pt refused to go to clinic, [facility] Shahram Barnett called for transport to [facility], but pt was transported to [facility]. Corina Duron [phone] would have been seeing her with wound care.

## 2022-12-20 NOTE — PROGRESS NOTES
Pharmacy Note - Extended Infusion Beta-Lactam Adjustment    Cefepime ordered for treatment of SSTI. Per [facility] Renal Dose Adjustment Policy and Extended Infusion Beta-Lactam Policy, dosing will be changed to cefepime 1000 mg IV q 12 hr- EI. Estimated Creatinine Clearance: Estimated Creatinine Clearance: 80 mL/min (based on SCr of 1 mg/dL). Dialysis Status, MAAME, CKD: NA  BMI: Body mass index is 52.18 kg/m². Rationale for Adjustment: Agent is renally eliminated and demonstrates time-dependent effect on bacterial eradication. Extended-infusion dosing strategy aims to enhance microbiologic and clinical efficacy. Pharmacy will continue to monitor renal function, cultures and sensitivities (where available) and adjust dose as necessary. Please call with any questions.     India Roman [facility], PharmD, Samantha Rios [address]  12/20/2022 12:41 AM

## 2022-12-20 NOTE — PROGRESS NOTES
Clinical Pharmacy Consult Note    Pharmacy was consulted by Dr. Raymond Payment to dose vancomycin for a ssti. We will sign off of the case, as vancomcyin has since been discontinued. Please consider consulting Pharmacy again if vancomycin is re-started. Thank you for allowing us to participate in the care of this patient.     Argentina Rosales, PharmD  12/20/2022

## 2022-12-20 NOTE — ED PROVIDER NOTES
[id number]          ATTENDING PHYSICIAN NOTE       Date of evaluation: 12/19/2022    Chief Complaint     Cellulitis (States said PCP sent in for poss iv abx, R leg swelling/redness/pain)      History of Present Illness     Ruba Grere is a 60 y.o. female who presents to the emergency department, referred by her primary care provider for cellulitis not improving with outpatient antibiotics. The patient has a somewhat complex medical history, that includes hypertension, hyperlipidemia, diabetes mellitus, chronic kidney disease, numerous other comorbidities, and chronic venous stasis with an ulcer over the right tibia. She is followed by wound care for this in the  system. She was seen on December 13, with increasing pain, redness, swelling associated with the venous stasis ulcer of the right lower extremity, and was diagnosed with cellulitis. She was started on Keflex, and has been taking that medication since that time, but feels that the pain and swelling has worsened in the last couple of days. She was seen by her primary care provider, who was concerned regarding the lack of improvement on antibiotics, and referred her to the emergency department for IV antibiotics and admission. She describes the pain as 7 out of 10 in intensity, worsened in the last couple of days, worsen when the leg is dependent, or when the area is touched. She denies any fevers or chills, nausea or vomiting. On review of systems, she does have a history of MRSA colonization. Review of Systems     Review of Systems   All other systems reviewed and are negative.     Past Medical, Surgical, Family, and Social History     She has a past medical history of Anemia, Anxiety, Arthritis, CAD (coronary artery disease), Cancer ([address] CHF (congestive heart failure) ([facility] Chronic kidney disease, Depression, Diabetes mellitus ([facility] [facility] Hep C w/o coma, chronic ([facility] [address] Hyperlipidemia, Hypertension, MRSA (methicillin resistant staph aureus) culture positive, Neuropathy, Schizoaffective disorder ([address] and Thyroid disease. She has a past surgical history that includes Lung lobectomy (N/A); Dental surgery; Upper gastrointestinal endoscopy (N/A, 4/26/2021); Colonoscopy (4/26/2021); and Upper gastrointestinal endoscopy (N/A, 6/21/2021). Her family history includes Liver Disease in her brother. She reports that she quit smoking about 1 years ago. Her smoking use included cigarettes. She has a 60.00 pack-year smoking history. She has never used smokeless tobacco. She reports that she does not drink alcohol and does not use drugs. Medications     Previous Medications    ALBUTEROL (PROVENTIL HFA;VENTOLIN HFA) 108 (90 BASE) MCG/ACT INHALER    Inhale 2 puffs into the lungs every 6 hours as needed for Wheezing    ARIPIPRAZOLE (ABILIFY) 10 MG TABLET    Take 10 mg by mouth daily    ASCORBIC ACID (VITAMIN C) 500 MG TABLET    Take 500 mg by mouth daily    ATORVASTATIN (LIPITOR) 20 MG TABLET    Take 1 tablet by mouth in the morning. BUPROPION (WELLBUTRIN XL) 300 MG EXTENDED RELEASE TABLET    Take 300 mg by mouth every morning    CLONAZEPAM (KLONOPIN) 0.5 MG TABLET    Take 0.5 mg by mouth daily as needed.     DICLOFENAC SODIUM 3 % GEL    Diclofenac Topical Gel 1 %    PRN    Active    DIPHENHYDRAMINE (BENADRYL) 25 MG TABLET    Take 25 mg by mouth every 6 hours as needed for Itching    DULOXETINE (CYMBALTA) 30 MG EXTENDED RELEASE CAPSULE    Take 30 mg by mouth daily     FLUTICASONE (FLOVENT HFA) 220 MCG/ACT INHALER    Inhale 1 puff into the lungs 2 times daily    GABAPENTIN (NEURONTIN) 300 MG CAPSULE    TAKE ONE CAPSULE BY MOUTH THREE TIMES A DAY    LEVOTHYROXINE (SYNTHROID) 125 MCG TABLET    Take 125 mcg by mouth Daily    LOPERAMIDE (IMODIUM) 2 MG CAPSULE    TAKE ONE CAPSULE BY MOUTH THREE TIMES A DAY AS NEEDED FOR DIARRHEA    METHOCARBAMOL (ROBAXIN) 500 MG TABLET    Take 500 mg by mouth 4 times daily    METOPROLOL (LOPRESSOR) 25 MG TABLET    Take 25 mg by mouth 2 times daily    MOMETASONE-FORMOTEROL (DULERA) 100-5 MCG/ACT INHALER    Inhale 2 puffs into the lungs 2 times daily    MONTELUKAST (SINGULAIR) 10 MG TABLET    Take 10 mg by mouth nightly    MULTIPLE VITAMINS-MINERALS (THERAPEUTIC MULTIVITAMIN-MINERALS) TABLET    Take 1 tablet by mouth daily    MUPIROCIN (BACTROBAN) 2 % OINTMENT    Apply topically 3 times daily Apply topically 3 times daily. NICOTINE POLACRILEX (COMMIT) 4 MG LOZENGE    Take 4 mg by mouth as needed for Smoking cessation    NITROGLYCERIN (NITROSTAT) 0.4 MG SL TABLET    Place 0.4 mg under the tongue    PANTOPRAZOLE (PROTONIX) 40 MG TABLET    Take 1 tablet by mouth daily    POTASSIUM BICARBONATE (K-LYTE) 25 MEQ DISINTEGRATING TABLET    Take 25 mEq by mouth 2 times daily    TORSEMIDE (DEMADEX) 20 MG TABLET    Take 2 tablets by mouth in the morning and 2 tablets before bedtime. TRAMADOL (ULTRAM) 50 MG TABLET    Take 50 mg by mouth every 6 hours as needed for Pain. VITAMIN D (CHOLECALCIFEROL) 5000 UNITS CAPS CAPSULE    Take 5,000 Units by mouth daily     VITAMIN E 1000 UNITS CAPSULE    Take 1,000 Units by mouth daily       Allergies     She is allergic to tums [calcium carbonate antacid]. Physical Exam     INITIAL VITALS: BP: 139/88, Temp: 98.3 °F (36.8 °C), Heart Rate: 85, Resp: 18, SpO2: 96 %     General: Somewhat chronically ill-appearing older patient, uncomfortable appearing, but in no acute respiratory distress. HEENT: Pupils are equal, round, and reactive to light. Extraocular muscles are intact. Conjunctivae are clear and moist. No redness or drainage from the eyes. No drainage from the nose. The oropharynx appeared to be normal.    Neck: Supple, with full range of motion. Cardiovascular: Normal S1-S2. Heart sounds somewhat muffled relating to body habitus. 2+ radial pulses bilaterally. Respiratory: Unlabored breathing with equal chest rise and fall.  Lungs are clear to auscultation bilaterally. No adventitious lung sounds heard. Abdomen: Protuberant, but soft and nontender, without guarding or rebound tenderness. No masses or hepatosplenomegaly. Skin: Generally warm and dry. On examination of the lower extremities, there is evidence of bilateral chronic venous stasis. The right lower extremity is wrapped in a compression dressing. When this is removed, there is more blanching erythema and warmth, which extends essentially from the dorsum of the foot to the medial and posterior aspect of the leg just above the knee. There is a skin marker outlining a more ruborous area of erythema, beyond which a more blanching erythema has extended. There is an ulcerated lesion over the anterior aspect of the distal shin, without active drainage. No palpable crepitus. Neuro: Alert and oriented x3. No focal neurologic deficits are noted. Extremities: Warm and well-perfused, without clubbing, cyanosis, or edema. The patient moves all extremities equally. Psych: The patient's mood and affect are generally within normal limits for their presentation. Diagnostic Results       RADIOLOGY:  XR TIBIA FIBULA RIGHT (2 VIEWS)   Final Result      1. No findings for acute bony abnormality. 2.  Moderate osteoarthrosis within the right knee as described. 3.  Diffuse lower extremity edema. Correlate clinically.           LABS:   Results for orders placed or performed during the hospital encounter of 12/19/22   CBC with Auto Differential   Result Value Ref Range    WBC 10.8 4.0 - 11.0 K/uL    RBC 4.85 4.00 - 5.20 M/uL    Hemoglobin 14.1 12.0 - 16.0 g/dL    Hematocrit 40.9 36.0 - 48.0 %    MCV 84.2 80.0 - 100.0 fL    MCH 29.0 26.0 - 34.0 pg    MCHC 34.4 31.0 - 36.0 g/dL    RDW 15.0 12.4 - 15.4 %    Platelets 984 701 - 390 K/uL    MPV 7.7 5.0 - 10.5 fL    Neutrophils % 76.8 %    Lymphocytes % 11.3 %    Monocytes % 9.5 %    Eosinophils % 1.9 %    Basophils % 0.5 %    Neutrophils Absolute 8.3 (H) 1.7 - 7.7 K/uL    Lymphocytes Absolute 1.2 1.0 - 5.1 K/uL    Monocytes Absolute 1.0 0.0 - 1.3 K/uL    Eosinophils Absolute 0.2 0.0 - 0.6 K/uL    Basophils Absolute 0.1 0.0 - 0.2 K/uL   BMP w/ Reflex to MG   Result Value Ref Range    Sodium 136 136 - 145 mmol/L    Potassium reflex Magnesium 3.5 3.5 - 5.1 mmol/L    Chloride 92 (L) 99 - 110 mmol/L    CO2 32 21 - 32 mmol/L    Anion Gap 12 3 - 16    Glucose 100 (H) 70 - 99 mg/dL    BUN 9 7 - 20 mg/dL    Creatinine 1.0 0.6 - 1.2 mg/dL    Est, Glom Filt Rate >60 >60    Calcium 10.0 8.3 - 10.6 mg/dL   Magnesium   Result Value Ref Range    Magnesium 2.10 1.80 - 2.40 mg/dL         RECENT VITALS:  BP: 139/88, Temp: 98.3 °F (36.8 °C), Heart Rate: 85, Resp: 18, SpO2: 96 %     Procedures       ED Course     Nursing Notes, Past Medical Hx, Past Surgical Hx, Social Hx, Allergies, and Family Hx were reviewed. The patient was given the following medications:  Orders Placed This Encounter   Medications    oxyCODONE-acetaminophen (PERCOCET) 5-325 MG per tablet 1 tablet    ceFAZolin (ANCEF) 1,000 mg in dextrose 5 % 50 mL IVPB (mini-bag)     Order Specific Question:   Antimicrobial Indications     Answer:   Skin and Soft Tissue Infection    doxycycline (VIBRAMYCIN) 100 mg in dextrose 5 % 100 mL IVPB     Order Specific Question:   Antimicrobial Indications     Answer:   Skin and Soft Tissue Infection       CONSULTS:  IP CONSULT TO HOSPITALIST    MEDICAL DECISION MAKING / ASSESSMENT / Mary Flanagan is a 60 y.o. female with a complex past medical history as described above, managed by outpatient wound care for a venous stasis ulcer on the right shin, with about a week history of extensive right lower extremity cellulitis, not responding to outpatient oral therapy with Keflex. She does not have any systemic signs or symptoms. She does not have any evidence of necrotizing soft tissue infection. Plain films show no bony erosion or gas, but soft tissue edema. Laboratory evaluation is overall reassuring. The patient was given oxycodone for pain control, and was started on IV Ancef and doxycycline, to expand for MRSA coverage. Given the extent of cellulitis and the failure of outpatient therapy, she does warrant admission for observed antibiosis to ensure that her symptoms improve before discharge. Her case was discussed with the hospitalist, and she is being admitted at this time. Clinical Impression     1. Cellulitis of right lower extremity    2. Chronic cutaneous venous stasis ulcer (HCC)        Disposition     PATIENT REFERRED TO:  No follow-up provider specified. DISCHARGE MEDICATIONS:  New Prescriptions    No medications on file       DISPOSITION Decision To Admit    (Please note that portions of this note were completed with voice recognition software.   Efforts were made to edit the dictations but occasionally words are mis-transcribed.)     Lashawn Bianchi MD  12/19/22 [id number]

## 2022-12-20 NOTE — PROGRESS NOTES
Pt states she does not have the urge to urinate but has not produced urine since the beginning of the shift. Bladder scan revealed >898 mL.  Dr. Pascual Carvajal sent secure message about pt status

## 2022-12-20 NOTE — CONSULTS
Infectious Disease consulted via [employer].   Electronically signed by Sandro Enrique on 12/20/2022 at 7:42 AM

## 2022-12-20 NOTE — ED NOTES
Clinical Pharmacy Progress Note  Medication History     Admit Date: 12/19/2022    List of of current medications patient is taking is complete. Home Medication list in EPIC updated to reflect changes noted below. Source of information: [address] [place] [address] records, PDMP/OARRS report    Patient's home pharmacy: [address] [place] [address] - Ph: [phone]     Changes made to medication list:   Medications removed: (include reason, ex: therapy completed, patient no longer taking, etc.)  Dulera inhaler - no longer taking  Flovent inhaler - no longer taking  Loperamide - no longer taking  Nitroglycerin SL tabs - no longer taking  Tramadol - no longer taking  Vitamin E - no longer taking  Medications added:   Bupropion 75 mg - patient takes bupropion  mg PO QAM in addition to bupropion 75 mg PO QAM (total daily dose of 375 mg)  Duloxetine 60 mg - patient takes duloxetine 30 mg PO QAM and 60 mg QPM (total daily dose of 90 mg)  Ibuprofen  Potassium bicarbonate  Prazosin  Medication doses adjusted:   Clonazepam - dose adjusted to 0.5 mg PO BID PRN per PDMP and [facility] records  Gabapentin - dose adjusted to 600 mg PO TID PRN per PDMP and [facility] records  Methocarbamol - dose adjusted to 500 mg PO TID PRN per [facility] records  Other notes:   Patient unable to recall names of medications that she takes at home when asked by her RN. Patient's outpatient pharmacy ([facility] [address]) was contacted and list was provided. Medication history completed to match list provided by patient's pharmacy.      Current Outpatient Medications   Medication Instructions    albuterol (PROVENTIL HFA;VENTOLIN HFA) 108 (90 BASE) MCG/ACT inhaler 2 puffs, Inhalation, EVERY 6 HOURS PRN    ARIPiprazole (ABILIFY) 10 mg, Oral, DAILY    atorvastatin (LIPITOR) 20 mg, Oral, DAILY    buPROPion (WELLBUTRIN XL) 300 mg, Oral, EVERY MORNING    buPROPion (WELLBUTRIN) 75 mg, Oral, DAILY, (In addition to bupropion  mg daily)    clonazePAM (KLONOPIN) 0.5 mg, Oral, 2 TIMES DAILY PRN    DULoxetine (CYMBALTA) 30 mg, Oral, DAILY, (In addition to duloxetine 60 mg nightly for total daily dose of 90 mg)    DULoxetine (CYMBALTA) 60 mg, Oral, EVERY EVENING, (In addition to duloxetine 30 mg daily for total daily dose of 90 mg)    gabapentin (NEURONTIN) 600 mg, Oral, 3 TIMES DAILY    ibuprofen (ADVIL;MOTRIN) 800 mg, Oral, EVERY 8 HOURS PRN    levothyroxine (SYNTHROID) 125 mcg, Oral, DAILY    methocarbamol (ROBAXIN) 500 mg, Oral, 3 TIMES DAILY PRN    metoprolol tartrate (LOPRESSOR) 25 mg, Oral, 2 TIMES DAILY    montelukast (SINGULAIR) 10 mg, Oral, NIGHTLY    Multiple Vitamins-Minerals (THERAPEUTIC MULTIVITAMIN-MINERALS) tablet 1 tablet, Oral, DAILY    mupirocin (BACTROBAN) 2 % ointment Topical, 3 TIMES DAILY    nicotine polacrilex (COMMIT) 4 mg, Oral, EVERY 1 HOUR PRN    pantoprazole (PROTONIX) 40 mg, Oral, DAILY    potassium bicarbonate (EFFER-K) 25 MEQ disintegrating tablet 25 mEq, Oral, 2 TIMES DAILY    prazosin (MINIPRESS) 2 mg, Oral, NIGHTLY    torsemide (DEMADEX) 40 mg, Oral, 2 TIMES DAILY    vitamin D (CHOLECALCIFEROL) 5,000 Units, Oral, DAILY       Please call with any questions.   Ginette Blas, PharmD, [address] [facility]  Clinical Pharmacist - Emergency Dept  Wireless: [phone]  12/20/2022 12:32 PM

## 2022-12-20 NOTE — PROGRESS NOTES
Purewick placed, pt unable to tolerate going up to bathroom or bedside commode after multiple attempts

## 2022-12-20 NOTE — PROGRESS NOTES
Pharmacist Review and Automatic Dose Adjustment of Prophylactic Enoxaparin    The reviewing pharmacist has made an adjustment to the ordered enoxaparin dose or converted to UFH per the approved [facility] protocol and table as defined below. Plan / Rationale: Based upon the patient's weight and renal function, the ordered dose of Lovenox 40 mg SC q24h has been converted to 30 mg SC q12h. Thank you,  Charmayne Deck, PharmD, [address] [facility]  Clinical Pharmacist - Emergency Dept  Wireless: [phone]  12/20/2022 1:00 PM      Neymar Bauman is a 60 y.o. female. Recent Labs     12/19/22  1752 12/20/22  0744   CREATININE 1.0 1.0       Estimated Creatinine Clearance: 80 mL/min (based on SCr of 1 mg/dL). Recent Labs     12/19/22  1752 12/20/22  0744   HGB 14.1 12.9   HCT 40.9 38.4    262     No results for input(s): INR in the last 72 hours.     Height:   Ht Readings from Last 1 Encounters:   09/29/22 5' 4\" (1.626 m)     Weight:  Wt Readings from Last 1 Encounters:   12/19/22 (!) 304 lb (137.9 kg)

## 2022-12-20 NOTE — H&P
Hospital Medicine History & Physical      PCP: Roxanne Rich MD    Date of Admission: 12/19/2022    Date of Service: Pt seen/examined on 12/20/22 and Admitted to Inpatient with expected LOS greater than two midnights due to medical therapy.        Chief Complaint: Right leg swelling and redness      History Of Present Illness:      Trent Mason is 60 y.o. female with history of multiple comorbidities including HTN, DM II, dCHF, obesity and venous stasis dermatitis  She has chronic wound on the left leg which has since healed after extensive follow-up at the wound care clinic  She now presents to the ER with complaint of right leg pain, swelling and redness  It started to worsen about 2 weeks ago, since then it has progressively worsened  She took oral Keflex as an outpatient but this has not helped  Her mobility has been limited because of this  She denies any fever or chills  She denies any trauma  She is now sent to the ED by her PCP and admitted for further management including IV antibiotics      Past Medical History:          Diagnosis Date    Anemia     Anxiety     Arthritis     CAD (coronary artery disease)     Cancer (HCC)     CHF (congestive heart failure) (HCC)     diastolic    Chronic kidney disease     Depression     Diabetes mellitus ([facility]     Hep C w/o coma, chronic ([facility]     Hyperlipidemia     Hypertension     MRSA (methicillin resistant staph aureus) culture positive 03/26/2021    left leg wound    Neuropathy     Schizoaffective disorder ([facility]     Thyroid disease        Past Surgical History:          Procedure Laterality Date    COLONOSCOPY  4/26/2021    COLONOSCOPY POLYPECTOMY SNARE/COLD BIOPSY performed by Wilber De La Cruz MD at  [address]     pt unsure of laterality     UPPER GASTROINTESTINAL ENDOSCOPY N/A 4/26/2021    EGD BIOPSY performed by Wilber De La Cruz MD at [address] [place] [address] 6/21/2021    EGD 0.4 mg under the tongue 8/16/18   Historical Provider, MD   gabapentin (NEURONTIN) 300 MG capsule TAKE ONE CAPSULE BY MOUTH THREE TIMES A DAY 7/5/19   Historical Provider, MD   loperamide (IMODIUM) 2 MG capsule TAKE ONE CAPSULE BY MOUTH THREE TIMES A DAY AS NEEDED FOR DIARRHEA 4/11/19   Historical Provider, MD   traMADol (ULTRAM) 50 MG tablet Take 50 mg by mouth every 6 hours as needed for Pain. 12/30/19   Historical Provider, MD   albuterol (PROVENTIL HFA;VENTOLIN HFA) 108 (90 BASE) MCG/ACT inhaler Inhale 2 puffs into the lungs every 6 hours as needed for Wheezing    Historical Provider, MD   ascorbic acid (VITAMIN C) 500 MG tablet Take 500 mg by mouth daily    Historical Provider, MD   vitamin D (CHOLECALCIFEROL) 5000 UNITS CAPS capsule Take 5,000 Units by mouth daily     Historical Provider, MD   metoprolol (LOPRESSOR) 25 MG tablet Take 25 mg by mouth 2 times daily    Historical Provider, MD   Multiple Vitamins-Minerals (THERAPEUTIC MULTIVITAMIN-MINERALS) tablet Take 1 tablet by mouth daily    Historical Provider, MD   nicotine polacrilex (COMMIT) 4 MG lozenge Take 4 mg by mouth as needed for Smoking cessation    Historical Provider, MD   mupirocin (BACTROBAN) 2 % ointment Apply topically 3 times daily Apply topically 3 times daily. Historical Provider, MD   vitamin E 1000 UNITS capsule Take 1,000 Units by mouth daily    Historical Provider, MD       Allergies:  Tums [calcium carbonate antacid]    Social History:      The patient currently lives at home    TOBACCO:   reports that she quit smoking about 1 years ago. Her smoking use included cigarettes. She has a 60.00 pack-year smoking history. She has never used smokeless tobacco.  ETOH:   reports no history of alcohol use.   E-cigarette/Vaping       Questions Responses    E-cigarette/Vaping Use Never User    Start Date     Passive Exposure     Quit Date     Counseling Given     Comments               Family History:      Reviewed and negative in regards to presenting illness/complaint. Problem Relation Age of Onset    Liver Disease Brother        REVIEW OF SYSTEMS COMPLETED:   Pertinent positives as noted in the HPI. All other systems reviewed and negative. PHYSICAL EXAM PERFORMED:    BP (!) 163/89   Pulse 75   Temp 98.2 °F (36.8 °C) (Oral)   Resp 20   Wt (!) 304 lb (137.9 kg)   SpO2 96%   BMI 52.18 kg/m²     General appearance:  No apparent distress, appears stated age and cooperative. HEENT:  Normal cephalic, atraumatic without obvious deformity. Pupils equal, round, and reactive to light. Extra ocular muscles intact. Conjunctivae/corneas clear. Neck: Supple, with full range of motion. No jugular venous distention. Trachea midline. Respiratory:  Normal respiratory effort. Clear to auscultation, bilaterally without Rales/Wheezes/Rhonchi. Cardiovascular:  Regular rate and rhythm with normal S1/S2 without murmurs, rubs or gallops. Abdomen: Soft, non-tender, non-distended with normal bowel sounds. Musculoskeletal:  No clubbing, cyanosis or edema bilaterally. Full range of motion without deformity. Skin: Skin color, texture, turgor normal.  No rashes or lesions. Neurologic:  Neurovascularly intact without any focal sensory/motor deficits. Cranial nerves: II-XII intact, grossly non-focal.  Psychiatric:  Alert and oriented, thought content appropriate, normal insight  Capillary Refill: Brisk,3 seconds, normal  Peripheral Pulses: +2 palpable, equal bilaterally       Labs:     Recent Labs     12/19/22  1752   WBC 10.8   HGB 14.1   HCT 40.9        Recent Labs     12/19/22  1752      K 3.5   CL 92*   CO2 32   BUN 9   CREATININE 1.0   CALCIUM 10.0     No results for input(s): AST, ALT, BILIDIR, BILITOT, ALKPHOS in the last 72 hours. No results for input(s): INR in the last 72 hours. No results for input(s): Yajaira Nasuti in the last 72 hours.     Urinalysis:      Lab Results   Component Value Date/Time    NITRU Negative 09/29/2022 04:16 PM    WBCUA >100 09/29/2022 04:16 PM    BACTERIA 2+ 09/29/2022 04:16 PM    RBCUA 0-2 09/29/2022 04:16 PM    BLOODU TRACE 09/29/2022 04:16 PM    SPECGRAV <=1.005 09/29/2022 04:16 PM    GLUCOSEU Negative 09/29/2022 04:16 PM       Radiology:     CXR: I have reviewed the CXR with the following interpretation:   EKG:  I have reviewed the EKG with the following interpretation:     XR TIBIA FIBULA RIGHT (2 VIEWS)   Final Result      1. No findings for acute bony abnormality. 2.  Moderate osteoarthrosis within the right knee as described. 3.  Diffuse lower extremity edema. Correlate clinically. Consults:    IP CONSULT TO HOSPITALIST  IP CONSULT TO PHARMACY  IP CONSULT TO INFECTIOUS DISEASES              ASSESSMENT:    Cellulitis of right leg  Venous stasis dermatitis of bilateral legs  Chronic heart failure with preserved ejection fraction  Chronic kidney disease  Diabetes mellitus type 2  Tobacco use disorder  Hypertension  Hypothyroid  Anxiety/depression  Frequent falls  Morbid obesity, BMI 52         PLAN:    Admit to [facility]  Consult infectious disease and wound care  IV cefepime and IV vancomycin as ordered  Consult pharmacy to dose IV vancomycin  Check hemoglobin A1c and BMP  Give IV Lasix 40 mg in the morning to diurese and continue as needed  Continue her home psych meds      DVT Prophylaxis: Lovenox  Diet: ADULT DIET; Regular  Code Status: Full Code    PT/OT Eval Status: PT/OT consult is ordered for discharge planning    Dispo -admit as inpatient       Conrad Menezes MD    Thank you Carl So MD for the opportunity to be involved in this patient's care. If you have any questions or concerns please feel free to contact me at [phone].

## 2022-12-20 NOTE — PROGRESS NOTES
Pt unsure of current home medications. Offered to call pt boyfriend, who she lives with, but pt stated they would not know either. Pharmacy listed currently closed. Unable to complete medication list at this time.

## 2022-12-20 NOTE — CONSULTS
Infectious Diseases Inpatient Consult Note    Reason for Consult:   R leg cellulitis  Requesting Physician:   Dr Pascual Carvajal  Primary Care Physician:  Cathi Pereyra MD  History Obtained From:   Pt, EPIC    Admit Date: 12/19/2022  Hospital Day: 2    CHIEF COMPLAINT:       Chief Complaint   Patient presents with    Cellulitis     States said PCP sent in for poss iv abx, R leg swelling/redness/pain       HISTORY OF PRESENT ILLNESS:      59 yo woman  Med hx - obesity (BMI 46), DM, CHF, CAD, CKD, OA, HTN, LE venous stasis  Surg hx - lung surg    LE venous stasis  Hx L leg wound    R leg redness, swelling, worse over 1-2 weeks  No trauma, No assoc fever / chills  Outpt rx cephalexin 4x/d x 3-4 days without improvement per pt    In ED, afeb, WBC 10.8  X-ray STS  Admit, started on Vancomycin      Past Medical History:    Past Medical History:   Diagnosis Date    Anemia     Anxiety     Arthritis     CAD (coronary artery disease)     Cancer (HCC)     CHF (congestive heart failure) (HCC)     diastolic    Chronic kidney disease     Depression     Diabetes mellitus ([address]     Hep C w/o coma, chronic (HCC)     Hyperlipidemia     Hypertension     MRSA (methicillin resistant staph aureus) culture positive 03/26/2021    left leg wound    Neuropathy     Schizoaffective disorder ([facility] [address]     Thyroid disease        Past Surgical History:    Past Surgical History:   Procedure Laterality Date    COLONOSCOPY  4/26/2021    COLONOSCOPY POLYPECTOMY SNARE/COLD BIOPSY performed by Husam Rudolph MD at  [address]     pt unsure of laterality     UPPER GASTROINTESTINAL ENDOSCOPY N/A 4/26/2021    EGD BIOPSY performed by Husam Rudolph MD at [place] [address] N/A 6/21/2021    EGD DIAGNOSTIC ONLY performed by Husam Rudolph MD at [facility] ER ENDOSCOPY       Current Medications:     pantoprazole  40 mg Oral Daily    levothyroxine  125 mcg Oral Daily    methocarbamol 500 mg Oral 4x Daily    therapeutic multivitamin-minerals  1 tablet Oral Daily    metoprolol tartrate  25 mg Oral BID    ARIPiprazole  10 mg Oral Daily    atorvastatin  20 mg Oral Daily    buPROPion  300 mg Oral QAM    DULoxetine  30 mg Oral Daily    gabapentin  300 mg Oral TID    mometasone-formoterol  2 puff Inhalation BID    montelukast  10 mg Oral Nightly    furosemide  40 mg IntraVENous Daily    cefepime  2,000 mg IntraVENous Q12H    vancomycin  1,500 mg IntraVENous Q12H    enoxaparin  30 mg SubCUTAneous BID    sodium chloride flush  5-40 mL IntraVENous 2 times per day       Allergies:  Tums [calcium carbonate antacid]    Social History:    TOBACCO:    Past cig - quit 1/2021  ETOH:    None   DRUGS:   None   MARITAL STATUS:      OCCUPATION:   None     Family History:   No immunodeficiency    REVIEW OF SYSTEMS:    No fever / chills / sweats. No weight loss. No visual change, eye pain, eye discharge. No oral lesion, sore throat, dysphagia. Denies cough / sputum. Denies chest pain, palpitations. Denies n / v / abd pain. No diarrhea. Denies dysuria or change in urinary function. Denies joint swelling or pain. No myalgia, arthralgia. Denies skin changes, itching  Denies focal weakness, sensory change or other neurologic symptom    Denies new / worse depression, psychiatric symptoms    PHYSICAL EXAM:      Vitals:    BP (!) 143/91   Pulse 81   Temp 98.3 °F (36.8 °C) (Oral)   Resp 19   Wt (!) 304 lb (137.9 kg)   SpO2 96%   BMI 52.18 kg/m²     GENERAL: No apparent distress.   RA  HEENT: Membranes moist, no oral lesion  NECK:  Supple, no lymphadenopathy  LUNGS: Clear b/l, no rales, no dullness  CARDIAC: RRR, no murmur appreciated  ABD:  + BS, soft / NT  EXT:  Bilateral LE venous stasis    R LE increase red / swelling (shiny appearance) / warm / tender   NEURO: No focal neurologic findings  PSYCH: Orientation, sensorium, mood normal  LINES:  Peripheral iv    DATA:    Lab Results   Component Value Date    WBC 9.6 2022    HGB 12.9 2022    HCT 38.4 2022    MCV 86.4 2022     2022     Lab Results   Component Value Date    CREATININE 1.0 2022    BUN 9 2022     2022    K 3.7 2022    CL 95 (L) 2022    CO2 33 (H) 2022       Hepatic Function Panel:   Lab Results   Component Value Date/Time    ALKPHOS 159 03/15/2022 04:32 AM    ALT 36 03/15/2022 04:32 AM    AST 30 03/15/2022 04:32 AM    PROT 7.4 03/15/2022 04:32 AM    BILITOT 0.3 03/15/2022 04:32 AM    LABALBU 3.9 03/15/2022 04:32 AM       Micro:   Wound cult sent     Imagin/19 L leg x-ray  Impression   1. No findings for acute bony abnormality. 2.  Moderate osteoarthrosis within the right knee as described. 3.  Diffuse lower extremity edema. Correlate clinically      Doppler u/s  Right   Very technically difficult due to body habitus, patient pain and inability to   tolerate compression maneuvers. Images are taken in color compare and vessels are not seen in their entirety   due to pain, open wound, and toughened skin not penetrated by ultrasound. Within the limits of this study, no evidence of deep or superficial venous   thrombosis in the right lower extremity. Left   Within the limits of this study, no evidence of deep or superficial venous   thrombosis in the left lower extremity.       IMPRESSION:      Patient Active Problem List   Diagnosis    Anemia    Cellulitis    Varicose veins of left lower extremity with inflammation, with ulcer of calf with fat layer exposed ([address]    Anatomical narrow angle of both eyes    Asthma    Benign neoplasm of colon    Borderline open-angle glaucoma    Chronic pain of right knee    CKD (chronic kidney disease)    Combined forms of age-related cataract of both eyes    Dental caries    Exotropia    Falls frequently    PTSD (post-traumatic stress disorder)    Gastroesophageal reflux disease    Hearing loss    Mixed hyperlipidemia    Hypertension    Hypothyroidism    Idiopathic sleep related nonobstructive alveolar hypoventilation    Intestinal malabsorption    Inflammatory disease of breast    Iron deficiency anemia    Chronic heart failure with preserved ejection fraction (HFpEF) (HCC)    Lung cancer (HCC)    MDD (major depressive disorder), recurrent episode, moderate (HCC)    Neuralgia, neuritis, and radiculitis, unspecified    Type 2 diabetes mellitus (HCC)    Urinary incontinence    Cellulitis of right leg    Venous stasis dermatitis of both lower extremities       Med hx - obesity (BMI 52), DM, CHF, cad, CKD, OA, HTN,     LE venous stasis  R LE cellulitis, red / warm / non-purulent      RECOMMENDATIONS:    Change to Ancef  Add Linezolid  D/c Vanco / Cefepime    Medical Decision Making: The following items were considered in medical decision making:  Discussion of patient care with other providers  Reviewed clinical lab tests  Reviewed radiology tests  Reviewed other diagnostic tests/interventions  Microbiology cultures and other micro tests reviewed      Risk of Complications/Morbidity: High   Illness(es)/ Infection present that pose threat to bodily function. There is potential for severe exacerbation of infection/side effects of treatment. Therapy requires intensive monitoring for antimicrobial agent toxicity    Discussed with pt, RN  Abad Carballo MD    ADDENDUM -  Reviewed med list, on Duloxetine, Bupropion  Will d/c Linezolid.    Suspect Streptococcal cellulitis, cont Ancef alone     Abad Carballo MD

## 2022-12-20 NOTE — PROGRESS NOTES
Physical Therapy  Facility/Department: [facility]  Physical Therapy Initial Assessment    Name: Radha Ozuna  : 1960  MRN: [id number]  Date of Service: 2022    Discharge Recommendations:Laura Murillo scored a 18/24 on the AM-PAC short mobility form. Current research shows that an AM-PAC score of 18 or greater is typically associated with a discharge to the patient's home setting. Based on the patient's AM-PAC score and their current functional mobility deficits, it is recommended that the patient have 2-3 sessions per week of Physical Therapy at d/c to increase the patient's independence. At this time, this patient demonstrates the endurance and safety to discharge home with (home services) and a follow up treatment frequency of 2-3x/wk. Please see assessment section for further patient specific details. If patient discharges prior to next session this note will serve as a discharge summary. Please see below for the latest assessment towards goals. PT Equipment Recommendations  Equipment Needed: No      Patient Diagnosis(es): The primary encounter diagnosis was Cellulitis of right lower extremity. A diagnosis of Chronic cutaneous venous stasis ulcer (HCC) was also pertinent to this visit. Past Medical History:  has a past medical history of Anemia, Anxiety, Arthritis, CAD (coronary artery disease), Cancer ([address] CHF (congestive heart failure) ([address] Chronic kidney disease, Depression, Diabetes mellitus ([address] Hep C w/o coma, chronic ([address] Hyperlipidemia, Hypertension, MRSA (methicillin resistant staph aureus) culture positive, Neuropathy, Schizoaffective disorder ([address] and Thyroid disease. Past Surgical History:  has a past surgical history that includes Lung lobectomy (N/A); Dental surgery; Upper gastrointestinal endoscopy (N/A, 2021); Colonoscopy (2021); and Upper gastrointestinal endoscopy (N/A, 2021).     Assessment   Assessment: 62 yo referred in ED 12/20/22 for R LE cellulitis. Pt demo mobility below her reported baseline of independent at home with RW; states she struggles on 14 steps to enter apartment at baseline. Pt plans to return home at discharge. Anticipate pt will progress well as her pain/infection improves. If home, recommend 24 hour supervision initially, home PT, continued use of RW at all times. Treatment Diagnosis: mobility impairment due to cellulitis  Decision Making: Medium Complexity  Requires PT Follow-Up: Yes  Activity Tolerance  Activity Tolerance: Patient limited by fatigue;Patient limited by pain; Patient tolerated treatment well     Plan   Physcial Therapy Plan  General Plan:  (2-5)  Current Treatment Recommendations: Balance training, Functional mobility training, Transfer training, Gait training  Safety Devices  Type of Devices: Left in bed, Nurse notified, Call light within reach (Simultaneous filing. User may not have seen previous data.)     Restrictions  Position Activity Restriction  Other position/activity restrictions: up as tolerated, contact precautions (MRSA)     Subjective   General  Chart Reviewed: Yes  Additional Pertinent Hx: 60 y.o. female with history of multiple comorbidities including HTN, DM II, dCHF, obesity and venous stasis dermatitis presented to ED for R LE cellulitis. CXR neg; R tib/fib XR neg. Family / Caregiver Present: No  Diagnosis: R LE cellulitis  Follows Commands: Within Functional Limits  Subjective  Subjective: Pt found supine in bed and agreeable to PT with encouragement. Pt rates B LE pain 2/10 (had meds).          Social/Functional History  Social/Functional History  Lives With: Significant other (boyfriend)  Type of Home: Apartment  Home Layout: Multi-level  Home Access: Stairs to enter with rails (14 steps with rail)  Bathroom Shower/Tub: Tub/Shower unit  Bathroom Toilet: Standard (has raised commode seat but not using)  Bathroom Equipment: Grab bars in shower, Shower chair, Hand-held shower  Home Equipment: Walker, rolling  Receives Help From: Home health (aide from [facility] 2x per week for 5 hours)  ADL Assistance:  (assist with dressing/bathing (boyfriend assist with showers, aide helps with socks))  Homemaking Assistance:  (aide performs)  Ambulation Assistance: Independent (with RW)  Transfer Assistance: Independent  Active : No  Patient's  Info: uses passport via [place] [employer] to set up transportation  Occupation: On disability    Vision/Hearing  Vision  Vision: Impaired  Vision Exceptions: Wears glasses for reading  Hearing  Hearing: Ex[facility] to [facility]  Hearing Exceptions:  ([place] in L ear; no hearing aids)      Cognition   Orientation  Overall Orientation Status: Within Functional Limits  Cognition  Overall Cognitive Status: WFL     Objective                 AROM RLE (degrees)  RLE AROM: WFL  AROM LLE (degrees)  LLE AROM : WFL    Strength RLE  Strength RLE: WFL  Strength LLE  Strength LLE: WFL        Balance  Sitting: Intact (pt seated EOB i'ly)  Standing:  (CGA with RW)       Transfers  Sit to Stand: Contact guard assistance  Stand to Sit: Contact guard assistance    Ambulation  Device: Rolling Walker  Assistance: Contact guard assistance  Quality of Gait: forward posture with decreased step length/height; pt fatigued quickly  Distance: 8 ft  Comments: Pt declined walking any farther due to pain/fatigue.          AM-PAC Score  AM-PAC Inpatient Mobility Raw Score : 18 (12/20/22 0958)  AM-PAC Inpatient T-Scale Score : 43.63 (12/20/22 0958)  Mobility Inpatient CMS 0-100% Score: 46.58 (12/20/22 1332)  Mobility Inpatient CMS G-Code Modifier : CK (12/20/22 0958)         Goals  Short Term Goals  Time Frame for Short Term Goals: discharge  Short Term Goal 1: sit to/from supine mod I  Short Term Goal 2: sit to/from stand supervision  Short Term Goal 3: ambulate 50 ft with RW supervision  Patient Goals   Patient Goals : return home       Education  Patient Education  Education

## 2022-12-20 NOTE — PROGRESS NOTES
60 y.o. female with history of multiple comorbidities including HTN, DM II, dCHF, obesity and venous stasis dermatitis  She has chronic wound on the left leg which has since healed after extensive follow-up at the wound care clinic  She now presents to the ER with complaint of right leg pain, swelling and redness  Continue with broad spectrum iv AB  Wound care  Order venous doppler to r/o DVT  X ray of legs show sc tissue swelling   If no improvement , may order CT leg  On dvt PPX

## 2022-12-20 NOTE — PROGRESS NOTES
Occupational Therapy  Facility/Department: [facility]  Occupational Therapy Initial Assessment/Treatment    Name: Alek North  : 1960  MRN: [id number]  Date of Service: 2022    Discharge Geoff Frederick scored a 17/24 on the AM-PAC ADL Inpatient form. Current research shows that an AM-PAC score of 18 or greater is typically associated with a discharge to the patient's home setting. Based on the patient's AM-PAC score, and their current ADL deficits, it is recommended that the patient have 2-3 sessions per week of Occupational Therapy at d/c to increase the patient's independence. At this time, this patient demonstrates the endurance and safety to discharge home with home health services and a follow up treatment frequency of 2-3x/wk. Please see assessment section for further patient specific details. If patient discharges prior to next session this note will serve as a discharge summary. Please see below for the latest assessment towards goals. OT Equipment Recommendations  Equipment Needed: Yes  Mobility Devices: ADL Assistive Devices  ADL Assistive Devices: Transfer Tub Bench;Reacher;Long-handled Shoe Horn;Sock-Aid Hard (bariatric sock aid)       Patient Diagnosis(es): The primary encounter diagnosis was Cellulitis of right lower extremity. A diagnosis of Chronic cutaneous venous stasis ulcer (HCC) was also pertinent to this visit. Past Medical History:  has a past medical history of Anemia, Anxiety, Arthritis, CAD (coronary artery disease), Cancer ([address] CHF (congestive heart failure) ([address] Chronic kidney disease, Depression, Diabetes mellitus ([address] Hep C w/o coma, chronic ([address] Hyperlipidemia, Hypertension, MRSA (methicillin resistant staph aureus) culture positive, Neuropathy, Schizoaffective disorder ([address] and Thyroid disease. Past Surgical History:  has a past surgical history that includes Lung lobectomy (N/A);  Dental surgery; Upper gastrointestinal endoscopy (N/A, 4/26/2021); Colonoscopy (4/26/2021); and Upper gastrointestinal endoscopy (N/A, 6/21/2021). Assessment   Performance deficits / Impairments: Decreased functional mobility ; Decreased endurance;Decreased ADL status; Decreased balance;Decreased strength  Assessment: Pt is a 59 y/o F who reports having an aide 3x week and has assistance from boyfriend for some ADLs such as footwear and bathing. Pt currently requires increased assistance with functional mobility transfers and likely LB ADLs and toileting due to pain, decreased activity tolerance, impaired dynamic standing balance and generalized debility. Pt has 14 [address] apartmnet which has already been difficult for patient lately. Pt wants to d/c home and will benefit from home health services to recommend and assist with acquiring DME and AE to improve functional independence. Prognosis: Fair  Decision Making: Medium Complexity  REQUIRES OT FOLLOW-UP: Yes  Activity Tolerance  Activity Tolerance: Patient Tolerated treatment well;Patient limited by fatigue  Activity Tolerance Comments: minimal encouragement required for ambulation        Plan   Occupational Therapy Plan  Times Per Week: 2-5  Current Treatment Recommendations: Strengthening, Endurance training, Functional mobility training, Self-Care / ADL, Safety education & training, Patient/Caregiver education & training, Home management training, Equipment evaluation, education, & procurement     Restrictions  Position Activity Restriction  Other position/activity restrictions: up as tolerated, contact precautions (MRSA)    Subjective   General  Chart Reviewed: Yes  Additional Pertinent Hx: 59 y/o female with history of multiple comorbidities including HTN, DM II, dCHF, obesity and venous stasis dermatitis presented to ED for R LE cellulitis. CXR neg; R tib/fib XR neg.   Family / Caregiver Present: No  Referring Practitioner: Rock Johnson MD  Diagnosis: cellulitis of R leg  Subjective  Subjective: Pt supine in bed upon arrival, pleasant and agreeable to OT evaluation. \"I don't know how much walking I can do because I'm pretty weak. \"     Social/Functional History  Social/Functional History  Lives With: Significant other (boyfriend)  Type of Home: Apartment  Home Layout: Multi-level  Home Access: Stairs to enter with rails (14 steps with rail)  Bathroom Shower/Tub: Tub/Shower unit  Bathroom Toilet: Standard (has raised commode seat but not using)  Bathroom Equipment: Grab bars in shower, Shower chair, Hand-held shower  Home Equipment: Walker, [address] Help From: Home health (aide from [employer] 2x per week for 5 hours)  ADL Assistance:  (assist with dressing/bathing (boyfriend assist with showers, aide helps with socks))  Homemaking Assistance:  (aide performs)  Ambulation Assistance: Independent (with RW)  Transfer Assistance: Independent  Active : No  Patient's  Info: uses passport via [place] aging to set up transportation  Occupation: On disability       Objective   Heart Rate: 85  BP: 135/60  BP Location: Right upper arm  BP Method: Automatic  Patient Position: Semi fowlers  MAP (Calculated): 85  Resp: 19  SpO2: 98 %  O2 Device: Nasal cannula          Observation/Palpation  Observation: significant redness, edema and warmth to BLEs R>L  Safety Devices  Type of Devices: Left in bed;Nurse notified;Call light within reach (Simultaneous filing. User may not have seen previous data.)  Balance  Sitting: Intact (pt seated EOB i'ly)  Standing:  (CGA with RW)  Gait  Overall Level of Assistance: Contact-guard assistance (~10', slow and antalgic, heavy reliance of BUEs on RW)  Assistive Device: Walker, rolling     AROM: Within functional limits  Strength:  Within functional limits  Coordination: Within functional limits  Tone: Normal  Sensation: Impaired (neuropathy to lindsay feet)  ADL  UE Dressing: Setup  UE Dressing Skilled Clinical Factors: to don gown         Activity Tolerance  Activity Tolerance: Patient limited by fatigue;Patient limited by pain; Patient tolerated treatment well  Bed mobility  Supine to Sit: Stand by assistance  Sit to Supine: Stand by assistance (slow and effortful to manage BLEs into bed)  Scooting: Stand by assistance (laterally EOB)  Transfers  Sit to stand: Contact guard assistance (from EOB to RW)  Stand to sit: Contact guard assistance  Vision  Vision: Impaired  Vision Exceptions: Wears glasses for reading  Hearing  Hearing: Exceptions to [facility]  Hearing Exceptions:  (G[place]ell in L ear; no hearing aids)  Cognition  Overall Cognitive Status: WFL  Orientation  Overall Orientation Status: Within Functional Limits                  Education Given To: Patient  Education Provided: Role of Therapy;Plan of Care;ADL Adaptive Strategies;Transfer Training;Equipment  Education Provided Comments: pt educated on recommendation of home health OT to assist with problem solving ADLs and transfers as pt reports having difficulty transferring into tub as well as donning/doffing socks and shoes which her boyfriend has been completing for her; pt would benefit from demonstration and practive with AE if admitted  Education Method: Verbal  Education Outcome: Continued education needed;Verbalized understanding                        G-Code     OutComes Score                                                  AM-PAC Score        AM-PAC Inpatient Daily Activity Raw Score: 17 (12/20/22 1002)  AM-PAC Inpatient ADL T-Scale Score : 37.26 (12/20/22 1002)  ADL Inpatient CMS 0-100% Score: 50.11 (12/20/22 1002)  ADL Inpatient CMS G-Code Modifier : CK (12/20/22 1002)    Tinneti Score       Goals  Short Term Goals  Time Frame for Short Term Goals: by dc  Short Term Goal 1: Pt will complete LB dressing with use of AE prn with SBA  Short Term Goal 2: Pt will tolerate functional mobility to/from bathroom with SBA  Short Term Goal 3: Pt will complete toileting with SBA  Patient Goals   Patient goals :

## 2022-12-20 NOTE — CARE COORDINATION
Case Management Assessment  Initial Evaluation    Date/Time of Evaluation: 12/20/2022 1:24 PM  Assessment Completed by: Barron Hardy RN    If patient is discharged prior to next notation, then this note serves as note for discharge by case management. Patient Name: Cassandra Mcfadden                   YOB: 1960  Diagnosis: Cellulitis of right leg [Q86.084]                   Date / Time: 12/19/2022  7:50 PM    Patient Admission Status: Inpatient   Readmission Risk (Low < 19, Mod (19-27), High > 27): Readmission Risk Score: 12.9    Current PCP: Giacomo Villalobos MD  PCP verified by CM? Yes    Chart Reviewed: Yes      History Provided by: Patient, Medical Record  Patient Orientation: Alert and Oriented    Patient Cognition: Alert    Hospitalization in the last 30 days (Readmission):  No      Advance Directives:      Code Status: Full Code     Discharge Planning:    Patient lives with: Spouse/Significant Other Type of Home: Apartment  Primary Care Giver: Self (boyfriend provides assistance as needed. Has a [employer] aide to assist as well)  Patient Support Systems include: Spouse/Significant Other, Home Care Staff, CHERRY/Passport, Homemaker (Active with [employer] Passport/waiver aide services and [employer] transportation)   Current Financial resources: Medicaid  Current community resources:    Current services prior to admission: C-pap, Durable Medical Equipment, O2 through [employer]            Current DME: Lito Santos Shower Chair            Type of Home Care services:  PT, OT, Nursing Services    ADLS  Prior functional level: Assistance with the following:, Bathing, Dressing, Toileting, Cooking, Housework, Shopping (boyfriend assists with dressing and bathing (or [employer] aide will assist).  Ambulates independently with a rolling walker)  Current functional level: Assistance with the following:, Bathing, Dressing, Toileting, Cooking, Housework, Shopping    PT AM-PAC: 18 /24  OT AM-PAC: 17 /24    Family can provide assistance at DC: Yes  Plans to Return to Present Housing: Yes  Potential Assistance needed at discharge: [address], CHERRY/Passport (ADIS CADENA.  May be open to [facility]-Active with [facility] in the past)            Potential DME:  deferred  Patient expects to discharge to: [address] [place] [address] for transportation at discharge:  private car with significant other    Financial    Payor: MEDICAID [place] / Plan: Domenica Cortes DEPT OF JOB / Product Type: *No Product type* /     Does insurance require precert for SNF: No    Potential assistance Purchasing Medications: No  Meds-to-Beds request:        [facility] [phone] - F [phone]  [facility]ill[facility]  [address] [address]  Phone: [phone] Fax: [phone]    FTYWMAVY'F KCOUGUPZ [facility] [phone] - F [phone]  [facility] [address]  Phone: [phone] Fax: [phone]    PPGPIVPOP [id number] - VRSGCT DOOS, [address]  [phone] - F [phone]  [address] [place] [address]  Phone: [phone] Fax: [phone] [facility],[address] [facility], RN  Case Management Department  [phone]

## 2022-12-20 NOTE — CONSULTS
Clinical Pharmacy Consult Note    Vancomycin - Management by Pharmacy    Consult Date(s): 12/20  Consulting Provider(s): Dr. Dwayne Mcadams / Plan  SSTI/Cellulitis - Vancomycin  Concurrent Antimicrobials: Cefepime 2 g IV q12h (Day 1 of 7)  Day of Vanc Therapy / Ordered Duration: Day 1 of 7  Current Dosing Method: Bayesian-Guided AUC Dosing  Therapeutic Goal: -600 mg/L*hr  Current Dose / Plan:   Patient has known history of CKD, SCr appears to be at baseline (~1 mg/dL)  Patient will be started on empiric regimen of 1500 mg (~11 mg/kg) IV q12h to produce AUC of 400-600 mg/L*hr  Plan to obtain random vancomycin level to assess kinetics tomorrow (12/21, ordered) to ensure patient is not accumulating on regimen  Will continue to monitor clinical condition and make adjustments to regimen as appropriate    Thank you for consulting Pharmacy! Dunia Mike, PharmD, [address] [facility]  Clinical Pharmacist - Emergency Dept  Wireless: [phone]  12/20/2022 10:01 AM      Subjective/Objective: Seda Peña is a 60 y.o. female with a PMHx significant for chronic venous stasis, asthma, CKD, GERD, HTN, HLD, T2DM, hypothyroidism, HFpEF, PTSD, and MDD who is admitted with concern of persistent RLE cellulitis, unresponsive to treatment with cephalexin as an outpatient. Pharmacy is consulted to dose vancomycin. Ht Readings from Last 1 Encounters:   09/29/22 5' 4\" (1.626 m)     Wt Readings from Last 1 Encounters:   12/19/22 (!) 304 lb (137.9 kg)     Current & Prior Antimicrobial Regimen(s):  Cefepime 2 g IV q12h EI (12/20-current)  Vancomycin IV PTD  1500 mg IV q12h (12/20-current)    Vancomycin Level(s) / Doses:  Date Time Dose Type of Level / Level Interpretation   12/21 0800 1500 mg IV q12h Random =            Note: Serum levels collected for AUC-based dosing may be high if collected in close proximity to the dose administered. This is not necessarily indicative of toxicity.     Cultures & Sensitivities:  Date Site Micro Susceptibility / Result   12/20 MRSA nares Sent            Recent Labs     12/19/22  1752 12/20/22  0744   CREATININE 1.0 1.0   BUN 9 9   WBC 10.8 9.6       Estimated Creatinine Clearance: 80 mL/min (based on SCr of 1 mg/dL). Additional Lab Values / Findings of Note:  No results for input(s): PROCAL in the last 72 hours.

## 2022-12-20 NOTE — PLAN OF CARE
Problem: Skin/Tissue Integrity  Goal: Absence of new skin breakdown  Description: 1. Monitor for areas of redness and/or skin breakdown  2. Assess vascular access sites hourly  3. Every 4-6 hours minimum:  Change oxygen saturation probe site  4. Every 4-6 hours:  If on nasal continuous positive airway pressure, respiratory therapy assess nares and determine need for appliance change or resting period. Outcome: Progressing- patient assessed for any new or worsening skin breakdown     Problem: Safety - Adult  Goal: Free from fall injury  Outcome: Progressing- proper fall precautions used to take patient to bathroom.

## 2022-12-21 LAB
ANION GAP SERPL CALCULATED.3IONS-SCNC: 9 MMOL/L (ref 3–16)
BASOPHILS ABSOLUTE: 0.1 K/UL (ref 0–0.2)
BASOPHILS RELATIVE PERCENT: 0.8 %
BUN BLDV-MCNC: 13 MG/DL (ref 7–20)
CALCIUM SERPL-MCNC: 9.8 MG/DL (ref 8.3–10.6)
CHLORIDE BLD-SCNC: 96 MMOL/L (ref 99–110)
CO2: 34 MMOL/L (ref 21–32)
CREAT SERPL-MCNC: 1.1 MG/DL (ref 0.6–1.2)
EOSINOPHILS ABSOLUTE: 0.1 K/UL (ref 0–0.6)
EOSINOPHILS RELATIVE PERCENT: 1.7 %
ESTIMATED AVERAGE GLUCOSE: 119.8 MG/DL
GFR SERPL CREATININE-BSD FRML MDRD: 56 ML/MIN/{1.73_M2}
GLUCOSE BLD-MCNC: 119 MG/DL (ref 70–99)
GLUCOSE BLD-MCNC: 126 MG/DL (ref 70–99)
GLUCOSE BLD-MCNC: 128 MG/DL (ref 70–99)
GLUCOSE BLD-MCNC: 138 MG/DL (ref 70–99)
GLUCOSE BLD-MCNC: 156 MG/DL (ref 70–99)
GLUCOSE BLD-MCNC: 99 MG/DL (ref 70–99)
HBA1C MFR BLD: 5.8 %
HCT VFR BLD CALC: 35.6 % (ref 36–48)
HEMOGLOBIN: 12.1 G/DL (ref 12–16)
LYMPHOCYTES ABSOLUTE: 1.3 K/UL (ref 1–5.1)
LYMPHOCYTES RELATIVE PERCENT: 14.7 %
MAGNESIUM: 2.2 MG/DL (ref 1.8–2.4)
MCH RBC QN AUTO: 28.7 PG (ref 26–34)
MCHC RBC AUTO-ENTMCNC: 34 G/DL (ref 31–36)
MCV RBC AUTO: 84.5 FL (ref 80–100)
MONOCYTES ABSOLUTE: 1.1 K/UL (ref 0–1.3)
MONOCYTES RELATIVE PERCENT: 13.1 %
NEUTROPHILS ABSOLUTE: 6.1 K/UL (ref 1.7–7.7)
NEUTROPHILS RELATIVE PERCENT: 69.7 %
PDW BLD-RTO: 14.8 % (ref 12.4–15.4)
PERFORMED ON: ABNORMAL
PERFORMED ON: NORMAL
PLATELET # BLD: 262 K/UL (ref 135–450)
PMV BLD AUTO: 7.1 FL (ref 5–10.5)
POTASSIUM SERPL-SCNC: 3.9 MMOL/L (ref 3.5–5.1)
RBC # BLD: 4.22 M/UL (ref 4–5.2)
SODIUM BLD-SCNC: 139 MMOL/L (ref 136–145)
WBC # BLD: 8.7 K/UL (ref 4–11)

## 2022-12-21 PROCEDURE — 94150 VITAL CAPACITY TEST: CPT

## 2022-12-21 PROCEDURE — 80048 BASIC METABOLIC PNL TOTAL CA: CPT

## 2022-12-21 PROCEDURE — 6360000002 HC RX W HCPCS: Performed by: INTERNAL MEDICINE

## 2022-12-21 PROCEDURE — 2580000003 HC RX 258: Performed by: INTERNAL MEDICINE

## 2022-12-21 PROCEDURE — 36415 COLL VENOUS BLD VENIPUNCTURE: CPT

## 2022-12-21 PROCEDURE — 6370000000 HC RX 637 (ALT 250 FOR IP): Performed by: INTERNAL MEDICINE

## 2022-12-21 PROCEDURE — 97110 THERAPEUTIC EXERCISES: CPT

## 2022-12-21 PROCEDURE — 99232 SBSQ HOSP IP/OBS MODERATE 35: CPT | Performed by: INTERNAL MEDICINE

## 2022-12-21 PROCEDURE — 94664 DEMO&/EVAL PT USE INHALER: CPT

## 2022-12-21 PROCEDURE — 1200000000 HC SEMI PRIVATE

## 2022-12-21 PROCEDURE — 94640 AIRWAY INHALATION TREATMENT: CPT

## 2022-12-21 PROCEDURE — 85025 COMPLETE CBC W/AUTO DIFF WBC: CPT

## 2022-12-21 PROCEDURE — 2700000000 HC OXYGEN THERAPY PER DAY

## 2022-12-21 PROCEDURE — 83735 ASSAY OF MAGNESIUM: CPT

## 2022-12-21 PROCEDURE — 94761 N-INVAS EAR/PLS OXIMETRY MLT: CPT

## 2022-12-21 PROCEDURE — 97530 THERAPEUTIC ACTIVITIES: CPT

## 2022-12-21 RX ORDER — DEXTROSE MONOHYDRATE 100 MG/ML
INJECTION, SOLUTION INTRAVENOUS CONTINUOUS PRN
Status: DISCONTINUED | OUTPATIENT
Start: 2022-12-21 | End: 2022-12-26 | Stop reason: HOSPADM

## 2022-12-21 RX ORDER — AMMONIUM LACTATE 12 G/100G
LOTION TOPICAL PRN
Status: DISCONTINUED | OUTPATIENT
Start: 2022-12-21 | End: 2022-12-26 | Stop reason: HOSPADM

## 2022-12-21 RX ORDER — GLUCAGON 1 MG/ML
1 KIT INJECTION PRN
Status: DISCONTINUED | OUTPATIENT
Start: 2022-12-21 | End: 2022-12-26 | Stop reason: HOSPADM

## 2022-12-21 RX ORDER — INSULIN LISPRO 100 [IU]/ML
0-4 INJECTION, SOLUTION INTRAVENOUS; SUBCUTANEOUS NIGHTLY
Status: DISCONTINUED | OUTPATIENT
Start: 2022-12-21 | End: 2022-12-26 | Stop reason: HOSPADM

## 2022-12-21 RX ORDER — INSULIN LISPRO 100 [IU]/ML
0-4 INJECTION, SOLUTION INTRAVENOUS; SUBCUTANEOUS
Status: DISCONTINUED | OUTPATIENT
Start: 2022-12-21 | End: 2022-12-26 | Stop reason: HOSPADM

## 2022-12-21 RX ADMIN — CEFAZOLIN SODIUM 3000 MG: 1 POWDER, FOR SOLUTION INTRAMUSCULAR; INTRAVENOUS at 02:29

## 2022-12-21 RX ADMIN — ENOXAPARIN SODIUM 30 MG: 100 INJECTION SUBCUTANEOUS at 10:37

## 2022-12-21 RX ADMIN — METHOCARBAMOL 500 MG: 500 TABLET ORAL at 16:58

## 2022-12-21 RX ADMIN — SODIUM CHLORIDE: 9 INJECTION, SOLUTION INTRAVENOUS at 17:00

## 2022-12-21 RX ADMIN — CEFAZOLIN SODIUM 3000 MG: 1 POWDER, FOR SOLUTION INTRAMUSCULAR; INTRAVENOUS at 10:57

## 2022-12-21 RX ADMIN — SODIUM CHLORIDE: 9 INJECTION, SOLUTION INTRAVENOUS at 10:56

## 2022-12-21 RX ADMIN — METOPROLOL TARTRATE 25 MG: 25 TABLET, FILM COATED ORAL at 20:13

## 2022-12-21 RX ADMIN — OXYCODONE AND ACETAMINOPHEN 1 TABLET: 5; 325 TABLET ORAL at 19:43

## 2022-12-21 RX ADMIN — SODIUM CHLORIDE, PRESERVATIVE FREE 10 ML: 5 INJECTION INTRAVENOUS at 10:37

## 2022-12-21 RX ADMIN — CEFAZOLIN SODIUM 3000 MG: 1 POWDER, FOR SOLUTION INTRAMUSCULAR; INTRAVENOUS at 17:06

## 2022-12-21 RX ADMIN — BUPROPION HYDROCHLORIDE 300 MG: 150 TABLET, FILM COATED, EXTENDED RELEASE ORAL at 10:36

## 2022-12-21 RX ADMIN — DULOXETINE HYDROCHLORIDE 30 MG: 30 CAPSULE, DELAYED RELEASE ORAL at 10:36

## 2022-12-21 RX ADMIN — OXYCODONE AND ACETAMINOPHEN 1 TABLET: 5; 325 TABLET ORAL at 12:39

## 2022-12-21 RX ADMIN — SODIUM CHLORIDE, PRESERVATIVE FREE 10 ML: 5 INJECTION INTRAVENOUS at 20:21

## 2022-12-21 RX ADMIN — METHOCARBAMOL 500 MG: 500 TABLET ORAL at 12:39

## 2022-12-21 RX ADMIN — OXYCODONE AND ACETAMINOPHEN 1 TABLET: 5; 325 TABLET ORAL at 05:55

## 2022-12-21 RX ADMIN — METHOCARBAMOL 500 MG: 500 TABLET ORAL at 10:36

## 2022-12-21 RX ADMIN — ARIPIPRAZOLE 10 MG: 5 TABLET ORAL at 10:36

## 2022-12-21 RX ADMIN — MOMETASONE FUROATE AND FORMOTEROL FUMARATE DIHYDRATE 2 PUFF: 100; 5 AEROSOL RESPIRATORY (INHALATION) at 07:58

## 2022-12-21 RX ADMIN — GABAPENTIN 300 MG: 300 CAPSULE ORAL at 12:39

## 2022-12-21 RX ADMIN — MONTELUKAST SODIUM 10 MG: 10 TABLET, FILM COATED ORAL at 20:13

## 2022-12-21 RX ADMIN — ATORVASTATIN CALCIUM 20 MG: 20 TABLET, FILM COATED ORAL at 10:37

## 2022-12-21 RX ADMIN — PANTOPRAZOLE SODIUM 40 MG: 40 TABLET, DELAYED RELEASE ORAL at 10:37

## 2022-12-21 RX ADMIN — MOMETASONE FUROATE AND FORMOTEROL FUMARATE DIHYDRATE 2 PUFF: 100; 5 AEROSOL RESPIRATORY (INHALATION) at 20:17

## 2022-12-21 RX ADMIN — GABAPENTIN 300 MG: 300 CAPSULE ORAL at 10:34

## 2022-12-21 RX ADMIN — LEVOTHYROXINE SODIUM 125 MCG: 0.12 TABLET ORAL at 05:55

## 2022-12-21 RX ADMIN — GABAPENTIN 300 MG: 300 CAPSULE ORAL at 20:13

## 2022-12-21 RX ADMIN — Medication 1 TABLET: at 10:37

## 2022-12-21 RX ADMIN — METHOCARBAMOL 500 MG: 500 TABLET ORAL at 19:44

## 2022-12-21 RX ADMIN — FUROSEMIDE 40 MG: 10 INJECTION, SOLUTION INTRAMUSCULAR; INTRAVENOUS at 10:46

## 2022-12-21 RX ADMIN — METOPROLOL TARTRATE 25 MG: 25 TABLET, FILM COATED ORAL at 10:36

## 2022-12-21 RX ADMIN — ENOXAPARIN SODIUM 30 MG: 100 INJECTION SUBCUTANEOUS at 20:13

## 2022-12-21 ASSESSMENT — PAIN SCALES - GENERAL
PAINLEVEL_OUTOF10: 7
PAINLEVEL_OUTOF10: 7
PAINLEVEL_OUTOF10: 2
PAINLEVEL_OUTOF10: 0
PAINLEVEL_OUTOF10: 3
PAINLEVEL_OUTOF10: 5
PAINLEVEL_OUTOF10: 1
PAINLEVEL_OUTOF10: 0
PAINLEVEL_OUTOF10: 2

## 2022-12-21 ASSESSMENT — PAIN DESCRIPTION - LOCATION
LOCATION: BACK;LEG;KNEE
LOCATION: LEG;KNEE;BACK

## 2022-12-21 ASSESSMENT — PAIN DESCRIPTION - DESCRIPTORS
DESCRIPTORS: ACHING;OTHER (COMMENT)
DESCRIPTORS: ACHING

## 2022-12-21 ASSESSMENT — PAIN DESCRIPTION - ORIENTATION
ORIENTATION: RIGHT;LEFT
ORIENTATION: RIGHT;LEFT;MID

## 2022-12-21 ASSESSMENT — PAIN DESCRIPTION - ONSET: ONSET: ON-GOING

## 2022-12-21 ASSESSMENT — PAIN DESCRIPTION - FREQUENCY: FREQUENCY: CONTINUOUS

## 2022-12-21 ASSESSMENT — PAIN - FUNCTIONAL ASSESSMENT
PAIN_FUNCTIONAL_ASSESSMENT: PREVENTS OR INTERFERES SOME ACTIVE ACTIVITIES AND ADLS
PAIN_FUNCTIONAL_ASSESSMENT: PREVENTS OR INTERFERES WITH ALL ACTIVE AND SOME PASSIVE ACTIVITIES

## 2022-12-21 ASSESSMENT — PAIN DESCRIPTION - PAIN TYPE: TYPE: ACUTE PAIN

## 2022-12-21 NOTE — PROGRESS NOTES
Consulted for R Lower Leg wound. Wound is currently dry and intact. Wound Care to sign off. Please re-consult for changes or deterioration.      R Lower Leg:

## 2022-12-21 NOTE — PROGRESS NOTES
Pt rested well throughout the night. Pain managed effectively with prn meds. Schulte patent, draining clear yellow urine. Tolerating diet. Turns self in bed with assist. Uses call light appropriately. Bed alarm on for safety.

## 2022-12-21 NOTE — PROGRESS NOTES
Physical Therapy  Facility/Department: [address]  Daily Treatment Note  NAME: Rafael Morales  : 1960  MRN: [id number]    Date of Service: 2022    Discharge Recommendations:  Rafael Morales scored a 10/24 on the AM-PAC short mobility form. Current research shows that an AM-PAC score of 17 or less is typically not associated with a discharge to the patient's home setting. Based on the patient's AM-PAC score and their current functional mobility deficits, it is recommended that the patient have 5-7 sessions per week of Physical Therapy at d/c to increase the patient's independence. At this time, this patient demonstrates complex nursing, medical, and rehabilitative needs, and would benefit from intensive rehabilitation services upon discharge from the Inpatient setting. This patient demonstrates the ability to participate in and benefit from an intensive therapy program with a coordinated interdisciplinary team approach to foster frequent, structured, and documented communication among disciplines, who will work together to establish, prioritize, and achieve treatment goals. Please see assessment section for further patient specific details. If patient discharges prior to next session this note will serve as a discharge summary. Please see below for the latest assessment towards goals. Patient would benefit from continued therapy after discharge   PT Equipment Recommendations  Equipment Needed:  (defer for now)    Patient Diagnosis(es): The primary encounter diagnosis was Cellulitis of right lower extremity. A diagnosis of Chronic cutaneous venous stasis ulcer (HCC) was also pertinent to this visit. Assessment   Assessment: Pt requiring significantly increased assistance today as compared to yesterday. Required max A & use of yusef stedy for transfer to chair. At this time, recommend further intpt PT upon D/C. Will follow per plan of care.   Activity Tolerance: Patient limited by fatigue;Patient limited by endurance; Patient limited by pain  Equipment Needed:  (defer for now)     Plan    Physcial Therapy Plan  General Plan:  (2-5)  Current Treatment Recommendations: Functional mobility training;Transfer training;Strengthening; Endurance training;Gait training; Safety education & training;Patient/Caregiver education & training; Therapeutic activities     Restrictions  Position Activity Restriction  Other position/activity restrictions: up as tolerated, contact precautions (MRSA)     Subjective    Subjective  Subjective: Pt supine in bed & agreeable to PT. \"I want to get out of this bed. \"  Pain: pt c/o right LE pain 5/10, RN in to give pain meds. Objective      Bed Mobility Training  Bed Mobility Training: Yes  Supine to Sit: Moderate assistance (HOB elevated)  Scooting: Maximum assistance (seated scoot to EOB)  Balance  Sitting: Intact  Transfer Training  Transfer Training: Yes  Sit to Stand: Maximum assistance (elevated bed to yusef stedy. attempted first to RW & pt unable (3 failed attempts) despite max A.)  Stand to Sit: Moderate assistance (from stedy to chair)  Bed to Chair: Total assistance (via yusef stedy)  Gait Training  Gait Training: No (pt unable)     PT Exercises  Exercise Treatment: x 15 bilat LE: LAQ, marching, ankle DF/PF, hip abd     Safety Devices  Type of Devices: Call light within reach; Chair alarm in place; Left in chair;Nurse notified       Goals  Short Term Goals  Time Frame for Short Term Goals: discharge  Short Term Goal 1: sit to/from supine mod I  Short Term Goal 2: sit to/from stand supervision  Short Term Goal 3: ambulate 48 ft with RW supervision  Patient Goals   Patient Goals : return home    Education  Patient Education  Education Given To: Patient  Education Provided: Plan of Care;Transfer Training  Education Method: Verbal  Education Outcome: Verbalized understanding    Therapy Time   Individual Concurrent Group Co-treatment   Time In 1031         Time Out 1323 Minutes [address] [place] [address],

## 2022-12-21 NOTE — PROGRESS NOTES
Patient admitted to room 3307 from ED. Patient oriented to room, call light, bed rails, phone, lights and bathroom. Patient instructed about the schedule of the day including: vital sign frequency, lab draws, possible tests, frequency of MD and staff rounds, including RN/MD rounding together at bedside, daily weights, and I &O's. Patient instructed about prescribed diet, how to use 8MENU, and television. Bed alarm in place, patient aware of placement and reason. Bed locked, in lowest position, side rails up 2/4, call light within reach. Will continue to monitor.

## 2022-12-21 NOTE — PLAN OF CARE
Problem: Skin/Tissue Integrity  Goal: Absence of new skin breakdown  Description: 1. Monitor for areas of redness and/or skin breakdown  2. Assess vascular access sites hourly  3. Every 4-6 hours minimum:  Change oxygen saturation probe site  4. Every 4-6 hours:  If on nasal continuous positive airway pressure, respiratory therapy assess nares and determine need for appliance change or resting period.   Outcome: Progressing     Problem: Safety - Adult  Goal: Free from fall injury  Outcome: Progressing  Flowsheets (Taken 12/21/2022 0458)  Free From Fall Injury: Instruct family/caregiver on patient safety     Problem: Chronic Conditions and Co-morbidities  Goal: Patient's chronic conditions and co-morbidity symptoms are monitored and maintained or improved  Outcome: Progressing  Flowsheets (Taken 12/20/2022 2122)  Care Plan - Patient's Chronic Conditions and Co-Morbidity Symptoms are Monitored and Maintained or Improved:   Monitor and assess patient's chronic conditions and comorbid symptoms for stability, deterioration, or improvement   Collaborate with multidisciplinary team to address chronic and comorbid conditions and prevent exacerbation or deterioration     Problem: Discharge Planning  Goal: Discharge to home or other facility with appropriate resources  Outcome: Progressing  Flowsheets (Taken 12/20/2022 2122)  Discharge to home or other facility with appropriate resources:   Identify barriers to discharge with patient and caregiver   Arrange for needed discharge resources and transportation as appropriate   Identify discharge learning needs (meds, wound care, etc)     Problem: Pain  Goal: Verbalizes/displays adequate comfort level or baseline comfort level  Outcome: Progressing     Problem: Pain  Goal: Verbalizes/displays adequate comfort level or baseline comfort level  Outcome: Progressing     Problem: ABCDS Injury Assessment  Goal: Absence of physical injury  Outcome: Progressing

## 2022-12-22 LAB
ANION GAP SERPL CALCULATED.3IONS-SCNC: 9 MMOL/L (ref 3–16)
BASOPHILS ABSOLUTE: 0.1 K/UL (ref 0–0.2)
BASOPHILS RELATIVE PERCENT: 0.8 %
BUN BLDV-MCNC: 13 MG/DL (ref 7–20)
CALCIUM SERPL-MCNC: 9.8 MG/DL (ref 8.3–10.6)
CHLORIDE BLD-SCNC: 93 MMOL/L (ref 99–110)
CO2: 34 MMOL/L (ref 21–32)
CREAT SERPL-MCNC: 1 MG/DL (ref 0.6–1.2)
EOSINOPHILS ABSOLUTE: 0.1 K/UL (ref 0–0.6)
EOSINOPHILS RELATIVE PERCENT: 1.2 %
GFR SERPL CREATININE-BSD FRML MDRD: >60 ML/MIN/{1.73_M2}
GLUCOSE BLD-MCNC: 100 MG/DL (ref 70–99)
GLUCOSE BLD-MCNC: 111 MG/DL (ref 70–99)
GLUCOSE BLD-MCNC: 114 MG/DL (ref 70–99)
GLUCOSE BLD-MCNC: 118 MG/DL (ref 70–99)
GLUCOSE BLD-MCNC: 137 MG/DL (ref 70–99)
HCT VFR BLD CALC: 35.4 % (ref 36–48)
HEMOGLOBIN: 11.8 G/DL (ref 12–16)
LYMPHOCYTES ABSOLUTE: 1.4 K/UL (ref 1–5.1)
LYMPHOCYTES RELATIVE PERCENT: 13 %
MAGNESIUM: 2.2 MG/DL (ref 1.8–2.4)
MCH RBC QN AUTO: 28.2 PG (ref 26–34)
MCHC RBC AUTO-ENTMCNC: 33.5 G/DL (ref 31–36)
MCV RBC AUTO: 84.3 FL (ref 80–100)
MONOCYTES ABSOLUTE: 1.2 K/UL (ref 0–1.3)
MONOCYTES RELATIVE PERCENT: 11.5 %
NEUTROPHILS ABSOLUTE: 7.8 K/UL (ref 1.7–7.7)
NEUTROPHILS RELATIVE PERCENT: 73.5 %
PDW BLD-RTO: 14.8 % (ref 12.4–15.4)
PERFORMED ON: ABNORMAL
PLATELET # BLD: 266 K/UL (ref 135–450)
PMV BLD AUTO: 7.2 FL (ref 5–10.5)
POTASSIUM SERPL-SCNC: 3.6 MMOL/L (ref 3.5–5.1)
RBC # BLD: 4.19 M/UL (ref 4–5.2)
SODIUM BLD-SCNC: 136 MMOL/L (ref 136–145)
WBC # BLD: 10.6 K/UL (ref 4–11)

## 2022-12-22 PROCEDURE — 94640 AIRWAY INHALATION TREATMENT: CPT

## 2022-12-22 PROCEDURE — 6370000000 HC RX 637 (ALT 250 FOR IP): Performed by: INTERNAL MEDICINE

## 2022-12-22 PROCEDURE — 97530 THERAPEUTIC ACTIVITIES: CPT

## 2022-12-22 PROCEDURE — 97110 THERAPEUTIC EXERCISES: CPT

## 2022-12-22 PROCEDURE — 2580000003 HC RX 258: Performed by: INTERNAL MEDICINE

## 2022-12-22 PROCEDURE — 85025 COMPLETE CBC W/AUTO DIFF WBC: CPT

## 2022-12-22 PROCEDURE — 80048 BASIC METABOLIC PNL TOTAL CA: CPT

## 2022-12-22 PROCEDURE — 83735 ASSAY OF MAGNESIUM: CPT

## 2022-12-22 PROCEDURE — 1200000000 HC SEMI PRIVATE

## 2022-12-22 PROCEDURE — 6360000002 HC RX W HCPCS: Performed by: INTERNAL MEDICINE

## 2022-12-22 PROCEDURE — 36415 COLL VENOUS BLD VENIPUNCTURE: CPT

## 2022-12-22 PROCEDURE — 94761 N-INVAS EAR/PLS OXIMETRY MLT: CPT

## 2022-12-22 RX ADMIN — CEFAZOLIN SODIUM 3000 MG: 1 POWDER, FOR SOLUTION INTRAMUSCULAR; INTRAVENOUS at 02:45

## 2022-12-22 RX ADMIN — ATORVASTATIN CALCIUM 20 MG: 20 TABLET, FILM COATED ORAL at 10:26

## 2022-12-22 RX ADMIN — PANTOPRAZOLE SODIUM 40 MG: 40 TABLET, DELAYED RELEASE ORAL at 10:27

## 2022-12-22 RX ADMIN — MOMETASONE FUROATE AND FORMOTEROL FUMARATE DIHYDRATE 2 PUFF: 100; 5 AEROSOL RESPIRATORY (INHALATION) at 08:44

## 2022-12-22 RX ADMIN — OXYCODONE AND ACETAMINOPHEN 1 TABLET: 5; 325 TABLET ORAL at 16:57

## 2022-12-22 RX ADMIN — MONTELUKAST SODIUM 10 MG: 10 TABLET, FILM COATED ORAL at 20:57

## 2022-12-22 RX ADMIN — SODIUM CHLORIDE: 9 INJECTION, SOLUTION INTRAVENOUS at 02:44

## 2022-12-22 RX ADMIN — METHOCARBAMOL 500 MG: 500 TABLET ORAL at 20:57

## 2022-12-22 RX ADMIN — ENOXAPARIN SODIUM 30 MG: 100 INJECTION SUBCUTANEOUS at 20:57

## 2022-12-22 RX ADMIN — SODIUM CHLORIDE: 9 INJECTION, SOLUTION INTRAVENOUS at 11:08

## 2022-12-22 RX ADMIN — GABAPENTIN 300 MG: 300 CAPSULE ORAL at 20:57

## 2022-12-22 RX ADMIN — LEVOTHYROXINE SODIUM 125 MCG: 0.12 TABLET ORAL at 10:44

## 2022-12-22 RX ADMIN — ARIPIPRAZOLE 10 MG: 5 TABLET ORAL at 10:26

## 2022-12-22 RX ADMIN — METHOCARBAMOL 500 MG: 500 TABLET ORAL at 10:26

## 2022-12-22 RX ADMIN — DULOXETINE HYDROCHLORIDE 30 MG: 30 CAPSULE, DELAYED RELEASE ORAL at 10:26

## 2022-12-22 RX ADMIN — CEFAZOLIN SODIUM 3000 MG: 1 POWDER, FOR SOLUTION INTRAMUSCULAR; INTRAVENOUS at 11:09

## 2022-12-22 RX ADMIN — CEFAZOLIN SODIUM 3000 MG: 1 POWDER, FOR SOLUTION INTRAMUSCULAR; INTRAVENOUS at 21:22

## 2022-12-22 RX ADMIN — GABAPENTIN 300 MG: 300 CAPSULE ORAL at 16:57

## 2022-12-22 RX ADMIN — SODIUM CHLORIDE, PRESERVATIVE FREE 10 ML: 5 INJECTION INTRAVENOUS at 20:57

## 2022-12-22 RX ADMIN — OXYCODONE AND ACETAMINOPHEN 1 TABLET: 5; 325 TABLET ORAL at 10:21

## 2022-12-22 RX ADMIN — METOPROLOL TARTRATE 25 MG: 25 TABLET, FILM COATED ORAL at 10:27

## 2022-12-22 RX ADMIN — GABAPENTIN 300 MG: 300 CAPSULE ORAL at 10:26

## 2022-12-22 RX ADMIN — METOPROLOL TARTRATE 25 MG: 25 TABLET, FILM COATED ORAL at 20:57

## 2022-12-22 RX ADMIN — MOMETASONE FUROATE AND FORMOTEROL FUMARATE DIHYDRATE 2 PUFF: 100; 5 AEROSOL RESPIRATORY (INHALATION) at 19:45

## 2022-12-22 RX ADMIN — BUPROPION HYDROCHLORIDE 300 MG: 150 TABLET, FILM COATED, EXTENDED RELEASE ORAL at 10:26

## 2022-12-22 RX ADMIN — METHOCARBAMOL 500 MG: 500 TABLET ORAL at 16:57

## 2022-12-22 RX ADMIN — ENOXAPARIN SODIUM 30 MG: 100 INJECTION SUBCUTANEOUS at 10:26

## 2022-12-22 RX ADMIN — SODIUM CHLORIDE, PRESERVATIVE FREE 10 ML: 5 INJECTION INTRAVENOUS at 10:26

## 2022-12-22 RX ADMIN — SODIUM CHLORIDE: 9 INJECTION, SOLUTION INTRAVENOUS at 21:21

## 2022-12-22 RX ADMIN — Medication 1 TABLET: at 10:27

## 2022-12-22 ASSESSMENT — PAIN DESCRIPTION - ONSET: ONSET: ON-GOING

## 2022-12-22 ASSESSMENT — PAIN SCALES - GENERAL
PAINLEVEL_OUTOF10: 5
PAINLEVEL_OUTOF10: 2
PAINLEVEL_OUTOF10: 5
PAINLEVEL_OUTOF10: 7
PAINLEVEL_OUTOF10: 7
PAINLEVEL_OUTOF10: 5
PAINLEVEL_OUTOF10: 0

## 2022-12-22 ASSESSMENT — PAIN - FUNCTIONAL ASSESSMENT: PAIN_FUNCTIONAL_ASSESSMENT: PREVENTS OR INTERFERES WITH ALL ACTIVE AND SOME PASSIVE ACTIVITIES

## 2022-12-22 ASSESSMENT — PAIN DESCRIPTION - DESCRIPTORS: DESCRIPTORS: ACHING

## 2022-12-22 ASSESSMENT — PAIN DESCRIPTION - ORIENTATION: ORIENTATION: RIGHT;LEFT;MID;LOWER

## 2022-12-22 ASSESSMENT — PAIN DESCRIPTION - LOCATION: LOCATION: KNEE;BACK;LEG

## 2022-12-22 ASSESSMENT — PAIN DESCRIPTION - PAIN TYPE: TYPE: ACUTE PAIN;CHRONIC PAIN

## 2022-12-22 ASSESSMENT — PAIN DESCRIPTION - FREQUENCY: FREQUENCY: CONTINUOUS

## 2022-12-22 NOTE — PROGRESS NOTES
ID Follow-up NOTE    CC:   R leg cellulitis  Antibiotics: Ancef     Admit Date: 2022  Hospital Day: 3    Subjective:     Patient reports LESSS R leg pain      Objective:     Patient Vitals for the past 8 hrs:   BP Temp Temp src Pulse Resp SpO2   22 1700 110/70 97.5 °F (36.4 °C) Axillary 75 18 94 %     I/O last 3 completed shifts: In: 1000 [P.O.:800; IV Piggyback:200]  Out: 7026 [Urine:3775]  No intake/output data recorded. EXAM:  GENERAL: No apparent distress. HEENT: Membranes moist, no oral lesion  NECK:  Supple, no lymphadenopathy  LUNGS: Clear b/l, no rales, no dullness  CARDIAC: RRR, no murmur appreciated  ABD:  + BS, soft / NT  EXT:  R  LE red, warm, dry skin, no open wound   NEURO: No focal neurologic findings  PSYCH: Orientation, sensorium, mood normal  LINES:  Peripheral iv       Data Review:  Lab Results   Component Value Date    WBC 8.7 2022    HGB 12.1 2022    HCT 35.6 (L) 2022    MCV 84.5 2022     2022     Lab Results   Component Value Date    CREATININE 1.1 2022    BUN 13 2022     2022    K 3.9 2022    CL 96 (L) 2022    CO2 34 (H) 2022       Hepatic Function Panel:   Lab Results   Component Value Date/Time    ALKPHOS 159 03/15/2022 04:32 AM    ALT 36 03/15/2022 04:32 AM    AST 30 03/15/2022 04:32 AM    PROT 7.4 03/15/2022 04:32 AM    BILITOT 0.3 03/15/2022 04:32 AM    LABALBU 3.9 03/15/2022 04:32 AM       Micro:   Wound cult sent      Imagin/19 L leg x-ray  Impression   1. No findings for acute bony abnormality. 2.  Moderate osteoarthrosis within the right knee as described. 3.  Diffuse lower extremity edema. Correlate clinically       Doppler u/s  Right   Very technically difficult due to body habitus, patient pain and inability to   tolerate compression maneuvers.    Images are taken in color compare and vessels are not seen in their entirety   due to pain, open wound, and toughened skin not penetrated by ultrasound. Within the limits of this study, no evidence of deep or superficial venous   thrombosis in the right lower extremity. Left   Within the limits of this study, no evidence of deep or superficial venous   thrombosis in the left lower extremity. Scheduled Meds:   insulin lispro  0-4 Units SubCUTAneous TID WC    insulin lispro  0-4 Units SubCUTAneous Nightly    pantoprazole  40 mg Oral Daily    levothyroxine  125 mcg Oral Daily    methocarbamol  500 mg Oral 4x Daily    therapeutic multivitamin-minerals  1 tablet Oral Daily    metoprolol tartrate  25 mg Oral BID    ARIPiprazole  10 mg Oral Daily    atorvastatin  20 mg Oral Daily    buPROPion  300 mg Oral QAM    DULoxetine  30 mg Oral Daily    gabapentin  300 mg Oral TID    mometasone-formoterol  2 puff Inhalation BID    montelukast  10 mg Oral Nightly    enoxaparin  30 mg SubCUTAneous BID    ceFAZolin  3,000 mg IntraVENous Q8H    sodium chloride flush  5-40 mL IntraVENous 2 times per day       Continuous Infusions:   dextrose      sodium chloride 10 mL/hr at 12/21/22 1700       PRN Meds:  glucose, dextrose bolus **OR** dextrose bolus, glucagon (rDNA), dextrose, clonazePAM, oxyCODONE-acetaminophen, sodium chloride flush, sodium chloride, ondansetron **OR** ondansetron, polyethylene glycol, acetaminophen **OR** acetaminophen      Assessment:     Med hx - obesity (BMI 52), DM, CHF, cad, CKD, OA, HTN,      LE venous stasis  R LE cellulitis, red / warm / non-purulent      Plan:     Cont Ancef  Add Moisturizer - Lac-Hydrin    At discharge, stepdawn to cefadroxil 1 gm bid x 10 days     Medical Decision Making:   The following items were considered in medical decision making:  Discussion of patient care with other providers  Reviewed clinical lab tests  Reviewed radiology tests  Reviewed other diagnostic tests/interventions  Independent review of radiologic images  Microbiology cultures and other micro tests reviewed      Discussed with pt, RN  Baljeet Price MD

## 2022-12-22 NOTE — PROGRESS NOTES
Occupational Therapy  Facility/Department: [address]  Daily Treatment Note  NAME: Cassandra Mcfadden  : 1960  MRN: [id number]    Date of Service: 2022    Discharge Recommendations: Cassandra Mcfadden scored a 15/24 on the AM-PAC ADL Inpatient form. Current research shows that an AM-PAC score of 17 or less is typically not associated with a discharge to the patient's home setting. Based on the patient's AM-PAC score and their current ADL deficits, it is recommended that the patient have 5-7 sessions per week of Occupational Therapy at d/c to increase the patient's independence. At this time, this patient demonstrates complex nursing, medical, and rehabilitative needs, and would benefit from intensive rehabilitation services upon discharge from the Inpatient setting. This patient demonstrates the ability to participate in and benefit from an intensive therapy program with a coordinated interdisciplinary team approach to foster frequent, structured, and documented communication among disciplines, who will work together to establish, prioritize, and achieve treatment goals. Please see assessment section for further patient specific details. If patient discharges prior to next session this note will serve as a discharge summary. Please see below for the latest assessment towards goals. Patient Diagnosis(es): The primary encounter diagnosis was Cellulitis of right lower extremity. A diagnosis of Chronic cutaneous venous stasis ulcer (HCC) was also pertinent to this visit. Assessment      Assessment: Pt limited by pain and weakness this session. Needing increased assist and STEDY for transfers and mobility. Pt verb that her LEs feel weaker and is unable to tolerate standing with RW. Pt motivated to increase strength and independence and would benefit from cont OT to work on ADLs, funct mob and transfers. Cont with POC      Activity Tolerance: Patient limited by pain; Patient limited by fatigue;Patient tolerated treatment well      Plan   Occupational Therapy Plan  Times Per Week: 2-5     Restrictions  Position Activity Restriction  Other position/activity restrictions: up as tolerated, contact precautions (MRSA)        Subjective   Subjective  Subjective: Pt in chair upon arrival. Agreeable to OT  Pain: Complaint of pain in L knee           Objective    Vitals     Balance  Sitting: Intact  Standing: With support (STEDY- CGA)      Transfer Training  Transfer Training: Yes  Sit to Stand: Maximum assistance (multiple trials and cues; STEDY)  Stand to Sit: Minimum assistance         ADL  Feeding: Independent  LE Dressing: Dependent/Total (for socks)  Toileting: Dependent/Total (arriaza in place)      OT Exercises  Exercise Treatment: x15 reps BLE completed sitting- knee flex/ext, marches, ankle pumps. Per Pt request  Circulation/Endurance Exercises: ~5 min stance in STEDY with CGA to work on standing balance and endurance         Safety Devices  Type of Devices: Call light within reach; Chair alarm in place; Left in chair;Nurse notified     Patient Education  Education Given To: Patient  Education Provided: Role of Therapy;Plan of Care;ADL Adaptive Strategies;Transfer Training;Equipment;Home Exercise Program  Education Method: Verbal  Barriers to Learning: None  Education Outcome: Continued education needed;Verbalized understanding    Goals  Short Term Goals  Time Frame for Short Term Goals: by dc  Short Term Goal 1: Pt will complete LB dressing with use of AE prn with SBA- not met  Short Term Goal 2: Pt will tolerate functional mobility to/from bathroom with SBA- not met  Short Term Goal 3: Pt will complete toileting with SBA- not met  Patient Goals   Patient goals : to go home       Therapy Time   Individual Concurrent Group Co-treatment   Time In 1342         Time Out 1410         Minutes 28         Timed Code Treatment Minutes: [address] 46

## 2022-12-22 NOTE — CARE COORDINATION
CM met with pt at bedside. Discussed PT/OT rec SNF. Pt stated she plans to go home. She states she just could not move around with therapy due to pain. Pt stated she wants to get and move. She is tired of being in the bed. She hopes to do better with therapy today. Pt would be agreeable to home care. 4:10 PM  Phil met with pt. PT/OT still rec SNF. Pt still thinks once she feels better she can go home.

## 2022-12-22 NOTE — PROGRESS NOTES
Patient is A&O x4.  RA, sat 94%. Wears oxygen at night in place of CPAP. No complaints of SOB. Medicated for c/o pain as needed. Respirations appear to easy and unlabored. Lungs clear. Respirations easy with no complaints of cough. No complaints of nausea/vomiting/diarrhea. Up with steady x2 assist to the bathroom/BSC as needed. Schulte cath in place. Left FA PIV intact and flushed. Tolerating regular diet. Plan of care and safety measures reviewed with the patient. Call light in reach and bed alarm in place. Will continue to monitor.   Electronically signed by Rachel Carlson RN on 12/21/2022 at 11:16 PM

## 2022-12-22 NOTE — PROGRESS NOTES
Hospitalist Progress Note      PCP: Brent Lamas MD    Date of Admission: 12/19/2022    Chief Complaint:  Right leg swelling and redness    Hospital Course:     60 y.o. female with history of multiple comorbidities including HTN, DM II, dCHF, obesity and venous stasis dermatitis presents to the ER with complaint of right leg pain, swelling and redness. She has chronic wound on the left leg which has since healed after extensive follow-up at the wound care clinic. Her right leg was noticed to have pain and swelling, redness about 2 weeks ago. She has been taking keflex which did not help. Subjective:     Seen and examined patient at bedside. Afebrile, VS-stable, no overnight events. AAOx4. Positive for pain in bilateral legs. Denies chest pain, SOB, nausea or vomiting, diarrhea or constipation. OT 15/24, will need placement.     Medications:  Reviewed    Infusion Medications    dextrose      sodium chloride 10 mL/hr at 12/22/22 1108     Scheduled Medications    insulin lispro  0-4 Units SubCUTAneous TID WC    insulin lispro  0-4 Units SubCUTAneous Nightly    pantoprazole  40 mg Oral Daily    levothyroxine  125 mcg Oral Daily    methocarbamol  500 mg Oral 4x Daily    therapeutic multivitamin-minerals  1 tablet Oral Daily    metoprolol tartrate  25 mg Oral BID    ARIPiprazole  10 mg Oral Daily    atorvastatin  20 mg Oral Daily    buPROPion  300 mg Oral QAM    DULoxetine  30 mg Oral Daily    gabapentin  300 mg Oral TID    mometasone-formoterol  2 puff Inhalation BID    montelukast  10 mg Oral Nightly    enoxaparin  30 mg SubCUTAneous BID    ceFAZolin  3,000 mg IntraVENous Q8H    sodium chloride flush  5-40 mL IntraVENous 2 times per day     PRN Meds: glucose, dextrose bolus **OR** dextrose bolus, glucagon (rDNA), dextrose, ammonium lactate, clonazePAM, oxyCODONE-acetaminophen, sodium chloride flush, sodium chloride, ondansetron **OR** ondansetron, polyethylene glycol, acetaminophen **OR** acetaminophen      Intake/Output Summary (Last 24 hours) at 12/22/2022 1504  Last data filed at 12/22/2022 0441  Gross per 24 hour   Intake 753.24 ml   Output 2000 ml   Net -1246.76 ml       Physical Exam Performed:    /73   Pulse 76   Temp 98.5 °F (36.9 °C) (Oral)   Resp 18   Ht 5' 6\" (1.676 m)   Wt (!) 307 lb 8.7 oz (139.5 kg)   SpO2 94%   BMI 49.64 kg/m²     General appearance: No apparent distress,      Respiratory:  Normal respiratory effort. On NC 1 L  Cardiovascular: Regular rate and rhythm with normal S1/S2 without murmurs, rubs or gallops. Abdomen: Soft, non-tender, non-distended with normal bowel sounds. Skin: edema and erythema on the right lower ext, seems shrinking in size. Also has some erythema on the LLE  Neurologic:  Neurovascularly intact without any focal sensory/motor deficits. Cranial nerves: II-XII intact, grossly non-focal.  Psychiatric: Alert and oriented, thought content appropriate, normal insight    Labs:   Recent Labs     12/20/22  0744 12/21/22  0623 12/22/22  0633   WBC 9.6 8.7 10.6   HGB 12.9 12.1 11.8*   HCT 38.4 35.6* 35.4*    262 266     Recent Labs     12/20/22  0744 12/21/22  0622 12/22/22  0633    139 136   K 3.7 3.9 3.6   CL 95* 96* 93*   CO2 33* 34* 34*   BUN 9 13 13   CREATININE 1.0 1.1 1.0   CALCIUM 9.9 9.8 9.8     No results for input(s): AST, ALT, BILIDIR, BILITOT, ALKPHOS in the last 72 hours. No results for input(s): INR in the last 72 hours. No results for input(s): Lattie Raulito in the last 72 hours. Urinalysis:      Lab Results   Component Value Date/Time    NITRU Negative 09/29/2022 04:16 PM    WBCUA >100 09/29/2022 04:16 PM    BACTERIA 2+ 09/29/2022 04:16 PM    RBCUA 0-2 09/29/2022 04:16 PM    BLOODU TRACE 09/29/2022 04:16 PM    SPECGRAV <=1.005 09/29/2022 04:16 PM    GLUCOSEU Negative 09/29/2022 04:16 PM       Radiology:  VL Extremity Venous Bilateral   Final Result      XR TIBIA FIBULA RIGHT (2 VIEWS)   Final Result      1.   No findings for acute bony abnormality. 2.  Moderate osteoarthrosis within the right knee as described. 3.  Diffuse lower extremity edema. Correlate clinically. IP CONSULT TO HOSPITALIST  IP CONSULT TO INFECTIOUS DISEASES    Assessment/Plan:    Active Hospital Problems    Diagnosis     Venous stasis dermatitis of both lower extremities [I87.2]      Priority: Medium    Cellulitis of right leg [L03.115]      Priority: Medium    Hypertension [I10]     Varicose veins of left lower extremity with inflammation, with ulcer of calf with fat layer exposed ([facility] [address] [F13.853, L97.222]     Falls frequently [R29.6]     PTSD (post-traumatic stress disorder) [F43.10]     CKD (chronic kidney disease) [N18.9]     Type 2 diabetes mellitus (HCC) [E11.9]     Hypothyroidism [E03.9]     Chronic heart failure with preserved ejection fraction (HFpEF) ([facility]) [I50.32]     Gastroesophageal reflux disease [K21.9]     Asthma [J45.909]        Cellulitis of right leg  Venous stasis dermatitis of bilateral legs  Chronic heart failure with preserved ejection fraction  Acute hypoxic resp failure with hx of ROSARIO  Chronic kidney disease  Diabetes mellitus type 2  Tobacco use disorder  Hypertension  Hypothyroid  Anxiety/depression  Frequent falls  Morbid obesity, BMI 52           PLAN:     Initially was on IV cefepime and IV vancomycin as ordered. Then changed to ancef by ID. Follow culture. Continue wound care. Cont Ancef for now. Add Moisturizer - Lac-Hydrin. Per ID, at discharge, stepdawn to cefadroxil 1 gm bid x 10 day. Continue her home psych meds, synthroid, inhalers  Sliding scale and hypoglycemia protocol   Need family to check on settings of bipap. Patient was instructed to talk to family. DVT Prophylaxis: lovenox   Diet: ADULT DIET;  Regular  Code Status: Full Code  PT/OT Eval Status: PT 10/24, OT 15/24, need placement    Dispo - inpt,      Ramiro Leigh MD

## 2022-12-22 NOTE — PLAN OF CARE
Problem: Skin/Tissue Integrity  Goal: Absence of new skin breakdown  Description: 1. Monitor for areas of redness and/or skin breakdown  2. Assess vascular access sites hourly  3. Every 4-6 hours minimum:  Change oxygen saturation probe site  4. Every 4-6 hours:  If on nasal continuous positive airway pressure, respiratory therapy assess nares and determine need for appliance change or resting period. Outcome: Progressing  Note: Moisturizing lotion requested and received from pharmacy, lotion applied to legs per order. Problem: Safety - Adult  Goal: Free from fall injury  Outcome: Progressing  Flowsheets (Taken 12/22/2022 1816)  Free From Fall Injury: Instruct family/caregiver on patient safety  Note: Agrees to use call light to make needs known.

## 2022-12-23 LAB
GLUCOSE BLD-MCNC: 105 MG/DL (ref 70–99)
GLUCOSE BLD-MCNC: 112 MG/DL (ref 70–99)
GLUCOSE BLD-MCNC: 113 MG/DL (ref 70–99)
GLUCOSE BLD-MCNC: 165 MG/DL (ref 70–99)
PERFORMED ON: ABNORMAL

## 2022-12-23 PROCEDURE — 6360000002 HC RX W HCPCS: Performed by: INTERNAL MEDICINE

## 2022-12-23 PROCEDURE — 1200000000 HC SEMI PRIVATE

## 2022-12-23 PROCEDURE — 6370000000 HC RX 637 (ALT 250 FOR IP): Performed by: INTERNAL MEDICINE

## 2022-12-23 PROCEDURE — 97530 THERAPEUTIC ACTIVITIES: CPT

## 2022-12-23 PROCEDURE — 97535 SELF CARE MNGMENT TRAINING: CPT

## 2022-12-23 PROCEDURE — 94640 AIRWAY INHALATION TREATMENT: CPT

## 2022-12-23 PROCEDURE — 2580000003 HC RX 258: Performed by: INTERNAL MEDICINE

## 2022-12-23 PROCEDURE — 51798 US URINE CAPACITY MEASURE: CPT

## 2022-12-23 PROCEDURE — 97110 THERAPEUTIC EXERCISES: CPT

## 2022-12-23 PROCEDURE — 94761 N-INVAS EAR/PLS OXIMETRY MLT: CPT

## 2022-12-23 RX ORDER — CEFADROXIL 1000 MG/1
1 TABLET ORAL 2 TIMES DAILY
Qty: 20 TABLET | Refills: 0 | Status: SHIPPED | OUTPATIENT
Start: 2022-12-23 | End: 2022-12-26 | Stop reason: SDUPTHER

## 2022-12-23 RX ORDER — OXYCODONE HYDROCHLORIDE AND ACETAMINOPHEN 5; 325 MG/1; MG/1
1 TABLET ORAL EVERY 8 HOURS PRN
Qty: 9 TABLET | Refills: 0 | Status: SHIPPED | OUTPATIENT
Start: 2022-12-23 | End: 2022-12-26 | Stop reason: SDUPTHER

## 2022-12-23 RX ADMIN — GABAPENTIN 300 MG: 300 CAPSULE ORAL at 13:23

## 2022-12-23 RX ADMIN — METHOCARBAMOL 500 MG: 500 TABLET ORAL at 18:17

## 2022-12-23 RX ADMIN — METOPROLOL TARTRATE 25 MG: 25 TABLET, FILM COATED ORAL at 08:22

## 2022-12-23 RX ADMIN — BUPROPION HYDROCHLORIDE 300 MG: 150 TABLET, FILM COATED, EXTENDED RELEASE ORAL at 08:22

## 2022-12-23 RX ADMIN — OXYCODONE AND ACETAMINOPHEN 1 TABLET: 5; 325 TABLET ORAL at 20:38

## 2022-12-23 RX ADMIN — GABAPENTIN 300 MG: 300 CAPSULE ORAL at 20:34

## 2022-12-23 RX ADMIN — Medication 1 TABLET: at 08:22

## 2022-12-23 RX ADMIN — DULOXETINE HYDROCHLORIDE 30 MG: 30 CAPSULE, DELAYED RELEASE ORAL at 08:23

## 2022-12-23 RX ADMIN — MOMETASONE FUROATE AND FORMOTEROL FUMARATE DIHYDRATE 2 PUFF: 100; 5 AEROSOL RESPIRATORY (INHALATION) at 19:51

## 2022-12-23 RX ADMIN — SODIUM CHLORIDE, PRESERVATIVE FREE 10 ML: 5 INJECTION INTRAVENOUS at 20:35

## 2022-12-23 RX ADMIN — ARIPIPRAZOLE 10 MG: 5 TABLET ORAL at 08:23

## 2022-12-23 RX ADMIN — ENOXAPARIN SODIUM 30 MG: 100 INJECTION SUBCUTANEOUS at 08:23

## 2022-12-23 RX ADMIN — ATORVASTATIN CALCIUM 20 MG: 20 TABLET, FILM COATED ORAL at 08:22

## 2022-12-23 RX ADMIN — CEFAZOLIN SODIUM 3000 MG: 1 POWDER, FOR SOLUTION INTRAMUSCULAR; INTRAVENOUS at 09:48

## 2022-12-23 RX ADMIN — METHOCARBAMOL 500 MG: 500 TABLET ORAL at 08:22

## 2022-12-23 RX ADMIN — OXYCODONE AND ACETAMINOPHEN 1 TABLET: 5; 325 TABLET ORAL at 04:36

## 2022-12-23 RX ADMIN — SODIUM CHLORIDE, PRESERVATIVE FREE 10 ML: 5 INJECTION INTRAVENOUS at 08:23

## 2022-12-23 RX ADMIN — GABAPENTIN 300 MG: 300 CAPSULE ORAL at 08:22

## 2022-12-23 RX ADMIN — MOMETASONE FUROATE AND FORMOTEROL FUMARATE DIHYDRATE 2 PUFF: 100; 5 AEROSOL RESPIRATORY (INHALATION) at 10:09

## 2022-12-23 RX ADMIN — PANTOPRAZOLE SODIUM 40 MG: 40 TABLET, DELAYED RELEASE ORAL at 08:22

## 2022-12-23 RX ADMIN — METOPROLOL TARTRATE 25 MG: 25 TABLET, FILM COATED ORAL at 20:34

## 2022-12-23 RX ADMIN — SODIUM CHLORIDE: 9 INJECTION, SOLUTION INTRAVENOUS at 04:39

## 2022-12-23 RX ADMIN — CEFAZOLIN SODIUM 3000 MG: 1 POWDER, FOR SOLUTION INTRAMUSCULAR; INTRAVENOUS at 19:02

## 2022-12-23 RX ADMIN — ENOXAPARIN SODIUM 30 MG: 100 INJECTION SUBCUTANEOUS at 20:34

## 2022-12-23 RX ADMIN — LEVOTHYROXINE SODIUM 125 MCG: 0.12 TABLET ORAL at 08:28

## 2022-12-23 RX ADMIN — MONTELUKAST SODIUM 10 MG: 10 TABLET, FILM COATED ORAL at 20:34

## 2022-12-23 RX ADMIN — METHOCARBAMOL 500 MG: 500 TABLET ORAL at 13:22

## 2022-12-23 RX ADMIN — METHOCARBAMOL 500 MG: 500 TABLET ORAL at 20:34

## 2022-12-23 RX ADMIN — CEFAZOLIN SODIUM 3000 MG: 1 POWDER, FOR SOLUTION INTRAMUSCULAR; INTRAVENOUS at 04:40

## 2022-12-23 RX ADMIN — OXYCODONE AND ACETAMINOPHEN 1 TABLET: 5; 325 TABLET ORAL at 11:38

## 2022-12-23 ASSESSMENT — PAIN SCALES - GENERAL
PAINLEVEL_OUTOF10: 0
PAINLEVEL_OUTOF10: 4
PAINLEVEL_OUTOF10: 0
PAINLEVEL_OUTOF10: 5
PAINLEVEL_OUTOF10: 3
PAINLEVEL_OUTOF10: 4
PAINLEVEL_OUTOF10: 5
PAINLEVEL_OUTOF10: 7
PAINLEVEL_OUTOF10: 0
PAINLEVEL_OUTOF10: 7
PAINLEVEL_OUTOF10: 0
PAINLEVEL_OUTOF10: 10

## 2022-12-23 ASSESSMENT — PAIN DESCRIPTION - ORIENTATION
ORIENTATION: RIGHT
ORIENTATION: RIGHT
ORIENTATION: MID
ORIENTATION: RIGHT
ORIENTATION: RIGHT;LEFT;MID;LOWER
ORIENTATION: MID

## 2022-12-23 ASSESSMENT — PAIN DESCRIPTION - PAIN TYPE
TYPE: ACUTE PAIN
TYPE: ACUTE PAIN;CHRONIC PAIN

## 2022-12-23 ASSESSMENT — PAIN DESCRIPTION - LOCATION
LOCATION: KNEE;LEG;BACK
LOCATION: LEG
LOCATION: COCCYX
LOCATION: BACK
LOCATION: LEG
LOCATION: LEG

## 2022-12-23 ASSESSMENT — PAIN DESCRIPTION - ONSET
ONSET: ON-GOING

## 2022-12-23 ASSESSMENT — PAIN DESCRIPTION - DESCRIPTORS
DESCRIPTORS: ACHING

## 2022-12-23 ASSESSMENT — PAIN - FUNCTIONAL ASSESSMENT
PAIN_FUNCTIONAL_ASSESSMENT: PREVENTS OR INTERFERES WITH ALL ACTIVE AND SOME PASSIVE ACTIVITIES
PAIN_FUNCTIONAL_ASSESSMENT: PREVENTS OR INTERFERES SOME ACTIVE ACTIVITIES AND ADLS

## 2022-12-23 ASSESSMENT — PAIN DESCRIPTION - FREQUENCY
FREQUENCY: CONTINUOUS

## 2022-12-23 NOTE — PROGRESS NOTES
Hospitalist Progress Note      PCP: Katy Yo MD    Date of Admission: 12/19/2022    Chief Complaint:  Right leg swelling and redness    Hospital Course:     60 y.o. female with history of multiple comorbidities including HTN, DM II, dCHF, obesity and venous stasis dermatitis presents to the ER with complaint of right leg pain, swelling and redness. She has chronic wound on the left leg which has since healed after extensive follow-up at the wound care clinic. Her right leg was noticed to have pain and swelling, redness about 2 weeks ago. She has been taking keflex which did not help. Subjective:     Seen and examined patient at bedside. No prior hx of urinary retention, Denies being on minipress or flomax at home. Has had px with UTIs.      Medications:  Reviewed    Infusion Medications    dextrose      sodium chloride Stopped (12/23/22 0440)     Scheduled Medications    insulin lispro  0-4 Units SubCUTAneous TID WC    insulin lispro  0-4 Units SubCUTAneous Nightly    pantoprazole  40 mg Oral Daily    levothyroxine  125 mcg Oral Daily    methocarbamol  500 mg Oral 4x Daily    therapeutic multivitamin-minerals  1 tablet Oral Daily    metoprolol tartrate  25 mg Oral BID    ARIPiprazole  10 mg Oral Daily    atorvastatin  20 mg Oral Daily    buPROPion  300 mg Oral QAM    DULoxetine  30 mg Oral Daily    gabapentin  300 mg Oral TID    mometasone-formoterol  2 puff Inhalation BID    montelukast  10 mg Oral Nightly    enoxaparin  30 mg SubCUTAneous BID    ceFAZolin  3,000 mg IntraVENous Q8H    sodium chloride flush  5-40 mL IntraVENous 2 times per day     PRN Meds: glucose, dextrose bolus **OR** dextrose bolus, glucagon (rDNA), dextrose, ammonium lactate, clonazePAM, oxyCODONE-acetaminophen, sodium chloride flush, sodium chloride, ondansetron **OR** ondansetron, polyethylene glycol, acetaminophen **OR** acetaminophen      Intake/Output Summary (Last 24 hours) at 12/23/2022 4180  Last data filed at 12/23/2022 0537  Gross per 24 hour   Intake 1846.88 ml   Output 1800 ml   Net 46.88 ml         Physical Exam Performed:    /64   Pulse 64   Temp 98 °F (36.7 °C) (Oral)   Resp 18   Ht 5' 6\" (1.676 m)   Wt (!) 307 lb 8.7 oz (139.5 kg)   SpO2 96%   BMI 49.64 kg/m²     General appearance: No apparent distress,      Respiratory:  Normal respiratory effort. On NC 1 L  Cardiovascular: Regular rate and rhythm with normal S1/S2 without murmurs, rubs or gallops. Abdomen: Soft, non-tender, non-distended with normal bowel sounds. Skin: edema and erythema on the right lower ext, seems shrinking in size. Also has some erythema on the LLE  Neurologic:  Neurovascularly intact without any focal sensory/motor deficits. Cranial nerves: II-XII intact, grossly non-focal.  Psychiatric: Alert and oriented, thought content appropriate, normal insight    Labs:   Recent Labs     12/21/22  0623 12/22/22  0633   WBC 8.7 10.6   HGB 12.1 11.8*   HCT 35.6* 35.4*    266       Recent Labs     12/21/22  0622 12/22/22  0633    136   K 3.9 3.6   CL 96* 93*   CO2 34* 34*   BUN 13 13   CREATININE 1.1 1.0   CALCIUM 9.8 9.8       No results for input(s): AST, ALT, BILIDIR, BILITOT, ALKPHOS in the last 72 hours. No results for input(s): INR in the last 72 hours. No results for input(s): Yecenia Pinaz in the last 72 hours. Urinalysis:      Lab Results   Component Value Date/Time    NITRU Negative 09/29/2022 04:16 PM    WBCUA >100 09/29/2022 04:16 PM    BACTERIA 2+ 09/29/2022 04:16 PM    RBCUA 0-2 09/29/2022 04:16 PM    BLOODU TRACE 09/29/2022 04:16 PM    SPECGRAV <=1.005 09/29/2022 04:16 PM    GLUCOSEU Negative 09/29/2022 04:16 PM       Radiology:  VL Extremity Venous Bilateral   Final Result      XR TIBIA FIBULA RIGHT (2 VIEWS)   Final Result      1. No findings for acute bony abnormality. 2.  Moderate osteoarthrosis within the right knee as described. 3.  Diffuse lower extremity edema.   Correlate

## 2022-12-23 NOTE — PROGRESS NOTES
Physician Progress Note      PATIENT:               Philippe Hutton  [facility] #:                  [id number]  :                       1960  ADMIT DATE:       2022 7:50 PM  [address] [place] DATE:  RESPONDING  PROVIDER #:        Bijan Munoz DO          QUERY TEXT:    Patient admitted with right lower extremity cellulitis, noted to have hx of   CKD. If possible, please document in progress notes and discharge summary if   you are evaluating and/or treating any of the following: The medical record reflects the following:  Risk Factors: 60year old female w/ DM2, HTN, CHF  Clinical Indicators: 22 GFR >60; 22 GFR >60; 22 GFR 56  Treatment: Labs, I&Os  Options provided:  -- CKD Stage 1 GFR>90  -- CKD Stage 2 GFR 60-90  -- CKD Stage 3a GFR 45-59  -- CKD Stage 3b GFR 30-44  -- CKD Stage 4 GFR 15-29  -- CKD Stage 5 GFR<15  -- ESRD  -- Other - I will add my own diagnosis  -- Disagree - Not applicable / Not valid  -- Disagree - Clinically unable to determine / Unknown  -- Refer to Clinical Documentation Reviewer    PROVIDER RESPONSE TEXT:    This patient has CKD Stage 1. Query created by: Silvestre Gallardo on 2022 10:54 AM      QUERY TEXT:    Pt admitted with right lower extremity cellulitis. Pt noted to have DM type 2. If possible, please document in progress notes and discharge summary the   relationship, if any, between cellulitis and DM. The medical record reflects the following:  Risk Factors: 60year old female with Type 2 DM and RLE cellulitis  Clinical Indicators: Per record review, last HA1C at OSH 22- 6.0. On   admission 22 glucose 100.  Per  H&P- Venous stasis dermatitis of   bilateral legs  Treatment: Labs, imaging, ID consult, wound RN consult, IV Ancef, IV cefepime,   IV doxycycline and vancomycin  Options provided:  -- Right lower extremity cellulitis associated with Diabetes  -- Right lower extremity cellulitis unrelated to Diabetes  -- Other - I will add my own diagnosis  -- Disagree - Not applicable / Not valid  -- Disagree - Clinically unable to determine / Unknown  -- Refer to Clinical Documentation Reviewer    PROVIDER RESPONSE TEXT:    Right lower extremity cellulitis associated with Diabetes. Query created by:  Brandon Leos on 12/21/2022 10:54 AM      Electronically signed by:  Alysha Huston DO 12/23/2022 2:29 PM

## 2022-12-23 NOTE — PROGRESS NOTES
Pt a/o x4. VSS. Denies N/V. Pt reported pain level of a 10 out of 10. Pt medicated w/ prn percocet and scheduled robaxin. Pt reported relief. Pt gets up x2/3 w/ yusef hanley. Tolerated poorly. Schulte removed [id number]. Will continue to monitor patient.      Red Gonzáles RN

## 2022-12-23 NOTE — PROGRESS NOTES
Occupational Therapy  Facility/Department: [facility] [address]  Occupational Therapy Treatment    Name: Ulysses Reynolds  : 1960  MRN: [id number]  Date of Service: 2022    Discharge Recommendations:     OT Equipment Recommendations  Equipment Needed: No  Other: defer to dc location   Ulysses Reynolds scored a 15/24 on the AM-PAC ADL Inpatient form. Current research shows that an AM-PAC score of 17 or less is typically not associated with a discharge to the patient's home setting. Based on the patient's AM-PAC score and their current ADL deficits, it is recommended that the patient have 3-5 sessions per week of Occupational Therapy at d/c to increase the patient's independence. Please see assessment section for further patient specific details. If patient discharges prior to next session this note will serve as a discharge summary. Please see below for the latest assessment towards goals. Patient Diagnosis(es): The primary encounter diagnosis was Cellulitis of right lower extremity. Diagnoses of Chronic cutaneous venous stasis ulcer (HCC) and Cellulitis of right leg were also pertinent to this visit. Past Medical History:  has a past medical history of Anemia, Anxiety, Arthritis, CAD (coronary artery disease), Cancer ([address] CHF (congestive heart failure) ([address] Chronic kidney disease, Depression, Diabetes mellitus ([address] Hep C w/o coma, chronic ([address] Hyperlipidemia, Hypertension, MRSA (methicillin resistant staph aureus) culture positive, Neuropathy, Schizoaffective disorder ([address] and Thyroid disease. Past Surgical History:  has a past surgical history that includes Lung lobectomy (N/A); Dental surgery; Upper gastrointestinal endoscopy (N/A, 2021); Colonoscopy (2021); and Upper gastrointestinal endoscopy (N/A, 2021).     Treatment Diagnosis: decreased ADLs and transfers secondary to R LE cellulitis      Assessment   Performance deficits / Impairments: Decreased functional mobility ;Decreased endurance;Decreased ADL status; Decreased balance;Decreased strength  Assessment: Pt demonstrated decreased sit to stand transfers- pt with multiple attempts to stand from EOB to RW, however pt unable to stand even with bed elevated. Pt completed unsupported sitting grooming tasks edge of bed with setup. Pt required total A to don socks seated EOB. Pt reporting she typically puts a cream on her legs which helps with pain/ being able to stand, but she has not been getting that while she has been in the hospital. RN was made aware. Pt was educated on current needs/ deficits and pts fall risk should she return home, however pt reporting planning to dc home with her boyfriend. Hopeful if pts pain is under control she will be able to stand pivot/ stand at the walker. If pt not able to stand once her pain is under control, curerntly recommending pt to discharge to IP OT. Treatment Diagnosis: decreased ADLs and transfers secondary to R LE cellulitis  Prognosis: Fair  REQUIRES OT FOLLOW-UP: Yes  Activity Tolerance  Activity Tolerance: Patient Tolerated treatment well;Patient limited by pain  Activity Tolerance Comments: Pt reported increased unrated pain in LEs when attempting to stand to walker. Pt declined further mobility, but reporting once she receives cream she will be able to stand/ function at home.         Plan   Occupational Therapy Plan  Times Per Week: 2-5  Current Treatment Recommendations: Strengthening, Endurance training, Functional mobility training, Self-Care / ADL, Safety education & training, Patient/Caregiver education & training, Home management training, Equipment evaluation, education, & procurement     Restrictions  Position Activity Restriction  Other position/activity restrictions: up as tolerated, contact precautions (MRSA)    Subjective   General  Chart Reviewed: Yes  Patient assessed for rehabilitation services?: Yes  Additional Pertinent Hx: 61 y/o female with history of multiple comorbidities including HTN, DM II, dCHF, obesity and venous stasis dermatitis presented to ED for R LE cellulitis. CXR neg; R tib/fib XR neg. Family / Caregiver Present: No  Referring Practitioner: Rolando Ware MD  Diagnosis: cellulitis of R leg  Subjective  Subjective: Pt semi supine in bed upon arrival, agreeable to OT session, requesting to trial standing to walker. Pt reporting LE pain limiting standing- reports she has a cream she usually uses at home and has not had while here, and that is the reason for her LE weakness. RN aware. Social/Functional History  Social/Functional History  Lives With: Significant other (boyfriend)  Type of Home: Apartment  Home Layout: Multi-level  Home Access: Stairs to enter with rails (14 steps with rail)  Bathroom Shower/Tub: Tub/Shower unit  Bathroom Toilet: Standard (has raised commode seat but not using)  Bathroom Equipment: Grab bars in shower, Shower chair, Hand-held shower  Home Equipment: Walker, [address] Help From: Home health (aide from [facility] 2x per week for 5 hours)  ADL Assistance:  (assist with dressing/bathing (boyfriend assist with showers, aide helps with socks))  Homemaking Assistance:  (aide performs)  Ambulation Assistance: Independent (with RW)  Transfer Assistance: Independent  Active : No  Patient's  Info: uses passport via [place] on aging to set up transportation  Occupation: On disability       Objective           Observation/Palpation  Observation: Continued significant redness and edema to BLE  Safety Devices  Type of Devices: All fall risk precautions in place; Bed alarm in place;Call light within reach;Gait belt;Left in bed;Nurse notified           ADL  Grooming: Setup;Minimal assistance  Grooming Skilled Clinical Factors: seated EOB for oral care- use of mouthwash. min A to comb back of hair  LE Dressing: Dependent/Total  LE Dressing Skilled Clinical Factors: donning socks  Toileting: Dependent/Total  Toileting Skilled Clinical Factors: arriaza catheter- pt denied urge to have BM     Activity Tolerance  Activity Tolerance: Patient limited by fatigue;Patient limited by pain  Bed mobility  Supine to Sit: Stand by assistance (+ use of bed rail, HOB slightly elevated. Increased time)  Sit to Supine: Stand by assistance  Transfers  Slide Board: Moderate assistance (simulated- lateral scoot to right, assist with chux pad)  Sit to stand: Dependent/Total  Transfer Comments: Pt attempting to stand to RW multiple trials, however unable to tolerate standing. Pt reporting increased pain in LEs- not having her \"acetamenophen cream\"- was limiting her ability to stand to the walker. Further attempts were made with bed elevated, however pt reporting pain in her legs and at the catheter site limiting her ability to stand today. Pt did complete lateral scoot transfer to pts R to simulate a slide board transfer, required mod A to scoot laterally on the bed. Extensively discussed discharge plans, OT verbalized grave concerns for pt being able to get into her home/ get up/down the stairs. Pt with poor insight into deficits, reporting her boyfriend and aides will help her, or she will scoot up the stairs on her bottom. OT informed pt that if she is unable to stand from the edge of the bed, elevated, pt will likely not be able to stand from the stairs, get out of the car, etc. Pt  reporting once she uses the cream on her legs she will be able to stand. RN aware. Cognition  Overall Cognitive Status: Exceptions  Arousal/Alertness: Appropriate responses to stimuli  Following Commands:  Follows all commands without difficulty  Attention Span: Attends with cues to redirect  Safety Judgement: Decreased awareness of need for assistance  Problem Solving: Assistance required to correct errors made;Decreased awareness of errors  Insights: Decreased awareness of deficits  Initiation: Does not require cues  Sequencing: Does not require cues  Cognition Comment: pt with limited insight into deficits  Orientation  Overall Orientation Status: Within Functional Limits               Exercise Treatment: x15 reps BLE completed sitting- knee flex/ext, marches, ankle pumps. Per Pt request prior to attempting to stand second time  A/AROM Exercises: B UE elbow flexion/ extension, wrist flexion/ extension x 20 reps. Pt also completed thumb opposition each finger, was able to reach all digits with incresed difficulty at her small finger. Grasp release- pt reporting difficulty flexing PIPs. Pt reporting having hurt her hand back in August and MD did not do anything for it. Encouraged pt to follow up with PCP if hand continues to be difficulty to move. Education Given To: Patient  Education Provided: Role of Therapy;Plan of Care;ADL Adaptive Strategies;Transfer Training;Equipment  Education Provided Comments: Extensively educated pt on discharge plans and recommendations for IP OT prior to returning home. Pt with limited insight into deficits/ discharge needs, RN aware.   Education Method: Verbal  Barriers to Learning: Cognition  Education Outcome: Continued education needed;Verbalized understanding                        AM-PAC Score        AM-PAC Inpatient Daily Activity Raw Score: 15 (12/23/22 1500)  AM-PAC Inpatient ADL T-Scale Score : 34.69 (12/23/22 1500)  ADL Inpatient CMS 0-100% Score: 56.46 (12/23/22 1500)  ADL Inpatient CMS G-Code Modifier : CK (12/23/22 1500)    Tinneti Score       Goals  Short Term Goals  Time Frame for Short Term Goals: by dc  Short Term Goal 1: Pt will complete LB dressing with use of AE prn with SBA- not met  Short Term Goal 2: Pt will tolerate functional mobility to/from bathroom with SBA- not met  Short Term Goal 3: Pt will complete toileting with SBA- not met  Short Term Goal 4: New goals 12/23: Pt will complete BSC transfer with mod A  Patient Goals   Patient goals : to go home       Therapy Time   Individual Concurrent Group Co-treatment   Time In 0413 Time Out 1426         Minutes 38         Timed Code Treatment Minutes: [facility]  [facility], OTR/L [id number]

## 2022-12-23 NOTE — CARE COORDINATION
CASA met w/Pt at bedside, Pt is from home and wants to return home. She doesnt want to go to a SNF since she feels that will only make her feel more depressed. She is hoping her daughter can drive her home. Pt asked if SW would reach out to her daughter she is unsure if she is working or not, if not Pt will need transport home. Pt has 14 steps to enter she stated she can get herself up the stairs but walking to the stairs without support would be challenging in the snow. Pt would like to ROS with [facility]. Rakel sent orders to [facility]. Pt denies any other needs for DC. CASA LVM for Pt's daughter. CASA perez served MD to do meds to beds for PT today. HHA said Pt will not  her meds from the pharmacy. SW following.   Electronically signed by PABLO Anderson, MANDEEPW on 12/23/2022 at 2:04 PM  [phone]

## 2022-12-23 NOTE — PLAN OF CARE
Problem: Safety - Adult  Goal: Free from fall injury  Outcome: Progressing  Note: Pt free from falls/ injury during duration of shift. Bed alarm on, fall risk precautions in place. Problem: Pain  Goal: Verbalizes/displays adequate comfort level or baseline comfort level  Outcome: Progressing  Flowsheets (Taken 12/23/2022 0433 by Maria Isabel Abdullahi RN)  Verbalizes/displays adequate comfort level or baseline comfort level: Encourage patient to monitor pain and request assistance  Note: Pt reported pain level of a 10 out of 10. Pt medicated w/ prn percocet and scheduled robaxin. Pt reported relief.

## 2022-12-23 NOTE — PROGRESS NOTES
Physical Therapy  Facility/Department: [address]  Physical Therapy Treatment    Name: Blanchie Phalen  : 1960  MRN: [id number]  Date of Service: 2022    Discharge Recommendations:  Blanchie Phalen scored a 9/24 on the AM-PAC short mobility form. Current research shows that an AM-PAC score of 17 or less is typically not associated with a discharge to the patient's home setting. Based on the patient's AM-PAC score and their current functional mobility deficits, it is recommended that the patient have 5-7 sessions per week of Physical Therapy at d/c to increase the patient's independence. At this time, this patient demonstrates complex nursing, medical, and rehabilitative needs, and would benefit from intensive rehabilitation services upon discharge from the Inpatient setting. This patient demonstrates the ability to participate in and benefit from an intensive therapy program with a coordinated interdisciplinary team approach to foster frequent, structured, and documented communication among disciplines, who will work together to establish, prioritize, and achieve treatment goals. Please see assessment section for further patient specific details. If patient discharges prior to next session this note will serve as a discharge summary. Please see below for the latest assessment towards goals. Patient would benefit from continued therapy after discharge   PT Equipment Recommendations  Other: defer      Patient Diagnosis(es): The primary encounter diagnosis was Cellulitis of right lower extremity. A diagnosis of Chronic cutaneous venous stasis ulcer (HCC) was also pertinent to this visit.   Past Medical History:  has a past medical history of Anemia, Anxiety, Arthritis, CAD (coronary artery disease), Cancer ([address] CHF (congestive heart failure) ([address] Chronic kidney disease, Depression, Diabetes mellitus ([address] Hep C w/o coma, chronic ([address] Hyperlipidemia, Hypertension, MRSA (methicillin resistant staph aureus) culture positive, Neuropathy, Schizoaffective disorder ([address] and Thyroid disease. Past Surgical History:  has a past surgical history that includes Lung lobectomy (N/A); Dental surgery; Upper gastrointestinal endoscopy (N/A, 4/26/2021); Colonoscopy (4/26/2021); and Upper gastrointestinal endoscopy (N/A, 6/21/2021). Assessment   Body Structures, Functions, Activity Limitations Requiring Skilled Therapeutic Intervention: Decreased functional mobility ; Decreased strength;Decreased safe awareness;Decreased endurance; Increased pain  Assessment: Pt significantly limited by pain this session, requiring significant encouragement to attempt stand to yusef steady. Pt requiring MAxAx2 to stand to yusef steady due to LE weakness and reported pain. Safety education provided as pt wanted to attempt transfer with no AD, despite reporting that her legs were feeling weaker than yesterday (pt requiring yusef steady yesterday). Safety concerns for d/c home due to current need for lift equipment and pt being unable to ambulate. Pt will continue to benefit from skilled therapy to maximize safety and independence. Treatment Diagnosis: mobility impairment due to cellulitis  Activity Tolerance  Activity Tolerance: Patient limited by fatigue;Patient limited by pain     Plan   Physcial Therapy Plan  General Plan:  (2-5)  Current Treatment Recommendations: Functional mobility training, Transfer training, Strengthening, Endurance training, Gait training, Safety education & training, Patient/Caregiver education & training, Therapeutic activities  Safety Devices  Type of Devices:  All fall risk precautions in place, Bed alarm in place, Call light within reach, Gait belt, Left in bed, Nurse notified     Restrictions  Position Activity Restriction  Other position/activity restrictions: up as tolerated, contact precautions (MRSA)     Subjective   General  Chart Reviewed: Yes  Patient assessed for rehabilitation services?: Yes  Additional Pertinent Hx: 60 y.o. female with history of multiple comorbidities including HTN, DM II, dCHF, obesity and venous stasis dermatitis presented to ED for R LE cellulitis. CXR neg; R tib/fib XR neg. Family / Caregiver Present: No  Diagnosis: R LE cellulitis  Follows Commands: Within Functional Limits  General Comment  Comments: Pt found seated in recliner upon PT arrival.  Pt agreeable to therapy session. Subjective  Subjective: \"It just hurts so bad to stand. \"         Social/Functional History  Social/Functional History  Lives With: Significant other (boyfriend)  Type of Home: Apartment  Home Layout: Multi-level  Home Access: Stairs to enter with rails (14 steps with rail)  Bathroom Shower/Tub: Tub/Shower unit  Bathroom Toilet: Standard (has raised commode seat but not using)  Bathroom Equipment: Grab bars in shower, Shower chair, Hand-held shower  Home Equipment: Walker, [address] Help From: Home health (aide from [facility] 2x per week for 5 hours)  ADL Assistance:  (assist with dressing/bathing (boyfriend assist with showers, aide helps with socks))  Homemaking Assistance:  (aide performs)  Ambulation Assistance: Independent (with RW)  Transfer Assistance: Independent  Active : No  Patient's  Info: uses passport via [place] on aging to set up transportation  Occupation: On disability  Vision/Hearing       Cognition         Objective   Heart Rate: [address]: Monitor  BP: 128/64  BP Location: Right lower arm  BP Method: Automatic  Patient Position: Semi fowlers  MAP (Calculated): 85  Resp: 18  SpO2: 95 %  O2 Device: None (Room air)     Observation/Palpation  Observation: Continued significant redness and edema to BLE                       Bed mobility  Sit to Supine: Dependent/Total (MaxAx1+MinAx2, HOB flat, use of bedrail, assist for BLE and trunk placement)  Scooting: Dependent/Total (MaxAx2, to scoot up in bed in supine for repositioning, bed in trendelenberg)  Transfers  Sit to Stand: Dependent/Total (MaxAx2, from recliner and yusef steady to yusef steady, cues for hand placement and for full stand)  Stand to Sit: Dependent/Total (ModAx2, cues for safe technique)  Bed to Chair: Dependent/Total (recliner to EOB via yusef steady)  Comment: Pt requesting to return to bed upon PT entering room. Pt stating that her legs feel extremely weak and she does not think she will be able to stand. PT/RN obtaining yusef steady. Pt stating, \"I don't want to use that. It hurts my legs and I can't stand up. \"  PT asking pt how she would like to transfer. Pt stating that she wants to \"just get over to the bed. \"  PT/RN educating pt on safety risk of attempting to transfer without an assistive device, especially if pt feels that her legs are significantly weak (pt required yusef steady yesterday). Pillow placed between yusef steady and pt's legs for comfort. Increased time and reported pain to acheive full enough stand to place yusef steady wings. After transfer, PT again reminding pt of safety risk of attempting to transfer without an assistive device and goal of attempting to perform stands, even with use of lift equipment, to work toward pt's goal to walk again. Pt verbalized understanding.   Ambulation  Comments: unsafe to ambulate this session                 OutComes Score                                                  AM-PAC Score  AM-PAC Inpatient Mobility Raw Score : 9 (12/23/22 1230)  AM-PAC Inpatient T-Scale Score : 30.55 (12/23/22 1230)  Mobility Inpatient CMS 0-100% Score: 81.38 (12/23/22 1230)  Mobility Inpatient CMS G-Code Modifier : CM (12/23/22 1230)          Tinneti Score       Goals  Short Term Goals  Time Frame for Short Term Goals: discharge  Short Term Goal 1: sit to/from supine mod I -ongoing  Short Term Goal 2: sit to/from stand supervision -ongoing  Short Term Goal 3: ambulate 50 ft with RW supervision -ongoing  Patient Goals   Patient Goals : return home       Education  Patient Education  Education Given To: Patient  Education Provided Comments: importance of attempting standing  Education Method: Verbal  Barriers to Learning: None  Education Outcome: Verbalized understanding;Continued education needed      Therapy Time   Individual Concurrent Group Co-treatment   Time In 1120         Time Out 1143         Minutes 23             Timed Code Treatment Minutes:   23    Total Treatment Minutes:  [address] [facility], PT   This note to serve as discharge summary if patient discharged before next session.

## 2022-12-23 NOTE — CARE COORDINATION
CTN spoke to [facility] regarding VALERIO. Orders faxed to [phone].    Electronically signed by Janeth Bowen LPN on 34/07/5706 at 1:23 PM

## 2022-12-24 LAB
GLUCOSE BLD-MCNC: 117 MG/DL (ref 70–99)
GLUCOSE BLD-MCNC: 118 MG/DL (ref 70–99)
GLUCOSE BLD-MCNC: 124 MG/DL (ref 70–99)
GLUCOSE BLD-MCNC: 149 MG/DL (ref 70–99)
PERFORMED ON: ABNORMAL

## 2022-12-24 PROCEDURE — 6360000002 HC RX W HCPCS: Performed by: INTERNAL MEDICINE

## 2022-12-24 PROCEDURE — 2580000003 HC RX 258: Performed by: INTERNAL MEDICINE

## 2022-12-24 PROCEDURE — 94761 N-INVAS EAR/PLS OXIMETRY MLT: CPT

## 2022-12-24 PROCEDURE — 6370000000 HC RX 637 (ALT 250 FOR IP): Performed by: INTERNAL MEDICINE

## 2022-12-24 PROCEDURE — 1200000000 HC SEMI PRIVATE

## 2022-12-24 PROCEDURE — 94640 AIRWAY INHALATION TREATMENT: CPT

## 2022-12-24 RX ADMIN — METHOCARBAMOL 500 MG: 500 TABLET ORAL at 17:26

## 2022-12-24 RX ADMIN — METOPROLOL TARTRATE 25 MG: 25 TABLET, FILM COATED ORAL at 09:30

## 2022-12-24 RX ADMIN — DICLOFENAC SODIUM 4 G: 10 GEL TOPICAL at 15:32

## 2022-12-24 RX ADMIN — CEFAZOLIN SODIUM 3000 MG: 1 POWDER, FOR SOLUTION INTRAMUSCULAR; INTRAVENOUS at 17:36

## 2022-12-24 RX ADMIN — METHOCARBAMOL 500 MG: 500 TABLET ORAL at 21:24

## 2022-12-24 RX ADMIN — GABAPENTIN 300 MG: 300 CAPSULE ORAL at 21:24

## 2022-12-24 RX ADMIN — METHOCARBAMOL 500 MG: 500 TABLET ORAL at 09:29

## 2022-12-24 RX ADMIN — ENOXAPARIN SODIUM 30 MG: 100 INJECTION SUBCUTANEOUS at 21:23

## 2022-12-24 RX ADMIN — PANTOPRAZOLE SODIUM 40 MG: 40 TABLET, DELAYED RELEASE ORAL at 09:30

## 2022-12-24 RX ADMIN — MOMETASONE FUROATE AND FORMOTEROL FUMARATE DIHYDRATE 2 PUFF: 100; 5 AEROSOL RESPIRATORY (INHALATION) at 12:34

## 2022-12-24 RX ADMIN — CEFAZOLIN SODIUM 3000 MG: 1 POWDER, FOR SOLUTION INTRAMUSCULAR; INTRAVENOUS at 02:17

## 2022-12-24 RX ADMIN — BUPROPION HYDROCHLORIDE 300 MG: 150 TABLET, FILM COATED, EXTENDED RELEASE ORAL at 09:29

## 2022-12-24 RX ADMIN — OXYCODONE AND ACETAMINOPHEN 1 TABLET: 5; 325 TABLET ORAL at 09:41

## 2022-12-24 RX ADMIN — SODIUM CHLORIDE, PRESERVATIVE FREE 10 ML: 5 INJECTION INTRAVENOUS at 21:23

## 2022-12-24 RX ADMIN — GABAPENTIN 300 MG: 300 CAPSULE ORAL at 13:33

## 2022-12-24 RX ADMIN — OXYCODONE AND ACETAMINOPHEN 1 TABLET: 5; 325 TABLET ORAL at 15:35

## 2022-12-24 RX ADMIN — METOPROLOL TARTRATE 25 MG: 25 TABLET, FILM COATED ORAL at 21:24

## 2022-12-24 RX ADMIN — GABAPENTIN 300 MG: 300 CAPSULE ORAL at 09:29

## 2022-12-24 RX ADMIN — ENOXAPARIN SODIUM 30 MG: 100 INJECTION SUBCUTANEOUS at 09:30

## 2022-12-24 RX ADMIN — SODIUM CHLORIDE, PRESERVATIVE FREE 10 ML: 5 INJECTION INTRAVENOUS at 09:30

## 2022-12-24 RX ADMIN — METHOCARBAMOL 500 MG: 500 TABLET ORAL at 13:33

## 2022-12-24 RX ADMIN — LEVOTHYROXINE SODIUM 125 MCG: 0.12 TABLET ORAL at 05:20

## 2022-12-24 RX ADMIN — ATORVASTATIN CALCIUM 20 MG: 20 TABLET, FILM COATED ORAL at 09:30

## 2022-12-24 RX ADMIN — DULOXETINE HYDROCHLORIDE 30 MG: 30 CAPSULE, DELAYED RELEASE ORAL at 09:29

## 2022-12-24 RX ADMIN — DICLOFENAC SODIUM 4 G: 10 GEL TOPICAL at 11:20

## 2022-12-24 RX ADMIN — MONTELUKAST SODIUM 10 MG: 10 TABLET, FILM COATED ORAL at 21:25

## 2022-12-24 RX ADMIN — ARIPIPRAZOLE 10 MG: 5 TABLET ORAL at 09:30

## 2022-12-24 RX ADMIN — CEFAZOLIN SODIUM 3000 MG: 1 POWDER, FOR SOLUTION INTRAMUSCULAR; INTRAVENOUS at 11:11

## 2022-12-24 RX ADMIN — MOMETASONE FUROATE AND FORMOTEROL FUMARATE DIHYDRATE 2 PUFF: 100; 5 AEROSOL RESPIRATORY (INHALATION) at 20:06

## 2022-12-24 RX ADMIN — Medication 1 TABLET: at 09:29

## 2022-12-24 RX ADMIN — POLYETHYLENE GLYCOL 3350 17 G: 17 POWDER, FOR SOLUTION ORAL at 06:26

## 2022-12-24 ASSESSMENT — PAIN DESCRIPTION - PAIN TYPE: TYPE: CHRONIC PAIN

## 2022-12-24 ASSESSMENT — PAIN DESCRIPTION - DESCRIPTORS
DESCRIPTORS: ACHING

## 2022-12-24 ASSESSMENT — PAIN SCALES - GENERAL
PAINLEVEL_OUTOF10: 8
PAINLEVEL_OUTOF10: 6
PAINLEVEL_OUTOF10: 4
PAINLEVEL_OUTOF10: 5
PAINLEVEL_OUTOF10: 6
PAINLEVEL_OUTOF10: 7
PAINLEVEL_OUTOF10: 7
PAINLEVEL_OUTOF10: 4

## 2022-12-24 ASSESSMENT — PAIN DESCRIPTION - LOCATION
LOCATION: LEG
LOCATION: LEG
LOCATION: KNEE

## 2022-12-24 ASSESSMENT — PAIN DESCRIPTION - ORIENTATION
ORIENTATION: RIGHT
ORIENTATION: RIGHT;LEFT

## 2022-12-24 ASSESSMENT — PAIN DESCRIPTION - FREQUENCY: FREQUENCY: CONTINUOUS

## 2022-12-24 ASSESSMENT — PAIN - FUNCTIONAL ASSESSMENT: PAIN_FUNCTIONAL_ASSESSMENT: PREVENTS OR INTERFERES SOME ACTIVE ACTIVITIES AND ADLS

## 2022-12-24 ASSESSMENT — PAIN DESCRIPTION - ONSET: ONSET: ON-GOING

## 2022-12-24 NOTE — PROGRESS NOTES
Patient alert and oriented. Vitals stable. Arthritis knee pain continues. Percocet given with benefit. Diclofenec applied to knees as ordered prn. Patient up to chair, gait difficult due to pain in knees. Patient requesting Motrin 800 mg bid, discussed with Dr Octavio Olivarez, did not want to order. Patient appetite good, tolerating general diet. BLE with non pitting edema, elenita in color. Up to chair, call light in reach. Will monitor.

## 2022-12-24 NOTE — PROGRESS NOTES
Patient alert and oriented x 4. VSS. Pain 4/10 of tale bone, air pillow placed for tale bone with benefit for the patient. R leg painful to touch, skin appearance purple red. BLE warm to touch. L leg also has skin discoloration. Patient tolerated regular diet well. Cefazolin is running now, L forearm IV. Patient has no urination yet. Bed in lowest position. Bed alarm on. Call light within reach.

## 2022-12-24 NOTE — PLAN OF CARE
Problem: Skin/Tissue Integrity  Goal: Absence of new skin breakdown  Description: 1. Monitor for areas of redness and/or skin breakdown  2. Assess vascular access sites hourly  3. Every 4-6 hours minimum:  Change oxygen saturation probe site  4. Every 4-6 hours:  If on nasal continuous positive airway pressure, respiratory therapy assess nares and determine need for appliance change or resting period. Outcome: Progressing     Problem: Safety - Adult  Goal: Free from fall injury  12/23/2022 2149 by Cassandra Valera RN  Outcome: Progressing  12/23/2022 1559 by Jonathan Grace  Outcome: Progressing  Note: Pt free from falls/ injury during duration of shift. Bed alarm on, fall risk precautions in place. Problem: Chronic Conditions and Co-morbidities  Goal: Patient's chronic conditions and co-morbidity symptoms are monitored and maintained or improved  Outcome: Progressing  Flowsheets (Taken 12/23/2022 2123)  Care Plan - Patient's Chronic Conditions and Co-Morbidity Symptoms are Monitored and Maintained or Improved: Monitor and assess patient's chronic conditions and comorbid symptoms for stability, deterioration, or improvement     Problem: Discharge Planning  Goal: Discharge to home or other facility with appropriate resources  Outcome: Progressing  Flowsheets (Taken 12/23/2022 2123)  Discharge to home or other facility with appropriate resources: Identify barriers to discharge with patient and caregiver     Problem: Pain  Goal: Verbalizes/displays adequate comfort level or baseline comfort level  12/23/2022 2149 by Cassandra Valera RN  Outcome: Progressing  12/23/2022 1559 by Jonathan Grace  Outcome: Progressing  Flowsheets (Taken 12/23/2022 0433 by Kelly Mak RN)  Verbalizes/displays adequate comfort level or baseline comfort level: Encourage patient to monitor pain and request assistance  Note: Pt reported pain level of a 10 out of 10. Pt medicated w/ prn percocet and scheduled robaxin.  Pt reported relief.      Problem: ABCDS Injury Assessment  Goal: Absence of physical injury  Outcome: Progressing

## 2022-12-24 NOTE — PROGRESS NOTES
Patient was admitted for management of cellulitis on RLE. Noted to have reddish, Warm and swelling. Currently being managed with IV abx. Patient alert and oriented. With stable VS on RA. 2-3 person assistance when transferring. With purewick to monitor urine output post arriaza. Bladder scan done q6. On reg diet. Accucheck QID. PIV on Left arm leaked, inserted new one on L FA g. 22. Needs attended. Handed off to incoming nurse.

## 2022-12-24 NOTE — PROGRESS NOTES
Hospitalist Progress Note      PCP: Dayan Maldonado MD    Date of Admission: 12/19/2022    Chief Complaint:  Right leg swelling and redness    Hospital Course:     60 y.o. female with history of multiple comorbidities including HTN, DM II, dCHF, obesity and venous stasis dermatitis presents to the ER with complaint of right leg pain, swelling and redness. She has chronic wound on the left leg which has since healed after extensive follow-up at the wound care clinic. Her right leg was noticed to have pain and swelling, redness about 2 weeks ago. She has been taking keflex which did not help. Subjective:     She says she feels like she is getting her strength back but appears very unsteady. Says her  can take care of her but then tells me he is disabled also. Tells me her aide will help her but she has no aide until Tuesday.      Medications:  Reviewed    Infusion Medications    dextrose      sodium chloride Stopped (12/23/22 0440)     Scheduled Medications    insulin lispro  0-4 Units SubCUTAneous TID WC    insulin lispro  0-4 Units SubCUTAneous Nightly    pantoprazole  40 mg Oral Daily    levothyroxine  125 mcg Oral Daily    methocarbamol  500 mg Oral 4x Daily    therapeutic multivitamin-minerals  1 tablet Oral Daily    metoprolol tartrate  25 mg Oral BID    ARIPiprazole  10 mg Oral Daily    atorvastatin  20 mg Oral Daily    buPROPion  300 mg Oral QAM    DULoxetine  30 mg Oral Daily    gabapentin  300 mg Oral TID    mometasone-formoterol  2 puff Inhalation BID    montelukast  10 mg Oral Nightly    enoxaparin  30 mg SubCUTAneous BID    ceFAZolin  3,000 mg IntraVENous Q8H    sodium chloride flush  5-40 mL IntraVENous 2 times per day     PRN Meds: diclofenac sodium, glucose, dextrose bolus **OR** dextrose bolus, glucagon (rDNA), dextrose, ammonium lactate, clonazePAM, oxyCODONE-acetaminophen, sodium chloride flush, sodium chloride, ondansetron **OR** ondansetron, polyethylene glycol, acetaminophen **OR** acetaminophen      Intake/Output Summary (Last 24 hours) at 12/24/2022 1634  Last data filed at 12/24/2022 1116  Gross per 24 hour   Intake 480 ml   Output 950 ml   Net -470 ml         Physical Exam Performed:    /72   Pulse 70   Temp 97.5 °F (36.4 °C) (Oral)   Resp 16   Ht 5' 6\" (1.676 m)   Wt (!) 307 lb 8.7 oz (139.5 kg)   SpO2 93%   BMI 49.64 kg/m²     General appearance: No apparent distress,      Respiratory:  Normal respiratory effort. On NC 1 L  Cardiovascular: Regular rate and rhythm with normal S1/S2 without murmurs, rubs or gallops. Abdomen: Soft, non-tender, non-distended with normal bowel sounds. Skin: edema and erythema on the right lower ext, seems shrinking in size. Also has some erythema on the LLE  Neurologic:  Neurovascularly intact without any focal sensory/motor deficits. Cranial nerves: II-XII intact, grossly non-focal.  Psychiatric: Alert and oriented, thought content appropriate, normal insight    Labs:   Recent Labs     12/22/22  0633   WBC 10.6   HGB 11.8*   HCT 35.4*          Recent Labs     12/22/22  0633      K 3.6   CL 93*   CO2 34*   BUN 13   CREATININE 1.0   CALCIUM 9.8       No results for input(s): AST, ALT, BILIDIR, BILITOT, ALKPHOS in the last 72 hours. No results for input(s): INR in the last 72 hours. No results for input(s): Corky Stallion in the last 72 hours. Urinalysis:      Lab Results   Component Value Date/Time    NITRU Negative 09/29/2022 04:16 PM    WBCUA >100 09/29/2022 04:16 PM    BACTERIA 2+ 09/29/2022 04:16 PM    RBCUA 0-2 09/29/2022 04:16 PM    BLOODU TRACE 09/29/2022 04:16 PM    SPECGRAV <=1.005 09/29/2022 04:16 PM    GLUCOSEU Negative 09/29/2022 04:16 PM       Radiology:  VL Extremity Venous Bilateral   Final Result      XR TIBIA FIBULA RIGHT (2 VIEWS)   Final Result      1. No findings for acute bony abnormality. 2.  Moderate osteoarthrosis within the right knee as described.       3.  Diffuse lower extremity edema. Correlate clinically. IP CONSULT TO HOSPITALIST  IP CONSULT TO INFECTIOUS DISEASES  IP CONSULT TO HOME CARE NEEDS    Assessment/Plan:    Active Hospital Problems    Diagnosis     Venous stasis dermatitis of both lower extremities [I87.2]      Priority: Medium    Cellulitis of right leg [L03.115]      Priority: Medium    Hypertension [I10]     Varicose veins of left lower extremity with inflammation, with ulcer of calf with fat layer exposed ([facility] [address] [N12.724, L97.222]     Falls frequently [R29.6]     PTSD (post-traumatic stress disorder) [F43.10]     CKD (chronic kidney disease) [N18.9]     Type 2 diabetes mellitus (HCC) [E11.9]     Hypothyroidism [E03.9]     Chronic heart failure with preserved ejection fraction (HFpEF) ([facility]) [I50.32]     Gastroesophageal reflux disease [K21.9]     Asthma [J45.909]        Cellulitis of right leg  Venous stasis dermatitis of bilateral legs  Chronic heart failure with preserved ejection fraction  Acute hypoxic resp failure with hx of ROSARIO  Chronic kidney disease  Diabetes mellitus type 2  Tobacco use disorder  Hypertension  Hypothyroid  Anxiety/depression  Frequent falls  Morbid obesity, BMI 52        PLAN:     Initially was on IV cefepime and IV vancomycin as ordered. Then changed to ancef by ID. Follow culture. Continue wound care. Cont Ancef for now. Add Moisturizer - Lac-Hydrin. Per ID, at discharge, stepdawn to cefadroxil 1 gm bid x 10 day. Urinary Retention  Has arriaza  Voiding trial    Continue her home psych meds, synthroid, inhalers  Sliding scale and hypoglycemia protocol   Need family to check on settings of bipap. Patient was instructed to talk to family. DVT Prophylaxis: lovenox   Diet: ADULT DIET; Regular  Code Status: Full Code  PT/OT Eval Status: PT 10/24, OT 15/24, need placement but currently refusing.      Dispo - inpt pending voiding trial, trying to see if daughter can help with transportation, patient unlikely to be able to navigate getting into her home. Patient admitting she cannot navigate stairs but still wanting to dc to home however  also disabled. Acknowledges she is too weak to discharge today. Unclear if patient fully understanding discharge needs/safety at home, will place calls to her  and daughter.      Amie Lose, DO

## 2022-12-25 LAB
ANAEROBIC CULTURE: NORMAL
GLUCOSE BLD-MCNC: 113 MG/DL (ref 70–99)
GLUCOSE BLD-MCNC: 118 MG/DL (ref 70–99)
GLUCOSE BLD-MCNC: 121 MG/DL (ref 70–99)
GLUCOSE BLD-MCNC: 98 MG/DL (ref 70–99)
GRAM STAIN RESULT: NORMAL
PERFORMED ON: ABNORMAL
PERFORMED ON: NORMAL
WOUND/ABSCESS: NORMAL

## 2022-12-25 PROCEDURE — 94640 AIRWAY INHALATION TREATMENT: CPT

## 2022-12-25 PROCEDURE — 6370000000 HC RX 637 (ALT 250 FOR IP): Performed by: INTERNAL MEDICINE

## 2022-12-25 PROCEDURE — 2580000003 HC RX 258: Performed by: INTERNAL MEDICINE

## 2022-12-25 PROCEDURE — 6360000002 HC RX W HCPCS: Performed by: INTERNAL MEDICINE

## 2022-12-25 PROCEDURE — 1200000000 HC SEMI PRIVATE

## 2022-12-25 PROCEDURE — 94761 N-INVAS EAR/PLS OXIMETRY MLT: CPT

## 2022-12-25 RX ORDER — CALCIUM CARBONATE 200(500)MG
500 TABLET,CHEWABLE ORAL 3 TIMES DAILY PRN
Status: DISCONTINUED | OUTPATIENT
Start: 2022-12-25 | End: 2022-12-26 | Stop reason: HOSPADM

## 2022-12-25 RX ADMIN — Medication: at 20:00

## 2022-12-25 RX ADMIN — MOMETASONE FUROATE AND FORMOTEROL FUMARATE DIHYDRATE 2 PUFF: 100; 5 AEROSOL RESPIRATORY (INHALATION) at 20:30

## 2022-12-25 RX ADMIN — GABAPENTIN 300 MG: 300 CAPSULE ORAL at 19:57

## 2022-12-25 RX ADMIN — METOPROLOL TARTRATE 25 MG: 25 TABLET, FILM COATED ORAL at 19:57

## 2022-12-25 RX ADMIN — PANTOPRAZOLE SODIUM 40 MG: 40 TABLET, DELAYED RELEASE ORAL at 08:56

## 2022-12-25 RX ADMIN — SODIUM CHLORIDE, PRESERVATIVE FREE 10 ML: 5 INJECTION INTRAVENOUS at 09:00

## 2022-12-25 RX ADMIN — METHOCARBAMOL 500 MG: 500 TABLET ORAL at 08:56

## 2022-12-25 RX ADMIN — METHOCARBAMOL 500 MG: 500 TABLET ORAL at 12:52

## 2022-12-25 RX ADMIN — CEFAZOLIN SODIUM 3000 MG: 1 POWDER, FOR SOLUTION INTRAMUSCULAR; INTRAVENOUS at 09:49

## 2022-12-25 RX ADMIN — ARIPIPRAZOLE 10 MG: 5 TABLET ORAL at 08:57

## 2022-12-25 RX ADMIN — ANTACID TABLETS 500 MG: 500 TABLET, CHEWABLE ORAL at 15:02

## 2022-12-25 RX ADMIN — MOMETASONE FUROATE AND FORMOTEROL FUMARATE DIHYDRATE 2 PUFF: 100; 5 AEROSOL RESPIRATORY (INHALATION) at 09:55

## 2022-12-25 RX ADMIN — METOPROLOL TARTRATE 25 MG: 25 TABLET, FILM COATED ORAL at 08:56

## 2022-12-25 RX ADMIN — ATORVASTATIN CALCIUM 20 MG: 20 TABLET, FILM COATED ORAL at 08:57

## 2022-12-25 RX ADMIN — METHOCARBAMOL 500 MG: 500 TABLET ORAL at 19:57

## 2022-12-25 RX ADMIN — OXYCODONE AND ACETAMINOPHEN 1 TABLET: 5; 325 TABLET ORAL at 09:07

## 2022-12-25 RX ADMIN — CEFAZOLIN SODIUM 3000 MG: 1 POWDER, FOR SOLUTION INTRAMUSCULAR; INTRAVENOUS at 16:52

## 2022-12-25 RX ADMIN — OXYCODONE AND ACETAMINOPHEN 1 TABLET: 5; 325 TABLET ORAL at 23:42

## 2022-12-25 RX ADMIN — DICLOFENAC SODIUM 4 G: 10 GEL TOPICAL at 14:49

## 2022-12-25 RX ADMIN — GABAPENTIN 300 MG: 300 CAPSULE ORAL at 08:56

## 2022-12-25 RX ADMIN — SODIUM CHLORIDE, PRESERVATIVE FREE 10 ML: 5 INJECTION INTRAVENOUS at 19:58

## 2022-12-25 RX ADMIN — METHOCARBAMOL 500 MG: 500 TABLET ORAL at 17:01

## 2022-12-25 RX ADMIN — OXYCODONE AND ACETAMINOPHEN 1 TABLET: 5; 325 TABLET ORAL at 17:01

## 2022-12-25 RX ADMIN — LEVOTHYROXINE SODIUM 125 MCG: 0.12 TABLET ORAL at 05:58

## 2022-12-25 RX ADMIN — ENOXAPARIN SODIUM 30 MG: 100 INJECTION SUBCUTANEOUS at 19:57

## 2022-12-25 RX ADMIN — GABAPENTIN 300 MG: 300 CAPSULE ORAL at 12:52

## 2022-12-25 RX ADMIN — ENOXAPARIN SODIUM 30 MG: 100 INJECTION SUBCUTANEOUS at 08:57

## 2022-12-25 RX ADMIN — DULOXETINE HYDROCHLORIDE 30 MG: 30 CAPSULE, DELAYED RELEASE ORAL at 08:56

## 2022-12-25 RX ADMIN — Medication: at 12:00

## 2022-12-25 RX ADMIN — MONTELUKAST SODIUM 10 MG: 10 TABLET, FILM COATED ORAL at 19:57

## 2022-12-25 RX ADMIN — CEFAZOLIN SODIUM 3000 MG: 1 POWDER, FOR SOLUTION INTRAMUSCULAR; INTRAVENOUS at 01:08

## 2022-12-25 RX ADMIN — Medication 1 TABLET: at 08:56

## 2022-12-25 RX ADMIN — BUPROPION HYDROCHLORIDE 300 MG: 150 TABLET, FILM COATED, EXTENDED RELEASE ORAL at 08:57

## 2022-12-25 ASSESSMENT — PAIN DESCRIPTION - DESCRIPTORS
DESCRIPTORS: ACHING
DESCRIPTORS: ACHING;DISCOMFORT
DESCRIPTORS: ACHING

## 2022-12-25 ASSESSMENT — PAIN - FUNCTIONAL ASSESSMENT
PAIN_FUNCTIONAL_ASSESSMENT: PREVENTS OR INTERFERES SOME ACTIVE ACTIVITIES AND ADLS

## 2022-12-25 ASSESSMENT — PAIN DESCRIPTION - FREQUENCY
FREQUENCY: CONTINUOUS

## 2022-12-25 ASSESSMENT — PAIN DESCRIPTION - LOCATION
LOCATION: KNEE
LOCATION: LEG
LOCATION: KNEE
LOCATION: ANKLE;KNEE

## 2022-12-25 ASSESSMENT — PAIN DESCRIPTION - ORIENTATION
ORIENTATION: RIGHT;LEFT

## 2022-12-25 ASSESSMENT — PAIN SCALES - GENERAL
PAINLEVEL_OUTOF10: 7
PAINLEVEL_OUTOF10: 8
PAINLEVEL_OUTOF10: 6
PAINLEVEL_OUTOF10: 7
PAINLEVEL_OUTOF10: 4
PAINLEVEL_OUTOF10: 7
PAINLEVEL_OUTOF10: 4

## 2022-12-25 ASSESSMENT — PAIN DESCRIPTION - PAIN TYPE
TYPE: CHRONIC PAIN

## 2022-12-25 ASSESSMENT — PAIN DESCRIPTION - ONSET
ONSET: ON-GOING

## 2022-12-25 NOTE — PLAN OF CARE
Problem: Skin/Tissue Integrity  Goal: Absence of new skin breakdown  Description: 1. Monitor for areas of redness and/or skin breakdown  2. Assess vascular access sites hourly  3. Every 4-6 hours minimum:  Change oxygen saturation probe site  4. Every 4-6 hours:  If on nasal continuous positive airway pressure, respiratory therapy assess nares and determine need for appliance change or resting period.   Outcome: Progressing     Problem: Safety - Adult  Goal: Free from fall injury  Outcome: Progressing     Problem: Chronic Conditions and Co-morbidities  Goal: Patient's chronic conditions and co-morbidity symptoms are monitored and maintained or improved  Outcome: Progressing  Flowsheets (Taken 12/24/2022 2008)  Care Plan - Patient's Chronic Conditions and Co-Morbidity Symptoms are Monitored and Maintained or Improved: Monitor and assess patient's chronic conditions and comorbid symptoms for stability, deterioration, or improvement     Problem: Discharge Planning  Goal: Discharge to home or other facility with appropriate resources  Outcome: Progressing  Flowsheets (Taken 12/24/2022 2008)  Discharge to home or other facility with appropriate resources: Identify barriers to discharge with patient and caregiver     Problem: Pain  Goal: Verbalizes/displays adequate comfort level or baseline comfort level  Outcome: Progressing     Problem: ABCDS Injury Assessment  Goal: Absence of physical injury  Outcome: Progressing

## 2022-12-25 NOTE — PROGRESS NOTES
Patient currently being managed for cellulitis on RLE. Still with reddish, warm and swelling. With IV abx. Patient alert and oriented. With stable VS on RA. 2-3 person assistance when transferring. Requested to use purewick during the night because she felt tired the whole day. On reg diet. Accucheck QID. With PIV on L FA g. 22 on sl. Needs attended. Handed off to incoming nurse.

## 2022-12-25 NOTE — PROGRESS NOTES
Hospitalist Progress Note      PCP: Reji Mukherjee MD    Date of Admission: 12/19/2022    Chief Complaint:  Right leg swelling and redness    Hospital Course:     60 y.o. female with history of multiple comorbidities including HTN, DM II, dCHF, obesity and venous stasis dermatitis presents to the ER with complaint of right leg pain, swelling and redness. She has chronic wound on the left leg which has since healed after extensive follow-up at the wound care clinic. Her right leg was noticed to have pain and swelling, redness about 2 weeks ago. She has been taking keflex which did not help. Subjective:     No acute changes overnight, no new complaints, feels like she is getting a little stronger.      Medications:  Reviewed    Infusion Medications    dextrose      sodium chloride Stopped (12/23/22 0440)     Scheduled Medications    insulin lispro  0-4 Units SubCUTAneous TID WC    insulin lispro  0-4 Units SubCUTAneous Nightly    pantoprazole  40 mg Oral Daily    levothyroxine  125 mcg Oral Daily    methocarbamol  500 mg Oral 4x Daily    therapeutic multivitamin-minerals  1 tablet Oral Daily    metoprolol tartrate  25 mg Oral BID    ARIPiprazole  10 mg Oral Daily    atorvastatin  20 mg Oral Daily    buPROPion  300 mg Oral QAM    DULoxetine  30 mg Oral Daily    gabapentin  300 mg Oral TID    mometasone-formoterol  2 puff Inhalation BID    montelukast  10 mg Oral Nightly    enoxaparin  30 mg SubCUTAneous BID    ceFAZolin  3,000 mg IntraVENous Q8H    sodium chloride flush  5-40 mL IntraVENous 2 times per day     PRN Meds: calcium carbonate, diclofenac sodium, glucose, dextrose bolus **OR** dextrose bolus, glucagon (rDNA), dextrose, ammonium lactate, clonazePAM, oxyCODONE-acetaminophen, sodium chloride flush, sodium chloride, ondansetron **OR** ondansetron, polyethylene glycol, acetaminophen **OR** acetaminophen      Intake/Output Summary (Last 24 hours) at 12/25/2022 1839  Last data filed at 12/25/2022 1807  Gross per 24 hour   Intake 1016.91 ml   Output 2335 ml   Net -1318.09 ml         Physical Exam Performed:    /71   Pulse 66   Temp 97.6 °F (36.4 °C) (Oral)   Resp 16   Ht 5' 6\" (1.676 m)   Wt (!) 307 lb 8.7 oz (139.5 kg)   SpO2 92%   BMI 49.64 kg/m²     General appearance: No apparent distress,      Respiratory:  Normal respiratory effort. On NC 1 L  Cardiovascular: Regular rate and rhythm with normal S1/S2 without murmurs, rubs or gallops. Abdomen: Soft, non-tender, non-distended with normal bowel sounds. Skin: edema and erythema on the right lower ext, seems shrinking in size. Also has some erythema on the LLE  Neurologic:  Neurovascularly intact without any focal sensory/motor deficits. Cranial nerves: II-XII intact, grossly non-focal.  Psychiatric: Alert and oriented, thought content appropriate, normal insight    Labs:   No results for input(s): WBC, HGB, HCT, PLT in the last 72 hours. No results for input(s): NA, K, CL, CO2, BUN, CREATININE, CALCIUM, PHOS in the last 72 hours. Invalid input(s): MAGNES    No results for input(s): AST, ALT, BILIDIR, BILITOT, ALKPHOS in the last 72 hours. No results for input(s): INR in the last 72 hours. No results for input(s): Danial Sayda in the last 72 hours. Urinalysis:      Lab Results   Component Value Date/Time    NITRU Negative 09/29/2022 04:16 PM    WBCUA >100 09/29/2022 04:16 PM    BACTERIA 2+ 09/29/2022 04:16 PM    RBCUA 0-2 09/29/2022 04:16 PM    BLOODU TRACE 09/29/2022 04:16 PM    SPECGRAV <=1.005 09/29/2022 04:16 PM    GLUCOSEU Negative 09/29/2022 04:16 PM       Radiology:  VL Extremity Venous Bilateral   Final Result      XR TIBIA FIBULA RIGHT (2 VIEWS)   Final Result      1. No findings for acute bony abnormality. 2.  Moderate osteoarthrosis within the right knee as described. 3.  Diffuse lower extremity edema. Correlate clinically.           IP CONSULT TO HOSPITALIST  IP CONSULT TO INFECTIOUS DISEASES  IP CONSULT TO HOME CARE NEEDS    Assessment/Plan:    Active Hospital Problems    Diagnosis     Venous stasis dermatitis of both lower extremities [I87.2]      Priority: Medium    Cellulitis of right leg [L03.115]      Priority: Medium    Hypertension [I10]     Varicose veins of left lower extremity with inflammation, with ulcer of calf with fat layer exposed ([facility] [address] [W93.420, L97.222]     Falls frequently [R29.6]     PTSD (post-traumatic stress disorder) [F43.10]     CKD (chronic kidney disease) [N18.9]     Type 2 diabetes mellitus (HCC) [E11.9]     Hypothyroidism [E03.9]     Chronic heart failure with preserved ejection fraction (HFpEF) ([facility]) [I50.32]     Gastroesophageal reflux disease [K21.9]     Asthma [J45.909]        Cellulitis of right leg  Venous stasis dermatitis of bilateral legs  Chronic heart failure with preserved ejection fraction  Acute hypoxic resp failure with hx of ROSARIO  Chronic kidney disease  Diabetes mellitus type 2  Tobacco use disorder  Hypertension  Hypothyroid  Anxiety/depression  Frequent falls  Morbid obesity, BMI 52        PLAN:     Initially was on IV cefepime and IV vancomycin as ordered. Then changed to ancef by ID. Follow culture. Continue wound care. Cont Ancef for now. Add Moisturizer - Lac-Hydrin. Per ID, at discharge, stepdown to cefadroxil 1 gm bid x 10 day. Urinary Retention  Has arriaza  Voiding trial    Continue her home psych meds, synthroid, inhalers  Sliding scale and hypoglycemia protocol   Need family to check on settings of bipap. Patient was instructed to talk to family. DVT Prophylaxis: lovenox   Diet: ADULT DIET; Regular  Code Status: Full Code  PT/OT Eval Status: PT 10/24, OT 15/24, need placement but currently refusing. Dispo - inpt pending dispo plan. Patient saying she wants to go home but I have some concerns about her understanding regarding her debility and safety. She states her boyfriend can care for her. I called her daughter today for further hx.  Her daughter reports the patient as having increased debility. The boyfriend is recovering from a knee surgery and is more debilitated than the patient. Daughter wants to talk to the patient about SNF today.      Maggie Bella, DO

## 2022-12-26 VITALS
WEIGHT: 293 LBS | DIASTOLIC BLOOD PRESSURE: 71 MMHG | RESPIRATION RATE: 16 BRPM | SYSTOLIC BLOOD PRESSURE: 123 MMHG | HEART RATE: 69 BPM | BODY MASS INDEX: 47.09 KG/M2 | HEIGHT: 66 IN | TEMPERATURE: 99.3 F | OXYGEN SATURATION: 94 %

## 2022-12-26 LAB
GLUCOSE BLD-MCNC: 100 MG/DL (ref 70–99)
GLUCOSE BLD-MCNC: 113 MG/DL (ref 70–99)
GLUCOSE BLD-MCNC: 114 MG/DL (ref 70–99)
GLUCOSE BLD-MCNC: 124 MG/DL (ref 70–99)
GLUCOSE BLD-MCNC: 96 MG/DL (ref 70–99)
PERFORMED ON: ABNORMAL
PERFORMED ON: NORMAL

## 2022-12-26 PROCEDURE — 2580000003 HC RX 258: Performed by: INTERNAL MEDICINE

## 2022-12-26 PROCEDURE — 6360000002 HC RX W HCPCS: Performed by: INTERNAL MEDICINE

## 2022-12-26 PROCEDURE — 94640 AIRWAY INHALATION TREATMENT: CPT

## 2022-12-26 PROCEDURE — 6370000000 HC RX 637 (ALT 250 FOR IP): Performed by: INTERNAL MEDICINE

## 2022-12-26 PROCEDURE — 94761 N-INVAS EAR/PLS OXIMETRY MLT: CPT

## 2022-12-26 PROCEDURE — 94664 DEMO&/EVAL PT USE INHALER: CPT

## 2022-12-26 RX ORDER — OXYCODONE HYDROCHLORIDE AND ACETAMINOPHEN 5; 325 MG/1; MG/1
1 TABLET ORAL EVERY 8 HOURS PRN
Qty: 9 TABLET | Refills: 0 | Status: SHIPPED | OUTPATIENT
Start: 2022-12-26 | End: 2022-12-29

## 2022-12-26 RX ORDER — CEFADROXIL 1000 MG/1
1 TABLET ORAL 2 TIMES DAILY
Qty: 14 TABLET | Refills: 0 | Status: SHIPPED | OUTPATIENT
Start: 2022-12-26 | End: 2023-01-02

## 2022-12-26 RX ADMIN — OXYCODONE AND ACETAMINOPHEN 1 TABLET: 5; 325 TABLET ORAL at 10:51

## 2022-12-26 RX ADMIN — OXYCODONE AND ACETAMINOPHEN 1 TABLET: 5; 325 TABLET ORAL at 16:59

## 2022-12-26 RX ADMIN — Medication 1 TABLET: at 10:51

## 2022-12-26 RX ADMIN — METHOCARBAMOL 500 MG: 500 TABLET ORAL at 16:59

## 2022-12-26 RX ADMIN — PANTOPRAZOLE SODIUM 40 MG: 40 TABLET, DELAYED RELEASE ORAL at 10:51

## 2022-12-26 RX ADMIN — METHOCARBAMOL 500 MG: 500 TABLET ORAL at 10:50

## 2022-12-26 RX ADMIN — SODIUM CHLORIDE: 9 INJECTION, SOLUTION INTRAVENOUS at 01:00

## 2022-12-26 RX ADMIN — CEFAZOLIN SODIUM 3000 MG: 1 POWDER, FOR SOLUTION INTRAMUSCULAR; INTRAVENOUS at 01:00

## 2022-12-26 RX ADMIN — METOPROLOL TARTRATE 25 MG: 25 TABLET, FILM COATED ORAL at 10:49

## 2022-12-26 RX ADMIN — CEFAZOLIN SODIUM 3000 MG: 1 POWDER, FOR SOLUTION INTRAMUSCULAR; INTRAVENOUS at 16:52

## 2022-12-26 RX ADMIN — DULOXETINE HYDROCHLORIDE 30 MG: 30 CAPSULE, DELAYED RELEASE ORAL at 10:51

## 2022-12-26 RX ADMIN — GABAPENTIN 300 MG: 300 CAPSULE ORAL at 12:32

## 2022-12-26 RX ADMIN — MOMETASONE FUROATE AND FORMOTEROL FUMARATE DIHYDRATE 2 PUFF: 100; 5 AEROSOL RESPIRATORY (INHALATION) at 09:09

## 2022-12-26 RX ADMIN — BUPROPION HYDROCHLORIDE 300 MG: 150 TABLET, FILM COATED, EXTENDED RELEASE ORAL at 10:50

## 2022-12-26 RX ADMIN — GABAPENTIN 300 MG: 300 CAPSULE ORAL at 10:49

## 2022-12-26 RX ADMIN — METHOCARBAMOL 500 MG: 500 TABLET ORAL at 12:32

## 2022-12-26 RX ADMIN — LEVOTHYROXINE SODIUM 125 MCG: 0.12 TABLET ORAL at 05:04

## 2022-12-26 RX ADMIN — ARIPIPRAZOLE 10 MG: 5 TABLET ORAL at 10:49

## 2022-12-26 RX ADMIN — ATORVASTATIN CALCIUM 20 MG: 20 TABLET, FILM COATED ORAL at 10:51

## 2022-12-26 RX ADMIN — ENOXAPARIN SODIUM 30 MG: 100 INJECTION SUBCUTANEOUS at 10:48

## 2022-12-26 RX ADMIN — CEFAZOLIN SODIUM 3000 MG: 1 POWDER, FOR SOLUTION INTRAMUSCULAR; INTRAVENOUS at 10:46

## 2022-12-26 RX ADMIN — SODIUM CHLORIDE, PRESERVATIVE FREE 10 ML: 5 INJECTION INTRAVENOUS at 10:55

## 2022-12-26 ASSESSMENT — PAIN DESCRIPTION - DESCRIPTORS
DESCRIPTORS: ACHING

## 2022-12-26 ASSESSMENT — PAIN SCALES - GENERAL
PAINLEVEL_OUTOF10: 7
PAINLEVEL_OUTOF10: 3
PAINLEVEL_OUTOF10: 0
PAINLEVEL_OUTOF10: 7
PAINLEVEL_OUTOF10: 6
PAINLEVEL_OUTOF10: 3

## 2022-12-26 ASSESSMENT — PAIN DESCRIPTION - LOCATION
LOCATION: KNEE

## 2022-12-26 ASSESSMENT — PAIN DESCRIPTION - PAIN TYPE: TYPE: CHRONIC PAIN

## 2022-12-26 ASSESSMENT — PAIN DESCRIPTION - ORIENTATION
ORIENTATION: RIGHT;LEFT
ORIENTATION: RIGHT;LEFT
ORIENTATION: RIGHT
ORIENTATION: RIGHT;LEFT
ORIENTATION: RIGHT;LEFT

## 2022-12-26 ASSESSMENT — PAIN - FUNCTIONAL ASSESSMENT: PAIN_FUNCTIONAL_ASSESSMENT: PREVENTS OR INTERFERES SOME ACTIVE ACTIVITIES AND ADLS

## 2022-12-26 ASSESSMENT — PAIN DESCRIPTION - ONSET: ONSET: ON-GOING

## 2022-12-26 ASSESSMENT — PAIN DESCRIPTION - FREQUENCY: FREQUENCY: CONTINUOUS

## 2022-12-26 NOTE — DISCHARGE SUMMARY
Hospital Medicine Discharge Summary    Patient ID: Venkat Cruz      Patient's PCP: Morley Frankel, MD    Admit Date: 12/19/2022     Discharge Date:   ***    Admitting Physician: Ron Arboleda MD     Discharge Physician: Eve Schlatter, MD     Discharge Diagnoses: Active Hospital Problems    Diagnosis Date Noted    Venous stasis dermatitis of both lower extremities [I87.2] 12/20/2022     Priority: Medium    Cellulitis of right leg [L03.115] 12/19/2022     Priority: Medium    Hypertension [I10] 05/06/2021    Varicose veins of left lower extremity with inflammation, with ulcer of calf with [facility] layer exposed [facility] [T60.932, L97.222] 04/06/2021    Falls frequently [R29.6] 08/09/2019    PTSD (post-traumatic stress disorder) [F43.10] 11/09/2018    CKD (chronic kidney disease) [N18.9] 05/04/2017    Type 2 diabetes mellitus ([facility] [E11.9] 05/04/2017    Hypothyroidism [E03.9] 07/29/2014    Chronic heart failure with preserved ejection fraction (HFpEF) ([facility] [I50.32] 11/27/2012    Gastroesophageal reflux disease [K21.9] 09/07/2010    Asthma [J45.909] 08/30/2010       The patient was seen and examined on day of discharge and this discharge summary is in conjunction with any daily progress note from day of discharge. Hospital Course: ***          Physical Exam Performed:     /71   Pulse 69   Temp 99.3 °F (37.4 °C) (Oral)   Resp 16   Ht 5' 6\" (1.676 m)   Wt (!) 307 lb 8.7 oz (139.5 kg)   SpO2 94%   BMI 49.64 kg/m²       General appearance:  No apparent distress, appears stated age and cooperative. HEENT:  Normal cephalic, atraumatic without obvious deformity. Pupils equal, round, and reactive to light. Extra ocular muscles intact. Conjunctivae/corneas clear. Neck: Supple, with full range of motion. No jugular venous distention. Trachea midline. Respiratory:  Normal respiratory effort. Clear to auscultation, bilaterally without Rales/Wheezes/Rhonchi.   Cardiovascular:  Regular rate and rhythm with normal S1/S2 without murmurs, rubs or gallops. Abdomen: Soft, non-tender, non-distended with normal bowel sounds. Musculoskeletal:  No clubbing, cyanosis or edema bilaterally. Full range of motion without deformity. Skin: Skin color, texture, turgor normal.  No rashes or lesions. Neurologic:  Neurovascularly intact without any focal sensory/motor deficits. Cranial nerves: II-XII intact, grossly non-focal.  Psychiatric:  Alert and oriented, thought content appropriate, normal insight  Capillary Refill: Brisk,< 3 seconds   Peripheral Pulses: +2 palpable, equal bilaterally       Labs: For convenience and continuity at follow-up the following most recent labs are provided:      CBC:    Lab Results   Component Value Date/Time    WBC 10.6 12/22/2022 06:33 AM    HGB 11.8 12/22/2022 06:33 AM    HCT 35.4 12/22/2022 06:33 AM     12/22/2022 06:33 AM       Renal:    Lab Results   Component Value Date/Time     12/22/2022 06:33 AM    K 3.6 12/22/2022 06:33 AM    K 3.5 12/19/2022 05:52 PM    CL 93 12/22/2022 06:33 AM    CO2 34 12/22/2022 06:33 AM    BUN 13 12/22/2022 06:33 AM    CREATININE 1.0 12/22/2022 06:33 AM    CALCIUM 9.8 12/22/2022 06:33 AM         Significant Diagnostic Studies    Radiology:   VL Extremity Venous Bilateral   Final Result      XR TIBIA FIBULA RIGHT (2 VIEWS)   Final Result      1. No findings for acute bony abnormality. 2.  Moderate osteoarthrosis within the right knee as described. 3.  Diffuse lower extremity edema. Correlate clinically.              Consults:     IP CONSULT TO HOSPITALIST  IP CONSULT TO INFECTIOUS DISEASES  IP CONSULT TO HOME CARE NEEDS    Disposition:  ***     Condition at Discharge: [facility] Patient Condition:[id number]}    Discharge Instructions/Follow-up:  ***    Code Status:  Full Code ***    Activity: activity as tolerated    Diet: {diet:28837}      Discharge Medications:     Current Discharge Medication List             Details   cefadroxil (DURICEF) 1 g tablet Take 1 tablet by mouth 2 times daily for 7 days  Qty: 14 tablet, Refills: 0      oxyCODONE-acetaminophen (PERCOCET) 5-325 MG per tablet Take 1 tablet by mouth every 8 hours as needed for Pain for up to 3 days. Max Daily Amount: 3 tablets  Qty: 9 tablet, Refills: 0    Comments: Reduce doses taken as pain becomes manageable  Associated Diagnoses: Cellulitis of right leg                Details   !! DULoxetine (CYMBALTA) 60 MG extended release capsule Take 60 mg by mouth every evening (In addition to duloxetine 30 mg daily for total daily dose of 90 mg)      buPROPion (WELLBUTRIN) 75 MG tablet Take 75 mg by mouth daily (In addition to bupropion  mg daily)      prazosin (MINIPRESS) 2 MG capsule Take 2 mg by mouth nightly      potassium bicarbonate (EFFER-K) 25 MEQ disintegrating tablet Take 25 mEq by mouth 2 times daily      methocarbamol (ROBAXIN) 500 MG tablet Take 500 mg by mouth 3 times daily as needed (muscle spasms)      torsemide (DEMADEX) 20 MG tablet Take 2 tablets by mouth in the morning and 2 tablets before bedtime. Qty: 360 tablet, Refills: 3      atorvastatin (LIPITOR) 20 MG tablet Take 1 tablet by mouth in the morning. Qty: 90 tablet, Refills: 3      montelukast (SINGULAIR) 10 MG tablet Take 10 mg by mouth nightly      pantoprazole (PROTONIX) 40 MG tablet Take 1 tablet by mouth daily  Qty: 90 tablet, Refills: 1      clonazePAM (KLONOPIN) 0.5 MG tablet Take 0.5 mg by mouth 2 times daily as needed for Anxiety. buPROPion (WELLBUTRIN XL) 300 MG extended release tablet Take 300 mg by mouth every morning      levothyroxine (SYNTHROID) 125 MCG tablet Take 125 mcg by mouth Daily      ARIPiprazole (ABILIFY) 10 MG tablet Take 10 mg by mouth daily      !! DULoxetine (CYMBALTA) 30 MG extended release capsule Take 30 mg by mouth daily (In addition to duloxetine 60 mg nightly for total daily dose of 90 mg)      gabapentin (NEURONTIN) 600 MG tablet Take 600 mg by mouth 3 times daily.       albuterol (PROVENTIL HFA;VENTOLIN HFA) 108 (90 BASE) MCG/ACT inhaler Inhale 2 puffs into the lungs every 6 hours as needed for Wheezing      vitamin D (CHOLECALCIFEROL) 5000 UNITS CAPS capsule Take 5,000 Units by mouth daily       metoprolol (LOPRESSOR) 25 MG tablet Take 25 mg by mouth 2 times daily      Multiple Vitamins-Minerals (THERAPEUTIC MULTIVITAMIN-MINERALS) tablet Take 1 tablet by mouth daily      nicotine polacrilex (COMMIT) 4 MG lozenge Take 4 mg by mouth every hour as needed for Smoking cessation      mupirocin (BACTROBAN) 2 % ointment Apply topically 3 times daily       ! ! - Potential duplicate medications found. Please discuss with provider. Time Spent on discharge is more than {Time; 15 min - 8 hours:98146} in the examination, evaluation, counseling and review of medications and discharge plan. Signed:    David Montiel MD   12/26/2022      Thank you Carl So MD for the opportunity to be involved in this patient's care. If you have any questions or concerns please feel free to contact me at [phone].

## 2022-12-26 NOTE — PROGRESS NOTES
Hospitalist Progress Note      PCP: Shanel Ochoa MD    Date of Admission: 12/19/2022    Chief Complaint:  Right leg swelling and redness    Hospital Course:     60 y.o. female with history of multiple comorbidities including HTN, DM II, dCHF, obesity and venous stasis dermatitis presents to the ER with complaint of right leg pain, swelling and redness. She has chronic wound on the left leg which has since healed after extensive follow-up at the wound care clinic. Her right leg was noticed to have pain and swelling, redness about 2 weeks ago. She has been taking keflex which did not help.      Subjective: out of bed in chair, says she wants to go home    Medications:  Reviewed    Infusion Medications    dextrose      sodium chloride Stopped (12/26/22 0218)     Scheduled Medications    insulin lispro  0-4 Units SubCUTAneous TID WC    insulin lispro  0-4 Units SubCUTAneous Nightly    pantoprazole  40 mg Oral Daily    levothyroxine  125 mcg Oral Daily    methocarbamol  500 mg Oral 4x Daily    therapeutic multivitamin-minerals  1 tablet Oral Daily    metoprolol tartrate  25 mg Oral BID    ARIPiprazole  10 mg Oral Daily    atorvastatin  20 mg Oral Daily    buPROPion  300 mg Oral QAM    DULoxetine  30 mg Oral Daily    gabapentin  300 mg Oral TID    mometasone-formoterol  2 puff Inhalation BID    montelukast  10 mg Oral Nightly    enoxaparin  30 mg SubCUTAneous BID    ceFAZolin  3,000 mg IntraVENous Q8H    sodium chloride flush  5-40 mL IntraVENous 2 times per day     PRN Meds: calcium carbonate, diclofenac sodium, glucose, dextrose bolus **OR** dextrose bolus, glucagon (rDNA), dextrose, ammonium lactate, clonazePAM, oxyCODONE-acetaminophen, sodium chloride flush, sodium chloride, ondansetron **OR** ondansetron, polyethylene glycol, acetaminophen **OR** acetaminophen      Intake/Output Summary (Last 24 hours) at 12/26/2022 0921  Last data filed at 12/26/2022 1362  Gross per 24 hour   Intake 476.91 ml   Output 1535 ml   Net -1058.09 ml       Physical Exam Performed:    /70   Pulse 69   Temp 97.9 °F (36.6 °C) (Oral)   Resp 18   Ht 5' 6\" (1.676 m)   Wt (!) 307 lb 8.7 oz (139.5 kg)   SpO2 94%   BMI 49.64 kg/m²     General appearance: No apparent distress,   Respiratory:  Normal respiratory effort. On NC 1 L  Cardiovascular: Regular rate and rhythm with normal S1/S2 without murmurs, rubs or gallops. Abdomen: Soft, non-tender, non-distended with normal bowel sounds. Skin: edema and erythema on the right lower ext, seems shrinking in size. Also has some erythema on the LLE  Neurologic:  Neurovascularly intact without any focal sensory/motor deficits. Cranial nerves: II-XII intact, grossly non-focal.  Psychiatric: Alert and oriented, thought content appropriate, normal insight    Labs:   No results for input(s): WBC, HGB, HCT, PLT in the last 72 hours. No results for input(s): NA, K, CL, CO2, BUN, CREATININE, CALCIUM, PHOS in the last 72 hours. Invalid input(s): MAGNES    No results for input(s): AST, ALT, BILIDIR, BILITOT, ALKPHOS in the last 72 hours. No results for input(s): INR in the last 72 hours. No results for input(s): [place] in the last 72 hours. Urinalysis:      Lab Results   Component Value Date/Time    NITRU Negative 09/29/2022 04:16 PM    WBCUA >100 09/29/2022 04:16 PM    BACTERIA 2+ 09/29/2022 04:16 PM    RBCUA 0-2 09/29/2022 04:16 PM    BLOODU TRACE 09/29/2022 04:16 PM    SPECGRAV <=1.005 09/29/2022 04:16 PM    GLUCOSEU Negative 09/29/2022 04:16 PM       Radiology:  VL Extremity Venous Bilateral   Final Result      XR TIBIA FIBULA RIGHT (2 VIEWS)   Final Result      1. No findings for acute bony abnormality. 2.  Moderate osteoarthrosis within the right knee as described. 3.  Diffuse lower extremity edema. Correlate clinically.           IP CONSULT TO HOSPITALIST  IP CONSULT TO INFECTIOUS DISEASES  IP CONSULT TO HOME CARE NEEDS    Assessment/Plan:    NENA/Rudy Tellez 0318 Problems    Diagnosis     Venous stasis dermatitis of both lower extremities [I87.2]      Priority: Medium    Cellulitis of right leg [L03.115]      Priority: Medium    Hypertension [I10]     Varicose veins of left lower extremity with inflammation, with ulcer of calf with fat layer exposed ([facility] [Q77.719, L97.222]     Falls frequently [R29.6]     PTSD (post-traumatic stress disorder) [F43.10]     CKD (chronic kidney disease) [N18.9]     Type 2 diabetes mellitus (HCC) [E11.9]     Hypothyroidism [E03.9]     Chronic heart failure with preserved ejection fraction (HFpEF) ([facility]) [I50.32]     Gastroesophageal reflux disease [K21.9]     Asthma [J45.909]        Cellulitis of right leg  Venous stasis dermatitis of bilateral legs  Chronic heart failure with preserved ejection fraction  Acute hypoxic resp failure with hx of ROSARIO  Chronic kidney disease  Diabetes mellitus type 2  Tobacco use disorder  Hypertension  Hypothyroid  Anxiety/depression  Frequent falls  Morbid obesity Body mass index is 49.64 kg/m². - Complicating assessment and treatment. Placing patient at risk for multiple co-morbidities as well as early death and contributing to the patient's presentation.  on weight loss when appropriate. PLAN:     Initially was on IV cefepime and IV vancomycin as ordered. Then changed to ancef by ID. Follow culture. Continue wound care. Cont Ancef for now. Add Moisturizer - Lac-Hydrin. Per ID, at discharge, stepdown to cefadroxil 1 gm bid x 10 day. Urinary Retention:off arriaza, voiding well    Continue her home psych meds, synthroid, inhalers  Sliding scale and hypoglycemia protocol   Family to check on settings of bipap. DVT Prophylaxis: lovenox   Diet: ADULT DIET;  Regular  Code Status: Full Code  PT/OT Eval Status:   9/24 on the AM-PAC short mobility form - on 12/23    Dispo - medically ready for discharge  Per Dr. Corin Justice note 12/25 -- Patient saying she wants to go home but I have some concerns about her understanding regarding her debility and safety. She states her boyfriend can care for her. I called her daughter today for further hx. Her daughter reports the patient as having increased debility. The boyfriend is recovering from a knee surgery and is more debilitated than the patient. Pt has 14 steps to enter she stated she can get herself up the stairs    I discussed with patient, she is refused to go to facility and wanted to be discharged home.  Daughter called who will talk to patient, if she still refuses, ok to dc home    Missy New MD

## 2022-12-26 NOTE — PLAN OF CARE
Problem: Skin/Tissue Integrity  Goal: Absence of new skin breakdown  Description: 1. Monitor for areas of redness and/or skin breakdown  2. Assess vascular access sites hourly  3. Every 4-6 hours minimum:  Change oxygen saturation probe site  4. Every 4-6 hours:  If on nasal continuous positive airway pressure, respiratory therapy assess nares and determine need for appliance change or resting period. Outcome: Progressing   No new areas of skin breakdown noted. BLE red, elenita. Patient encouraged to reposition to prevent skin breakdown. Problem: Safety - Adult  Goal: Free from fall injury  Outcome: Progressing   Fall precautions in place. Bed alarm activated, low position, wheels locked. Up with max assist x 2 with gait belt and walker. Patient encouraged to call with needs and concerns. Call light and belongings within reach. Continue to monitor safety. Problem: Discharge Planning  Goal: Discharge to home or other facility with appropriate resources  Outcome: Progressing   Patient to be discharged when medically stable. Problem: Pain  Goal: Verbalizes/displays adequate comfort level or baseline comfort level  Outcome: Progressing   C/o BLE pain rating 6/10. Medicated with prn percocet. Patient sleeping when pain reassessed.

## 2022-12-27 NOTE — PROGRESS NOTES
Patient alert and oriented x 4. VSS. R and L knee pain 7/10 controlled with Percocet 5-325 mg with benefit for the patient, pain reduced to 3/10. Patient has difficult time to walk due to the bilateral knee pain. External catheter was in place, however, patient encouraged to walk to the bathroom. By patient's words, \"It is too far distance to walk. \" ATX, cefazolin 3,000 mg administered as prescribed. Discharged education given, patient verbalized understanding. Home ATX and pain medication sent to local pharmacy by Dr. Girard Hatchet. Patient wheeled to the front entrance where her son-in-law, caregiver was waiting to ride home.

## 2022-12-27 NOTE — DISCHARGE INSTR - COC
Continuity of Care Form    Patient Name: Trinity Arrieta   :  1960  MRN:  [id number]    Admit date:  2022  Discharge date:  ***    Code Status Order: Prior   Advance Directives:     Admitting Physician:  Desiree Brown MD  PCP: Brent Lamas MD    Discharging Nurse: [facility] Unit/Room#: 7178/8643-66  Discharging Unit Phone Number: ***    Emergency Contact:   Extended Emergency Contact Information  Primary Emergency Contact: Isak Ferrell  Address: [address] [place] [address] [address]           [address] [place], [address] Phone: [phone]  Mobile Phone: [phone]  Relation: Other  Secondary Emergency Contact: Winsome Bond  [place] Phone: [phone]  Relation: Child   needed?  No    Past Surgical History:  Past Surgical History:   Procedure Laterality Date    COLONOSCOPY  2021    COLONOSCOPY POLYPECTOMY SNARE/COLD BIOPSY performed by Malia Moralez MD at [address]     pt unsure of laterality     UPPER GASTROINTESTINAL ENDOSCOPY N/A 2021    EGD BIOPSY performed by Malia Moralez MD at [address] 2021    EGD DIAGNOSTIC ONLY performed by Malia Moralez MD at [facility] ENDOSCOPY       Immunization History:   Immunization History   Administered Date(s) Administered    COVID-19, PFIZER PURPLE top, DILUTE for use, (age 15 y+), 30mcg/0.3mL 2021, 2021, 2022    Hepatitis A/Hepatitis B (Twinrix) 2006    Influenza, FLUARIX, FLULAVAL, FLUZONE (age 10 mo+) AND AFLURIA, (age 1 y+), PF, 0.5mL 2016, 2017    Tdap (Boostrix, Adacel) 2018       Active Problems:  Patient Active Problem List   Diagnosis Code    Anemia D64.9    Cellulitis L03.90    Varicose veins of left lower extremity with inflammation, with ulcer of calf with fat layer exposed ([address] I83.222, L97.222    Anatomical narrow angle of both eyes H40.033    Asthma J45.909    Benign neoplasm of colon D12.6    Borderline open-angle glaucoma CVH8044    Chronic pain of right knee M25.561, G89.29    CKD (chronic kidney disease) N18.9    Combined forms of age-related cataract of both eyes H25.813    Dental caries K02.9    Exotropia H50.10    Falls frequently R29.6    PTSD (post-traumatic stress disorder) F43.10    Gastroesophageal reflux disease K21.9    Hearing loss H91.90    Mixed hyperlipidemia E78.2    Hypertension I10    Hypothyroidism E03.9    Idiopathic sleep related nonobstructive alveolar hypoventilation G47.34    Intestinal malabsorption K90.9    Inflammatory disease of breast N61.0    Iron deficiency anemia D50.9    Chronic heart failure with preserved ejection fraction (HFpEF) ([facility]) I50.32    Lung cancer ([facility]) C34.90    MDD (major depressive disorder), recurrent episode, moderate ([facility]) F33.1    Neuralgia, neuritis, and radiculitis, unspecified XVT1932    Type 2 diabetes mellitus ([facility]) E11.9    Urinary incontinence R32    Cellulitis of right leg L03. 115    Venous stasis dermatitis of both lower extremities I87.2       Isolation/Infection:   Isolation            No Isolation          Patient Infection Status       Infection Onset Added Last Indicated Last Indicated By Review Planned Expiration Resolved Resolved By    MRSA 21 Culture, Wound        Resolved    Influenza 22 Rapid influenza A/B antigens   22     COVID-19 (Rule Out) 22 COVID-19, Rapid (Ordered)   22 Rule-Out Test Resulted    C-diff Rule Out 21 Clostridium difficile toxin/antigen (Ordered)   21 Severo Heller Post, RN    Order             Nurse Assessment:  Last Vital Signs: /71   Pulse 69   Temp 99.3 °F (37.4 °C) (Oral)   Resp 16   Ht 5' 6\" (1.676 m)   Wt (!) 307 lb 8.7 oz (139.5 kg)   SpO2 94%   BMI 49.64 kg/m²     Last documented pain score (0-10 scale): Pain Level: 7  Last Weight:   Wt Readings from Last 1 Encounters:   22 (!) 307 lb 8.7 oz (139.5 kg)     Mental Status:  {IP PT MENTAL STATUS:}    IV Access:  { PHUONG IV ACCESS:647396202}    Nursing Mobility/ADLs:  Walking   {CHP DME TUDF:140866641}  Transfer  {CHP DME YLGE:570766297}  Bathing  {CHP DME XIZJ:938475134}  Dressing  {CHP DME MARY:052013253}  Toileting  {CHP DME OCQL:605980398}  Feeding  {CHP DME HZSW:468381375}  Med Admin  {CHP DME ALWC:666529553}  Med Delivery   { PHUONG MED Delivery:887705576}    Wound Care Documentation and Therapy:        Elimination:  Continence: Bowel: {YES / VJ:17368}  Bladder: {YES / M}  Urinary Catheter: {Urinary Catheter:018757868}   Colostomy/Ileostomy/Ileal Conduit: {YES / LQ:46492}       Date of Last BM: ***    Intake/Output Summary (Last 24 hours) at 2022 1522  Last data filed at 2022  Gross per 24 hour   Intake --   Output 650 ml   Net -650 ml     I/O last 3 completed shifts:   In: 61 [P.O.:60]  Out: 800 [Urine:800]    Safety Concerns:     [address] [facility] Safety Concerns:562754588}    Impairments/Disabilities:      [address] [facility] Impairments/Disabilities:697094825}    Nutrition Therapy:  Current Nutrition Therapy:   [address] [facility] Diet List:492420138}    Routes of Feeding: {CHP DME Other Feedings:233045651}  Liquids: {Slp liquid thickness:19048}  Daily Fluid Restriction: {CHP DME Yes amt example:604585165}  Last Modified Barium Swallow with Video (Video Swallowing Test): {Done Not Done OOZF:324299543}    Treatments at the Time of Hospital Discharge:   Respiratory Treatments: ***  Oxygen Therapy:  {Therapy; copd oxygen:22269}  Ventilator:    {[facility] Vent IZBT:107327408}    Rehab Therapies: {THERAPEUTIC INTERVENTION:0702245930}  Weight Bearing Status/Restrictions: [address]  Weight Bearin}  Other Medical Equipment (for information only, NOT a DME order):  {EQUIPMENT:455687548}  Other Treatments: ***    Patient's personal belongings (please select all that are sent with patient):  {CHP DME Belongings:805627305}    RN SIGNATURE:  {Esignature:932311592}    CASE MANAGEMENT/SOCIAL WORK SECTION    Inpatient Status Date: ***    Readmission Risk Assessment Score:  Readmission Risk              Risk of Unplanned Readmission:  0           Discharging to Facility/ Agency   Name:   Address:  Phone:  Fax:    Dialysis Facility (if applicable)   Name:  Address:  Dialysis Schedule:  Phone:  Fax:    / signature: {Esignature:024128475}    PHYSICIAN SECTION    Prognosis: {Prognosis:2472704999}    Condition at Discharge: [address] [place] [address] Patient Condition:595785012}    Rehab Potential (if transferring to Rehab): {Prognosis:1864589431}    Recommended Labs or Other Treatments After Discharge: ***    Physician Certification: I certify the above information and transfer of Steph Elder  is necessary for the continuing treatment of the diagnosis listed and that she requires {Admit to Appropriate Level of Care:79069} for {GREATER/LESS:769140311} 30 days.      Update Admission H&P: {[facility]70[facility]}    PHYSICIAN SIGNATURE:  {Esignature:661384868}

## 2024-01-07 ENCOUNTER — HOSPITAL ENCOUNTER (EMERGENCY)
Age: 64
Discharge: HOME | End: 2024-01-07
Attending: EMERGENCY MEDICINE
Payer: MEDICAID

## 2024-01-07 ENCOUNTER — APPOINTMENT (OUTPATIENT)
Dept: GENERAL RADIOLOGY | Age: 64
End: 2024-01-07
Payer: MEDICAID

## 2024-01-07 VITALS
BODY MASS INDEX: 50.02 KG/M2 | RESPIRATION RATE: 18 BRPM | OXYGEN SATURATION: 96 % | TEMPERATURE: 97.9 F | DIASTOLIC BLOOD PRESSURE: 88 MMHG | HEART RATE: 64 BPM | SYSTOLIC BLOOD PRESSURE: 147 MMHG | HEIGHT: 64 IN | WEIGHT: 293 LBS

## 2024-01-07 DIAGNOSIS — J44.1 COPD EXACERBATION (HCC): Primary | ICD-10-CM

## 2024-01-07 DIAGNOSIS — J06.9 ACUTE UPPER RESPIRATORY INFECTION: ICD-10-CM

## 2024-01-07 LAB
ALBUMIN SERPL-MCNC: 4.2 G/DL (ref 3.4–5)
ALBUMIN/GLOB SERPL: 1.3 {RATIO} (ref 1.1–2.2)
ALP SERPL-CCNC: 249 U/L (ref 40–129)
ALT SERPL-CCNC: 30 U/L (ref 10–40)
ANION GAP SERPL CALCULATED.3IONS-SCNC: 12 MMOL/L (ref 3–16)
AST SERPL-CCNC: 22 U/L (ref 15–37)
BASOPHILS # BLD: 0.1 K/UL (ref 0–0.2)
BASOPHILS NFR BLD: 0.8 %
BILIRUB SERPL-MCNC: 0.4 MG/DL (ref 0–1)
BUN SERPL-MCNC: 23 MG/DL (ref 7–20)
CALCIUM SERPL-MCNC: 9.7 MG/DL (ref 8.3–10.6)
CHLORIDE SERPL-SCNC: 94 MMOL/L (ref 99–110)
CO2 SERPL-SCNC: 34 MMOL/L (ref 21–32)
CREAT SERPL-MCNC: 1.1 MG/DL (ref 0.6–1.2)
DEPRECATED RDW RBC AUTO: 15.7 % (ref 12.4–15.4)
EOSINOPHIL # BLD: 0.2 K/UL (ref 0–0.6)
EOSINOPHIL NFR BLD: 1.8 %
GFR SERPLBLD CREATININE-BSD FMLA CKD-EPI: 56 ML/MIN/{1.73_M2}
GLUCOSE SERPL-MCNC: 131 MG/DL (ref 70–99)
HCT VFR BLD AUTO: 39.4 % (ref 36–48)
HGB BLD-MCNC: 13.3 G/DL (ref 12–16)
LYMPHOCYTES # BLD: 1.5 K/UL (ref 1–5.1)
LYMPHOCYTES NFR BLD: 16.4 %
MAGNESIUM SERPL-MCNC: 2.3 MG/DL (ref 1.8–2.4)
MCH RBC QN AUTO: 27.6 PG (ref 26–34)
MCHC RBC AUTO-ENTMCNC: 33.7 G/DL (ref 31–36)
MCV RBC AUTO: 81.8 FL (ref 80–100)
MONOCYTES # BLD: 1 K/UL (ref 0–1.3)
MONOCYTES NFR BLD: 10.3 %
NEUTROPHILS # BLD: 6.6 K/UL (ref 1.7–7.7)
NEUTROPHILS NFR BLD: 70.7 %
NT-PROBNP SERPL-MCNC: <36 PG/ML (ref 0–124)
PLATELET # BLD AUTO: 245 K/UL (ref 135–450)
PMV BLD AUTO: 7.2 FL (ref 5–10.5)
POTASSIUM SERPL-SCNC: 3.5 MMOL/L (ref 3.5–5.1)
PROT SERPL-MCNC: 7.5 G/DL (ref 6.4–8.2)
RBC # BLD AUTO: 4.82 M/UL (ref 4–5.2)
SODIUM SERPL-SCNC: 140 MMOL/L (ref 136–145)
TROPONIN, HIGH SENSITIVITY: 10 NG/L (ref 0–14)
WBC # BLD AUTO: 9.3 K/UL (ref 4–11)

## 2024-01-07 PROCEDURE — 93005 ELECTROCARDIOGRAM TRACING: CPT | Performed by: EMERGENCY MEDICINE

## 2024-01-07 PROCEDURE — 84484 ASSAY OF TROPONIN QUANT: CPT

## 2024-01-07 PROCEDURE — 6370000000 HC RX 637 (ALT 250 FOR IP): Performed by: EMERGENCY MEDICINE

## 2024-01-07 PROCEDURE — 99285 EMERGENCY DEPT VISIT HI MDM: CPT

## 2024-01-07 PROCEDURE — 83880 ASSAY OF NATRIURETIC PEPTIDE: CPT

## 2024-01-07 PROCEDURE — 71046 X-RAY EXAM CHEST 2 VIEWS: CPT

## 2024-01-07 PROCEDURE — 94664 DEMO&/EVAL PT USE INHALER: CPT

## 2024-01-07 PROCEDURE — 6360000002 HC RX W HCPCS: Performed by: EMERGENCY MEDICINE

## 2024-01-07 PROCEDURE — 80053 COMPREHEN METABOLIC PANEL: CPT

## 2024-01-07 PROCEDURE — 83735 ASSAY OF MAGNESIUM: CPT

## 2024-01-07 PROCEDURE — 94640 AIRWAY INHALATION TREATMENT: CPT

## 2024-01-07 PROCEDURE — 94760 N-INVAS EAR/PLS OXIMETRY 1: CPT

## 2024-01-07 PROCEDURE — 85025 COMPLETE CBC W/AUTO DIFF WBC: CPT

## 2024-01-07 RX ORDER — TORSEMIDE 100 MG/1
100 TABLET ORAL DAILY
COMMUNITY

## 2024-01-07 RX ORDER — ALBUTEROL SULFATE 2.5 MG/3ML
2.5 SOLUTION RESPIRATORY (INHALATION) ONCE
Status: COMPLETED | OUTPATIENT
Start: 2024-01-07 | End: 2024-01-07

## 2024-01-07 RX ORDER — POTASSIUM CHLORIDE 20 MEQ/1
TABLET, EXTENDED RELEASE ORAL
COMMUNITY
Start: 2023-12-20

## 2024-01-07 RX ORDER — PREDNISONE 50 MG/1
50 TABLET ORAL DAILY
Qty: 5 TABLET | Refills: 0 | Status: SHIPPED | OUTPATIENT
Start: 2024-01-07 | End: 2024-01-12

## 2024-01-07 RX ORDER — QUETIAPINE FUMARATE 100 MG/1
140 TABLET, FILM COATED ORAL NIGHTLY
COMMUNITY

## 2024-01-07 RX ORDER — PREDNISONE 20 MG/1
60 TABLET ORAL ONCE
Status: COMPLETED | OUTPATIENT
Start: 2024-01-07 | End: 2024-01-07

## 2024-01-07 RX ORDER — SPIRONOLACTONE 50 MG/1
50 TABLET, FILM COATED ORAL DAILY
COMMUNITY
Start: 2023-12-26

## 2024-01-07 RX ORDER — IPRATROPIUM BROMIDE AND ALBUTEROL SULFATE 2.5; .5 MG/3ML; MG/3ML
1 SOLUTION RESPIRATORY (INHALATION) ONCE
Status: COMPLETED | OUTPATIENT
Start: 2024-01-07 | End: 2024-01-07

## 2024-01-07 RX ORDER — EMPAGLIFLOZIN 10 MG/1
10 TABLET, FILM COATED ORAL DAILY
COMMUNITY

## 2024-01-07 RX ORDER — PREDNISONE 50 MG/1
50 TABLET ORAL DAILY
Qty: 5 TABLET | Refills: 0 | Status: SHIPPED | OUTPATIENT
Start: 2024-01-07 | End: 2024-01-07

## 2024-01-07 RX ORDER — LANOLIN ALCOHOL/MO/W.PET/CERES
400 CREAM (GRAM) TOPICAL 2 TIMES DAILY
COMMUNITY
Start: 2023-12-27

## 2024-01-07 RX ADMIN — ALBUTEROL SULFATE 2.5 MG: 2.5 SOLUTION RESPIRATORY (INHALATION) at 12:05

## 2024-01-07 RX ADMIN — PREDNISONE 60 MG: 20 TABLET ORAL at 12:02

## 2024-01-07 RX ADMIN — IPRATROPIUM BROMIDE AND ALBUTEROL SULFATE 1 DOSE: 2.5; .5 SOLUTION RESPIRATORY (INHALATION) at 10:58

## 2024-01-07 ASSESSMENT — PAIN - FUNCTIONAL ASSESSMENT
PAIN_FUNCTIONAL_ASSESSMENT: NONE - DENIES PAIN
PAIN_FUNCTIONAL_ASSESSMENT: NONE - DENIES PAIN

## 2024-01-07 NOTE — ED PROVIDER NOTES
results with patient/family including incidental findings.  - At this point I do not see indication for further work-up in the ER, as it is unlikely  and poses more risk than benefit.  - Follow up plan and return precautions also discussed.  Patient/family verbalized understanding of plan and agreeable to plan.        Clinical Impression:  1. COPD exacerbation (HCC)    2. Acute upper respiratory infection          Disposition:  Discharge to home in good condition.    Blood pressure (!) 147/88, pulse 64, temperature 97.9 °F (36.6 °C), temperature source Oral, resp. rate 18, height 1.626 m (5' 4\"), weight 134.3 kg (296 lb 1.6 oz), SpO2 96 %.    Patient was given scripts for the following medications. I counseled patient how to take these medications.   New Prescriptions    PREDNISONE (DELTASONE) 50 MG TABLET    Take 1 tablet by mouth daily for 5 days     Modified Medications    No medications on file       Disposition referral (if applicable):  Panchito Conklin MD  [address] [address] [phone]    Schedule an appointment as soon as possible for a visit in 3 days        ISatnam, nic the primary attending of record and contributed the majority of evaluation and treatment of emergent care for this encounter.     Total critical care time is 30 minutes, which excludes separately billable procedures and updating family. Time spent is specifically for management of the presenting complaint and symptoms initially, direct bedside care, reevaluation, review of records, and consultation.  There was a high probability of clinically significant life-threatening deterioration in the patient's condition, which required my urgent intervention.     This chart was generated in part by using Dragon Dictation system and may contain errors related to that system including errors in grammar, punctuation, and spelling, as well as words and phrases that may be

## 2024-01-07 NOTE — DISCHARGE INSTRUCTIONS
Use your albuterol inhaler every 4 hours while awake while having symptoms.  Be mindful of steroids can cause elevated blood sugars to make sure that you check your blood sugar daily and curve down on your carbohydrates and sugar intake.

## 2024-01-07 NOTE — DISCHARGE INSTR - COC
Continuity of Care Form    Patient Name: Laura Diop   :  1960  MRN:  [id number]    Admit date:  2024  Discharge date:  ***    Code Status Order: Prior   Advance Directives:     Admitting Physician:  No admitting provider for patient encounter.  PCP: Panchito Conklin MD    Discharging Nurse: ***  Discharging Hospital Unit/Room#:   Discharging Unit Phone Number: ***    Emergency Contact:   Extended Emergency Contact Information  Primary Emergency Contact: Isak Ferrell  Address: [address] [place] [address] [address]           [address]  Home Phone: [phone]  Mobile Phone: [phone]  Relation: Other  Secondary Emergency Contact: christy diop  Home Phone: [phone]  Relation: Child   needed? No    Past Surgical History:  Past Surgical History:   Procedure Laterality Date    COLONOSCOPY  2021    COLONOSCOPY POLYPECTOMY SNARE/COLD BIOPSY performed by David GEORGE MD at [facility] ENDOSCOPY    DENTAL SURGERY      LOBECTOMY N/A     pt unsure of laterality     UPPER GASTROINTESTINAL ENDOSCOPY N/A 2021    EGD BIOPSY performed by David GEORGE MD at [facility] ENDOSCOPY    UPPER GASTROINTESTINAL ENDOSCOPY N/A 2021    EGD DIAGNOSTIC ONLY performed by David GEORGE MD at [facility] ENDOSCOPY       Immunization History:   Immunization History   Administered Date(s) Administered    COVID-19, PFIZER PURPLE top, DILUTE for use, (age 12 y+), 30mcg/0.3mL 2021, 2021, 2022    Hep A-Hep B, TWINRIX, (age 18y+), IM, 1mL 2006    Influenza, FLUARIX, FLULAVAL, FLUZONE (age 6 mo+) AND AFLURIA, (age 3 y+), PF, 0.5mL 2016, 2017    TDaP, ADACEL (age 10y-64y), BOOSTRIX (age 10y+), IM, 0.5mL 2018       Active Problems:  Patient Active Problem List   Diagnosis Code    Anemia D64.9    Cellulitis L03.90    Varicose veins of left lower extremity with inflammation, with ulcer of calf with fat layer exposed ([facility]) I83.222, L97.222

## 2024-01-07 NOTE — ED NOTES
Patient to ed with complaints of a uri which started 10 days ago patient has complaints of a cough and wheezing.

## 2024-01-09 LAB
EKG ATRIAL RATE: 64 BPM
EKG DIAGNOSIS: NORMAL
EKG P AXIS: 75 DEGREES
EKG P-R INTERVAL: 178 MS
EKG Q-T INTERVAL: 430 MS
EKG QRS DURATION: 92 MS
EKG QTC CALCULATION (BAZETT): 443 MS
EKG R AXIS: -13 DEGREES
EKG T AXIS: 56 DEGREES
EKG VENTRICULAR RATE: 64 BPM

## 2024-01-09 PROCEDURE — 93010 ELECTROCARDIOGRAM REPORT: CPT | Performed by: INTERNAL MEDICINE

## 2024-04-23 NOTE — CARE COORDINATION
Spoke with pharmacist Isabella at [facility], she confirmed savings card info and will call pt when ready for pick-up.  Message sent to MA to arrange GI referral.   discharge: Not Applicable  PHUONG Completed: No    Notification completed in HENS/PAS?:  No    IMM Completed:   No    Transportation:  Transportation PLAN for discharge: Lyft   Mode of Transport: Lyft  Reason for medical transport: Not Applicable  Name of Transport Company: Not Applicable  Time of Transport: afternoon    Transport form completed: No    Home Care:  [address] ordered at discharge: No  [address]: Not Applicable  Orders faxed: No    Durable Medical Equipment:  DME Provider: none  Equipment obtained during hospitalization: none    Home Oxygen and Respiratory Equipment:  Oxygen needed at discharge?: No  [address]: Not Applicable  Portable tank available for discharge?: No    Dialysis:  Dialysis patient: No    Dialysis Center:  Not Applicable    Hospice Services:  Location: Not Applicable  Agency: Not Applicable    Consents signed: No    Referrals made at [facility] for outpatient continued care:  Not Applicable    Additional CM Notes: Patient to discharge home with no needs. Daughter is unable to transport today as she is working. Patient to discharge in Lyft once her iron infusion is complete. The Plan for Transition of Care is related to the following treatment goals of Anemia [D64.9]    The Patient and/or patient representative Tommy Jackson and her family were provided with a choice of provider and agrees with the discharge plan Yes    Freedom of choice list was provided with basic dialogue that supports the patient's individualized plan of care/goals and shares the quality data associated with the providers.  Yes    Care Transitions patient: No    Karissa Awan  Case Management Department  Ph: [phone]  Fax: [phone]

## 2024-07-05 ENCOUNTER — HOSPITAL ENCOUNTER (EMERGENCY)
Age: 64
Discharge: HOME | End: 2024-07-05
Attending: EMERGENCY MEDICINE
Payer: MEDICAID

## 2024-07-05 VITALS
TEMPERATURE: 97.9 F | OXYGEN SATURATION: 94 % | DIASTOLIC BLOOD PRESSURE: 92 MMHG | HEART RATE: 77 BPM | BODY MASS INDEX: 48.06 KG/M2 | RESPIRATION RATE: 21 BRPM | SYSTOLIC BLOOD PRESSURE: 135 MMHG | WEIGHT: 281.53 LBS | HEIGHT: 64 IN

## 2024-07-05 DIAGNOSIS — B02.9 HERPES ZOSTER WITHOUT COMPLICATION: Primary | ICD-10-CM

## 2024-07-05 DIAGNOSIS — N39.0 URINARY TRACT INFECTION IN FEMALE: ICD-10-CM

## 2024-07-05 LAB
BACTERIA URNS QL MICRO: ABNORMAL /HPF
BILIRUB UR QL STRIP.AUTO: NEGATIVE
CLARITY UR: ABNORMAL
COLOR UR: YELLOW
EPI CELLS #/AREA URNS HPF: ABNORMAL /HPF (ref 0–5)
GLUCOSE UR STRIP.AUTO-MCNC: 250 MG/DL
HGB UR QL STRIP.AUTO: ABNORMAL
KETONES UR STRIP.AUTO-MCNC: NEGATIVE MG/DL
LEUKOCYTE ESTERASE UR QL STRIP.AUTO: ABNORMAL
NITRITE UR QL STRIP.AUTO: NEGATIVE
PH UR STRIP.AUTO: 6 [PH] (ref 5–8)
PROT UR STRIP.AUTO-MCNC: NEGATIVE MG/DL
RBC #/AREA URNS HPF: ABNORMAL /HPF (ref 0–4)
SP GR UR STRIP.AUTO: <=1.005 (ref 1–1.03)
UA COMPLETE W REFLEX CULTURE PNL UR: YES
UA DIPSTICK W REFLEX MICRO PNL UR: YES
URN SPEC COLLECT METH UR: ABNORMAL
UROBILINOGEN UR STRIP-ACNC: 0.2 E.U./DL
WBC #/AREA URNS HPF: >100 /HPF (ref 0–5)

## 2024-07-05 PROCEDURE — 87186 SC STD MICRODIL/AGAR DIL: CPT

## 2024-07-05 PROCEDURE — 99283 EMERGENCY DEPT VISIT LOW MDM: CPT

## 2024-07-05 PROCEDURE — 87086 URINE CULTURE/COLONY COUNT: CPT

## 2024-07-05 PROCEDURE — 6370000000 HC RX 637 (ALT 250 FOR IP): Performed by: EMERGENCY MEDICINE

## 2024-07-05 PROCEDURE — 81001 URINALYSIS AUTO W/SCOPE: CPT

## 2024-07-05 PROCEDURE — 87077 CULTURE AEROBIC IDENTIFY: CPT

## 2024-07-05 RX ORDER — OXYCODONE HYDROCHLORIDE AND ACETAMINOPHEN 5; 325 MG/1; MG/1
TABLET ORAL
COMMUNITY
Start: 2024-06-10

## 2024-07-05 RX ORDER — CEFUROXIME AXETIL 250 MG/1
250 TABLET ORAL ONCE
Status: COMPLETED | OUTPATIENT
Start: 2024-07-05 | End: 2024-07-05

## 2024-07-05 RX ORDER — VALACYCLOVIR HYDROCHLORIDE 1 G/1
1000 TABLET, FILM COATED ORAL 3 TIMES DAILY
Qty: 21 TABLET | Refills: 0 | Status: SHIPPED | OUTPATIENT
Start: 2024-07-05 | End: 2024-07-12

## 2024-07-05 RX ORDER — ALLOPURINOL 100 MG/1
TABLET ORAL
COMMUNITY
Start: 2024-07-01

## 2024-07-05 RX ORDER — PREDNISONE 20 MG/1
TABLET ORAL
Qty: 12 TABLET | Refills: 0 | Status: SHIPPED | OUTPATIENT
Start: 2024-07-05 | End: 2024-07-15

## 2024-07-05 RX ORDER — CEFUROXIME AXETIL 250 MG/1
250 TABLET ORAL 2 TIMES DAILY
Qty: 14 TABLET | Refills: 0 | Status: SHIPPED | OUTPATIENT
Start: 2024-07-05 | End: 2024-07-12

## 2024-07-05 RX ADMIN — CEFUROXIME AXETIL 250 MG: 250 TABLET ORAL at 10:19

## 2024-07-05 ASSESSMENT — PAIN - FUNCTIONAL ASSESSMENT: PAIN_FUNCTIONAL_ASSESSMENT: 0-10

## 2024-07-05 ASSESSMENT — PAIN SCALES - GENERAL
PAINLEVEL_OUTOF10: 6
PAINLEVEL_OUTOF10: 6

## 2024-07-05 ASSESSMENT — PAIN DESCRIPTION - ORIENTATION
ORIENTATION: RIGHT
ORIENTATION: RIGHT

## 2024-07-05 ASSESSMENT — PAIN DESCRIPTION - LOCATION
LOCATION: BUTTOCKS
LOCATION: BUTTOCKS

## 2024-07-05 ASSESSMENT — PAIN DESCRIPTION - DESCRIPTORS: DESCRIPTORS: ACHING

## 2024-07-05 NOTE — ED PROVIDER NOTES
[facility] Emergency Department      Pt Name: Laura Murillo  MRN: [id number]  Birthdate 1960  Date of evaluation: 7/5/2024  Provider: TERESA OWUSU MD  CHIEF COMPLAINT  Chief Complaint   Patient presents with    Rash     Reports rash on right upper buttock, onset 2 days ago. Now on right thigh. +painful. +blistery rash noted no fever    Dysuria     Reports burning with urination for 1 week. Reports urine \"foggy\"      HPI  Laura Murillo is a 63 y.o. female who presents because of a rash.  She has noticed a painful rash to her right upper buttock for the past 2 days.  She does not recall if she ever had chickenpox in the past.  She did not get a shingles shot.  She also reports having burning with urination for the past week.  Her urine has appeared cloudy.  She denies any fever or chills.  She denies any abdominal pain.    REVIEW OF SYSTEMS:  No fever, normal appetite Pertinent positives and negatives as per the HPI.  All other pertinent review of systems reviewed and negative.  Nursing notes reviewed.    PAST MEDICAL HISTORY  Past Medical History:   Diagnosis Date    Anemia     Anxiety     Arthritis     CAD (coronary artery disease)     Cancer (HCC)     CHF (congestive heart failure) (HCC)     diastolic    Chronic kidney disease     Depression     Diabetes mellitus (HCC)     Hep C w/o coma, chronic (HCC)     Hyperlipidemia     Hypertension     MRSA (methicillin resistant staph aureus) culture positive 03/26/2021    left leg wound    Neuropathy     Schizoaffective disorder (HCC)     Thyroid disease      SURGICAL HISTORY  Past Surgical History:   Procedure Laterality Date    COLONOSCOPY  4/26/2021    COLONOSCOPY POLYPECTOMY SNARE/COLD BIOPSY performed by David GEORGE MD at [facility] ENDOSCOPY    DENTAL SURGERY      LOBECTOMY N/A     pt unsure of laterality     UPPER GASTROINTESTINAL ENDOSCOPY N/A 4/26/2021    EGD BIOPSY performed by David GEORGE MD at [facility] ENDOSCOPY    UPPER GASTROINTESTINAL  ENDOSCOPY N/A 6/21/2021    EGD DIAGNOSTIC ONLY performed by David GEORGE MD at [facility] ENDOSCOPY     MEDICATIONS:  No current facility-administered medications on file prior to encounter.     Current Outpatient Medications on File Prior to Encounter   Medication Sig Dispense Refill    allopurinol (ZYLOPRIM) 100 MG tablet TAKE 1 TABLET (100 MG) DAILY      oxyCODONE-acetaminophen (PERCOCET) 5-325 MG per tablet TAKE 1 TABLET BY ORAL ROUTE EVERY 8 HOURS AS NEEDED FOR PAIN FOR 30 DAYS, FOR USE: 6/12/24-7/11/24. MAY FILL 6/10/24.      JARDIANCE 10 MG tablet Take 1 tablet by mouth daily      torsemide (DEMADEX) 100 MG tablet Take 1 tablet by mouth daily (Patient not taking: Reported on 7/5/2024)      spironolactone (ALDACTONE) 50 MG tablet Take 1 tablet by mouth daily      QUEtiapine (SEROQUEL) 100 MG tablet Take 140 mg by mouth nightly      potassium chloride (KLOR-CON M) 20 MEQ extended release tablet TAKE 2 TABLETS BY MOUTH IN THE MORNING AND 1 TABLET IN THE EVENING      magnesium oxide (MAG-OX) 400 (240 Mg) MG tablet Take 1 tablet by mouth 2 times daily      buPROPion (WELLBUTRIN) 75 MG tablet Take 1 tablet by mouth daily (In addition to bupropion  mg daily) (Patient not taking: Reported on 7/5/2024)      prazosin (MINIPRESS) 2 MG capsule Take 1 capsule by mouth nightly      potassium bicarbonate (EFFER-K) 25 MEQ disintegrating tablet Take 1 tablet by mouth 2 times daily (Patient not taking: Reported on 7/5/2024)      methocarbamol (ROBAXIN) 500 MG tablet Take 1 tablet by mouth 3 times daily as needed (muscle spasms)      torsemide (DEMADEX) 20 MG tablet Take 2 tablets by mouth in the morning and 2 tablets before bedtime. 360 tablet 3    atorvastatin (LIPITOR) 20 MG tablet Take 1 tablet by mouth in the morning. (Patient taking differently: Take 2 tablets by mouth daily) 90 tablet 3    montelukast (SINGULAIR) 10 MG tablet Take 1 tablet by mouth nightly      pantoprazole (PROTONIX) 40 MG tablet Take 1 tablet

## 2024-07-07 LAB
BACTERIA UR CULT: ABNORMAL
ORGANISM: ABNORMAL

## 2025-04-21 ENCOUNTER — APPOINTMENT (OUTPATIENT)
Dept: GENERAL RADIOLOGY | Age: 65
End: 2025-04-21
Payer: MEDICARE

## 2025-04-21 ENCOUNTER — HOSPITAL ENCOUNTER (EMERGENCY)
Age: 65
Discharge: HOME | End: 2025-04-22
Attending: STUDENT IN AN ORGANIZED HEALTH CARE EDUCATION/TRAINING PROGRAM
Payer: MEDICARE

## 2025-04-21 VITALS
BODY MASS INDEX: 48.32 KG/M2 | WEIGHT: 283 LBS | RESPIRATION RATE: 18 BRPM | DIASTOLIC BLOOD PRESSURE: 90 MMHG | HEIGHT: 64 IN | HEART RATE: 93 BPM | OXYGEN SATURATION: 100 % | TEMPERATURE: 98.5 F | SYSTOLIC BLOOD PRESSURE: 188 MMHG

## 2025-04-21 DIAGNOSIS — L03.116 LEFT LEG CELLULITIS: ICD-10-CM

## 2025-04-21 DIAGNOSIS — J44.1 COPD EXACERBATION (HCC): Primary | ICD-10-CM

## 2025-04-21 LAB
ANION GAP SERPL CALCULATED.3IONS-SCNC: 13 MMOL/L (ref 3–16)
BASE EXCESS BLDV CALC-SCNC: 7.4 MMOL/L (ref -3–3)
BASOPHILS # BLD: 0.1 K/UL (ref 0–0.2)
BASOPHILS NFR BLD: 0.9 %
BUN SERPL-MCNC: 20 MG/DL (ref 7–20)
CALCIUM SERPL-MCNC: 9.3 MG/DL (ref 8.3–10.6)
CHLORIDE SERPL-SCNC: 95 MMOL/L (ref 99–110)
CO2 BLDV-SCNC: 32 MMOL/L
CO2 SERPL-SCNC: 28 MMOL/L (ref 21–32)
CREAT SERPL-MCNC: 1 MG/DL (ref 0.6–1.2)
D-DIMER QUANTITATIVE: 0.38 UG/ML FEU (ref 0–0.6)
DEPRECATED RDW RBC AUTO: 15.1 % (ref 12.4–15.4)
EOSINOPHIL # BLD: 0.1 K/UL (ref 0–0.6)
EOSINOPHIL NFR BLD: 1.2 %
FLUAV RNA UPPER RESP QL NAA+PROBE: NEGATIVE
FLUBV AG NPH QL: NEGATIVE
GFR SERPLBLD CREATININE-BSD FMLA CKD-EPI: 62 ML/MIN/{1.73_M2}
GLUCOSE SERPL-MCNC: 114 MG/DL (ref 70–99)
HCO3 BLDV-SCNC: 32.5 MMOL/L (ref 23–29)
HCT VFR BLD AUTO: 40 % (ref 36–48)
HGB BLD-MCNC: 13.3 G/DL (ref 12–16)
LYMPHOCYTES # BLD: 1.5 K/UL (ref 1–5.1)
LYMPHOCYTES NFR BLD: 17.9 %
MAGNESIUM SERPL-MCNC: 2.21 MG/DL (ref 1.8–2.4)
MCH RBC QN AUTO: 28.2 PG (ref 26–34)
MCHC RBC AUTO-ENTMCNC: 33.2 G/DL (ref 31–36)
MCV RBC AUTO: 84.7 FL (ref 80–100)
MONOCYTES # BLD: 1 K/UL (ref 0–1.3)
MONOCYTES NFR BLD: 12.3 %
NEUTROPHILS # BLD: 5.6 K/UL (ref 1.7–7.7)
NEUTROPHILS NFR BLD: 67.7 %
NT-PROBNP SERPL-MCNC: <36 PG/ML (ref 0–124)
O2 THERAPY: ABNORMAL
PCO2 BLDV: 54.6 MMHG (ref 40–50)
PH BLDV: 7.38 [PH] (ref 7.35–7.45)
PLATELET # BLD AUTO: 254 K/UL (ref 135–450)
PMV BLD AUTO: 7.1 FL (ref 5–10.5)
PO2 BLDV: 134.6 MMHG (ref 25–40)
POTASSIUM SERPL-SCNC: 3.2 MMOL/L (ref 3.5–5.1)
RBC # BLD AUTO: 4.72 M/UL (ref 4–5.2)
SAO2 % BLDV: 99 %
SARS-COV-2 RDRP RESP QL NAA+PROBE: NOT DETECTED
SODIUM SERPL-SCNC: 136 MMOL/L (ref 136–145)
TROPONIN, HIGH SENSITIVITY: 7 NG/L (ref 0–14)
TROPONIN, HIGH SENSITIVITY: 8 NG/L (ref 0–14)
WBC # BLD AUTO: 8.3 K/UL (ref 4–11)

## 2025-04-21 PROCEDURE — 83735 ASSAY OF MAGNESIUM: CPT

## 2025-04-21 PROCEDURE — 2500000003 HC RX 250 WO HCPCS: Performed by: STUDENT IN AN ORGANIZED HEALTH CARE EDUCATION/TRAINING PROGRAM

## 2025-04-21 PROCEDURE — 6360000002 HC RX W HCPCS: Performed by: STUDENT IN AN ORGANIZED HEALTH CARE EDUCATION/TRAINING PROGRAM

## 2025-04-21 PROCEDURE — 85025 COMPLETE CBC W/AUTO DIFF WBC: CPT

## 2025-04-21 PROCEDURE — 82803 BLOOD GASES ANY COMBINATION: CPT

## 2025-04-21 PROCEDURE — 71045 X-RAY EXAM CHEST 1 VIEW: CPT

## 2025-04-21 PROCEDURE — 2700000000 HC OXYGEN THERAPY PER DAY

## 2025-04-21 PROCEDURE — 83880 ASSAY OF NATRIURETIC PEPTIDE: CPT

## 2025-04-21 PROCEDURE — 80048 BASIC METABOLIC PNL TOTAL CA: CPT

## 2025-04-21 PROCEDURE — 84484 ASSAY OF TROPONIN QUANT: CPT

## 2025-04-21 PROCEDURE — 87804 INFLUENZA ASSAY W/OPTIC: CPT

## 2025-04-21 PROCEDURE — 96374 THER/PROPH/DIAG INJ IV PUSH: CPT

## 2025-04-21 PROCEDURE — 6370000000 HC RX 637 (ALT 250 FOR IP): Performed by: STUDENT IN AN ORGANIZED HEALTH CARE EDUCATION/TRAINING PROGRAM

## 2025-04-21 PROCEDURE — 87635 SARS-COV-2 COVID-19 AMP PRB: CPT

## 2025-04-21 PROCEDURE — 94640 AIRWAY INHALATION TREATMENT: CPT

## 2025-04-21 PROCEDURE — 85379 FIBRIN DEGRADATION QUANT: CPT

## 2025-04-21 PROCEDURE — 99285 EMERGENCY DEPT VISIT HI MDM: CPT

## 2025-04-21 PROCEDURE — 93005 ELECTROCARDIOGRAM TRACING: CPT | Performed by: STUDENT IN AN ORGANIZED HEALTH CARE EDUCATION/TRAINING PROGRAM

## 2025-04-21 RX ORDER — PREDNISONE 20 MG/1
60 TABLET ORAL DAILY
Qty: 9 TABLET | Refills: 0 | Status: SHIPPED | OUTPATIENT
Start: 2025-04-21 | End: 2025-04-22

## 2025-04-21 RX ORDER — IPRATROPIUM BROMIDE AND ALBUTEROL SULFATE 2.5; .5 MG/3ML; MG/3ML
1 SOLUTION RESPIRATORY (INHALATION) ONCE
Status: COMPLETED | OUTPATIENT
Start: 2025-04-21 | End: 2025-04-21

## 2025-04-21 RX ORDER — CEPHALEXIN 500 MG/1
500 CAPSULE ORAL ONCE
Status: COMPLETED | OUTPATIENT
Start: 2025-04-21 | End: 2025-04-22

## 2025-04-21 RX ORDER — CEPHALEXIN 500 MG/1
500 CAPSULE ORAL 2 TIMES DAILY
Qty: 14 CAPSULE | Refills: 0 | Status: SHIPPED | OUTPATIENT
Start: 2025-04-21 | End: 2025-04-22

## 2025-04-21 RX ORDER — POTASSIUM CHLORIDE 750 MG/1
40 TABLET, EXTENDED RELEASE ORAL 2 TIMES DAILY
Status: DISCONTINUED | OUTPATIENT
Start: 2025-04-21 | End: 2025-04-22 | Stop reason: HOSPADM

## 2025-04-21 RX ADMIN — POTASSIUM CHLORIDE 40 MEQ: 750 TABLET, EXTENDED RELEASE ORAL at 22:34

## 2025-04-21 RX ADMIN — IPRATROPIUM BROMIDE AND ALBUTEROL SULFATE 1 DOSE: 2.5; .5 SOLUTION RESPIRATORY (INHALATION) at 21:56

## 2025-04-21 RX ADMIN — WATER 125 MG: 1 INJECTION INTRAMUSCULAR; INTRAVENOUS; SUBCUTANEOUS at 22:34

## 2025-04-21 RX ADMIN — IPRATROPIUM BROMIDE AND ALBUTEROL SULFATE 1 DOSE: 2.5; .5 SOLUTION RESPIRATORY (INHALATION) at 23:15

## 2025-04-21 ASSESSMENT — LIFESTYLE VARIABLES
HOW OFTEN DO YOU HAVE A DRINK CONTAINING ALCOHOL: 2-3 TIMES A WEEK
HOW MANY STANDARD DRINKS CONTAINING ALCOHOL DO YOU HAVE ON A TYPICAL DAY: 1 OR 2

## 2025-04-22 LAB
EKG ATRIAL RATE: 95 BPM
EKG DIAGNOSIS: NORMAL
EKG P AXIS: 80 DEGREES
EKG P-R INTERVAL: 170 MS
EKG Q-T INTERVAL: 370 MS
EKG QRS DURATION: 88 MS
EKG QTC CALCULATION (BAZETT): 464 MS
EKG R AXIS: -11 DEGREES
EKG T AXIS: 70 DEGREES
EKG VENTRICULAR RATE: 95 BPM

## 2025-04-22 PROCEDURE — 93010 ELECTROCARDIOGRAM REPORT: CPT | Performed by: INTERNAL MEDICINE

## 2025-04-22 PROCEDURE — 6370000000 HC RX 637 (ALT 250 FOR IP): Performed by: STUDENT IN AN ORGANIZED HEALTH CARE EDUCATION/TRAINING PROGRAM

## 2025-04-22 RX ORDER — CEPHALEXIN 500 MG/1
500 CAPSULE ORAL 2 TIMES DAILY
Qty: 14 CAPSULE | Refills: 0 | Status: SHIPPED | OUTPATIENT
Start: 2025-04-22 | End: 2025-04-29

## 2025-04-22 RX ORDER — PREDNISONE 20 MG/1
60 TABLET ORAL DAILY
Qty: 9 TABLET | Refills: 0 | Status: SHIPPED | OUTPATIENT
Start: 2025-04-22 | End: 2025-04-25

## 2025-04-22 RX ADMIN — CEPHALEXIN 500 MG: 500 CAPSULE ORAL at 00:01

## 2025-04-22 NOTE — ED PROVIDER NOTES
EMERGENCY DEPARTMENT PROVIDER NOTE         PATIENT IDENTIFICATION     Name:   Laura Murillo  MRN:   [id number]  YOB: 1960  Date of Evaluation:   4/21/2025  Provider:   Richard Overton DO  PCP:   Panchito Conklin MD        CHIEF COMPLAINT       Shortness of Breath (Pt came to the ED with c/o shortness of breath. HX of CHF and COPD)        HISTORY OF PRESENT ILLNESS     Laura Murillo is a(n) 64 y.o. female with past medical history as below including COPD not on home oxygen  who arrives via EMS for shortness of breath.  Patient states that she was taking pain medications as well as drinking 6 tall boys of beer tonight.  States that she is having some shortness of breath.  EMS was called by patient's daughter who expressed concern for patient using opiates and drinking alcohol.  Patient states that she recently completed an antibiotic course for right lower extremity cellulitis with improvement of symptoms.  She affirms chronic bilateral lower extremity edema which appears at baseline.  The patient currently denies fever, chills, recent illness, headache, rash, chest pain, cough, abdominal pain, nausea, vomiting, change in bowel movements, and urinary symptoms.  She affirms history of medical symptoms attributed to her COPD in the past.  She states that she has not taken any medications for her current symptoms    I personally reviewed the following nurse documentation:  Past Medical History:   Diagnosis Date    Anemia     Anxiety     Arthritis     CAD (coronary artery disease)     Cancer (HCC)     CHF (congestive heart failure) (HCC)     diastolic    Chronic kidney disease     Depression     Diabetes mellitus (HCC)     Hep C w/o coma, chronic (HCC)     Hyperlipidemia     Hypertension     MRSA (methicillin resistant staph aureus) culture positive 03/26/2021    left leg wound    Neuropathy     Schizoaffective disorder (HCC)     Thyroid disease      Prior to Admission medications    Medication

## 2025-07-10 ENCOUNTER — HOSPITAL ENCOUNTER (EMERGENCY)
Age: 65
Discharge: HOME | End: 2025-07-10
Attending: EMERGENCY MEDICINE
Payer: MEDICARE

## 2025-07-10 ENCOUNTER — APPOINTMENT (OUTPATIENT)
Dept: GENERAL RADIOLOGY | Age: 65
End: 2025-07-10
Payer: MEDICARE

## 2025-07-10 VITALS
WEIGHT: 293 LBS | SYSTOLIC BLOOD PRESSURE: 138 MMHG | TEMPERATURE: 98.1 F | HEART RATE: 75 BPM | OXYGEN SATURATION: 95 % | RESPIRATION RATE: 18 BRPM | BODY MASS INDEX: 50.86 KG/M2 | DIASTOLIC BLOOD PRESSURE: 79 MMHG

## 2025-07-10 DIAGNOSIS — M79.2 NEUROPATHIC PAIN OF ANKLE, RIGHT: Primary | ICD-10-CM

## 2025-07-10 LAB
ANION GAP SERPL CALCULATED.3IONS-SCNC: 11 MMOL/L (ref 3–16)
BASOPHILS # BLD: 0.1 K/UL (ref 0–0.2)
BASOPHILS NFR BLD: 0.8 %
BUN SERPL-MCNC: 17 MG/DL (ref 7–20)
CALCIUM SERPL-MCNC: 9.2 MG/DL (ref 8.3–10.6)
CHLORIDE SERPL-SCNC: 96 MMOL/L (ref 99–110)
CO2 SERPL-SCNC: 31 MMOL/L (ref 21–32)
CREAT SERPL-MCNC: 1.2 MG/DL (ref 0.6–1.2)
CRP SERPL-MCNC: 11 MG/L (ref 0–5.1)
DEPRECATED RDW RBC AUTO: 14.7 % (ref 12.4–15.4)
EOSINOPHIL # BLD: 0.1 K/UL (ref 0–0.6)
EOSINOPHIL NFR BLD: 1.4 %
ERYTHROCYTE [SEDIMENTATION RATE] IN BLOOD BY WESTERGREN METHOD: 35 MM/HR (ref 0–30)
GFR SERPLBLD CREATININE-BSD FMLA CKD-EPI: 50 ML/MIN/{1.73_M2}
GLUCOSE SERPL-MCNC: 108 MG/DL (ref 70–99)
HCT VFR BLD AUTO: 41.4 % (ref 36–48)
HGB BLD-MCNC: 13.9 G/DL (ref 12–16)
LYMPHOCYTES # BLD: 1.3 K/UL (ref 1–5.1)
LYMPHOCYTES NFR BLD: 14.2 %
MCH RBC QN AUTO: 28.1 PG (ref 26–34)
MCHC RBC AUTO-ENTMCNC: 33.6 G/DL (ref 31–36)
MCV RBC AUTO: 83.8 FL (ref 80–100)
MONOCYTES # BLD: 1 K/UL (ref 0–1.3)
MONOCYTES NFR BLD: 10.8 %
NEUTROPHILS # BLD: 6.8 K/UL (ref 1.7–7.7)
NEUTROPHILS NFR BLD: 72.8 %
PLATELET # BLD AUTO: 248 K/UL (ref 135–450)
PMV BLD AUTO: 7.4 FL (ref 5–10.5)
POTASSIUM SERPL-SCNC: 4.2 MMOL/L (ref 3.5–5.1)
RBC # BLD AUTO: 4.95 M/UL (ref 4–5.2)
SODIUM SERPL-SCNC: 138 MMOL/L (ref 136–145)
WBC # BLD AUTO: 9.3 K/UL (ref 4–11)

## 2025-07-10 PROCEDURE — 96374 THER/PROPH/DIAG INJ IV PUSH: CPT

## 2025-07-10 PROCEDURE — 73610 X-RAY EXAM OF ANKLE: CPT

## 2025-07-10 PROCEDURE — 6370000000 HC RX 637 (ALT 250 FOR IP)

## 2025-07-10 PROCEDURE — 36415 COLL VENOUS BLD VENIPUNCTURE: CPT

## 2025-07-10 PROCEDURE — 85652 RBC SED RATE AUTOMATED: CPT

## 2025-07-10 PROCEDURE — 73630 X-RAY EXAM OF FOOT: CPT

## 2025-07-10 PROCEDURE — 6360000002 HC RX W HCPCS

## 2025-07-10 PROCEDURE — 99284 EMERGENCY DEPT VISIT MOD MDM: CPT

## 2025-07-10 PROCEDURE — 85025 COMPLETE CBC W/AUTO DIFF WBC: CPT

## 2025-07-10 PROCEDURE — 80048 BASIC METABOLIC PNL TOTAL CA: CPT

## 2025-07-10 PROCEDURE — 6370000000 HC RX 637 (ALT 250 FOR IP): Performed by: NURSE PRACTITIONER

## 2025-07-10 PROCEDURE — 86140 C-REACTIVE PROTEIN: CPT

## 2025-07-10 RX ORDER — ACETAMINOPHEN 325 MG/1
650 TABLET ORAL ONCE
Status: COMPLETED | OUTPATIENT
Start: 2025-07-10 | End: 2025-07-10

## 2025-07-10 RX ORDER — GABAPENTIN 300 MG/1
600 CAPSULE ORAL ONCE
Status: COMPLETED | OUTPATIENT
Start: 2025-07-10 | End: 2025-07-10

## 2025-07-10 RX ORDER — KETOROLAC TROMETHAMINE 30 MG/ML
15 INJECTION, SOLUTION INTRAMUSCULAR; INTRAVENOUS ONCE
Status: COMPLETED | OUTPATIENT
Start: 2025-07-10 | End: 2025-07-10

## 2025-07-10 RX ADMIN — ACETAMINOPHEN 650 MG: 325 TABLET ORAL at 18:05

## 2025-07-10 RX ADMIN — GABAPENTIN 600 MG: 300 CAPSULE ORAL at 19:04

## 2025-07-10 RX ADMIN — KETOROLAC TROMETHAMINE 15 MG: 30 INJECTION, SOLUTION INTRAMUSCULAR at 19:04

## 2025-07-10 ASSESSMENT — PAIN DESCRIPTION - LOCATION
LOCATION: FOOT
LOCATION: FOOT

## 2025-07-10 ASSESSMENT — ENCOUNTER SYMPTOMS
DIARRHEA: 0
SHORTNESS OF BREATH: 0
VOMITING: 0
NAUSEA: 0

## 2025-07-10 ASSESSMENT — PAIN SCALES - GENERAL
PAINLEVEL_OUTOF10: 7
PAINLEVEL_OUTOF10: 4
PAINLEVEL_OUTOF10: 7

## 2025-07-10 ASSESSMENT — PAIN DESCRIPTION - ORIENTATION: ORIENTATION: RIGHT

## 2025-07-10 NOTE — ED PROVIDER NOTES
ED Attending Attestation Note     Date of evaluation: 7/10/2025    This patient was seen by the resident.  I have seen and examined the patient, agree with the workup, evaluation, management and diagnosis. The care plan has been discussed.  My assessment reveals an older female who actually appears older than her stated age who presents with ongoing pain in her feet.  States that she is unable to bear it any longer.  On exam patient has some chronic skin changes without any significant weeping from any of the wounds.     Rossana Thurman MD  07/10/25 [id number]

## 2025-07-10 NOTE — ED TRIAGE NOTES
[facility] Emergency Department  MEDICAL SCREENING EXAM    Date of Service: 7/10/2025    Reason for Visit: Foot Pain (Pt. Presents to ED with c/o intermittent right foot pain for weeks. Wounds on foot - unsure of how long they have been there )        Patient History, Brief Exam, and Initial Assessment     Abbreviated HPI: Laura Murillo is a 64 y.o. female with history of chronic wounds to the right ankle and foot, sees wound care at ; presenting with complaints of severe right foot and ankle pain.  Patient repeatedly stating \"it is burning, it is burning.\"  Patient tells me that this has been ongoing for several weeks, however worsened over the past day or so.  Denies new injury or trauma.  Unsure about fevers, chills or sweats.  Patient tearful on exam.  Reports that she is not on anything for pain.    INITIAL VITALS: BP: 130/85, Temp: 98.1 °F (36.7 °C), Pulse: 82, Respirations: 18, SpO2: 90 %  Physical Exam  Vitals and nursing note reviewed.   Constitutional:       Appearance: She is obese.   Musculoskeletal:      Comments: Swelling noted of the right ankle and foot, with diffuse tenderness to light touch throughout ankle and foot, patient refuses to range due to pain.  Chronic appearing wounds noted to lateral and medial foot and ankle.  Intact distal pulses, chronic appearing skin changes similar to contralateral side   Neurological:      Mental Status: She is alert.   Psychiatric:         Mood and Affect: Mood normal.         Behavior: Behavior normal.           Plan     Patient was evaluated in the REU for a medical screening exam.  To further evaluate the presenting complaints, the following orders have been placed:  Orders Placed This Encounter   Procedures    XR FOOT RIGHT (MIN 3 VIEWS)    XR ANKLE RIGHT (MIN 3 VIEWS)    CBC with Auto Differential    Basic Metabolic Panel w/ Reflex to MG    Sedimentation Rate    C-Reactive Protein        See primary provider's note for full details and final

## 2025-07-10 NOTE — DISCHARGE INSTRUCTIONS
Please follow up with your primary care physician and neurologist for further management of your ankle pain.    Make sure you are following wound care instructions as directed.    Call your primary care physician or go to the nearest emergency room if your pain becomes worse or you notice any worsening in your ankle wounds.

## 2025-07-10 NOTE — ED PROVIDER NOTES
disorder (HCC), and Thyroid disease.  She has a past surgical history that includes Lung lobectomy (N/A); Dental surgery; Upper gastrointestinal endoscopy (N/A, 4/26/2021); Colonoscopy (4/26/2021); and Upper gastrointestinal endoscopy (N/A, 6/21/2021).  Her family history includes Liver Disease in her brother.  She reports that she has been smoking cigarettes. She started smoking about 34 years ago. She has a 60 pack-year smoking history. She has never used smokeless tobacco. She reports that she does not drink alcohol and does not use drugs.    Medications     Previous Medications    ALBUTEROL (PROVENTIL HFA;VENTOLIN HFA) 108 (90 BASE) MCG/ACT INHALER    Inhale 2 puffs into the lungs every 6 hours as needed for Wheezing    ALLOPURINOL (ZYLOPRIM) 100 MG TABLET    TAKE 1 TABLET (100 MG) DAILY    ARIPIPRAZOLE (ABILIFY) 10 MG TABLET    Take 1 tablet by mouth daily    ATORVASTATIN (LIPITOR) 20 MG TABLET    Take 1 tablet by mouth in the morning.    BUPROPION (WELLBUTRIN XL) 300 MG EXTENDED RELEASE TABLET    Take 1 tablet by mouth every morning    BUPROPION (WELLBUTRIN) 75 MG TABLET    Take 1 tablet by mouth daily (In addition to bupropion  mg daily)    GABAPENTIN (NEURONTIN) 600 MG TABLET    Take 1 tablet by mouth 3 times daily.    JARDIANCE 10 MG TABLET    Take 1 tablet by mouth daily    LEVOTHYROXINE (SYNTHROID) 125 MCG TABLET    Take 1 tablet by mouth Daily    MAGNESIUM OXIDE (MAG-OX) 400 (240 MG) MG TABLET    Take 1 tablet by mouth 2 times daily    METHOCARBAMOL (ROBAXIN) 500 MG TABLET    Take 1 tablet by mouth 3 times daily as needed (muscle spasms)    METOPROLOL (LOPRESSOR) 25 MG TABLET    Take 1 tablet by mouth 2 times daily    MONTELUKAST (SINGULAIR) 10 MG TABLET    Take 1 tablet by mouth nightly    MULTIPLE VITAMINS-MINERALS (THERAPEUTIC MULTIVITAMIN-MINERALS) TABLET    Take 1 tablet by mouth daily    MUPIROCIN (BACTROBAN) 2 % OINTMENT    Apply topically 3 times daily    NICOTINE POLACRILEX (COMMIT) 4 MG  LOZENGE    Take 1 lozenge by mouth every hour as needed for Smoking cessation    OXYCODONE-ACETAMINOPHEN (PERCOCET) 5-325 MG PER TABLET    TAKE 1 TABLET BY ORAL ROUTE EVERY 8 HOURS AS NEEDED FOR PAIN FOR 30 DAYS, FOR USE: 6/12/24-7/11/24. MAY FILL 6/10/24.    PANTOPRAZOLE (PROTONIX) 40 MG TABLET    Take 1 tablet by mouth daily    POTASSIUM BICARBONATE (EFFER-K) 25 MEQ DISINTEGRATING TABLET    Take 1 tablet by mouth 2 times daily    POTASSIUM CHLORIDE (KLOR-CON M) 20 MEQ EXTENDED RELEASE TABLET    TAKE 2 TABLETS BY MOUTH IN THE MORNING AND 1 TABLET IN THE EVENING    PRAZOSIN (MINIPRESS) 2 MG CAPSULE    Take 1 capsule by mouth nightly    QUETIAPINE (SEROQUEL) 100 MG TABLET    Take 140 mg by mouth nightly    SPIRONOLACTONE (ALDACTONE) 50 MG TABLET    Take 1 tablet by mouth daily    TORSEMIDE (DEMADEX) 100 MG TABLET    Take 1 tablet by mouth daily    TORSEMIDE (DEMADEX) 20 MG TABLET    Take 2 tablets by mouth in the morning and 2 tablets before bedtime.    VITAMIN D (CHOLECALCIFEROL) 5000 UNITS CAPS CAPSULE    Take 1 capsule by mouth daily       Allergies     She is allergic to tums [calcium carbonate antacid].    Physical Exam     INITIAL VITALS: BP: 130/85, Temp: 98.1 °F (36.7 °C), Pulse: 82, Respirations: 18, SpO2: 90 %   Physical Exam  Vitals and nursing note reviewed.   Constitutional:       General: She is in acute distress.      Appearance: Normal appearance. She is obese.   HENT:      Head: Normocephalic and atraumatic.      Right Ear: External ear normal.      Left Ear: External ear normal.      Nose: Nose normal.   Eyes:      General: No scleral icterus.     Extraocular Movements: Extraocular movements intact.      Pupils: Pupils are equal, round, and reactive to light.   Cardiovascular:      Rate and Rhythm: Normal rate and regular rhythm.      Pulses: Normal pulses.      Heart sounds: Normal heart sounds.   Pulmonary:      Effort: Pulmonary effort is normal.      Breath sounds: Normal breath sounds.

## 2025-07-11 NOTE — ED NOTES
Patient prepared for and ready to be discharged. Dressed in clothes and given belongings.  IV removed, pt tolerated well, no complications.  Patient discharged at this time in no acute distress after pt verbalized understanding of discharge instructions.   Reviewed medications, and when to return to the ED with patient. Encouraged follow up with Neuro  Patient wheeled to [facility] to take patient home.      Escuadra, Marylee, RN  07/10/25 [id number]

## 2025-07-13 ENCOUNTER — HOSPITAL ENCOUNTER (EMERGENCY)
Age: 65
Discharge: HOME | End: 2025-07-13
Payer: MEDICARE

## 2025-07-13 VITALS
OXYGEN SATURATION: 93 % | BODY MASS INDEX: 49.85 KG/M2 | WEIGHT: 292 LBS | DIASTOLIC BLOOD PRESSURE: 78 MMHG | RESPIRATION RATE: 18 BRPM | TEMPERATURE: 98.4 F | HEIGHT: 64 IN | SYSTOLIC BLOOD PRESSURE: 138 MMHG | HEART RATE: 86 BPM

## 2025-07-13 DIAGNOSIS — I83.891 BLEEDING FROM VARICOSE VEINS OF LOWER EXTREMITY, RIGHT: Primary | ICD-10-CM

## 2025-07-13 PROCEDURE — 99283 EMERGENCY DEPT VISIT LOW MDM: CPT

## 2025-07-13 NOTE — ED PROVIDER NOTES
[facility]  EMERGENCY DEPARTMENT ENCOUNTER          ATTENDING PHYSICIAN NOTE       Date of evaluation: 7/13/2025    Chief Complaint     Foot Injury (Walking across floor at home with no shoes/socks on and noticed bleeding from R foot. Bleeding controlled with gauze places by EMS at this time. Hx of diabetes. )      History of Present Illness     Laura Murillo is a 64 y.o. female who presents for bleeding foot.  States that this morning she noticed bleeding from her left foot.  States she is unsure when it started or why it started but it scared her and so she came to the emergency department.  Denies prior similar symptoms.  States the bleeding stopped prior to arrival to the emergency department on its own without applying a bandage.  Denies other injuries or symptoms    ASSESSMENT / PLAN  (MEDICAL DECISION MAKING)     INITIAL VITALS: BP: 138/78, Temp: 98.4 °F (36.9 °C), Pulse: 86, Respirations: 18, SpO2: 93 %      Laura Murillo is a 64 y.o. female presents with bleeding foot.  MDM per ED  ED Course as of 07/13/25 1222   Sun Jul 13, 2025   1218 64-year-old female presenting for foot bleeding.  Hemodynamically and clinically stable initial evaluation.  No active bleeding on initial evaluation.  There does appear to be a small scab with freshly dried blood on the medial side of the right foot.  Surrounded by an area of varicose veins making it likely that this patient experienced a spontaneous varicose vein bleed.  Given it is controlled at this time we will monitor in the emergency department for a brief time and apply pressure bandage to ensure that it does not bleed again.  She tolerated this well and was provided with additional bandaging for home.  She was given strict return precautions, observed ambulate around the department without issue, and discharged with follow-up plans with her PCP.  Discussed further workup in the emergency department though given the bleed was brief it is unlikely to

## 2025-07-13 NOTE — DISCHARGE INSTRUCTIONS
Please return to the emergency department in the immediately if you experience worsening of your symptoms or repeat bleeding.  Please follow-up with your primary care physician to ensure improvement from this hospital visit.  Please keep the manage on today you may take it off this evening as long as bleeding does not return

## (undated) DEVICE — CANNULA SAMP CO2 AD GRN 7FT CO2 AND 7FT O2 TBNG UNIV CONN

## (undated) DEVICE — TRAP SPEC RETRV CLR PLAS POLYP IN LN SUCT QUIK CTCH

## (undated) DEVICE — SNARE VASC L240CM LOOP W10MM SHTH DIA2.4MM RND STIFF CLD

## (undated) DEVICE — FORCEPS BX L240CM JAW DIA2.4MM ORNG L CAP W/ NDL DISP RAD

## (undated) DEVICE — FORCEPS BX L240CM JAW DIA2.8MM L CAP W/ NDL MIC MESH TOOTH

## (undated) DEVICE — MASK CAPNOGRAPHY AD W35IN DIA58IN SAMP LN L10FT O2 LN